# Patient Record
Sex: FEMALE | Race: BLACK OR AFRICAN AMERICAN | Employment: FULL TIME | URBAN - METROPOLITAN AREA
[De-identification: names, ages, dates, MRNs, and addresses within clinical notes are randomized per-mention and may not be internally consistent; named-entity substitution may affect disease eponyms.]

---

## 2017-08-17 ENCOUNTER — HOSPITAL ENCOUNTER (OUTPATIENT)
Dept: LAB | Age: 42
Discharge: HOME | End: 2017-08-17
Payer: COMMERCIAL

## 2017-08-17 ENCOUNTER — OFFICE VISIT (OUTPATIENT)
Dept: OBGYN CLINIC | Age: 42
End: 2017-08-17

## 2017-08-17 VITALS
TEMPERATURE: 98.4 F | WEIGHT: 163.8 LBS | DIASTOLIC BLOOD PRESSURE: 98 MMHG | RESPIRATION RATE: 18 BRPM | HEIGHT: 61 IN | BODY MASS INDEX: 30.93 KG/M2 | SYSTOLIC BLOOD PRESSURE: 154 MMHG

## 2017-08-17 DIAGNOSIS — Z01.419 WELL WOMAN EXAM WITH ROUTINE GYNECOLOGICAL EXAM: Primary | ICD-10-CM

## 2017-08-17 PROCEDURE — 87624 HPV HI-RISK TYP POOLED RSLT: CPT | Performed by: NURSE PRACTITIONER

## 2017-08-17 PROCEDURE — 88175 CYTOPATH C/V AUTO FLUID REDO: CPT | Performed by: NURSE PRACTITIONER

## 2017-08-17 NOTE — PATIENT INSTRUCTIONS
Epidermoid Cyst: Care Instructions  Your Care Instructions  An epidermoid (say \"mq-lri-AML-shayy\") cyst is a lump just under the skin. These cysts can form when a hair follicle becomes blocked. They are common in acne and may occur on the face, neck, back, and genitals. However, they can form anywhere on the body. These cysts are not cancer and do not lead to cancer. They tend not to hurt, but they can sometimes become swollen and painful. They also may break open (rupture) and cause scarring. These cysts sometimes do not cause problems and may not need treatment. If you have a cyst that is swollen and hurts, your doctor may inject it with a medicine to help it heal. But it is more likely that a painful cyst will need to be removed. Your doctor will give you a shot of numbing medicine and cut into the cyst to drain it or remove it. This makes the symptoms go away. Follow-up care is a key part of your treatment and safety. Be sure to make and go to all appointments, and call your doctor if you are having problems. Its also a good idea to know your test results and keep a list of the medicines you take. How can you care for yourself at home? · Do not squeeze the cyst or poke it with a needle to open it. This can cause swelling, redness, and infection. · Always have a doctor look at any new lumps you get to make sure that they are not serious. When should you call for help? Watch closely for changes in your health, and be sure to contact your doctor if:  · You have a fever, redness, or swelling after you get a shot of medicine in the cyst.  · You see or feel a new lump on your skin. Where can you learn more? Go to http://shan-valentina.info/. Enter C875 in the search box to learn more about \"Epidermoid Cyst: Care Instructions. \"  Current as of: October 13, 2016  Content Version: 11.3  © 3847-9204 [employer].  Care instructions adapted under license by [employer] (which disclaims liability or warranty for this information). If you have questions about a medical condition or this instruction, always ask your healthcare professional. [address] any warranty or liability for your use of this information.

## 2017-08-17 NOTE — MR AVS SNAPSHOT
Visit Information Date & Time Provider Department Dept. Phone Encounter #  
 8/17/2017  1:00 PM Randy Hernandez [address] [phone] [id number] Follow-up Instructions Return in about 1 year (around 8/17/2018). Upcoming Health Maintenance Date Due  
 PAP AKA CERVICAL CYTOLOGY 3/23/1995 INFLUENZA AGE 9 TO ADULT 8/1/2017 Allergies as of 8/17/2017  Review Complete On: 8/17/2017 By: Randy Hernandez NP No Known Allergies Current Immunizations  Reviewed on 11/22/2013 Name Date Influenza Vaccine  Deferred (Patient Refused) Pneumococcal Conjugate (PCV-13)  Deferred (Patient Refused) Not reviewed this visit You Were Diagnosed With   
  
 Codes Comments Well woman exam with routine gynecological exam    -  Primary ICD-10-CM: Y84.235 ICD-9-CM: V72.31 Vitals BP Temp Resp Height(growth percentile) Weight(growth percentile) LMP  
 (!) 154/98 (BP 1 Location: Right arm, BP Patient Position: Sitting) 98.4 °F (36.9 °C) (Oral) 18 5' 1\" (1.549 m) 163 lb 12.8 oz (74.3 kg) 08/13/2017 BMI OB Status Smoking Status 30.95 kg/m2 Having regular periods Current Some Day Smoker Vitals History BMI and BSA Data Body Mass Index Body Surface Area 30.95 kg/m 2 1.79 m 2 Preferred Pharmacy Pharmacy Name Phone [facility] [address] [phone] Your Updated Medication List  
  
   
This list is accurate as of: 8/17/17  2:28 PM.  Always use your most recent med list.  
  
  
  
  
 cloNIDine HCl 0.1 mg tablet Commonly known as:  CATAPRES Take  by mouth two (2) times a day. multivitamin, tx-iron-ca-min 9 mg iron-400 mcg Tab tablet Commonly known as:  THERA-M w/ IRON Take 1 Tab by mouth daily. We Performed the Following PAP IG, HPV AND RFX HPV 97/52,88([id number]) [[id number] Custom] Follow-up Instructions Return in about 1 year (around 8/17/2018). To-Do List   
 09/13/2017 Imaging:  SAR[facility] MAMMO BI SCREENING INCL CAD Patient Instructions Epidermoid Cyst: Care Instructions Your Care Instructions An epidermoid (say \"Radha\") cyst is a lump just under the skin. These cysts can form when a hair follicle becomes blocked. They are common in acne and may occur on the face, neck, back, and genitals. However, they can form anywhere on the body. These cysts are not cancer and do not lead to cancer. They tend not to hurt, but they can sometimes become swollen and painful. They also may break open (rupture) and cause scarring. These cysts sometimes do not cause problems and may not need treatment. If you have a cyst that is swollen and hurts, your doctor may inject it with a medicine to help it heal. But it is more likely that a painful cyst will need to be removed. Your doctor will give you a shot of numbing medicine and cut into the cyst to drain it or remove it. This makes the symptoms go away. Follow-up care is a key part of your treatment and safety. Be sure to make and go to all appointments, and call your doctor if you are having problems. Its also a good idea to know your test results and keep a list of the medicines you take. How can you care for yourself at home? · Do not squeeze the cyst or poke it with a needle to open it. This can cause swelling, redness, and infection. · Always have a doctor look at any new lumps you get to make sure that they are not serious. When should you call for help? Watch closely for changes in your health, and be sure to contact your doctor if: 
· You have a fever, redness, or swelling after you get a shot of medicine in the cyst. 
· You see or feel a new lump on your skin. Where can you learn more? Go to http://shan-valentina.info/. Enter S604 in the search box to learn more about \"Epidermoid Cyst: Care Instructions. \" Current as of: October 13, 2016 Content Version: 11.3 © 8813-7399 [employer] Care instructions adapted under license by [employer] (which disclaims liability or warranty for this information). If you have questions about a medical condition or this instruction, always ask your healthcare professional. [address] any warranty or liability for your use of this information. Introducing [facility]! [facility] introduces [employer] patient portal. Now you can access parts of your medical record, email your doctor's office, and request medication refills online. 1. In your internet browser, go to [email or web address] 2. Click on the First Time User? Click Here link in the Sign In box. You will see the New Member Sign Up page. 3. Enter your [employer] Access Code exactly as it appears below. You will not need to use this code after youve completed the sign-up process. If you do not sign up before the expiration date, you must request a new code. · [employer] Access Code: [email or web address] Expires: 11/15/2017  2:28 PM 
 
4. Enter the last four digits of your Social Security Number (xxxx) and Date of Birth (mm/dd/yyyy) as indicated and click Submit. You will be taken to the next sign-up page. 5. Create a [employer] ID. This will be your [employer] login ID and cannot be changed, so think of one that is secure and easy to remember. 6. Create a [employer] password. You can change your password at any time. 7. Enter your Password Reset Question and Answer. This can be used at a later time if you forget your password. 8. Enter your e-mail address. You will receive e-mail notification when new information is available in [address] 9. Click Sign Up. You can now view and download portions of your medical record. 10. Click the Download Summary menu link to download a portable copy of your medical information. If you have questions, please visit the Frequently Asked Questions section of the [employer] website. Remember, [employer] is NOT to be used for urgent needs. For medical emergencies, dial 911. Now available from your iPhone and Android! Please provide this summary of care documentation to your next provider. Your primary care clinician is listed as Geraldine Cerna. If you have any questions after today's visit, please call [phone].

## 2017-08-17 NOTE — PROGRESS NOTES
Chief Complaint   Patient presents with    Well Woman     Pt states has vaginal \"bumps\" for two years. Pt states she had a partial hysterectomy in 2013. Pt states she was told she had cyst on her ovary last year. Pt states she has a cycle every month, something like a regular cycle. Pt complained of having bad back pain two weeks ago.

## 2017-08-17 NOTE — PROGRESS NOTES
NEW PATIENT EXAM  Ada/Post Menopause    SUBJECTIVE: Herbie Edouard is a 36 y.o. female  who presents for well woman exam and to discuss \"bumps\" in the genital area. Pt. Has not had a check up in \"years\" and no longer takes any of her medications, in particular related to her hypertension. She reports that she notice \"bumps\" about 2 years ago particularly with waxing and shaving of the mons pubis. She no longer practices these behaviors however these bumps persist.  They have not gotten bigger, are not tender and do not raise to a head even with \"squeezing and hot compresses. \"  She has a history of \"boils\" in the axilla and wonders if these may be similar. She does have a history of abnormal pap tests however did not go for follow up. She describes a history of ectopic pregnancy which required \"removal of the tube\"--she thinks on the left. She states they did not remove the uterus or ovary. She does not use contraception describes that her cycles are monthly however irregular at times. Patient's last menstrual period was 2017. GYN History   Menopause:  No  Post menopausal bleeding:   NO  Sexually active: YES  Contraception:  none  Sexually transmitted diseases/infections: YES- HSV    Last pap: 3-4 years ago    Last Mammogram: never had      Past Medical History:   Diagnosis Date    Hypertension     Kidney anomaly, congenital     Tubal pregnancy      Past Surgical History:   Procedure Laterality Date    HX GYN  10/07/2013    cyst removed    HX HYSTERECTOMY  10/7/2013    partial hysterectomy     OB History      Para Term  AB Living    4    1 2    SAB TAB Ectopic Molar Multiple Live Births      1           Obstetric Comments     x 2- 1 male, 1 female--cerclage x 2--31 weeks and 32 weeks deliveries.          Family History   Problem Relation Age of Onset    Hypertension Mother     Hypertension Father     Hypertension Sister      Social History     Social History    Marital status:      Spouse name: N/A    Number of children: N/A    Years of education: N/A     Occupational History    Not on file. Social History Main Topics    Smoking status: Current Some Day Smoker     Packs/day: 0.25     Years: 25.00    Smokeless tobacco: Never Used      Comment: 1-3 day    Alcohol use Yes      Comment: occasionally/socially    Drug use: Yes     Special: Marijuana      Comment: stopped in 2013    Sexual activity: Yes     Partners: Male     Other Topics Concern    Not on file     Social History Narrative       Current Outpatient Prescriptions   Medication Sig Dispense Refill    multivitamin, tx-iron-ca-min (THERA-M W/ IRON) 9 mg iron-400 mcg tab tablet Take 1 Tab by mouth daily.  cloNIDine (CATAPRES) 0.1 mg tablet Take  by mouth two (2) times a day. Review of Systems:   Complete review of systems reviewed from social and history data forms. Pertinent positives in HPI. Objective:     Visit Vitals    BP (!) 154/98 (BP 1 Location: Right arm, BP Patient Position: Sitting)    Temp 98.4 °F (36.9 °C) (Oral)    Resp 18    Ht 5' 1\" (1.549 m)    Wt 163 lb 12.8 oz (74.3 kg)    LMP 08/13/2017  Comment: partial hysterecomy    BMI 30.95 kg/m2       General:  alert, cooperative, no distress, appears stated age   Skin:  Normal.   Lymph Nodes:  Cervical, supraclavicular, and axillary nodes normal.   Breast Exam: normal appearance, no masses or tenderness    Lungs:  clear to auscultation bilaterally   Heart:  regular rate and rhythm, S1, S2 normal, no murmur, click, rub or gallop   Abdomen: soft, non-tender. No masses,  no organomegaly   Back:  Costovertebral angle tenderness absent   Genitourinary: BUS normal. Introitus normal. Normal appearing vaginal epithelium, Vaginal discharge described as normal and physiologic.,  Normal cervix without lesions or tenderness, Uterus normal size anteverted. NT., Adnexa normal in size left and right without tenderness.    Extremities: extremities normal, atraumatic, no cyanosis or edema     Neurologic:  negative   Psychiatric:  non focal     ASSESSMENT:      ICD-10-CM ICD-9-CM    1. Well woman exam with routine gynecological exam Z01.419 V72.31 PAP IG, HPV AND RFX HPV 80/64,96([id number])      SARAH MAMMO BI SCREENING INCL CAD      NUSWAB VAGINITIS   2. Perimenopausal  3. Hydranitis supporativa  4. Epidermal cyst- left and right vulva    Plan:  Keep menstrual calendar. Advised to use contraception--foam and condoms. Pap taken. NuSwab taken. Mammogram ordered. Advised use astringent and antibacterial soap. Discourage \"squeezing\" boils. Discourage shaving in the genital region. Encouraged smoking cessation. See PCP regarding BP management--names of PCP's given. RTO 1 year. Pt. Voices understanding of treatment plan. Follow-up Disposition:  Return in about 1 year (around 8/17/2018).     Amanda Richter, RODOLFO

## 2017-08-21 ENCOUNTER — TELEPHONE (OUTPATIENT)
Dept: OBGYN CLINIC | Age: 42
End: 2017-08-21

## 2017-08-21 LAB
A VAGINAE DNA VAG QL NAA+PROBE: ABNORMAL SCORE
BVAB2 DNA VAG QL NAA+PROBE: ABNORMAL SCORE
C ALBICANS DNA VAG QL NAA+PROBE: NEGATIVE
C GLABRATA DNA VAG QL NAA+PROBE: NEGATIVE
MEGA1 DNA VAG QL NAA+PROBE: ABNORMAL SCORE
T VAGINALIS RRNA SPEC QL NAA+PROBE: NEGATIVE

## 2017-08-21 RX ORDER — METRONIDAZOLE 500 MG/1
500 TABLET ORAL 2 TIMES DAILY
Qty: 14 TAB | Refills: 0 | Status: SHIPPED | OUTPATIENT
Start: 2017-08-21 | End: 2017-08-28

## 2018-04-22 ENCOUNTER — HOSPITAL ENCOUNTER (EMERGENCY)
Age: 43
Discharge: HOME | End: 2018-04-22
Attending: EMERGENCY MEDICINE
Payer: COMMERCIAL

## 2018-04-22 VITALS
RESPIRATION RATE: 14 BRPM | BODY MASS INDEX: 31.01 KG/M2 | SYSTOLIC BLOOD PRESSURE: 208 MMHG | WEIGHT: 164.24 LBS | TEMPERATURE: 97.8 F | HEIGHT: 61 IN | HEART RATE: 72 BPM | OXYGEN SATURATION: 100 % | DIASTOLIC BLOOD PRESSURE: 120 MMHG

## 2018-04-22 DIAGNOSIS — I10 ESSENTIAL HYPERTENSION: ICD-10-CM

## 2018-04-22 DIAGNOSIS — K52.9 GASTROENTERITIS, ACUTE: Primary | ICD-10-CM

## 2018-04-22 LAB
ALBUMIN SERPL-MCNC: 4.1 G/DL (ref 3.5–5)
ALBUMIN/GLOB SERPL: 0.8 {RATIO} (ref 1.1–2.2)
ALP SERPL-CCNC: 90 U/L (ref 45–117)
ALT SERPL-CCNC: 21 U/L (ref 12–78)
ANION GAP SERPL CALC-SCNC: 12 MMOL/L (ref 5–15)
APPEARANCE UR: CLEAR
AST SERPL-CCNC: 17 U/L (ref 15–37)
BACTERIA URNS QL MICRO: NEGATIVE /HPF
BASOPHILS # BLD: 0.1 K/UL (ref 0–0.1)
BASOPHILS NFR BLD: 1 % (ref 0–1)
BILIRUB SERPL-MCNC: 0.3 MG/DL (ref 0.2–1)
BILIRUB UR QL: NEGATIVE
BUN SERPL-MCNC: 11 MG/DL (ref 6–20)
BUN/CREAT SERPL: 13 (ref 12–20)
CALCIUM SERPL-MCNC: 9.6 MG/DL (ref 8.5–10.1)
CHLORIDE SERPL-SCNC: 106 MMOL/L (ref 97–108)
CK MB CFR SERPL CALC: NORMAL % (ref 0–2.5)
CK MB SERPL-MCNC: <1 NG/ML (ref 5–25)
CK SERPL-CCNC: 102 U/L (ref 26–192)
CO2 SERPL-SCNC: 18 MMOL/L (ref 21–32)
COLOR UR: ABNORMAL
CREAT SERPL-MCNC: 0.87 MG/DL (ref 0.55–1.02)
DIFFERENTIAL METHOD BLD: ABNORMAL
EOSINOPHIL # BLD: 0.1 K/UL (ref 0–0.4)
EOSINOPHIL NFR BLD: 0 % (ref 0–7)
EPITH CASTS URNS QL MICRO: ABNORMAL /LPF
ERYTHROCYTE [DISTWIDTH] IN BLOOD BY AUTOMATED COUNT: 13.6 % (ref 11.5–14.5)
GLOBULIN SER CALC-MCNC: 4.9 G/DL (ref 2–4)
GLUCOSE SERPL-MCNC: 110 MG/DL (ref 65–100)
GLUCOSE UR STRIP.AUTO-MCNC: NEGATIVE MG/DL
HCT VFR BLD AUTO: 41.3 % (ref 35–47)
HGB BLD-MCNC: 14.8 G/DL (ref 11.5–16)
HGB UR QL STRIP: ABNORMAL
HYALINE CASTS URNS QL MICRO: ABNORMAL /LPF (ref 0–5)
IMM GRANULOCYTES # BLD: 0 K/UL (ref 0–0.04)
IMM GRANULOCYTES NFR BLD AUTO: 0 % (ref 0–0.5)
KETONES UR QL STRIP.AUTO: ABNORMAL MG/DL
LEUKOCYTE ESTERASE UR QL STRIP.AUTO: NEGATIVE
LIPASE SERPL-CCNC: 177 U/L (ref 73–393)
LYMPHOCYTES # BLD: 2.7 K/UL (ref 0.8–3.5)
LYMPHOCYTES NFR BLD: 23 % (ref 12–49)
MCH RBC QN AUTO: 32.2 PG (ref 26–34)
MCHC RBC AUTO-ENTMCNC: 35.8 G/DL (ref 30–36.5)
MCV RBC AUTO: 89.8 FL (ref 80–99)
MONOCYTES # BLD: 0.8 K/UL (ref 0–1)
MONOCYTES NFR BLD: 7 % (ref 5–13)
NEUTS SEG # BLD: 8 K/UL (ref 1.8–8)
NEUTS SEG NFR BLD: 69 % (ref 32–75)
NITRITE UR QL STRIP.AUTO: NEGATIVE
NRBC # BLD: 0 K/UL (ref 0–0.01)
NRBC BLD-RTO: 0 PER 100 WBC
PH UR STRIP: 7 [PH] (ref 5–8)
PLATELET # BLD AUTO: 365 K/UL (ref 150–400)
PMV BLD AUTO: 8.9 FL (ref 8.9–12.9)
POTASSIUM SERPL-SCNC: 3.4 MMOL/L (ref 3.5–5.1)
PROT SERPL-MCNC: 9 G/DL (ref 6.4–8.2)
PROT UR STRIP-MCNC: 300 MG/DL
RBC # BLD AUTO: 4.6 M/UL (ref 3.8–5.2)
RBC #/AREA URNS HPF: ABNORMAL /HPF (ref 0–5)
SODIUM SERPL-SCNC: 136 MMOL/L (ref 136–145)
SP GR UR REFRACTOMETRY: 1.01 (ref 1–1.03)
TROPONIN I SERPL-MCNC: <0.04 NG/ML
UA: UC IF INDICATED,UAUC: ABNORMAL
UROBILINOGEN UR QL STRIP.AUTO: 0.2 EU/DL (ref 0.2–1)
WBC # BLD AUTO: 11.7 K/UL (ref 3.6–11)
WBC URNS QL MICRO: ABNORMAL /HPF (ref 0–4)

## 2018-04-22 PROCEDURE — 96374 THER/PROPH/DIAG INJ IV PUSH: CPT

## 2018-04-22 PROCEDURE — 74011250637 HC RX REV CODE- 250/637: Performed by: EMERGENCY MEDICINE

## 2018-04-22 PROCEDURE — 93005 ELECTROCARDIOGRAM TRACING: CPT

## 2018-04-22 PROCEDURE — 82550 ASSAY OF CK (CPK): CPT | Performed by: PHYSICIAN ASSISTANT

## 2018-04-22 PROCEDURE — 96375 TX/PRO/DX INJ NEW DRUG ADDON: CPT

## 2018-04-22 PROCEDURE — 81001 URINALYSIS AUTO W/SCOPE: CPT | Performed by: PHYSICIAN ASSISTANT

## 2018-04-22 PROCEDURE — 74011250636 HC RX REV CODE- 250/636: Performed by: PHYSICIAN ASSISTANT

## 2018-04-22 PROCEDURE — 80053 COMPREHEN METABOLIC PANEL: CPT | Performed by: PHYSICIAN ASSISTANT

## 2018-04-22 PROCEDURE — 83690 ASSAY OF LIPASE: CPT | Performed by: PHYSICIAN ASSISTANT

## 2018-04-22 PROCEDURE — 99284 EMERGENCY DEPT VISIT MOD MDM: CPT

## 2018-04-22 PROCEDURE — 36415 COLL VENOUS BLD VENIPUNCTURE: CPT | Performed by: PHYSICIAN ASSISTANT

## 2018-04-22 PROCEDURE — 85025 COMPLETE CBC W/AUTO DIFF WBC: CPT | Performed by: PHYSICIAN ASSISTANT

## 2018-04-22 PROCEDURE — 74011250637 HC RX REV CODE- 250/637: Performed by: PHYSICIAN ASSISTANT

## 2018-04-22 PROCEDURE — 96361 HYDRATE IV INFUSION ADD-ON: CPT

## 2018-04-22 PROCEDURE — 84484 ASSAY OF TROPONIN QUANT: CPT | Performed by: PHYSICIAN ASSISTANT

## 2018-04-22 RX ORDER — CLONIDINE HYDROCHLORIDE 0.1 MG/1
0.1 TABLET ORAL
Status: COMPLETED | OUTPATIENT
Start: 2018-04-22 | End: 2018-04-22

## 2018-04-22 RX ORDER — AMLODIPINE BESYLATE 5 MG/1
10 TABLET ORAL
Status: COMPLETED | OUTPATIENT
Start: 2018-04-22 | End: 2018-04-22

## 2018-04-22 RX ORDER — CLONIDINE HYDROCHLORIDE 0.1 MG/1
0.1 TABLET ORAL 2 TIMES DAILY
Qty: 30 TAB | Refills: 0 | Status: SHIPPED | OUTPATIENT
Start: 2018-04-22 | End: 2018-05-08

## 2018-04-22 RX ORDER — KETOROLAC TROMETHAMINE 30 MG/ML
30 INJECTION, SOLUTION INTRAMUSCULAR; INTRAVENOUS
Status: COMPLETED | OUTPATIENT
Start: 2018-04-22 | End: 2018-04-22

## 2018-04-22 RX ORDER — PROCHLORPERAZINE EDISYLATE 5 MG/ML
10 INJECTION INTRAMUSCULAR; INTRAVENOUS
Status: COMPLETED | OUTPATIENT
Start: 2018-04-22 | End: 2018-04-22

## 2018-04-22 RX ORDER — ONDANSETRON 4 MG/1
4 TABLET, ORALLY DISINTEGRATING ORAL
Status: COMPLETED | OUTPATIENT
Start: 2018-04-22 | End: 2018-04-22

## 2018-04-22 RX ORDER — ONDANSETRON 4 MG/1
4 TABLET, ORALLY DISINTEGRATING ORAL
Qty: 10 TAB | Refills: 0 | Status: SHIPPED | OUTPATIENT
Start: 2018-04-22 | End: 2018-05-08

## 2018-04-22 RX ORDER — MORPHINE SULFATE 4 MG/ML
2 INJECTION INTRAVENOUS
Status: COMPLETED | OUTPATIENT
Start: 2018-04-22 | End: 2018-04-22

## 2018-04-22 RX ADMIN — SODIUM CHLORIDE 1000 ML: 900 INJECTION, SOLUTION INTRAVENOUS at 17:55

## 2018-04-22 RX ADMIN — KETOROLAC TROMETHAMINE 30 MG: 30 INJECTION, SOLUTION INTRAMUSCULAR at 17:08

## 2018-04-22 RX ADMIN — CLONIDINE HYDROCHLORIDE 0.1 MG: 0.1 TABLET ORAL at 19:46

## 2018-04-22 RX ADMIN — ONDANSETRON 4 MG: 4 TABLET, ORALLY DISINTEGRATING ORAL at 17:08

## 2018-04-22 RX ADMIN — CLONIDINE HYDROCHLORIDE 0.1 MG: 0.1 TABLET ORAL at 19:28

## 2018-04-22 RX ADMIN — MORPHINE SULFATE 2 MG: 4 INJECTION INTRAVENOUS at 19:22

## 2018-04-22 RX ADMIN — AMLODIPINE BESYLATE 10 MG: 5 TABLET ORAL at 19:46

## 2018-04-22 RX ADMIN — PROCHLORPERAZINE EDISYLATE 10 MG: 5 INJECTION INTRAMUSCULAR; INTRAVENOUS at 17:55

## 2018-04-22 RX ADMIN — ONDANSETRON 4 MG: 4 TABLET, ORALLY DISINTEGRATING ORAL at 15:48

## 2018-04-22 NOTE — DISCHARGE INSTRUCTIONS
Gastroenteritis: Care Instructions  Your Care Instructions    Gastroenteritis is an illness that may cause nausea, vomiting, and diarrhea. It is sometimes called \"stomach flu. \" It can be caused by bacteria or a virus. You will probably begin to feel better in 1 to 2 days. In the meantime, get plenty of rest and make sure you do not become dehydrated. Dehydration occurs when your body loses too much fluid. Follow-up care is a key part of your treatment and safety. Be sure to make and go to all appointments, and call your doctor if you are having problems. It's also a good idea to know your test results and keep a list of the medicines you take. How can you care for yourself at home? · If your doctor prescribed antibiotics, take them as directed. Do not stop taking them just because you feel better. You need to take the full course of antibiotics. · Drink plenty of fluids to prevent dehydration, enough so that your urine is light yellow or clear like water. Choose water and other caffeine-free clear liquids until you feel better. If you have kidney, heart, or liver disease and have to limit fluids, talk with your doctor before you increase your fluid intake. · Drink fluids slowly, in frequent, small amounts, because drinking too much too fast can cause vomiting. · Begin eating mild foods, such as dry toast, yogurt, applesauce, bananas, and rice. Avoid spicy, hot, or high-fat foods, and do not drink alcohol or caffeine for a day or two. Do not drink milk or eat ice cream until you are feeling better. How to prevent gastroenteritis  · Keep hot foods hot and cold foods cold. · Do not eat meats, dressings, salads, or other foods that have been kept at room temperature for more than 2 hours. · Use a thermometer to check your refrigerator. It should be between 34°F and 40°F.  · Defrost meats in the refrigerator or microwave, not on the kitchen counter. · Keep your hands and your kitchen clean.  Wash your hands, cutting boards, and countertops with hot soapy water frequently. · Cook meat until it is well done. · Do not eat raw eggs or uncooked sauces made with raw eggs. · Do not take chances. If food looks or tastes spoiled, throw it out. When should you call for help? Call 911 anytime you think you may need emergency care. For example, call if:  ? · You vomit blood or what looks like coffee grounds. ? · You passed out (lost consciousness). ? · You pass maroon or very bloody stools. ?Call your doctor now or seek immediate medical care if:  ? · You have severe belly pain. ? · You have signs of needing more fluids. You have sunken eyes, a dry mouth, and pass only a little dark urine. ? · You feel like you are going to faint. ? · You have increased belly pain that does not go away in 1 to 2 days. ? · You have new or increased nausea, or you are vomiting. ? · You have a new or higher fever. ? · Your stools are black and tarlike or have streaks of blood. ? Watch closely for changes in your health, and be sure to contact your doctor if:  ? · You are dizzy or lightheaded. ? · You urinate less than usual, or your urine is dark yellow or brown. ? · You do not feel better with each day that goes by. Where can you learn more? Go to http://shan-valentina.info/. Enter N142 in the search box to learn more about \"Gastroenteritis: Care Instructions. \"  Current as of: March 3, 2017  Content Version: 11.4  © 7781-0872 [employer]. Care instructions adapted under license by [employer] (which disclaims liability or warranty for this information). If you have questions about a medical condition or this instruction, always ask your healthcare professional. [address] any warranty or liability for your use of this information.        High Blood Pressure: Care Instructions  Your Care Instructions    If your blood pressure is usually above 140/90, you have high blood pressure, or hypertension. That means the top number is 140 or higher or the bottom number is 90 or higher, or both. Despite what a lot of people think, high blood pressure usually doesn't cause headaches or make you feel dizzy or lightheaded. It usually has no symptoms. But it does increase your risk for heart attack, stroke, and kidney or eye damage. The higher your blood pressure, the more your risk increases. Your doctor will give you a goal for your blood pressure. Your goal will be based on your health and your age. An example of a goal is to keep your blood pressure below 140/90. Lifestyle changes, such as eating healthy and being active, are always important to help lower blood pressure. You might also take medicine to reach your blood pressure goal.  Follow-up care is a key part of your treatment and safety. Be sure to make and go to all appointments, and call your doctor if you are having problems. It's also a good idea to know your test results and keep a list of the medicines you take. How can you care for yourself at home? Medical treatment  · If you stop taking your medicine, your blood pressure will go back up. You may take one or more types of medicine to lower your blood pressure. Be safe with medicines. Take your medicine exactly as prescribed. Call your doctor if you think you are having a problem with your medicine. · Talk to your doctor before you start taking aspirin every day. Aspirin can help certain people lower their risk of a heart attack or stroke. But taking aspirin isn't right for everyone, because it can cause serious bleeding. · See your doctor regularly. You may need to see the doctor more often at first or until your blood pressure comes down. · If you are taking blood pressure medicine, talk to your doctor before you take decongestants or anti-inflammatory medicine, such as ibuprofen. Some of these medicines can raise blood pressure.   · Learn how to check your blood pressure at home. Lifestyle changes  · Stay at a healthy weight. This is especially important if you put on weight around the waist. Losing even 10 pounds can help you lower your blood pressure. · If your doctor recommends it, get more exercise. Walking is a good choice. Bit by bit, increase the amount you walk every day. Try for at least 30 minutes on most days of the week. You also may want to swim, bike, or do other activities. · Avoid or limit alcohol. Talk to your doctor about whether you can drink any alcohol. · Try to limit how much sodium you eat to less than 2,300 milligrams (mg) a day. Your doctor may ask you to try to eat less than 1,500 mg a day. · Eat plenty of fruits (such as bananas and oranges), vegetables, legumes, whole grains, and low-fat dairy products. · Lower the amount of saturated fat in your diet. Saturated fat is found in animal products such as milk, cheese, and meat. Limiting these foods may help you lose weight and also lower your risk for heart disease. · Do not smoke. Smoking increases your risk for heart attack and stroke. If you need help quitting, talk to your doctor about stop-smoking programs and medicines. These can increase your chances of quitting for good. When should you call for help? Call 911 anytime you think you may need emergency care. This may mean having symptoms that suggest that your blood pressure is causing a serious heart or blood vessel problem. Your blood pressure may be over 180/110. ? For example, call 911 if:  ? · You have symptoms of a heart attack. These may include:  ¨ Chest pain or pressure, or a strange feeling in the chest.  ¨ Sweating. ¨ Shortness of breath. ¨ Nausea or vomiting. ¨ Pain, pressure, or a strange feeling in the back, neck, jaw, or upper belly or in one or both shoulders or arms. ¨ Lightheadedness or sudden weakness. ¨ A fast or irregular heartbeat. ? · You have symptoms of a stroke.  These may include:  ¨ Sudden numbness, tingling, weakness, or loss of movement in your face, arm, or leg, especially on only one side of your body. ¨ Sudden vision changes. ¨ Sudden trouble speaking. ¨ Sudden confusion or trouble understanding simple statements. ¨ Sudden problems with walking or balance. ¨ A sudden, severe headache that is different from past headaches. ? · You have severe back or belly pain. ?Do not wait until your blood pressure comes down on its own. Get help right away. ?Call your doctor now or seek immediate care if:  ? · Your blood pressure is much higher than normal (such as 180/110 or higher), but you don't have symptoms. ? · You think high blood pressure is causing symptoms, such as:  ¨ Severe headache. ¨ Blurry vision. ? Watch closely for changes in your health, and be sure to contact your doctor if:  ? · Your blood pressure measures 140/90 or higher at least 2 times. That means the top number is 140 or higher or the bottom number is 90 or higher, or both. ? · You think you may be having side effects from your blood pressure medicine. ? · Your blood pressure is usually normal, but it goes above normal at least 2 times. Where can you learn more? Go to http://shan-valentina.info/. Enter J956 in the search box to learn more about \"High Blood Pressure: Care Instructions. \"  Current as of: September 21, 2016  Content Version: 11.4  © 6867-0396 [employer]. Care instructions adapted under license by [employer] (which disclaims liability or warranty for this information). If you have questions about a medical condition or this instruction, always ask your healthcare professional. [place] [address] any warranty or liability for your use of this information.

## 2018-04-22 NOTE — ED TRIAGE NOTES
Assumed care of pt from [facility]. Pt is A&Ox 4. Pt reports CC of abdominal pain and back pain with N/V/D. Pt denies SOB/ CP. Pt reports she has a Hx of hypertension. Rechecked BP and still elevated. Pt resting on stretcher in POC.

## 2018-04-22 NOTE — ED PROVIDER NOTES
EMERGENCY DEPARTMENT HISTORY AND PHYSICAL EXAM      Date: 4/22/2018  Patient Name: Fred Lay have seen and evaluated this patient in the Express Care portion of triage for NVD since 4:00 AM.  The patients care will begin now and orders have been placed. This patient will be seen and provided further care in the Emergency Room. Written by Travis Crespo, a scribe for PA-C Kathyrn Schirmer.    History of Presenting Illness     Chief Complaint   Patient presents with    Vomiting     Pt. started vomiting this morning. History Provided By: Patient    HPI: Stefano Parson, 39 y.o. female with PMHx significant for HTN, presents ambulatory to the ED with cc of intermittent episodes of mild to moderate NVD with associated diaphoresis and diffuse ABD pain since 4:00 AM. Pt reports ~ 15 episodes of each. She states she took Advil without relief. She denies eating any suspicious foods. Pt specifically denies any cough, congestion, CP, SOB, or urinary symptoms. PCP: Jarrell Palafox MD    There are no other complaints, changes, or physical findings at this time. Current Outpatient Prescriptions   Medication Sig Dispense Refill    ondansetron (ZOFRAN ODT) 4 mg disintegrating tablet Take 1 Tab by mouth every eight (8) hours as needed for Nausea. 10 Tab 0    cloNIDine HCl (CATAPRES) 0.1 mg tablet Take 1 Tab by mouth two (2) times a day. 30 Tab 0    multivitamin, tx-iron-ca-min (THERA-M W/ IRON) 9 mg iron-400 mcg tab tablet Take 1 Tab by mouth daily.          Past History     Past Medical History:  Past Medical History:   Diagnosis Date    Hypertension     Kidney anomaly, congenital     Tubal pregnancy        Past Surgical History:  Past Surgical History:   Procedure Laterality Date    HX GYN  10/07/2013    cyst removed    HX HYSTERECTOMY  10/7/2013    partial hysterectomy       Family History:  Family History   Problem Relation Age of Onset    Hypertension Mother     Hypertension Father     Hypertension Sister        Social History:  Social History   Substance Use Topics    Smoking status: Current Some Day Smoker     Packs/day: 0.25     Years: 25.00    Smokeless tobacco: Never Used      Comment: 1-3 day    Alcohol use Yes      Comment: occasionally/socially       Allergies:  No Known Allergies      Review of Systems   Review of Systems   Constitutional: Positive for diaphoresis. HENT: Negative for congestion. Respiratory: Negative for cough and shortness of breath. Cardiovascular: Negative for chest pain. Gastrointestinal: Positive for abdominal pain, diarrhea, nausea and vomiting. Genitourinary: Negative for dysuria, frequency and hematuria. All other systems reviewed and are negative. Physical Exam   Physical Exam   Constitutional: She is oriented to person, place, and time. She appears well-developed and well-nourished. No distress. 40 yo -American female   HENT:   Head: Normocephalic and atraumatic. Eyes: Conjunctivae are normal. Right eye exhibits no discharge. Left eye exhibits no discharge. Neck: Normal range of motion. Neck supple. Cardiovascular: Normal rate, regular rhythm and normal heart sounds. No murmur heard. Pulmonary/Chest: Effort normal and breath sounds normal. No respiratory distress. She has no wheezes. Abdominal: Soft. Normal appearance and bowel sounds are normal. She exhibits no distension. There is generalized tenderness. There is no rebound and no guarding. Lymphadenopathy:     She has no cervical adenopathy. Neurological: She is alert and oriented to person, place, and time. Skin: Skin is warm and dry. She is not diaphoretic. Psychiatric: She has a normal mood and affect. Her behavior is normal.   Nursing note and vitals reviewed.     Diagnostic Study Results     Labs -     Recent Results (from the past 12 hour(s))   CBC WITH AUTOMATED DIFF    Collection Time: 04/22/18  4:47 PM   Result Value Ref Range    WBC 11.7 (H) 3.6 - 11.0 K/uL    RBC 4.60 3.80 - 5.20 M/uL    HGB 14.8 11.5 - 16.0 g/dL    HCT 41.3 35.0 - 47.0 %    MCV 89.8 80.0 - 99.0 FL    MCH 32.2 26.0 - 34.0 PG    MCHC 35.8 30.0 - 36.5 g/dL    RDW 13.6 11.5 - 14.5 %    PLATELET 770 096 - 757 K/uL    MPV 8.9 8.9 - 12.9 FL    NRBC 0.0 0  WBC    ABSOLUTE NRBC 0.00 0.00 - 0.01 K/uL    NEUTROPHILS 69 32 - 75 %    LYMPHOCYTES 23 12 - 49 %    MONOCYTES 7 5 - 13 %    EOSINOPHILS 0 0 - 7 %    BASOPHILS 1 0 - 1 %    IMMATURE GRANULOCYTES 0 0.0 - 0.5 %    ABS. NEUTROPHILS 8.0 1.8 - 8.0 K/UL    ABS. LYMPHOCYTES 2.7 0.8 - 3.5 K/UL    ABS. MONOCYTES 0.8 0.0 - 1.0 K/UL    ABS. EOSINOPHILS 0.1 0.0 - 0.4 K/UL    ABS. BASOPHILS 0.1 0.0 - 0.1 K/UL    ABS. IMM. GRANS. 0.0 0.00 - 0.04 K/UL    DF AUTOMATED     METABOLIC PANEL, COMPREHENSIVE    Collection Time: 04/22/18  4:47 PM   Result Value Ref Range    Sodium 136 136 - 145 mmol/L    Potassium 3.4 (L) 3.5 - 5.1 mmol/L    Chloride 106 97 - 108 mmol/L    CO2 18 (L) 21 - 32 mmol/L    Anion gap 12 5 - 15 mmol/L    Glucose 110 (H) 65 - 100 mg/dL    BUN 11 6 - 20 MG/DL    Creatinine 0.87 0.55 - 1.02 MG/DL    BUN/Creatinine ratio 13 12 - 20      GFR est AA >60 >60 ml/min/1.73m2    GFR est non-AA >60 >60 ml/min/1.73m2    Calcium 9.6 8.5 - 10.1 MG/DL    Bilirubin, total 0.3 0.2 - 1.0 MG/DL    ALT (SGPT) 21 12 - 78 U/L    AST (SGOT) 17 15 - 37 U/L    Alk.  phosphatase 90 45 - 117 U/L    Protein, total 9.0 (H) 6.4 - 8.2 g/dL    Albumin 4.1 3.5 - 5.0 g/dL    Globulin 4.9 (H) 2.0 - 4.0 g/dL    A-G Ratio 0.8 (L) 1.1 - 2.2     LIPASE    Collection Time: 04/22/18  4:47 PM   Result Value Ref Range    Lipase 177 73 - 393 U/L   TROPONIN I    Collection Time: 04/22/18  4:47 PM   Result Value Ref Range    Troponin-I, Qt. <0.04 <0.05 ng/mL   CK W/ CKMB & INDEX    Collection Time: 04/22/18  4:47 PM   Result Value Ref Range     26 - 192 U/L    CK - MB <1.0 <3.6 NG/ML    CK-MB Index Cannot be calculated 0 - 2.5     EKG, 12 LEAD, INITIAL    Collection Time: 04/22/18  5:14 PM   Result Value Ref Range    Ventricular Rate 86 BPM    Atrial Rate 86 BPM    P-R Interval 142 ms    QRS Duration 82 ms    Q-T Interval 396 ms    QTC Calculation (Bezet) 473 ms    Calculated P Axis 61 degrees    Calculated R Axis 82 degrees    Calculated T Axis 49 degrees    Diagnosis       Normal sinus rhythm  Possible Left atrial enlargement  Borderline ECG  When compared with ECG of 03-MAY-2016 16:55,  No significant change was found     URINALYSIS W/ REFLEX CULTURE    Collection Time: 04/22/18  6:51 PM   Result Value Ref Range    Color YELLOW/STRAW      Appearance CLEAR CLEAR      Specific gravity 1.014 1.003 - 1.030      pH (UA) 7.0 5.0 - 8.0      Protein 300 (A) NEG mg/dL    Glucose NEGATIVE  NEG mg/dL    Ketone TRACE (A) NEG mg/dL    Bilirubin NEGATIVE  NEG      Blood SMALL (A) NEG      Urobilinogen 0.2 0.2 - 1.0 EU/dL    Nitrites NEGATIVE  NEG      Leukocyte Esterase NEGATIVE  NEG      WBC 0-4 0 - 4 /hpf    RBC 0-5 0 - 5 /hpf    Epithelial cells FEW FEW /lpf    Bacteria NEGATIVE  NEG /hpf    UA:UC IF INDICATED CULTURE NOT INDICATED BY UA RESULT CNI      Hyaline cast 0-2 0 - 5 /lpf     Medical Decision Making   I am the first provider for this patient. I reviewed the vital signs, available nursing notes, past medical history, past surgical history, family history and social history. Vital Signs-Reviewed the patient's vital signs.   Patient Vitals for the past 12 hrs:   Temp Pulse Resp BP SpO2   04/22/18 2015 - - - (!) 208/120 -   04/22/18 1928 - 72 - - -   04/22/18 1926 - - - (!) 240/140 -   04/22/18 1710 97.8 °F (36.6 °C) - - (!) 250/142 -   04/22/18 1540 98.1 °F (36.7 °C) 95 14 (!) 184/161 100 %       Pulse Oximetry Analysis - 100% on RA    Cardiac Monitor:   Rate: 90 bpm    Records Reviewed: Nursing Notes, Old Medical Records, Previous Radiology Studies and Previous Laboratory Studies    Provider Notes (Medical Decision Making):   DDx: Gastroenteritis, colitis, food poisoning, dehydration, electrolyte abnormality     ED Course:   Initial assessment performed. The patients presenting problems have been discussed, and they are in agreement with the care plan formulated and outlined with them. I have encouraged them to ask questions as they arise throughout their visit. 5:40 PM  Spoke with Harleen Sorenson M.D. Agrees with care plan. Written by YUN Sherman, as dictated by Natalie Brock.    5:52 PM  Pt reevaluated. Pt has had multiple episodes of vomiting in ED. Compazine ordered but not given. States she is not currently prescribed HTN medications but she has taken Clonidine in the past BID (unsure of dose). She states her PCP took her off because her BP was controlled. Will order dose of Clonidine. Written by YUN Sherman, as dictated by Natalie Brock.    7:06 PM  Pt reevaluated. States she still has ABD pain but it is much improved. Written by YUN Sherman, as dictated by Natalie Brock.    8:55 PM  Pt reevaluated. No new episodes of emesis. States she is feeling improved and ready to be discharged. Written by YUN Sherman, as dictated by CHUYITA Houston Time:   0    Disposition:  DISCHARGE NOTE  9:07 PM  The patient has been re-evaluated and is ready for discharge. Reviewed available results with patient. Counseled pt on diagnosis and care plan. Pt has expressed understanding, and all questions have been answered. Pt agrees with plan and agrees to follow up as recommended, or return to the ED if their symptoms worsen. Discharge instructions have been provided and explained to the pt, along with reasons to return to the ED. PLAN:  1. Current Discharge Medication List      START taking these medications    Details   ondansetron (ZOFRAN ODT) 4 mg disintegrating tablet Take 1 Tab by mouth every eight (8) hours as needed for Nausea.   Qty: 10 Tab, Refills: 0      cloNIDine HCl (CATAPRES) 0.1 mg tablet Take 1 Tab by mouth two (2) times a day. Qty: 30 Tab, Refills: 0         CONTINUE these medications which have NOT CHANGED    Details   multivitamin, tx-iron-ca-min (THERA-M W/ IRON) 9 mg iron-400 mcg tab tablet Take 1 Tab by mouth daily. 2.   Follow-up Information     Follow up With Details Comments Torie Foster MD In 2 days        3. Eat a bland diet; drink plenty of fluids  Return to ED if worse     Diagnosis     Clinical Impression:   1. Gastroenteritis, acute    2. Essential hypertension        Attestations: This note is prepared by Jason Rojas, acting as Scribe for [facility]: The scribe's documentation has been prepared under my direction and personally reviewed by me in its entirety. I confirm that the note above accurately reflects all work, treatment, procedures, and medical decision making performed by me.

## 2018-04-23 LAB
ATRIAL RATE: 86 BPM
CALCULATED P AXIS, ECG09: 61 DEGREES
CALCULATED R AXIS, ECG10: 82 DEGREES
CALCULATED T AXIS, ECG11: 49 DEGREES
DIAGNOSIS, 93000: NORMAL
P-R INTERVAL, ECG05: 142 MS
Q-T INTERVAL, ECG07: 396 MS
QRS DURATION, ECG06: 82 MS
QTC CALCULATION (BEZET), ECG08: 473 MS
VENTRICULAR RATE, ECG03: 86 BPM

## 2018-05-08 ENCOUNTER — OFFICE VISIT (OUTPATIENT)
Dept: INTERNAL MEDICINE CLINIC | Age: 43
End: 2018-05-08

## 2018-05-08 VITALS
DIASTOLIC BLOOD PRESSURE: 113 MMHG | SYSTOLIC BLOOD PRESSURE: 185 MMHG | HEIGHT: 61 IN | TEMPERATURE: 98 F | RESPIRATION RATE: 18 BRPM | BODY MASS INDEX: 31.47 KG/M2 | WEIGHT: 166.7 LBS | HEART RATE: 96 BPM

## 2018-05-08 DIAGNOSIS — Z00.00 WELL ADULT EXAM: Primary | ICD-10-CM

## 2018-05-08 DIAGNOSIS — I10 HYPERTENSION, UNCONTROLLED: ICD-10-CM

## 2018-05-08 DIAGNOSIS — Q61.3 POLYCYSTIC KIDNEY DISEASE: ICD-10-CM

## 2018-05-08 DIAGNOSIS — L73.2 HIDRADENITIS SUPPURATIVA: ICD-10-CM

## 2018-05-08 DIAGNOSIS — Z12.39 BREAST CANCER SCREENING: ICD-10-CM

## 2018-05-08 RX ORDER — AMLODIPINE BESYLATE 5 MG/1
5 TABLET ORAL DAILY
Qty: 60 TAB | Refills: 3 | Status: SHIPPED | OUTPATIENT
Start: 2018-05-08 | End: 2018-09-09 | Stop reason: DRUGHIGH

## 2018-05-08 RX ORDER — CHLORTHALIDONE 25 MG/1
25 TABLET ORAL DAILY
Qty: 60 TAB | Refills: 3 | Status: SHIPPED | OUTPATIENT
Start: 2018-05-08 | End: 2018-09-09 | Stop reason: DRUGHIGH

## 2018-05-08 NOTE — PATIENT INSTRUCTIONS
Low Sodium Diet (2,000 Milligram): Care Instructions  Your Care Instructions    Too much sodium causes your body to hold on to extra water. This can raise your blood pressure and force your heart and kidneys to work harder. In very serious cases, this could cause you to be put in the hospital. It might even be life-threatening. By limiting sodium, you will feel better and lower your risk of serious problems. The most common source of sodium is salt. People get most of the salt in their diet from canned, prepared, and packaged foods. Fast food and restaurant meals also are very high in sodium. Your doctor will probably limit your sodium to less than 2,000 milligrams (mg) a day. This limit counts all the sodium in prepared and packaged foods and any salt you add to your food. Follow-up care is a key part of your treatment and safety. Be sure to make and go to all appointments, and call your doctor if you are having problems. It's also a good idea to know your test results and keep a list of the medicines you take. How can you care for yourself at home? Read food labels  · Read labels on cans and food packages. The labels tell you how much sodium is in each serving. Make sure that you look at the serving size. If you eat more than the serving size, you have eaten more sodium. · Food labels also tell you the Percent Daily Value for sodium. Choose products with low Percent Daily Values for sodium. · Be aware that sodium can come in forms other than salt, including monosodium glutamate (MSG), sodium citrate, and sodium bicarbonate (baking soda). MSG is often added to Asian food. When you eat out, you can sometimes ask for food without MSG or added salt. Buy low-sodium foods  · Buy foods that are labeled \"unsalted\" (no salt added), \"sodium-free\" (less than 5 mg of sodium per serving), or \"low-sodium\" (less than 140 mg of sodium per serving).  Foods labeled \"reduced-sodium\" and \"light sodium\" may still have too much sodium. Be sure to read the label to see how much sodium you are getting. · Buy fresh vegetables, or frozen vegetables without added sauces. Buy low-sodium versions of canned vegetables, soups, and other canned goods. Prepare low-sodium meals  · Cut back on the amount of salt you use in cooking. This will help you adjust to the taste. Do not add salt after cooking. One teaspoon of salt has about 2,300 mg of sodium. · Take the salt shaker off the table. · Flavor your food with garlic, lemon juice, onion, vinegar, herbs, and spices. Do not use soy sauce, lite soy sauce, steak sauce, onion salt, garlic salt, celery salt, mustard, or ketchup on your food. · Use low-sodium salad dressings, sauces, and ketchup. Or make your own salad dressings and sauces without adding salt. · Use less salt (or none) when recipes call for it. You can often use half the salt a recipe calls for without losing flavor. Other foods such as rice, pasta, and grains do not need added salt. · Rinse canned vegetables, and cook them in fresh water. This removes some-but not all-of the salt. · Avoid water that is naturally high in sodium or that has been treated with water softeners, which add sodium. Call your local water company to find out the sodium content of your water supply. If you buy bottled water, read the label and choose a sodium-free brand. Avoid high-sodium foods  · Avoid eating:  ¨ Smoked, cured, salted, and canned meat, fish, and poultry. ¨ Ham, [place], hot dogs, and luncheon meats. ¨ Regular, hard, and processed cheese and regular peanut butter. ¨ Crackers with salted tops, and other salted snack foods such as pretzels, chips, and salted popcorn. ¨ Frozen prepared meals, unless labeled low-sodium. ¨ Canned and dried soups, broths, and bouillon, unless labeled sodium-free or low-sodium. ¨ Canned vegetables, unless labeled sodium-free or low-sodium. ¨ Siddharth Shuck fries, pizza, tacos, and other fast foods.   Bjorn Garcia, olives, ketchup, and other condiments, especially soy sauce, unless labeled sodium-free or low-sodium. Where can you learn more? Go to http://shan-valentina.info/. Enter V213 in the search box to learn more about \"Low Sodium Diet (2,000 Milligram): Care Instructions. \"  Current as of: May 12, 2017  Content Version: 11.4  © 3160-0703 [employer]. Care instructions adapted under license by [employer] (which disclaims liability or warranty for this information). If you have questions about a medical condition or this instruction, always ask your healthcare professional. [address] any warranty or liability for your use of this information. Hidradenitis Suppurativa: Care Instructions  Your Care Instructions    Hidradenitis suppurativa (say \"vxu-jnoj-zm-NY-tus sup-yur-uh-TY-vuh\") is a skin condition that causes lumps on the skin that look like pimples or boils. The lumps are usually painful and can break open and drain blood and bad-smelling pus. The condition can come and go for many years. Treatment for this condition may includeantibiotics and other medicines. You may need surgeryto remove the lumps. Home care includes wearing loose-fitting clothes and washing the area gently. You can help prevent lumps from coming back by staying at a healthy weight and not smoking. Doctors don't know exactly how this condition starts. But they do know that something irritates and inflames the hair follicles, causing them to swell and form lumps. This skin condition can't be spread from person to person (isn't contagious). Follow-up care is a key part of your treatment and safety. Be sure to make and go to all appointments, and call your doctor if you are having problems. It's also a good idea to know your test results and keep a list of the medicines you take. How can you care for yourself at home? ?Skin care  ? · Wash the area every day with mild soap. Use your hands rather than a washcloth or sponge when you wash that part of your body. ? · Leave the affected areas uncovered when you can. If you have lumps that are draining, you can cover them with a bandage or other dressing. Put petroleum jelly (such as Vaseline) on the dressing to help keep it from sticking. ? · Wear-loose fitting clothes that don't rub against the area. Avoid activities that cause skin to rub together. ? · If you have pain, try a warm compress. Soak a towel or washcloth in warm water, wring it out, and place it on the affected skin for about 10 minutes. Medicines  ? · Be safe with medicines. Take your medicines exactly as prescribed. Call your doctor if you think you are having a problem with your medicine. You will get more details on the specific medicines your doctor prescribes. ? · If your doctor prescribed antibiotics, take them as directed. Do not stop taking them just because you feel better. You need to take the full course of antibiotics. ? Lifestyle choices  ? · If you smoke, think about quitting. Smoking can make the condition worse. If you need help quitting, talk to your doctor about stop-smoking programs and medicines. These can increase your chances of quitting for good. ? · Stay at a healthy weight, or lose weight, by eating healthy foods and being physically active. Being overweight could make this condition worse. When should you call for help? Call your doctor now or seek immediate medical care if:  ? · You have symptoms of infection, such as:  ¨ Increased pain, swelling, warmth, or redness. ¨ Red streaks leading from the area. ¨ Pus draining from the area. ¨ A fever. ? Watch closely for changes in your health, and be sure to contact your doctor if:  ? · You do not get better as expected. Where can you learn more? Go to http://shan-valentina.info/.   Enter X882 in the search box to learn more about \"Hidradenitis Suppurativa: Care Instructions. \"  Current as of: October 13, 2016  Content Version: 11.4  © 3480-5769 [employer], [facility]. Care instructions adapted under license by [employer] (which disclaims liability or warranty for this information). If you have questions about a medical condition or this instruction, always ask your healthcare professional. [place] [address] any warranty or liability for your use of this information.

## 2018-05-08 NOTE — PROGRESS NOTES
Keyona Dennison is a 39 y.o. female and presents with Establish Care and Hypertension  . Subjective:      PMH  HTN-pt was seen in ED and given clonidine.  She does not like clonidine due to SE  BP Readings from Last 3 Encounters:   05/08/18 (!) 185/113   04/22/18 (!) 208/120   08/17/17 (!) 154/98     Polycystic kidney disease  Pt suffers from deya axill and groin recurrent abscesses    PSH-salpingectomy  Due to ectopic pregnancy   oophorectomy    SH-    -+ sex active, no birth control   -works as teacher    -std check 8/17      mammo none  Colonoscopy n/a  Immunizations n/a  Eye care ~ 3/18  Dental care ~ 9/17  Pap 8/17  Exercise none      Review of Systems  Constitutional: negative for fevers, chills, anorexia and weight loss  Respiratory:  negative for cough, hemoptysis, dyspnea,wheezing  CV:   negative for chest pain, palpitations, lower extremity edema  GI:   negative for nausea, vomiting, diarrhea, abdominal pain,melena  Musculoskel: negative for myalgias, arthralgias, back pain, muscle weakness, joint pain  Neurological:  negative for headaches, dizziness, vertigo, memory problems and gait   Behavl/Psych: negative for feelings of anxiety, depression, mood changes    Past Medical History:   Diagnosis Date    Hypertension     Kidney anomaly, congenital     Tubal pregnancy      Past Surgical History:   Procedure Laterality Date    HX GYN  10/07/2013    cyst removed    HX HYSTERECTOMY  10/7/2013    partial hysterectomy     Social History     Social History    Marital status:      Spouse name: N/A    Number of children: N/A    Years of education: N/A     Social History Main Topics    Smoking status: Current Some Day Smoker     Packs/day: 0.25     Years: 25.00    Smokeless tobacco: Never Used      Comment: 1-3 day    Alcohol use Yes      Comment: occasionally/socially    Drug use: No      Comment: stopped in 2013    Sexual activity: Yes     Partners: Male     Other Topics Concern    None     Social History Narrative     Family History   Problem Relation Age of Onset    Hypertension Mother     Hypertension Father     Hypertension Sister      Current Outpatient Prescriptions   Medication Sig Dispense Refill    chlorthalidone (HYGROTEN) 25 mg tablet Take 1 Tab by mouth daily. Indications: hypertension 60 Tab 3    amLODIPine (NORVASC) 5 mg tablet Take 1 Tab by mouth daily. 60 Tab 3    cloNIDine HCl (CATAPRES) 0.1 mg tablet Take 1 Tab by mouth two (2) times a day. 30 Tab 0    multivitamin, tx-iron-ca-min (THERA-M W/ IRON) 9 mg iron-400 mcg tab tablet Take 1 Tab by mouth daily.  ondansetron (ZOFRAN ODT) 4 mg disintegrating tablet Take 1 Tab by mouth every eight (8) hours as needed for Nausea. 10 Tab 0     No Known Allergies    Objective:  Visit Vitals    BP (!) 185/113 (BP 1 Location: Left arm, BP Patient Position: Sitting)    Pulse 96    Temp 98 °F (36.7 °C) (Oral)    Resp 18    Ht 5' 1\" (1.549 m)    Wt 166 lb 11.2 oz (75.6 kg)    LMP 08/13/2017  Comment: partial hysterecomy    BMI 31.5 kg/m2     Physical Exam:   General appearance - alert, pleasant  Mental status - alert, oriented to person, place, and time  EYE-OGNZALO, EOMI, corneas normal, no foreign bodies ? exopthalmos?   ENT-ENT exam normal, no neck nodes or sinus tenderness  Nose - normal and patent, no erythema, discharge or polyps  Mouth - mucous membranes moist, pharynx normal without lesions  Neck - supple, no significant adenopathy   Chest - clear to auscultation, no wheezes, rales or rhonchi, symmetric air entry   Heart - normal rate, regular rhythm, normal S1, S2 + 2/6 systolic murmur  Abdomen - soft, obese soft +bs  Ext-peripheral pulses normal, no pedal edema, no clubbing or cyanosis  Skin-+ stigmata hidradenitis deya axillary area  Neuro -alert, oriented, normal speech, no focal findings or movement disorder noted      Results for orders placed or performed during the hospital encounter of 04/22/18   CBC WITH AUTOMATED DIFF   Result Value Ref Range    WBC 11.7 (H) 3.6 - 11.0 K/uL    RBC 4.60 3.80 - 5.20 M/uL    HGB 14.8 11.5 - 16.0 g/dL    HCT 41.3 35.0 - 47.0 %    MCV 89.8 80.0 - 99.0 FL    MCH 32.2 26.0 - 34.0 PG    MCHC 35.8 30.0 - 36.5 g/dL    RDW 13.6 11.5 - 14.5 %    PLATELET 480 446 - 552 K/uL    MPV 8.9 8.9 - 12.9 FL    NRBC 0.0 0  WBC    ABSOLUTE NRBC 0.00 0.00 - 0.01 K/uL    NEUTROPHILS 69 32 - 75 %    LYMPHOCYTES 23 12 - 49 %    MONOCYTES 7 5 - 13 %    EOSINOPHILS 0 0 - 7 %    BASOPHILS 1 0 - 1 %    IMMATURE GRANULOCYTES 0 0.0 - 0.5 %    ABS. NEUTROPHILS 8.0 1.8 - 8.0 K/UL    ABS. LYMPHOCYTES 2.7 0.8 - 3.5 K/UL    ABS. MONOCYTES 0.8 0.0 - 1.0 K/UL    ABS. EOSINOPHILS 0.1 0.0 - 0.4 K/UL    ABS. BASOPHILS 0.1 0.0 - 0.1 K/UL    ABS. IMM. GRANS. 0.0 0.00 - 0.04 K/UL    DF AUTOMATED     METABOLIC PANEL, COMPREHENSIVE   Result Value Ref Range    Sodium 136 136 - 145 mmol/L    Potassium 3.4 (L) 3.5 - 5.1 mmol/L    Chloride 106 97 - 108 mmol/L    CO2 18 (L) 21 - 32 mmol/L    Anion gap 12 5 - 15 mmol/L    Glucose 110 (H) 65 - 100 mg/dL    BUN 11 6 - 20 MG/DL    Creatinine 0.87 0.55 - 1.02 MG/DL    BUN/Creatinine ratio 13 12 - 20      GFR est AA >60 >60 ml/min/1.73m2    GFR est non-AA >60 >60 ml/min/1.73m2    Calcium 9.6 8.5 - 10.1 MG/DL    Bilirubin, total 0.3 0.2 - 1.0 MG/DL    ALT (SGPT) 21 12 - 78 U/L    AST (SGOT) 17 15 - 37 U/L    Alk.  phosphatase 90 45 - 117 U/L    Protein, total 9.0 (H) 6.4 - 8.2 g/dL    Albumin 4.1 3.5 - 5.0 g/dL    Globulin 4.9 (H) 2.0 - 4.0 g/dL    A-G Ratio 0.8 (L) 1.1 - 2.2     LIPASE   Result Value Ref Range    Lipase 177 73 - 393 U/L   URINALYSIS W/ REFLEX CULTURE   Result Value Ref Range    Color YELLOW/STRAW      Appearance CLEAR CLEAR      Specific gravity 1.014 1.003 - 1.030      pH (UA) 7.0 5.0 - 8.0      Protein 300 (A) NEG mg/dL    Glucose NEGATIVE  NEG mg/dL    Ketone TRACE (A) NEG mg/dL    Bilirubin NEGATIVE  NEG      Blood SMALL (A) NEG      Urobilinogen 0.2 0.2 - 1.0 EU/dL    Nitrites NEGATIVE  NEG      Leukocyte Esterase NEGATIVE  NEG      WBC 0-4 0 - 4 /hpf    RBC 0-5 0 - 5 /hpf    Epithelial cells FEW FEW /lpf    Bacteria NEGATIVE  NEG /hpf    UA:UC IF INDICATED CULTURE NOT INDICATED BY UA RESULT CNI      Hyaline cast 0-2 0 - 5 /lpf   TROPONIN I   Result Value Ref Range    Troponin-I, Qt. <0.04 <0.05 ng/mL   CK W/ CKMB & INDEX   Result Value Ref Range     26 - 192 U/L    CK - MB <1.0 <3.6 NG/ML    CK-MB Index Cannot be calculated 0 - 2.5     EKG, 12 LEAD, INITIAL   Result Value Ref Range    Ventricular Rate 86 BPM    Atrial Rate 86 BPM    P-R Interval 142 ms    QRS Duration 82 ms    Q-T Interval 396 ms    QTC Calculation (Bezet) 473 ms    Calculated P Axis 61 degrees    Calculated R Axis 82 degrees    Calculated T Axis 49 degrees    Diagnosis       Normal sinus rhythm  Possible Left atrial enlargement  Borderline ECG  When compared with ECG of 03-MAY-2016 16:55,  No significant change was found  Confirmed by Pao Harley ([id number]) on 4/23/2018 1:15:18 PM         Assessment/Plan:    ICD-10-CM ICD-9-CM    1. Well adult exam Z00.00 V70.0 LIPID PANEL      TSH 3RD GENERATION   2. Hypertension, uncontrolled I10 401.9 LIPID PANEL      TSH 3RD GENERATION      REFERRAL TO NEPHROLOGY      chlorthalidone (HYGROTEN) 25 mg tablet      amLODIPine (NORVASC) 5 mg tablet   3. Polycystic kidney disease Q61.3 753.12 LIPID PANEL      TSH 3RD GENERATION      REFERRAL TO NEPHROLOGY      chlorthalidone (HYGROTEN) 25 mg tablet      amLODIPine (NORVASC) 5 mg tablet   4. Breast cancer screening Z12.31 V76.10 [facility] MAMMO BI SCREENING INCL CAD   5. Hidradenitis suppurativa L73.2 705.83 REFERRAL TO DERMATOLOGY     Orders Placed This Encounter    [facility] MAMMO BI SCREENING INCL CAD     Standing Status:   Future     Standing Expiration Date:   6/7/2019     Order Specific Question:   Reason for Exam     Answer:   screening     Order Specific Question:   Is Patient Pregnant?      Answer:   No    LIPID PANEL    [facility] 3RD GENERATION    REFERRAL TO NEPHROLOGY     Referral Priority:   Routine     Referral Type:   Consultation     Referral Reason:   Specialty Services Required     Referral Location:   [facility]     Referred to Provider:   Peter Blanco MD    REFERRAL TO DERMATOLOGY     Referral Priority:   Routine     Referral Type:   Consultation     Referral Reason:   Specialty Services Required     Referral Location:   Marsha Herring MD     Referred to Provider:   Marsha Herring MD     Requested Specialty:   Dermatology    chlorthalidone (HYGROTEN) 25 mg tablet     Sig: Take 1 Tab by mouth daily. Indications: hypertension     Dispense:  60 Tab     Refill:  3    amLODIPine (NORVASC) 5 mg tablet     Sig: Take 1 Tab by mouth daily. Dispense:  60 Tab     Refill:  3     1. Well adult exam  mammo referral given  - LIPID PANEL  - [facility] 3RD GENERATION    2. Hypertension, uncontrolled  D/c clonidine  - LIPID PANEL  - [facility] 3RD GENERATION  - REFERRAL TO NEPHROLOGY  - chlorthalidone (HYGROTEN) 25 mg tablet; Take 1 Tab by mouth daily. Indications: hypertension  Dispense: 60 Tab; Refill: 3  - amLODIPine (NORVASC) 5 mg tablet; Take 1 Tab by mouth daily. Dispense: 60 Tab; Refill: 3    3. Polycystic kidney disease  noted  - LIPID PANEL  - [facility] 3RD GENERATION  - REFERRAL TO NEPHROLOGY  - chlorthalidone (HYGROTEN) 25 mg tablet; Take 1 Tab by mouth daily. Indications: hypertension  Dispense: 60 Tab; Refill: 3  - amLODIPine (NORVASC) 5 mg tablet; Take 1 Tab by mouth daily. Dispense: 60 Tab; Refill: 3    4. Breast cancer screening    - [facility] MAMMO BI SCREENING INCL CAD; Future    5. Hidradenitis suppurativa    - REFERRAL TO DERMATOLOGY    Patient Instructions          Low Sodium Diet (2,000 Milligram): Care Instructions  Your Care Instructions    Too much sodium causes your body to hold on to extra water. This can raise your blood pressure and force your heart and kidneys to work harder.  In very serious cases, this could cause you to be put in the hospital. It might even be life-threatening. By limiting sodium, you will feel better and lower your risk of serious problems. The most common source of sodium is salt. People get most of the salt in their diet from canned, prepared, and packaged foods. Fast food and restaurant meals also are very high in sodium. Your doctor will probably limit your sodium to less than 2,000 milligrams (mg) a day. This limit counts all the sodium in prepared and packaged foods and any salt you add to your food. Follow-up care is a key part of your treatment and safety. Be sure to make and go to all appointments, and call your doctor if you are having problems. It's also a good idea to know your test results and keep a list of the medicines you take. How can you care for yourself at home? Read food labels  · Read labels on cans and food packages. The labels tell you how much sodium is in each serving. Make sure that you look at the serving size. If you eat more than the serving size, you have eaten more sodium. · Food labels also tell you the Percent Daily Value for sodium. Choose products with low Percent Daily Values for sodium. · Be aware that sodium can come in forms other than salt, including monosodium glutamate (MSG), sodium citrate, and sodium bicarbonate (baking soda). MSG is often added to Asian food. When you eat out, you can sometimes ask for food without MSG or added salt. Buy low-sodium foods  · Buy foods that are labeled \"unsalted\" (no salt added), \"sodium-free\" (less than 5 mg of sodium per serving), or \"low-sodium\" (less than 140 mg of sodium per serving). Foods labeled \"reduced-sodium\" and \"light sodium\" may still have too much sodium. Be sure to read the label to see how much sodium you are getting. · Buy fresh vegetables, or frozen vegetables without added sauces. Buy low-sodium versions of canned vegetables, soups, and other canned goods.   Prepare low-sodium meals  · Cut back on the amount of salt you use in cooking. This will help you adjust to the taste. Do not add salt after cooking. One teaspoon of salt has about 2,300 mg of sodium. · Take the salt shaker off the table. · Flavor your food with garlic, lemon juice, onion, vinegar, herbs, and spices. Do not use soy sauce, lite soy sauce, steak sauce, onion salt, garlic salt, celery salt, mustard, or ketchup on your food. · Use low-sodium salad dressings, sauces, and ketchup. Or make your own salad dressings and sauces without adding salt. · Use less salt (or none) when recipes call for it. You can often use half the salt a recipe calls for without losing flavor. Other foods such as rice, pasta, and grains do not need added salt. · Rinse canned vegetables, and cook them in fresh water. This removes some-but not all-of the salt. · Avoid water that is naturally high in sodium or that has been treated with water softeners, which add sodium. Call your local water company to find out the sodium content of your water supply. If you buy bottled water, read the label and choose a sodium-free brand. Avoid high-sodium foods  · Avoid eating:  ¨ Smoked, cured, salted, and canned meat, fish, and poultry. ¨ Ham, [place], hot dogs, and luncheon meats. ¨ Regular, hard, and processed cheese and regular peanut butter. ¨ Crackers with salted tops, and other salted snack foods such as pretzels, chips, and salted popcorn. ¨ Frozen prepared meals, unless labeled low-sodium. ¨ Canned and dried soups, broths, and bouillon, unless labeled sodium-free or low-sodium. ¨ Canned vegetables, unless labeled sodium-free or low-sodium. ¨ [place] fries, pizza, tacos, and other fast foods. ¨ Pickles, olives, ketchup, and other condiments, especially soy sauce, unless labeled sodium-free or low-sodium. Where can you learn more? Go to http://shan-valentina.info/.   Enter P984 in the search box to learn more about \"Low Sodium Diet (2,000 Milligram): Care Instructions. \"  Current as of: May 12, 2017  Content Version: 11.4  © 7539-5432 [employer]. Care instructions adapted under license by [employer] (which disclaims liability or warranty for this information). If you have questions about a medical condition or this instruction, always ask your healthcare professional. [address] any warranty or liability for your use of this information. Hidradenitis Suppurativa: Care Instructions  Your Care Instructions    Hidradenitis suppurativa (say \"iqy-suoq-wz-NY-tus sup-yur-uh-TY-vuh\") is a skin condition that causes lumps on the skin that look like pimples or boils. The lumps are usually painful and can break open and drain blood and bad-smelling pus. The condition can come and go for many years. Treatment for this condition may includeantibiotics and other medicines. You may need surgeryto remove the lumps. Home care includes wearing loose-fitting clothes and washing the area gently. You can help prevent lumps from coming back by staying at a healthy weight and not smoking. Doctors don't know exactly how this condition starts. But they do know that something irritates and inflames the hair follicles, causing them to swell and form lumps. This skin condition can't be spread from person to person (isn't contagious). Follow-up care is a key part of your treatment and safety. Be sure to make and go to all appointments, and call your doctor if you are having problems. It's also a good idea to know your test results and keep a list of the medicines you take. How can you care for yourself at home? ?Skin care  ? · Wash the area every day with mild soap. Use your hands rather than a washcloth or sponge when you wash that part of your body. ? · Leave the affected areas uncovered when you can. If you have lumps that are draining, you can cover them with a bandage or other dressing.  Put petroleum jelly (such as Vaseline) on the dressing to help keep it from sticking. ? · Wear-loose fitting clothes that don't rub against the area. Avoid activities that cause skin to rub together. ? · If you have pain, try a warm compress. Soak a towel or washcloth in warm water, wring it out, and place it on the affected skin for about 10 minutes. Medicines  ? · Be safe with medicines. Take your medicines exactly as prescribed. Call your doctor if you think you are having a problem with your medicine. You will get more details on the specific medicines your doctor prescribes. ? · If your doctor prescribed antibiotics, take them as directed. Do not stop taking them just because you feel better. You need to take the full course of antibiotics. ? Lifestyle choices  ? · If you smoke, think about quitting. Smoking can make the condition worse. If you need help quitting, talk to your doctor about stop-smoking programs and medicines. These can increase your chances of quitting for good. ? · Stay at a healthy weight, or lose weight, by eating healthy foods and being physically active. Being overweight could make this condition worse. When should you call for help? Call your doctor now or seek immediate medical care if:  ? · You have symptoms of infection, such as:  ¨ Increased pain, swelling, warmth, or redness. ¨ Red streaks leading from the area. ¨ Pus draining from the area. ¨ A fever. ? Watch closely for changes in your health, and be sure to contact your doctor if:  ? · You do not get better as expected. Where can you learn more? Go to http://shan-valentina.info/. Enter N984 in the search box to learn more about \"Hidradenitis Suppurativa: Care Instructions. \"  Current as of: October 13, 2016  Content Version: 11.4  © 3077-3777 [employer]. Care instructions adapted under license by [employer] (which disclaims liability or warranty for this information).  If you have questions about a medical condition or this instruction, always ask your healthcare professional. [place] [address] any warranty or liability for your use of this information. Follow-up Disposition:  Return in about 8 weeks (around 7/3/2018) for bp check w me. I have reviewed with the patient details of the assessment and plan and all questions were answered. Relevent patient education was performed. The most recent lab findings were reviewed with the patient. An After Visit Summary was printed and given to the patient.

## 2018-05-08 NOTE — PROGRESS NOTES
Chief Complaint   Patient presents with    Establish Care    Hypertension     1. Have you been to the ER, urgent care clinic since your last visit? Hospitalized since your last visit? Yes When: 4/23/2018 [facility] for BP and stomach virus    2. Have you seen or consulted any other health care providers outside of the [facility] since your last visit? Include any pap smears or colon screening.  No

## 2018-05-08 NOTE — MR AVS SNAPSHOT
[address] [facility] [address] [facility] [id number] 
[phone] Patient: María Elena Otoole MRN: [id number] Visit Information Date & Time Provider Department Dept. Phone Encounter #  
 5/8/2018  2:15 PM Michael Abel, [address] [id number] Follow-up Instructions Return in about 8 weeks (around 7/3/2018) for bp check w me. Upcoming Health Maintenance Date Due  
 BREAST CANCER SCRN MAMMOGRAM 3/23/1992 Pneumococcal 19-64 Medium Risk (1 of 1 - PPSV23) 3/23/1993 DTaP/Tdap/Td series (1 - Tdap) 3/23/1995 Influenza Age 5 to Adult 8/1/2018 PAP AKA CERVICAL CYTOLOGY 8/17/2022 Allergies as of 5/8/2018  Review Complete On: 5/8/2018 By: Avelina Ross LPN No Known Allergies Current Immunizations  Reviewed on 11/22/2013 Name Date Influenza Vaccine  Deferred (Patient Refused) Pneumococcal Conjugate (PCV-13)  Deferred (Patient Refused) Not reviewed this visit You Were Diagnosed With   
  
 Codes Comments Well adult exam    -  Primary ICD-10-CM: Z00.00 ICD-9-CM: V70.0 Hypertension, uncontrolled     ICD-10-CM: I10 
ICD-9-CM: 401.9 Polycystic kidney disease     ICD-10-CM: Q61.3 ICD-9-CM: 753.12 Breast cancer screening     ICD-10-CM: Z12.31 
ICD-9-CM: V76.10 Hidradenitis suppurativa     ICD-10-CM: L73.2 ICD-9-CM: 705.83 Vitals BP Pulse Temp Resp Height(growth percentile) Weight(growth percentile) (!) 185/113 (BP 1 Location: Left arm, BP Patient Position: Sitting) 96 98 °F (36.7 °C) (Oral) 18 5' 1\" (1.549 m) 166 lb 11.2 oz (75.6 kg) LMP BMI OB Status Smoking Status 08/13/2017 31.5 kg/m2 Hysterectomy Current Some Day Smoker Vitals History BMI and BSA Data Body Mass Index Body Surface Area 31.5 kg/m 2 1.8 m 2 Preferred Pharmacy Pharmacy Name Phone [facility] [address]  [place] [phone] Your Updated Medication List  
  
   
This list is accurate as of 5/8/18  2:49 PM.  Always use your most recent med list. amLODIPine 5 mg tablet Commonly known as:  Brie Matar Take 1 Tab by mouth daily. chlorthalidone 25 mg tablet Commonly known as:  Michelle Yocasta Take 1 Tab by mouth daily. Indications: hypertension  
  
 cloNIDine HCl 0.1 mg tablet Commonly known as:  CATAPRES Take 1 Tab by mouth two (2) times a day. multivitamin, tx-iron-ca-min 9 mg iron-400 mcg Tab tablet Commonly known as:  THERA-M w/ IRON Take 1 Tab by mouth daily. ondansetron 4 mg disintegrating tablet Commonly known as:  ZOFRAN ODT Take 1 Tab by mouth every eight (8) hours as needed for Nausea. Prescriptions Sent to Pharmacy Refills  
 chlorthalidone (HYGROTEN) 25 mg tablet 3 Sig: Take 1 Tab by mouth daily. Indications: hypertension Class: Normal  
 Pharmacy: [facility] [address] Ph #: [phone] Route: Oral  
 amLODIPine (NORVASC) 5 mg tablet 3 Sig: Take 1 Tab by mouth daily. Class: Normal  
 Pharmacy: [facility] [address] Ph #: [phone] Route: Oral  
  
We Performed the Following LIPID PANEL [51694 CPT(R)] REFERRAL TO DERMATOLOGY [REF19 Custom] REFERRAL TO NEPHROLOGY [LLP43 Custom] Comments:  
 Please evaluate patient for PKD TSH 3RD GENERATION [07729 CPT(R)] Follow-up Instructions Return in about 8 weeks (around 7/3/2018) for bp check w me. To-Do List   
 05/08/2018 Imaging:  [facility] MAMMO BI SCREENING INCL CAD Referral Information Referral ID Referred By Referred To [id number] [address] [facility], [place]  [address]. Nephrology [address] [place], [address] Visits Status Start Date End Date 1 New Request 5/8/18 5/8/19 If your referral has a status of pending review or denied, additional information will be sent to support the outcome of this decision. Referral ID Referred By Referred To  
 [id number] ELLIOTT, Manfred Scheuermann, MD  
   [facility] [address] Visits Status Start Date End Date 1 New Request 5/8/18 5/8/19 If your referral has a status of pending review or denied, additional information will be sent to support the outcome of this decision. Patient Instructions Low Sodium Diet (2,000 Milligram): Care Instructions Your Care Instructions Too much sodium causes your body to hold on to extra water. This can raise your blood pressure and force your heart and kidneys to work harder. In very serious cases, this could cause you to be put in the hospital. It might even be life-threatening. By limiting sodium, you will feel better and lower your risk of serious problems. The most common source of sodium is salt. People get most of the salt in their diet from canned, prepared, and packaged foods. Fast food and restaurant meals also are very high in sodium. Your doctor will probably limit your sodium to less than 2,000 milligrams (mg) a day. This limit counts all the sodium in prepared and packaged foods and any salt you add to your food. Follow-up care is a key part of your treatment and safety. Be sure to make and go to all appointments, and call your doctor if you are having problems. It's also a good idea to know your test results and keep a list of the medicines you take. How can you care for yourself at home? Read food labels · Read labels on cans and food packages. The labels tell you how much sodium is in each serving. Make sure that you look at the serving size.  If you eat more than the serving size, you have eaten more sodium. · Food labels also tell you the Percent Daily Value for sodium. Choose products with low Percent Daily Values for sodium. · Be aware that sodium can come in forms other than salt, including monosodium glutamate (MSG), sodium citrate, and sodium bicarbonate (baking soda). MSG is often added to Asian food. When you eat out, you can sometimes ask for food without MSG or added salt. Buy low-sodium foods · Buy foods that are labeled \"unsalted\" (no salt added), \"sodium-free\" (less than 5 mg of sodium per serving), or \"low-sodium\" (less than 140 mg of sodium per serving). Foods labeled \"reduced-sodium\" and \"light sodium\" may still have too much sodium. Be sure to read the label to see how much sodium you are getting. · Buy fresh vegetables, or frozen vegetables without added sauces. Buy low-sodium versions of canned vegetables, soups, and other canned goods. Prepare low-sodium meals · Cut back on the amount of salt you use in cooking. This will help you adjust to the taste. Do not add salt after cooking. One teaspoon of salt has about 2,300 mg of sodium. · Take the salt shaker off the table. · Flavor your food with garlic, lemon juice, onion, vinegar, herbs, and spices. Do not use soy sauce, lite soy sauce, steak sauce, onion salt, garlic salt, celery salt, mustard, or ketchup on your food. · Use low-sodium salad dressings, sauces, and ketchup. Or make your own salad dressings and sauces without adding salt. · Use less salt (or none) when recipes call for it. You can often use half the salt a recipe calls for without losing flavor. Other foods such as rice, pasta, and grains do not need added salt. · Rinse canned vegetables, and cook them in fresh water. This removes some-but not all-of the salt. · Avoid water that is naturally high in sodium or that has been treated with water softeners, which add sodium.  Call your local water company to find out the sodium content of your water supply. If you buy bottled water, read the label and choose a sodium-free brand. Avoid high-sodium foods · Avoid eating: ¨ Smoked, cured, salted, and canned meat, fish, and poultry. ¨ Ham, upton, hot dogs, and luncheon meats. ¨ Regular, hard, and processed cheese and regular peanut butter. ¨ Crackers with salted tops, and other salted snack foods such as pretzels, chips, and salted popcorn. ¨ Frozen prepared meals, unless labeled low-sodium. ¨ Canned and dried soups, broths, and bouillon, unless labeled sodium-free or low-sodium. ¨ Canned vegetables, unless labeled sodium-free or low-sodium. ¨ Western Dee fries, pizza, tacos, and other fast foods. ¨ Pickles, olives, ketchup, and other condiments, especially soy sauce, unless labeled sodium-free or low-sodium. Where can you learn more? Go to http://shan-valentina.info/. Enter R845 in the search box to learn more about \"Low Sodium Diet (2,000 Milligram): Care Instructions. \" Current as of: May 12, 2017 Content Version: 11.4 © 9636-5836 [employer]. Care instructions adapted under license by [employer] (which disclaims liability or warranty for this information). If you have questions about a medical condition or this instruction, always ask your healthcare professional. [place] [address] any warranty or liability for your use of this information. Hidradenitis Suppurativa: Care Instructions Your Care Instructions Hidradenitis suppurativa (say \"gis-mtjl-si-NY-tus sup-yur-uh-TY-vuh\") is a skin condition that causes lumps on the skin that look like pimples or boils. The lumps are usually painful and can break open and drain blood and bad-smelling pus. The condition can come and go for many years. Treatment for this condition may includeantibiotics and other medicines. You may need surgeryto remove the lumps.  Home care includes wearing loose-fitting clothes and washing the area gently. You can help prevent lumps from coming back by staying at a healthy weight and not smoking. Doctors don't know exactly how this condition starts. But they do know that something irritates and inflames the hair follicles, causing them to swell and form lumps. This skin condition can't be spread from person to person (isn't contagious). Follow-up care is a key part of your treatment and safety. Be sure to make and go to all appointments, and call your doctor if you are having problems. It's also a good idea to know your test results and keep a list of the medicines you take. How can you care for yourself at home? ?Skin care ? · Wash the area every day with mild soap. Use your hands rather than a washcloth or sponge when you wash that part of your body. ? · Leave the affected areas uncovered when you can. If you have lumps that are draining, you can cover them with a bandage or other dressing. Put petroleum jelly (such as Vaseline) on the dressing to help keep it from sticking. ? · Wear-loose fitting clothes that don't rub against the area. Avoid activities that cause skin to rub together. ? · If you have pain, try a warm compress. Soak a towel or washcloth in warm water, wring it out, and place it on the affected skin for about 10 minutes. Medicines ? · Be safe with medicines. Take your medicines exactly as prescribed. Call your doctor if you think you are having a problem with your medicine. You will get more details on the specific medicines your doctor prescribes. ? · If your doctor prescribed antibiotics, take them as directed. Do not stop taking them just because you feel better. You need to take the full course of antibiotics. ? Lifestyle choices ? · If you smoke, think about quitting. Smoking can make the condition worse.  If you need help quitting, talk to your doctor about stop-smoking programs and medicines. These can increase your chances of quitting for good. ? · Stay at a healthy weight, or lose weight, by eating healthy foods and being physically active. Being overweight could make this condition worse. When should you call for help? Call your doctor now or seek immediate medical care if: 
? · You have symptoms of infection, such as: 
¨ Increased pain, swelling, warmth, or redness. ¨ Red streaks leading from the area. ¨ Pus draining from the area. ¨ A fever. ? Watch closely for changes in your health, and be sure to contact your doctor if: 
? · You do not get better as expected. Where can you learn more? Go to http://shan-valentina.info/. Enter B162 in the search box to learn more about \"Hidradenitis Suppurativa: Care Instructions. \" Current as of: October 13, 2016 Content Version: 11.4 © 0562-8800 [employer]. Care instructions adapted under license by [employer] (which disclaims liability or warranty for this information). If you have questions about a medical condition or this instruction, always ask your healthcare professional. [place] [address] any warranty or liability for your use of this information. Introducing \[facility]\"" & HEALTH SERVICES! [facility] introduces AwesomePiece patient portal. Now you can access parts of your medical record, email your doctor's office, and request medication refills online. 1. In your internet browser, go to [email or web address] 2. Click on the First Time User? Click Here link in the Sign In box. You will see the New Member Sign Up page. 3. Enter your AwesomePiece Access Code exactly as it appears below. You will not need to use this code after youve completed the sign-up process. If you do not sign up before the expiration date, you must request a new code. · AwesomePiece Access Code: [email or web address] Expires: 7/21/2018  3:40 PM 
 
 4. Enter the last four digits of your Social Security Number (xxxx) and Date of Birth (mm/dd/yyyy) as indicated and click Submit. You will be taken to the next sign-up page. 5. Create a Zimbra ID. This will be your Zimbra login ID and cannot be changed, so think of one that is secure and easy to remember. 6. Create a Zimbra password. You can change your password at any time. 7. Enter your Password Reset Question and Answer. This can be used at a later time if you forget your password. 8. Enter your e-mail address. You will receive e-mail notification when new information is available in [address] 9. Click Sign Up. You can now view and download portions of your medical record. 10. Click the Download Summary menu link to download a portable copy of your medical information. If you have questions, please visit the Frequently Asked Questions section of the Zimbra website. Remember, Zimbra is NOT to be used for urgent needs. For medical emergencies, dial 911. Now available from your iPhone and Android! Please provide this summary of care documentation to your next provider. Your primary care clinician is listed as Kendell Watts. If you have any questions after today's visit, please call [phone].

## 2018-09-09 ENCOUNTER — APPOINTMENT (OUTPATIENT)
Dept: GENERAL RADIOLOGY | Age: 43
DRG: 392 | End: 2018-09-09
Attending: EMERGENCY MEDICINE
Payer: COMMERCIAL

## 2018-09-09 ENCOUNTER — HOSPITAL ENCOUNTER (INPATIENT)
Age: 43
LOS: 3 days | Discharge: HOME | DRG: 392 | End: 2018-09-12
Attending: EMERGENCY MEDICINE | Admitting: INTERNAL MEDICINE
Payer: COMMERCIAL

## 2018-09-09 DIAGNOSIS — E87.6 HYPOKALEMIA: ICD-10-CM

## 2018-09-09 DIAGNOSIS — R11.2 NON-INTRACTABLE VOMITING WITH NAUSEA, UNSPECIFIED VOMITING TYPE: Primary | ICD-10-CM

## 2018-09-09 PROBLEM — R11.10 HYPEREMESIS: Status: ACTIVE | Noted: 2018-09-09

## 2018-09-09 LAB
ALBUMIN SERPL-MCNC: 4.1 G/DL (ref 3.5–5)
ALBUMIN/GLOB SERPL: 0.8 {RATIO} (ref 1.1–2.2)
ALP SERPL-CCNC: 83 U/L (ref 45–117)
ALT SERPL-CCNC: 23 U/L (ref 12–78)
AMPHET UR QL SCN: NEGATIVE
ANION GAP SERPL CALC-SCNC: 13 MMOL/L (ref 5–15)
APPEARANCE UR: CLEAR
AST SERPL-CCNC: 21 U/L (ref 15–37)
BACTERIA URNS QL MICRO: NEGATIVE /HPF
BARBITURATES UR QL SCN: NEGATIVE
BASOPHILS # BLD: 0.1 K/UL (ref 0–0.1)
BASOPHILS NFR BLD: 1 % (ref 0–1)
BENZODIAZ UR QL: NEGATIVE
BILIRUB SERPL-MCNC: 0.4 MG/DL (ref 0.2–1)
BILIRUB UR QL: NEGATIVE
BUN SERPL-MCNC: 8 MG/DL (ref 6–20)
BUN/CREAT SERPL: 8 (ref 12–20)
CALCIUM SERPL-MCNC: 9.8 MG/DL (ref 8.5–10.1)
CANNABINOIDS UR QL SCN: POSITIVE
CHLORIDE SERPL-SCNC: 97 MMOL/L (ref 97–108)
CO2 SERPL-SCNC: 25 MMOL/L (ref 21–32)
COCAINE UR QL SCN: NEGATIVE
COLOR UR: ABNORMAL
CREAT SERPL-MCNC: 0.99 MG/DL (ref 0.55–1.02)
DIFFERENTIAL METHOD BLD: NORMAL
DRUG SCRN COMMENT,DRGCM: ABNORMAL
EOSINOPHIL # BLD: 0.1 K/UL (ref 0–0.4)
EOSINOPHIL NFR BLD: 1 % (ref 0–7)
EPITH CASTS URNS QL MICRO: ABNORMAL /LPF
ERYTHROCYTE [DISTWIDTH] IN BLOOD BY AUTOMATED COUNT: 13.1 % (ref 11.5–14.5)
GLOBULIN SER CALC-MCNC: 5 G/DL (ref 2–4)
GLUCOSE SERPL-MCNC: 118 MG/DL (ref 65–100)
GLUCOSE UR STRIP.AUTO-MCNC: NEGATIVE MG/DL
HCT VFR BLD AUTO: 40.3 % (ref 35–47)
HGB BLD-MCNC: 14.5 G/DL (ref 11.5–16)
HGB UR QL STRIP: NEGATIVE
IMM GRANULOCYTES # BLD: 0 K/UL (ref 0–0.04)
IMM GRANULOCYTES NFR BLD AUTO: 0 % (ref 0–0.5)
KETONES UR QL STRIP.AUTO: NEGATIVE MG/DL
LEUKOCYTE ESTERASE UR QL STRIP.AUTO: NEGATIVE
LIPASE SERPL-CCNC: 226 U/L (ref 73–393)
LYMPHOCYTES # BLD: 2.8 K/UL (ref 0.8–3.5)
LYMPHOCYTES NFR BLD: 31 % (ref 12–49)
MAGNESIUM SERPL-MCNC: 2.2 MG/DL (ref 1.6–2.4)
MCH RBC QN AUTO: 32.9 PG (ref 26–34)
MCHC RBC AUTO-ENTMCNC: 36 G/DL (ref 30–36.5)
MCV RBC AUTO: 91.4 FL (ref 80–99)
METHADONE UR QL: NEGATIVE
MONOCYTES # BLD: 0.5 K/UL (ref 0–1)
MONOCYTES NFR BLD: 6 % (ref 5–13)
NEUTS SEG # BLD: 5.6 K/UL (ref 1.8–8)
NEUTS SEG NFR BLD: 62 % (ref 32–75)
NITRITE UR QL STRIP.AUTO: NEGATIVE
NRBC # BLD: 0 K/UL (ref 0–0.01)
NRBC BLD-RTO: 0 PER 100 WBC
OPIATES UR QL: NEGATIVE
PCP UR QL: NEGATIVE
PH UR STRIP: 7.5 [PH] (ref 5–8)
PLATELET # BLD AUTO: 380 K/UL (ref 150–400)
PMV BLD AUTO: 9 FL (ref 8.9–12.9)
POTASSIUM SERPL-SCNC: 2.2 MMOL/L (ref 3.5–5.1)
PROT SERPL-MCNC: 9.1 G/DL (ref 6.4–8.2)
PROT UR STRIP-MCNC: 30 MG/DL
RBC # BLD AUTO: 4.41 M/UL (ref 3.8–5.2)
RBC #/AREA URNS HPF: ABNORMAL /HPF (ref 0–5)
SODIUM SERPL-SCNC: 135 MMOL/L (ref 136–145)
SP GR UR REFRACTOMETRY: 1.01 (ref 1–1.03)
TROPONIN I SERPL-MCNC: <0.05 NG/ML
UA: UC IF INDICATED,UAUC: ABNORMAL
UROBILINOGEN UR QL STRIP.AUTO: 0.2 EU/DL (ref 0.2–1)
WBC # BLD AUTO: 9.1 K/UL (ref 3.6–11)
WBC URNS QL MICRO: ABNORMAL /HPF (ref 0–4)

## 2018-09-09 PROCEDURE — 80307 DRUG TEST PRSMV CHEM ANLYZR: CPT | Performed by: INTERNAL MEDICINE

## 2018-09-09 PROCEDURE — 85025 COMPLETE CBC W/AUTO DIFF WBC: CPT | Performed by: EMERGENCY MEDICINE

## 2018-09-09 PROCEDURE — 83735 ASSAY OF MAGNESIUM: CPT | Performed by: INTERNAL MEDICINE

## 2018-09-09 PROCEDURE — 65660000000 HC RM CCU STEPDOWN

## 2018-09-09 PROCEDURE — 74011250637 HC RX REV CODE- 250/637: Performed by: INTERNAL MEDICINE

## 2018-09-09 PROCEDURE — 74022 RADEX COMPL AQT ABD SERIES: CPT

## 2018-09-09 PROCEDURE — 96375 TX/PRO/DX INJ NEW DRUG ADDON: CPT

## 2018-09-09 PROCEDURE — 94760 N-INVAS EAR/PLS OXIMETRY 1: CPT

## 2018-09-09 PROCEDURE — 96376 TX/PRO/DX INJ SAME DRUG ADON: CPT

## 2018-09-09 PROCEDURE — 81001 URINALYSIS AUTO W/SCOPE: CPT | Performed by: EMERGENCY MEDICINE

## 2018-09-09 PROCEDURE — 74011250636 HC RX REV CODE- 250/636: Performed by: INTERNAL MEDICINE

## 2018-09-09 PROCEDURE — 36415 COLL VENOUS BLD VENIPUNCTURE: CPT | Performed by: EMERGENCY MEDICINE

## 2018-09-09 PROCEDURE — 93005 ELECTROCARDIOGRAM TRACING: CPT

## 2018-09-09 PROCEDURE — 99284 EMERGENCY DEPT VISIT MOD MDM: CPT

## 2018-09-09 PROCEDURE — 96365 THER/PROPH/DIAG IV INF INIT: CPT

## 2018-09-09 PROCEDURE — 83690 ASSAY OF LIPASE: CPT | Performed by: EMERGENCY MEDICINE

## 2018-09-09 PROCEDURE — 74011250636 HC RX REV CODE- 250/636: Performed by: EMERGENCY MEDICINE

## 2018-09-09 PROCEDURE — 84484 ASSAY OF TROPONIN QUANT: CPT | Performed by: EMERGENCY MEDICINE

## 2018-09-09 PROCEDURE — 80053 COMPREHEN METABOLIC PANEL: CPT | Performed by: EMERGENCY MEDICINE

## 2018-09-09 RX ORDER — POTASSIUM CHLORIDE 7.45 MG/ML
10 INJECTION INTRAVENOUS
Status: DISCONTINUED | OUTPATIENT
Start: 2018-09-09 | End: 2018-09-11

## 2018-09-09 RX ORDER — HYDRALAZINE HYDROCHLORIDE 20 MG/ML
10 INJECTION INTRAMUSCULAR; INTRAVENOUS
Status: DISCONTINUED | OUTPATIENT
Start: 2018-09-09 | End: 2018-09-11

## 2018-09-09 RX ORDER — ONDANSETRON 2 MG/ML
4 INJECTION INTRAMUSCULAR; INTRAVENOUS
Status: DISCONTINUED | OUTPATIENT
Start: 2018-09-09 | End: 2018-09-12 | Stop reason: HOSPADM

## 2018-09-09 RX ORDER — POTASSIUM CHLORIDE 7.45 MG/ML
10 INJECTION INTRAVENOUS
Status: COMPLETED | OUTPATIENT
Start: 2018-09-09 | End: 2018-09-09

## 2018-09-09 RX ORDER — POTASSIUM CHLORIDE 750 MG/1
40 TABLET, FILM COATED, EXTENDED RELEASE ORAL
Status: DISCONTINUED | OUTPATIENT
Start: 2018-09-09 | End: 2018-09-09

## 2018-09-09 RX ORDER — MORPHINE SULFATE 2 MG/ML
4 INJECTION, SOLUTION INTRAMUSCULAR; INTRAVENOUS
Status: COMPLETED | OUTPATIENT
Start: 2018-09-09 | End: 2018-09-09

## 2018-09-09 RX ORDER — SODIUM CHLORIDE AND POTASSIUM CHLORIDE .9; .15 G/100ML; G/100ML
SOLUTION INTRAVENOUS CONTINUOUS
Status: DISCONTINUED | OUTPATIENT
Start: 2018-09-09 | End: 2018-09-11

## 2018-09-09 RX ORDER — POTASSIUM CHLORIDE 7.45 MG/ML
10 INJECTION INTRAVENOUS
Status: COMPLETED | OUTPATIENT
Start: 2018-09-09 | End: 2018-09-10

## 2018-09-09 RX ORDER — ENOXAPARIN SODIUM 100 MG/ML
40 INJECTION SUBCUTANEOUS EVERY 24 HOURS
Status: DISCONTINUED | OUTPATIENT
Start: 2018-09-09 | End: 2018-09-12 | Stop reason: HOSPADM

## 2018-09-09 RX ORDER — ONDANSETRON 2 MG/ML
4 INJECTION INTRAMUSCULAR; INTRAVENOUS
Status: COMPLETED | OUTPATIENT
Start: 2018-09-09 | End: 2018-09-09

## 2018-09-09 RX ORDER — CHLORTHALIDONE 25 MG/1
25 TABLET ORAL EVERY EVENING
Status: ON HOLD | COMMUNITY
End: 2018-09-11

## 2018-09-09 RX ORDER — MORPHINE SULFATE 2 MG/ML
6 INJECTION, SOLUTION INTRAMUSCULAR; INTRAVENOUS
Status: COMPLETED | OUTPATIENT
Start: 2018-09-09 | End: 2018-09-09

## 2018-09-09 RX ORDER — ACETAMINOPHEN 500 MG
500 TABLET ORAL
Status: DISCONTINUED | OUTPATIENT
Start: 2018-09-09 | End: 2018-09-12 | Stop reason: HOSPADM

## 2018-09-09 RX ORDER — SODIUM CHLORIDE 0.9 % (FLUSH) 0.9 %
5-10 SYRINGE (ML) INJECTION AS NEEDED
Status: DISCONTINUED | OUTPATIENT
Start: 2018-09-09 | End: 2018-09-12 | Stop reason: HOSPADM

## 2018-09-09 RX ORDER — IBUPROFEN 200 MG
400 TABLET ORAL
COMMUNITY
End: 2018-09-18

## 2018-09-09 RX ORDER — SODIUM CHLORIDE 0.9 % (FLUSH) 0.9 %
5-10 SYRINGE (ML) INJECTION EVERY 8 HOURS
Status: DISCONTINUED | OUTPATIENT
Start: 2018-09-09 | End: 2018-09-12 | Stop reason: HOSPADM

## 2018-09-09 RX ORDER — CETIRIZINE HCL 10 MG
10 TABLET ORAL DAILY
COMMUNITY
End: 2020-09-03

## 2018-09-09 RX ORDER — HYDRALAZINE HYDROCHLORIDE 20 MG/ML
10 INJECTION INTRAMUSCULAR; INTRAVENOUS
Status: DISCONTINUED | OUTPATIENT
Start: 2018-09-09 | End: 2018-09-09

## 2018-09-09 RX ORDER — AMLODIPINE BESYLATE 5 MG/1
5 TABLET ORAL EVERY EVENING
Status: ON HOLD | COMMUNITY
End: 2018-09-11

## 2018-09-09 RX ADMIN — POTASSIUM CHLORIDE 10 MEQ: 10 INJECTION, SOLUTION INTRAVENOUS at 21:53

## 2018-09-09 RX ADMIN — HYDRALAZINE HYDROCHLORIDE 10 MG: 20 INJECTION INTRAMUSCULAR; INTRAVENOUS at 16:14

## 2018-09-09 RX ADMIN — POTASSIUM CHLORIDE 10 MEQ: 10 INJECTION, SOLUTION INTRAVENOUS at 18:22

## 2018-09-09 RX ADMIN — ONDANSETRON 4 MG: 2 INJECTION, SOLUTION INTRAMUSCULAR; INTRAVENOUS at 13:26

## 2018-09-09 RX ADMIN — POTASSIUM CHLORIDE 10 MEQ: 10 INJECTION, SOLUTION INTRAVENOUS at 19:39

## 2018-09-09 RX ADMIN — Medication 10 ML: at 19:01

## 2018-09-09 RX ADMIN — POTASSIUM CHLORIDE 10 MEQ: 10 INJECTION, SOLUTION INTRAVENOUS at 22:33

## 2018-09-09 RX ADMIN — ONDANSETRON 4 MG: 2 INJECTION INTRAMUSCULAR; INTRAVENOUS at 15:25

## 2018-09-09 RX ADMIN — ONDANSETRON 4 MG: 2 INJECTION INTRAMUSCULAR; INTRAVENOUS at 23:13

## 2018-09-09 RX ADMIN — Medication 10 ML: at 21:53

## 2018-09-09 RX ADMIN — POTASSIUM CHLORIDE 10 MEQ: 10 INJECTION, SOLUTION INTRAVENOUS at 15:10

## 2018-09-09 RX ADMIN — SODIUM CHLORIDE 1000 ML: 900 INJECTION, SOLUTION INTRAVENOUS at 13:26

## 2018-09-09 RX ADMIN — HYDRALAZINE HYDROCHLORIDE 10 MG: 20 INJECTION INTRAMUSCULAR; INTRAVENOUS at 21:53

## 2018-09-09 RX ADMIN — SODIUM CHLORIDE AND POTASSIUM CHLORIDE: 9; 1.49 INJECTION, SOLUTION INTRAVENOUS at 18:24

## 2018-09-09 RX ADMIN — POTASSIUM CHLORIDE 10 MEQ: 10 INJECTION, SOLUTION INTRAVENOUS at 17:04

## 2018-09-09 RX ADMIN — POTASSIUM CHLORIDE 10 MEQ: 10 INJECTION, SOLUTION INTRAVENOUS at 20:41

## 2018-09-09 RX ADMIN — ONDANSETRON 4 MG: 2 INJECTION INTRAMUSCULAR; INTRAVENOUS at 19:38

## 2018-09-09 RX ADMIN — MORPHINE SULFATE 6 MG: 2 INJECTION, SOLUTION INTRAMUSCULAR; INTRAVENOUS at 13:26

## 2018-09-09 RX ADMIN — MORPHINE SULFATE 4 MG: 2 INJECTION, SOLUTION INTRAMUSCULAR; INTRAVENOUS at 15:25

## 2018-09-09 RX ADMIN — POTASSIUM CHLORIDE 10 MEQ: 10 INJECTION, SOLUTION INTRAVENOUS at 23:16

## 2018-09-09 NOTE — PROGRESS NOTES
Pharmacy Clarification of Prior to Admission Medication Regimen The patient was interviewed regarding clarification of the prior to admission medication regimen and use of any other inhalers, topical products, over the counter medications, herbal medications, vitamin products or ophthalmic/nasal/otic medication use. Information Obtained From: Patient and Rx Query Pertinent Pharmacy Findings: None PTA medication list was corrected to the following:  
 
Prior to Admission Medications Prescriptions Last Dose Informant Patient Reported? Taking? amLODIPine (NORVASC) 5 mg tablet 9/8/2018 at Unknown time Self Yes Yes Sig: Take 5 mg by mouth every evening. cetirizine (ZYRTEC) 10 mg tablet 9/8/2018 at Unknown time Self Yes Yes Sig: Take 10 mg by mouth every evening. chlorthalidone (HYGROTEN) 25 mg tablet 9/8/2018 at Unknown time Self Yes Yes Sig: Take 25 mg by mouth every evening. ibuprofen (ADVIL) 200 mg tablet 9/8/2018 at Unknown time Self Yes Yes Sig: Take 400 mg by mouth daily as needed ('Pain/Headache'). multivitamin, tx-iron-ca-min (THERA-M W/ IRON) 9 mg iron-400 mcg tab tablet 9/8/2018 at Unknown time Self Yes Yes Sig: Take 1 Tab by mouth every evening. Facility-Administered Medications: None Thank you, Tasneem Isaacs CPhT Medication History Pharmacy Technician

## 2018-09-09 NOTE — IP AVS SNAPSHOT
[address] [facility] [address]. 
[phone] Patient: Chaka Garcia MRN: [id number] LYI:[id number] About your hospitalization You were admitted on:  September 9, 2018 You last received care in the:  [facility] 2 CARDIOPULMONARY CARE You were discharged on:  September 12, 2018 Why you were hospitalized Your primary diagnosis was:  Not on File Your diagnoses also included:  Hyperemesis Follow-up Information Follow up With Details Comments Contact Info Rebecca Yarbrough MD Go on 9/17/2018 For hospital follow up appointment at 2:00PM  [address] [place] [address] [address] [facility] [address] 
[phone] Your Scheduled Appointments Monday September 17, 2018  2:00 PM EDT TRANSITIONAL CARE MANAGEMENT with Rebecca Yarbrough MD  
[address] [place] [address] [address] [facility] [address]  
[phone] Thursday October 11, 2018 10:00 AM EDT  
SARAH MAMMO SCREENING with [facility] MAMMO 1 [address] [place] [address]) [address] Shower or bathe using soap and water. Do not use deodorant, powder, perfumes, or lotion the day of your exam.  If your prior mammograms were not performed at [facility] 6 please bring films with you or forward prior images 2 days before your procedure. Check in at registration 15min before your appointment time unless you were instructed to do otherwise. A script is not necessary, but if you have one, please bring it on the day of the mammogram or have it faxed to the department. You are responsible for finding a method of transportation to your appointment. If you don't have transportation, please reschedule your appointment at least 24 hours in advance.   [facility] [phone] [facility]  [phone] [facility] [address] [facility] [phone] [facility] [phone] Patients coming to [facility]. Use the Main Entrance on [address]. Check in at the Information Desk. If your appointment is after 4:00pm, please go to the Emergency Room to be registered. Physical Location: [address] [facility], [address] [facility] [address] Discharge Orders None A check meet indicates which time of day the medication should be taken. My Medications START taking these medications Instructions Each Dose to Equal  
 Morning Noon Evening Bedtime  
 metoprolol tartrate 25 mg tablet Commonly known as:  LOPRESSOR Your next dose is: Today Take 1 Tab by mouth every twelve (12) hours. 25 mg  
    
   
   
   
9:00pm  
  
 potassium chloride SR 10 mEq tablet Commonly known as:  KLOR-CON 10 Your next dose is: Today Take 1 Tab by mouth daily. 10 mEq CHANGE how you take these medications Instructions Each Dose to Equal  
 Morning Noon Evening Bedtime  
 amLODIPine 10 mg tablet Commonly known as:  Jeferson Alstrom Start taking on:  9/13/2018 What changed:   
- medication strength 
- how much to take - Another medication with the same name was removed. Continue taking this medication, and follow the directions you see here. Your next dose is:  Tomorrow Take 1 Tab by mouth daily. 10 mg  
    
  
   
   
   
  
 chlorthalidone 25 mg tablet Commonly known as:  Jennifer Gonzalez What changed:  Another medication with the same name was removed. Continue taking this medication, and follow the directions you see here. Your next dose is: Today Take 1 Tab by mouth every evening. 25 mg CONTINUE taking these medications Instructions Each Dose to Equal  
 Morning Noon Evening Bedtime ADVIL 200 mg tablet Generic drug:  ibuprofen Take 400 mg by mouth daily as needed ('Pain/Headache'). 400 mg  
    
   
   
   
  
 multivitamin, tx-iron-ca-min 9 mg iron-400 mcg Tab tablet Commonly known as:  THERA-M w/ IRON Your next dose is: Today Take 1 Tab by mouth every evening. 1 Tab ZyrTEC 10 mg tablet Generic drug:  cetirizine Your next dose is: Today Take 10 mg by mouth every evening. 10 mg Where to Get Your Medications Information on where to get these meds will be given to you by the nurse or doctor. ! Ask your nurse or doctor about these medications  
  amLODIPine 10 mg tablet  
 chlorthalidone 25 mg tablet  
 metoprolol tartrate 25 mg tablet  
 potassium chloride SR 10 mEq tablet Discharge Instructions None [employer] Announcement We are excited to announce that we are making your provider's discharge notes available to you in [employer]. You will see these notes when they are completed and signed by the physician that discharged you from your recent hospital stay. If you have any questions or concerns about any information you see in [employer], please call the Health Information Department where you were seen or reach out to your Primary Care Provider for more information about your plan of care. Introducing [facility]! Edwin Ernst introduces [employer] patient portal. Now you can access parts of your medical record, email your doctor's office, and request medication refills online. 1. In your internet browser, go to [email or web address] 2. Click on the First Time User? Click Here link in the Sign In box. You will see the New Member Sign Up page. 3. Enter your [employer] Access Code exactly as it appears below. You will not need to use this code after youve completed the sign-up process. If you do not sign up before the expiration date, you must request a new code. · [employer] Access Code: [email or web address] Expires: 11/20/2018 11:38 AM 
 
4. Enter the last four digits of your Social Security Number (xxxx) and Date of Birth (mm/dd/yyyy) as indicated and click Submit. You will be taken to the next sign-up page. 5. Create a [employer] ID. This will be your [employer] login ID and cannot be changed, so think of one that is secure and easy to remember. 6. Create a [employer] password. You can change your password at any time. 7. Enter your Password Reset Question and Answer. This can be used at a later time if you forget your password. 8. Enter your e-mail address. You will receive e-mail notification when new information is available in [address] 9. Click Sign Up. You can now view and download portions of your medical record. 10. Click the Download Summary menu link to download a portable copy of your medical information. If you have questions, please visit the Frequently Asked Questions section of the [employer] website. Remember, [employer] is NOT to be used for urgent needs. For medical emergencies, dial 911. Now available from your iPhone and Android! Introducing Shravan Eid As a [employer] patient, I wanted to make you aware of our electronic visit tool called Shravan Bazanjodyfracisco. [employer] 24/7 allows you to connect within minutes with a medical provider 24 hours a day, seven days a week via a mobile device or tablet or logging into a secure website from your computer. You can access Shravan Thompsonfin from anywhere in the [place]. A virtual visit might be right for you when you have a simple condition and feel like you just dont want to get out of bed, or cant get away from work for an appointment, when your regular [employer] provider is not available (evenings, weekends or holidays), or when youre out of town and need minor care.   Electronic visits cost only $49 and if the [facility] 24/7 provider determines a prescription is needed to treat your condition, one can be electronically transmitted to a nearby pharmacy*. Please take a moment to enroll today if you have not already done so. The enrollment process is free and takes just a few minutes. To enroll, please download the [employer] 24/7 tammie to your tablet or phone, or visit [email or web address] to enroll on your computer. And, as an [address] [place] [address] patient with a [employer] account, the results of your visits will be scanned into your electronic medical record and your primary care provider will be able to view the scanned results. We urge you to continue to see your regular [employer] provider for your ongoing medical care. And while your primary care provider may not be the one available when you seek a [employer] virtual visit, the peace of mind you get from getting a real diagnosis real time can be priceless. For more information on [employer], view our Frequently Asked Questions (FAQs) at [email or web address]. Sincerely, 
 
Maddison Flores MD 
Chief Medical Officer [facility] Sarita Quesada *:  certain medications cannot be prescribed via [employer] Unresulted Labs-Please follow up with your PCP about these lab tests Order Current Status EKG, 12 LEAD, INITIAL Preliminary result Providers Seen During Your Hospitalization Provider Specialty Primary office phone Flor Hernadez MD Emergency Medicine [phone] Gene Watkins MD Internal Medicine [phone] Jael Valencia MD Internal Medicine [phone] Your Primary Care Physician (PCP) Primary Care Physician Office Phone Office Fax Omar Miranda [phone] You are allergic to the following No active allergies Recent Documentation Height Weight BMI OB Status Smoking Status 1.549 m 65.8 kg 27.4 kg/m2 Hysterectomy Current Some Day Smoker Emergency Contacts Name Discharge Info Relation Home Work Mobile Julisa Spears N/A  AT THIS TIME [6] Mother [14] [phone] Devorah Brooks N/A  AT THIS TIME [6] Spouse [3] [phone] Madhav Hooper N/A  AT THIS TIME [6] Friend [5] [phone] Patient Belongings The following personal items are in your possession at time of discharge: 
  Dental Appliances: None  Visual Aid: None      Home Medications: None   Jewelry: With patient  Clothing: With patient    Other Valuables: Cell Phone, With patient Please provide this summary of care documentation to your next provider. Signatures-by signing, you are acknowledging that this After Visit Summary has been reviewed with you and you have received a copy. Patient Signature:  ____________________________________________________________ Date:  ____________________________________________________________  
  
Epi Stuart Provider Signature:  ____________________________________________________________ Date:  ____________________________________________________________

## 2018-09-09 NOTE — H&P
Hospitalist Admission Note NAME: Kamilla Mena :  1975 MRN:  [id number] Date/Time:  2018 3:44 PM 
 
Patient PCP: Naveen Becerril MD 
______________________________________________________________________ Given the patient's current clinical presentation, I have a high level of concern for decompensation if discharged from the emergency department. Complex decision making was performed, which includes reviewing the patient's available past medical records, laboratory results, and x-ray films. My assessment of this patient's clinical condition and my plan of care is as follows. Assessment / Plan: Hypokalemia due to abdominal pain and hyperemesis syndrome 
-likely related to [facility] use. UDS pending, however pt admits to last use 2 days ago prior to onset of symptoms. -will order KUB to r/o obstruction, ileus from low K 
-antiemetics prn 
-IVF with KCL 
-check mag 
-NPO 
-tylenol for fever/pain HTN, uncontrolled 
-unable to tolerate PO meds 
-hydralazine prn Tobacco use 
-nicotine patch, counseled Code Status: Full Surrogate Decision Maker: DVT Prophylaxis: lovenox GI Prophylaxis: not indicated Baseline: independent Subjective: CHIEF COMPLAINT: nausea/vomiting/abd pain HISTORY OF PRESENT ILLNESS:    
Анна Roldan is a 39 y.o.  female with PMHx HTN, hx of LORENA, present to the ER c/o 2 days of nausea and vomiting, now with abd pain associated with vomiting. Pt states she uses marijuana 2 days ago before onset of symptoms. She has had this problem in the past with THC use, however has never been admitted to the hospital for low potassium. Pt denies associated fever, chills, cp, palpitations, no diarrhea. Pt also states she has not been able to tolerate po intake to include her PTA meds. In the ER, vitals: T 97.9, P95, /124, SpO2 100% on RA We were asked to admit for work up and evaluation of the above problems. Past Medical History:  
Diagnosis Date  Hypertension  Kidney anomaly, congenital   
 Tubal pregnancy Past Surgical History:  
Procedure Laterality Date  HX GYN  10/07/2013 cyst removed  HX HYSTERECTOMY  10/7/2013  
 partial hysterectomy Social History Substance Use Topics  Smoking status: Current Some Day Smoker Packs/day: 0.25 Years: 25.00  Smokeless tobacco: Never Used Comment: 1-3 day  Alcohol use Yes Comment: occasionally/socially Family History Problem Relation Age of Onset  Hypertension Mother  Hypertension Father  Hypertension Sister No Known Allergies Prior to Admission medications Medication Sig Start Date End Date Taking? Authorizing Provider  
amLODIPine (NORVASC) 5 mg tablet Take 5 mg by mouth every evening. Yes Historical Provider  
chlorthalidone (HYGROTEN) 25 mg tablet Take 25 mg by mouth every evening. Yes Historical Provider  
cetirizine (ZYRTEC) 10 mg tablet Take 10 mg by mouth every evening. Yes Historical Provider  
ibuprofen (ADVIL) 200 mg tablet Take 400 mg by mouth daily as needed ('Pain/Headache'). Yes Historical Provider  
multivitamin, tx-iron-ca-min (THERA-M W/ IRON) 9 mg iron-400 mcg tab tablet Take 1 Tab by mouth every evening. Yes Historical Provider REVIEW OF SYSTEMS:    
I am not able to complete the review of systems because: The patient is intubated and sedated The patient has altered mental status due to his acute medical problems The patient has baseline aphasia from prior stroke(s) The patient has baseline dementia and is not reliable historian The patient is in acute medical distress and unable to provide information Total of 12 systems reviewed as follows:   
   POSITIVE= BOLD text  Negative = text not BOLD General:  fever, chills, sweats, generalized weakness, weight loss/gain,  
   loss of appetite Eyes:    blurred vision, eye pain, loss of vision, double vision ENT:    rhinorrhea, pharyngitis Respiratory:   cough, sputum production, SOB, ABRAHAM, wheezing, pleuritic pain  
Cardiology:   chest pain, palpitations, orthopnea, PND, edema, syncope Gastrointestinal:  abdominal pain , N/V, diarrhea, dysphagia, constipation, bleeding Genitourinary:  frequency, urgency, dysuria, hematuria, incontinence Muskuloskeletal :  arthralgia, myalgia, back pain Hematology:  easy bruising, nose or gum bleeding, lymphadenopathy Dermatological: rash, ulceration, pruritis, color change / jaundice Endocrine:   hot flashes or polydipsia Neurological:  headache, dizziness, confusion, focal weakness, paresthesia, Speech difficulties, memory loss, gait difficulty Psychological: Feelings of anxiety, depression, agitation Objective: VITALS:   
Visit Vitals  BP (!) 189/109 (BP 1 Location: Left arm, BP Patient Position: At rest)  Pulse 77  Temp 97.9 °F (36.6 °C)  Resp 10  
 Ht 5' 1\" (1.549 m)  Wt 65.8 kg (145 lb)  SpO2 100%  BMI 27.4 kg/m2 PHYSICAL EXAM: 
 
General:    Alert, cooperative, no distress, appears stated age. HEENT: Atraumatic, anicteric sclerae, pink conjunctivae No oral ulcers, mucosa moist, throat clear Neck:  Supple, symmetrical,  thyroid: non tender Lungs:   CTA b/l. No wheezing or Rhonchi. No rales. Chest wall:  No tenderness. No accessory muscle use. Heart:   Regular  rhythm,  No  Murmur. No edema Abdomen:   Tender on palpation but ND, +BS Extremities: No cyanosis. No clubbing,   
  Skin turgor normal, Radial dial pulse 2+ Skin:     Not pale. Not Jaundiced  No rashes Psych:  Not depressed. Not anxious or agitated. Neurologic: No facial asymmetry. No aphasia or slurred speech. Symmetrical strength, Sensation grossly intact. AAOx4.  
 
_______________________________________________________________________ Care Plan discussed with: 
  Comments Patient x Family RN x Care Manager Consultant:  camille MALCOLM physician  
_______________________________________________________________________ Expected  Disposition:  
Home with Family HH/PT/OT/RN x  
SNF/LTC   
MEME   
________________________________________________________________________ TOTAL TIME:  65 Minutes Critical Care Provided     Minutes non procedure based Comments  
 x Reviewed previous records  
>50% of visit spent in counseling and coordination of care x Discussion with patient and/or family and questions answered 
  
 
________________________________________________________________________ Signed: Jessica Sykes MD 
 
Procedures: see electronic medical records for all procedures/Xrays and details which were not copied into this note but were reviewed prior to creation of Plan. LAB DATA REVIEWED:   
Recent Results (from the past 24 hour(s)) CBC WITH AUTOMATED DIFF Collection Time: 09/09/18  1:16 PM  
Result Value Ref Range WBC 9.1 3.6 - 11.0 K/uL  
 RBC 4.41 3.80 - 5.20 M/uL  
 HGB 14.5 11.5 - 16.0 g/dL HCT 40.3 35.0 - 47.0 % MCV 91.4 80.0 - 99.0 FL  
 MCH 32.9 26.0 - 34.0 PG  
 MCHC 36.0 30.0 - 36.5 g/dL  
 RDW 13.1 11.5 - 14.5 % PLATELET 318 233 - 644 K/uL MPV 9.0 8.9 - 12.9 FL  
 NRBC 0.0 0  WBC ABSOLUTE NRBC 0.00 0.00 - 0.01 K/uL NEUTROPHILS 62 32 - 75 % LYMPHOCYTES 31 12 - 49 % MONOCYTES 6 5 - 13 % EOSINOPHILS 1 0 - 7 % BASOPHILS 1 0 - 1 % IMMATURE GRANULOCYTES 0 0.0 - 0.5 % ABS. NEUTROPHILS 5.6 1.8 - 8.0 K/UL  
 ABS. LYMPHOCYTES 2.8 0.8 - 3.5 K/UL  
 ABS. MONOCYTES 0.5 0.0 - 1.0 K/UL  
 ABS. EOSINOPHILS 0.1 0.0 - 0.4 K/UL  
 ABS. BASOPHILS 0.1 0.0 - 0.1 K/UL  
 ABS. IMM. GRANS. 0.0 0.00 - 0.04 K/UL  
 DF AUTOMATED METABOLIC PANEL, COMPREHENSIVE Collection Time: 09/09/18  1:16 PM  
Result Value Ref Range Sodium 135 (L) 136 - 145 mmol/L  Potassium 2.2 (LL) 3.5 - 5.1 mmol/L  
 Chloride 97 97 - 108 mmol/L  
 CO2 25 21 - 32 mmol/L Anion gap 13 5 - 15 mmol/L Glucose 118 (H) 65 - 100 mg/dL BUN 8 6 - 20 MG/DL Creatinine 0.99 0.55 - 1.02 MG/DL  
 BUN/Creatinine ratio 8 (L) 12 - 20 GFR est AA >60 >60 ml/min/1.73m2 GFR est non-AA >60 >60 ml/min/1.73m2 Calcium 9.8 8.5 - 10.1 MG/DL Bilirubin, total 0.4 0.2 - 1.0 MG/DL  
 ALT (SGPT) 23 12 - 78 U/L  
 AST (SGOT) 21 15 - 37 U/L Alk. phosphatase 83 45 - 117 U/L Protein, total 9.1 (H) 6.4 - 8.2 g/dL Albumin 4.1 3.5 - 5.0 g/dL Globulin 5.0 (H) 2.0 - 4.0 g/dL A-G Ratio 0.8 (L) 1.1 - 2.2    
TROPONIN I Collection Time: 09/09/18  1:16 PM  
Result Value Ref Range Troponin-I, Qt. <0.05 <0.05 ng/mL LIPASE Collection Time: 09/09/18  1:16 PM  
Result Value Ref Range Lipase 226 73 - 393 U/L MAGNESIUM Collection Time: 09/09/18  1:16 PM  
Result Value Ref Range Magnesium 2.2 1.6 - 2.4 mg/dL URINALYSIS W/ REFLEX CULTURE Collection Time: 09/09/18  1:17 PM  
Result Value Ref Range Color YELLOW/STRAW Appearance CLEAR CLEAR Specific gravity 1.015 1.003 - 1.030    
 pH (UA) 7.5 5.0 - 8.0 Protein 30 (A) NEG mg/dL Glucose NEGATIVE  NEG mg/dL Ketone NEGATIVE  NEG mg/dL Bilirubin NEGATIVE  NEG Blood NEGATIVE  NEG Urobilinogen 0.2 0.2 - 1.0 EU/dL Nitrites NEGATIVE  NEG Leukocyte Esterase NEGATIVE  NEG    
 WBC 0-4 0 - 4 /hpf  
 RBC 0-5 0 - 5 /hpf Epithelial cells FEW FEW /lpf Bacteria NEGATIVE  NEG /hpf  
 UA:UC IF INDICATED CULTURE NOT INDICATED BY UA RESULT CNI    
EKG, 12 LEAD, INITIAL Collection Time: 09/09/18  1:59 PM  
Result Value Ref Range Ventricular Rate 74 BPM  
 Atrial Rate 74 BPM  
 P-R Interval 138 ms QRS Duration 88 ms Q-T Interval 438 ms QTC Calculation (Bezet) 486 ms Calculated P Axis 58 degrees Calculated R Axis 68 degrees Calculated T Axis 33 degrees Diagnosis Normal sinus rhythm Prolonged QT 
 When compared with ECG of 22-APR-2018 17:14, No significant change was found

## 2018-09-09 NOTE — ED NOTES
Attempted to call report to [phone] - RN would like a minute to look over chart and will call back. Transport order placed for patient at this time.

## 2018-09-09 NOTE — ED PROVIDER NOTES
EMERGENCY DEPARTMENT HISTORY AND PHYSICAL EXAM 
 
 
Date: 9/9/2018 Patient Name: Carmen Oviedo History of Presenting Illness Chief Complaint Patient presents with  Vomiting  
  x yesterday, 3-4 episodes x this morning, denies any chance of pregnancy, denies any urinary sx's History Provided By: Patient HPI: Carmen Oviedo, 39 y.o. female with PMHx significant for HTN, presents to the ED with cc of ongoing nausea and vomiting x 2 days. She reports associated chest wall pain only when she vomits, general abdominal pain, fever, chills, and diaphoresis since onset of symptoms. Pt is compliant with her BP medications. Pt denies being in contact with sick individuals. Pt denies hx of abdominal surgery. Pt denies hx of DM. Pt specifically denies diarrhea, hematemesis, dyspnea, and cough. SHx: +Tobacco (0.25 ppd), +EtOH (occ), +Illicit Drug (marijuana) There are no other complaints, changes, or physical findings at this time. PCP: Catarina Morrell MD 
 
Current Facility-Administered Medications Medication Dose Route Frequency Provider Last Rate Last Dose  potassium chloride SR (KLOR-CON 10) tablet 40 mEq  40 mEq Oral NOW Michael Herndon MD      
 potassium chloride 10 mEq in 100 ml IVPB  10 mEq IntraVENous Jaziel Garcia MD      
 
Current Outpatient Prescriptions Medication Sig Dispense Refill  amLODIPine (NORVASC) 5 mg tablet Take 5 mg by mouth every evening.  chlorthalidone (HYGROTEN) 25 mg tablet Take 25 mg by mouth every evening.  cetirizine (ZYRTEC) 10 mg tablet Take 10 mg by mouth every evening.  ibuprofen (ADVIL) 200 mg tablet Take 400 mg by mouth daily as needed ('Pain/Headache').  multivitamin, tx-iron-ca-min (THERA-M W/ IRON) 9 mg iron-400 mcg tab tablet Take 1 Tab by mouth every evening. Past History Past Medical History: 
Past Medical History:  
Diagnosis Date  Hypertension  Kidney anomaly, congenital   
  Tubal pregnancy Past Surgical History: 
Past Surgical History:  
Procedure Laterality Date  HX GYN  10/07/2013 cyst removed  HX HYSTERECTOMY  10/7/2013  
 partial hysterectomy Family History: 
Family History Problem Relation Age of Onset  Hypertension Mother  Hypertension Father  Hypertension Sister Social History: 
Social History Substance Use Topics  Smoking status: Current Some Day Smoker Packs/day: 0.25 Years: 25.00  Smokeless tobacco: Never Used Comment: 1-3 day  Alcohol use Yes Comment: occasionally/socially Allergies: 
No Known Allergies Review of Systems Review of Systems Constitutional: Positive for chills, diaphoresis and fever. Negative for fatigue. HENT: Negative for congestion, rhinorrhea and sore throat. Eyes: Negative for pain, discharge and visual disturbance. Respiratory: Negative for cough, chest tightness, shortness of breath and wheezing. Cardiovascular: Positive for chest pain (with vomiting). Negative for palpitations and leg swelling. Gastrointestinal: Positive for abdominal pain (general), nausea and vomiting. Negative for constipation and diarrhea. Genitourinary: Negative for dysuria, frequency and hematuria. Musculoskeletal: Negative for arthralgias, back pain and myalgias. Skin: Negative for rash. Neurological: Negative for dizziness, weakness, light-headedness and headaches. Psychiatric/Behavioral: Negative. Physical Exam  
Physical Exam  
Constitutional: She is oriented to person, place, and time. She appears well-developed and well-nourished. No distress. Uncomfortable appearing HENT:  
Head: Normocephalic and atraumatic. Eyes: EOM are normal. Right eye exhibits no discharge. Left eye exhibits no discharge. No scleral icterus. Neck: Normal range of motion. Neck supple. No tracheal deviation present. Cardiovascular: Normal rate, regular rhythm, normal heart sounds and intact distal pulses. Exam reveals no gallop and no friction rub. No murmur heard. Pulmonary/Chest: Effort normal and breath sounds normal. No respiratory distress. She has no wheezes. She has no rales. Abdominal: Soft. She exhibits no distension. There is tenderness (general). There is no rebound and no guarding. Musculoskeletal: Normal range of motion. She exhibits no edema. Lymphadenopathy:  
  She has no cervical adenopathy. Neurological: She is alert and oriented to person, place, and time. No focal neuro deficits Skin: Skin is warm and dry. No rash noted. Psychiatric: She has a normal mood and affect. Nursing note and vitals reviewed. Diagnostic Study Results Labs - Recent Results (from the past 12 hour(s)) CBC WITH AUTOMATED DIFF Collection Time: 09/09/18  1:16 PM  
Result Value Ref Range WBC 9.1 3.6 - 11.0 K/uL  
 RBC 4.41 3.80 - 5.20 M/uL  
 HGB 14.5 11.5 - 16.0 g/dL HCT 40.3 35.0 - 47.0 % MCV 91.4 80.0 - 99.0 FL  
 MCH 32.9 26.0 - 34.0 PG  
 MCHC 36.0 30.0 - 36.5 g/dL  
 RDW 13.1 11.5 - 14.5 % PLATELET 952 920 - 338 K/uL MPV 9.0 8.9 - 12.9 FL  
 NRBC 0.0 0  WBC ABSOLUTE NRBC 0.00 0.00 - 0.01 K/uL NEUTROPHILS 62 32 - 75 % LYMPHOCYTES 31 12 - 49 % MONOCYTES 6 5 - 13 % EOSINOPHILS 1 0 - 7 % BASOPHILS 1 0 - 1 % IMMATURE GRANULOCYTES 0 0.0 - 0.5 % ABS. NEUTROPHILS 5.6 1.8 - 8.0 K/UL  
 ABS. LYMPHOCYTES 2.8 0.8 - 3.5 K/UL  
 ABS. MONOCYTES 0.5 0.0 - 1.0 K/UL  
 ABS. EOSINOPHILS 0.1 0.0 - 0.4 K/UL  
 ABS. BASOPHILS 0.1 0.0 - 0.1 K/UL  
 ABS. IMM. GRANS. 0.0 0.00 - 0.04 K/UL  
 DF AUTOMATED METABOLIC PANEL, COMPREHENSIVE Collection Time: 09/09/18  1:16 PM  
Result Value Ref Range Sodium 135 (L) 136 - 145 mmol/L Potassium 2.2 (LL) 3.5 - 5.1 mmol/L Chloride 97 97 - 108 mmol/L  
 CO2 25 21 - 32 mmol/L  Anion gap 13 5 - 15 mmol/L  
 Glucose 118 (H) 65 - 100 mg/dL BUN 8 6 - 20 MG/DL Creatinine 0.99 0.55 - 1.02 MG/DL  
 BUN/Creatinine ratio 8 (L) 12 - 20 GFR est AA >60 >60 ml/min/1.73m2 GFR est non-AA >60 >60 ml/min/1.73m2 Calcium 9.8 8.5 - 10.1 MG/DL Bilirubin, total 0.4 0.2 - 1.0 MG/DL  
 ALT (SGPT) 23 12 - 78 U/L  
 AST (SGOT) 21 15 - 37 U/L Alk. phosphatase 83 45 - 117 U/L Protein, total 9.1 (H) 6.4 - 8.2 g/dL Albumin 4.1 3.5 - 5.0 g/dL Globulin 5.0 (H) 2.0 - 4.0 g/dL A-G Ratio 0.8 (L) 1.1 - 2.2    
TROPONIN I Collection Time: 09/09/18  1:16 PM  
Result Value Ref Range Troponin-I, Qt. <0.05 <0.05 ng/mL LIPASE Collection Time: 09/09/18  1:16 PM  
Result Value Ref Range Lipase 226 73 - 393 U/L  
URINALYSIS W/ REFLEX CULTURE Collection Time: 09/09/18  1:17 PM  
Result Value Ref Range Color YELLOW/STRAW Appearance CLEAR CLEAR Specific gravity 1.015 1.003 - 1.030    
 pH (UA) 7.5 5.0 - 8.0 Protein 30 (A) NEG mg/dL Glucose NEGATIVE  NEG mg/dL Ketone NEGATIVE  NEG mg/dL Bilirubin NEGATIVE  NEG Blood NEGATIVE  NEG Urobilinogen 0.2 0.2 - 1.0 EU/dL Nitrites NEGATIVE  NEG Leukocyte Esterase NEGATIVE  NEG    
 WBC 0-4 0 - 4 /hpf  
 RBC 0-5 0 - 5 /hpf Epithelial cells FEW FEW /lpf Bacteria NEGATIVE  NEG /hpf  
 UA:UC IF INDICATED CULTURE NOT INDICATED BY UA RESULT CNI    
EKG, 12 LEAD, INITIAL Collection Time: 09/09/18  1:59 PM  
Result Value Ref Range Ventricular Rate 74 BPM  
 Atrial Rate 74 BPM  
 P-R Interval 138 ms QRS Duration 88 ms Q-T Interval 438 ms QTC Calculation (Bezet) 486 ms Calculated P Axis 58 degrees Calculated R Axis 68 degrees Calculated T Axis 33 degrees Diagnosis Normal sinus rhythm Prolonged QT When compared with ECG of 22-APR-2018 17:14, No significant change was found Radiologic Studies -  
XR ABD ACUTE W 1 V CHEST    (Results Pending) CXR Results  (Last 48 hours) None Medical Decision Making I am the first provider for this patient. I reviewed the vital signs, available nursing notes, past medical history, past surgical history, family history and social history. Vital Signs-Reviewed the patient's vital signs. Patient Vitals for the past 12 hrs: 
 Temp Pulse Resp BP SpO2  
09/09/18 1403 - - - - 100 % 09/09/18 1158 97.9 °F (36.6 °C) 95 18 (!) 157/124 100 % EKG interpretation: (Preliminary) 13:59 Rhythm: normal sinus rhythm; and regular . Rate (approx.): 74; Axis: normal; SC interval: normal; QRS interval: normal ; ST/T wave: normal; Other findings: normal ekg. Written by Chapin Sawyer ED Scribdanna, as dictated by Larissa Amin MD. Records Reviewed: Nursing Notes and Old Medical Records Provider Notes (Medical Decision Making): DDx: viral syndrome, gastritis, GERD, PUD, dehydration, electrolyte abnormality, cholecystitis, pancreatitis, appendicitis, obstruction, ilias Disposition Note: 
Pt is a 39 yr old female who presents to ED w/ vomiting and generalized abdominal pain. Generalized abdominal tenderness on exam. Wbc 9.1. Low suspicion for acute intraabdominal process. Potassium 2.2. Will treat with PO/IV potassium and admit for further management. ED Course:  
Initial assessment performed. The patients presenting problems have been discussed, and they are in agreement with the care plan formulated and outlined with them. I have encouraged them to ask questions as they arise throughout their visit. PROGRESS NOTE:  
2:13 PM 
Notified by RN that pt's potassium is 2.2. CRITICAL CARE NOTE : 
2:14 PM 
 
IMPENDING DETERIORATION -Metabolic ASSOCIATED RISK FACTORS - hypokalemia MANAGEMENT- Bedside Assessment and Supervision of Care INTERPRETATION -  ECG and labs INTERVENTIONS - IV and oral potassium CASE REVIEW - Hospitalist and Nursing TREATMENT RESPONSE -Stable PERFORMED BY - Self NOTES: 
 I have spent 35 minutes of critical care time involved in lab review, consultations with specialist, family decision- making, bedside attention and documentation. During this entire length of time I was immediately available to the patient . Gemma Chilel MD 
 
Consult Note: 
2:59 PM 
Gemma Chilel MD spoke with Dr. Pablo George, Specialty: Hospitalist 
Discussed pt's hx, disposition, and available diagnostic and imaging results. Reviewed care plans. Consultant agrees with plans as outlined. Will admit pt to hospital.  
 
Admit Note: 
3:00 PM 
Pt is being admitted by Dr. Pablo George. The results of their tests and reason(s) for their admission have been discussed with pt and/or available family. They convey agreement and understanding for the need to be admitted and for admission diagnosis. PLAN: 
1. Will admit pt to Hospitalist.  
 
Diagnosis Clinical Impression: 1. Non-intractable vomiting with nausea, unspecified vomiting type 2. Hypokalemia Attestations: This note is prepared by [employer], acting as Scribe for MD Gemma Laurent MD: The scribe's documentation has been prepared under my direction and personally reviewed by me in its entirety. I confirm that the note above accurately reflects all work, treatment, procedures, and medical decision making performed by me.

## 2018-09-09 NOTE — ED NOTES
1320- Assumed care of patient. Patient is alert and oriented, does not appear to be in distress. Patient ambulatory to ED with c/o abdominal pain and severe vomiting over past few days. Monitor x 2 applied. Patient positioned for comfort with call bell within reach. Side rails up for safety. Dr. Claudette Bugler has evaluated 1420- ED tech bedside for ultrasound guided PIV to administer IV potassium branden. Guerita Peralta

## 2018-09-09 NOTE — ED NOTES
TRANSFER - OUT REPORT: 
 
Verbal report given to JOVANA Banerjee (name) on Daryn Mistry  being transferred to 2225(unit) for routine progression of care Report consisted of patients Situation, Background, Assessment and  
Recommendations(SBAR). Information from the following report(s) SBAR, Kardex, ED Summary, [facility] and Recent Results was reviewed with the receiving nurse. Lines:  
Peripheral IV 09/09/18 Right Hand (Active) Site Assessment Clean, dry, & intact 9/9/2018  1:09 PM  
Phlebitis Assessment 0 9/9/2018  1:09 PM  
Infiltration Assessment 0 9/9/2018  1:09 PM  
Dressing Status Clean, dry, & intact 9/9/2018  1:09 PM  
Hub Color/Line Status Blue;Capped;Flushed 9/9/2018  1:09 PM  
   
Peripheral IV 09/09/18 Right Antecubital (Active) Site Assessment Clean, dry, & intact 9/9/2018  3:00 PM  
Phlebitis Assessment 0 9/9/2018  3:00 PM  
Infiltration Assessment 0 9/9/2018  3:00 PM  
Dressing Status Clean, dry, & intact 9/9/2018  3:00 PM  
Hub Color/Line Status Pink;Capped;Flushed 9/9/2018  3:00 PM  
  
 
Opportunity for questions and clarification was provided. Patient transported with: 
 I Just Shared

## 2018-09-09 NOTE — ED NOTES
Assumed care of pt from Cassie Turner RN. Pt resting comfortably with side rails up and call bell in reach. Pt aware of plan to be admitted to the hospital.  Pt requesting additional nausea and pain medication - Maura Halsted, MD informed. Will wait to administer PO K+ until nausea better controlled.

## 2018-09-10 LAB
ANION GAP SERPL CALC-SCNC: 13 MMOL/L (ref 5–15)
ATRIAL RATE: 74 BPM
BASOPHILS # BLD: 0 K/UL (ref 0–0.1)
BASOPHILS NFR BLD: 0 % (ref 0–1)
BUN SERPL-MCNC: 10 MG/DL (ref 6–20)
BUN/CREAT SERPL: 11 (ref 12–20)
CALCIUM SERPL-MCNC: 9.4 MG/DL (ref 8.5–10.1)
CALCULATED P AXIS, ECG09: 58 DEGREES
CALCULATED R AXIS, ECG10: 68 DEGREES
CALCULATED T AXIS, ECG11: 33 DEGREES
CHLORIDE SERPL-SCNC: 98 MMOL/L (ref 97–108)
CO2 SERPL-SCNC: 24 MMOL/L (ref 21–32)
CREAT SERPL-MCNC: 0.9 MG/DL (ref 0.55–1.02)
DIAGNOSIS, 93000: NORMAL
DIFFERENTIAL METHOD BLD: ABNORMAL
EOSINOPHIL # BLD: 0 K/UL (ref 0–0.4)
EOSINOPHIL NFR BLD: 0 % (ref 0–7)
ERYTHROCYTE [DISTWIDTH] IN BLOOD BY AUTOMATED COUNT: 13.4 % (ref 11.5–14.5)
GLUCOSE SERPL-MCNC: 130 MG/DL (ref 65–100)
HCT VFR BLD AUTO: 38.3 % (ref 35–47)
HGB BLD-MCNC: 13.9 G/DL (ref 11.5–16)
IMM GRANULOCYTES # BLD: 0.1 K/UL (ref 0–0.04)
IMM GRANULOCYTES NFR BLD AUTO: 1 % (ref 0–0.5)
LYMPHOCYTES # BLD: 2 K/UL (ref 0.8–3.5)
LYMPHOCYTES NFR BLD: 18 % (ref 12–49)
MAGNESIUM SERPL-MCNC: 2.2 MG/DL (ref 1.6–2.4)
MCH RBC QN AUTO: 33.5 PG (ref 26–34)
MCHC RBC AUTO-ENTMCNC: 36.3 G/DL (ref 30–36.5)
MCV RBC AUTO: 92.3 FL (ref 80–99)
MONOCYTES # BLD: 0.6 K/UL (ref 0–1)
MONOCYTES NFR BLD: 5 % (ref 5–13)
NEUTS SEG # BLD: 8.3 K/UL (ref 1.8–8)
NEUTS SEG NFR BLD: 76 % (ref 32–75)
NRBC # BLD: 0 K/UL (ref 0–0.01)
NRBC BLD-RTO: 0 PER 100 WBC
P-R INTERVAL, ECG05: 138 MS
PHOSPHATE SERPL-MCNC: 2.2 MG/DL (ref 2.6–4.7)
PLATELET # BLD AUTO: 368 K/UL (ref 150–400)
PMV BLD AUTO: 9.1 FL (ref 8.9–12.9)
POTASSIUM SERPL-SCNC: 2.8 MMOL/L (ref 3.5–5.1)
Q-T INTERVAL, ECG07: 438 MS
QRS DURATION, ECG06: 88 MS
QTC CALCULATION (BEZET), ECG08: 486 MS
RBC # BLD AUTO: 4.15 M/UL (ref 3.8–5.2)
SODIUM SERPL-SCNC: 135 MMOL/L (ref 136–145)
VENTRICULAR RATE, ECG03: 74 BPM
WBC # BLD AUTO: 11 K/UL (ref 3.6–11)

## 2018-09-10 PROCEDURE — 74011000250 HC RX REV CODE- 250: Performed by: HOSPITALIST

## 2018-09-10 PROCEDURE — 84100 ASSAY OF PHOSPHORUS: CPT | Performed by: NURSE PRACTITIONER

## 2018-09-10 PROCEDURE — 83735 ASSAY OF MAGNESIUM: CPT | Performed by: NURSE PRACTITIONER

## 2018-09-10 PROCEDURE — 36415 COLL VENOUS BLD VENIPUNCTURE: CPT | Performed by: INTERNAL MEDICINE

## 2018-09-10 PROCEDURE — 74011000250 HC RX REV CODE- 250: Performed by: NURSE PRACTITIONER

## 2018-09-10 PROCEDURE — 80048 BASIC METABOLIC PNL TOTAL CA: CPT | Performed by: INTERNAL MEDICINE

## 2018-09-10 PROCEDURE — 74011250637 HC RX REV CODE- 250/637: Performed by: INTERNAL MEDICINE

## 2018-09-10 PROCEDURE — 65660000000 HC RM CCU STEPDOWN

## 2018-09-10 PROCEDURE — 94760 N-INVAS EAR/PLS OXIMETRY 1: CPT

## 2018-09-10 PROCEDURE — 74011250636 HC RX REV CODE- 250/636: Performed by: NURSE PRACTITIONER

## 2018-09-10 PROCEDURE — 74011250636 HC RX REV CODE- 250/636: Performed by: HOSPITALIST

## 2018-09-10 PROCEDURE — 74011250636 HC RX REV CODE- 250/636: Performed by: INTERNAL MEDICINE

## 2018-09-10 PROCEDURE — 85025 COMPLETE CBC W/AUTO DIFF WBC: CPT | Performed by: INTERNAL MEDICINE

## 2018-09-10 RX ORDER — POTASSIUM CHLORIDE 7.45 MG/ML
10 INJECTION INTRAVENOUS
Status: DISCONTINUED | OUTPATIENT
Start: 2018-09-10 | End: 2018-09-10

## 2018-09-10 RX ADMIN — ACETAMINOPHEN 500 MG: 500 TABLET ORAL at 13:04

## 2018-09-10 RX ADMIN — Medication 10 ML: at 17:04

## 2018-09-10 RX ADMIN — ONDANSETRON 4 MG: 2 INJECTION INTRAMUSCULAR; INTRAVENOUS at 22:16

## 2018-09-10 RX ADMIN — Medication 10 ML: at 21:05

## 2018-09-10 RX ADMIN — HYDRALAZINE HYDROCHLORIDE 10 MG: 20 INJECTION INTRAMUSCULAR; INTRAVENOUS at 04:08

## 2018-09-10 RX ADMIN — NITROGLYCERIN 1 INCH: 20 OINTMENT TOPICAL at 05:01

## 2018-09-10 RX ADMIN — POTASSIUM CHLORIDE 10 MEQ: 10 INJECTION, SOLUTION INTRAVENOUS at 09:39

## 2018-09-10 RX ADMIN — NITROGLYCERIN 1 INCH: 20 OINTMENT TOPICAL at 13:04

## 2018-09-10 RX ADMIN — ONDANSETRON 4 MG: 2 INJECTION INTRAMUSCULAR; INTRAVENOUS at 17:04

## 2018-09-10 RX ADMIN — POTASSIUM CHLORIDE 10 MEQ: 10 INJECTION, SOLUTION INTRAVENOUS at 08:16

## 2018-09-10 RX ADMIN — POTASSIUM CHLORIDE 10 MEQ: 10 INJECTION, SOLUTION INTRAVENOUS at 05:35

## 2018-09-10 RX ADMIN — PROCHLORPERAZINE EDISYLATE 10 MG: 5 INJECTION INTRAMUSCULAR; INTRAVENOUS at 05:35

## 2018-09-10 RX ADMIN — ENOXAPARIN SODIUM 40 MG: 40 INJECTION, SOLUTION INTRAVENOUS; SUBCUTANEOUS at 17:03

## 2018-09-10 RX ADMIN — ONDANSETRON 4 MG: 2 INJECTION INTRAMUSCULAR; INTRAVENOUS at 08:18

## 2018-09-10 RX ADMIN — Medication 10 ML: at 05:02

## 2018-09-10 RX ADMIN — ACETAMINOPHEN 500 MG: 500 TABLET ORAL at 19:30

## 2018-09-10 RX ADMIN — PROCHLORPERAZINE EDISYLATE 10 MG: 5 INJECTION INTRAMUSCULAR; INTRAVENOUS at 11:24

## 2018-09-10 RX ADMIN — POTASSIUM PHOSPHATE, MONOBASIC AND POTASSIUM PHOSPHATE, DIBASIC: 224; 236 INJECTION, SOLUTION INTRAVENOUS at 12:51

## 2018-09-10 RX ADMIN — POTASSIUM CHLORIDE 10 MEQ: 10 INJECTION, SOLUTION INTRAVENOUS at 06:51

## 2018-09-10 RX ADMIN — ONDANSETRON 4 MG: 2 INJECTION INTRAMUSCULAR; INTRAVENOUS at 03:43

## 2018-09-10 NOTE — PROGRESS NOTES
0700: Bedside report received from JOVANA Waller. 
 
1900:  
Bedside shift change report given to JOVANA Waller (oncoming nurse). Report included the following information SBAR, Kardex, Intake/Output, MAR, Recent Results and Cardiac Rhythm NSR/Sinus Tach. SHIFT SUMMARY: 
 
 
 
 
 
[address] NURSING NOTE Admission Date 9/9/2018 Admission Diagnosis Hyperemesis Consults None Cardiac Monitoring [x] Yes [] No  
  
Purposeful Hourly Rounding [x] Yes   
Dhara Score Total Score: 2 Dhara score 3 or > [] Bed Alarm [] Avasys [] 1:1 sitter [] Patient refused (Signed refusal form in chart) George Score George Score: 21 George score 14 or < [] PMT consult [] Wound Care consult  
 []  Specialty bed  [] Nutrition consult Influenza Vaccine Received Flu Vaccine for Current Season (usually Sept-March): Not Flu Season Oxygen needs? [x] Room air Oxygen @  []1L    []2L    []3L   []4L    []5L   []6L via NC Chronic home O2 use? [] Yes [x] No 
Perform O2 challenge test and document in progress note using [employer] (.Home[employer]) Last bowel movement Urinary Catheter LDAs Peripheral IV 09/09/18 Right Hand (Active) Site Assessment Clean, dry, & intact 9/10/2018  7:59 AM  
Phlebitis Assessment 0 9/10/2018  7:59 AM  
Infiltration Assessment 0 9/10/2018  7:59 AM  
Dressing Status Clean, dry, & intact 9/10/2018  7:59 AM  
Dressing Type Transparent;Tape 9/10/2018  7:59 AM  
Hub Color/Line Status Blue;Flushed 9/10/2018  7:59 AM  
   
Peripheral IV 09/09/18 Right Antecubital (Active) Site Assessment Clean, dry, & intact 9/10/2018  7:59 AM  
Phlebitis Assessment 0 9/10/2018  7:59 AM  
Infiltration Assessment 0 9/10/2018  7:59 AM  
Dressing Status Clean, dry, & intact 9/10/2018  7:59 AM  
Dressing Type Transparent;Tape 9/10/2018  7:59 AM  
Hub Color/Line Status Pink; Infusing 9/10/2018  7:59 AM  
                  
  
Readmission Risk Assessment Tool Score Low Risk 0      
Total Score Criteria that do not apply:  
 Has Seen PCP in Last 6 Months (Yes=3, No=0) . Living with Significant Other. Assisted Living. LTAC. SNF. or  
Rehab Patient Length of Stay (>5 days = 3) IP Visits Last 12 Months (1-3=4, 4=9, >4=11) Pt. Coverage (Medicare=5 , Medicaid, or Self-Pay=4) Charlson Comorbidity Score (Age + Comorbid Conditions) Expected Length of Stay 2d 16h Actual Length of Stay 1

## 2018-09-10 NOTE — PROGRESS NOTES
0505 paged on call doctor concerning nausea with barely any relief with Zofran, and potassium of 2.8. 
0507 Spoke to Dr. Román Lock, new orders received.

## 2018-09-10 NOTE — PROGRESS NOTES
Hospitalist Progress Note NAME: Kamilla Mena :  1975 MRN:  [id number] Assessment / Plan: Hypokalemia due to abdominal pain and hyperemesis syndrome 
-likely related to [facility] use. UDS pending, however pt admits to last use 2 days ago prior to onset of symptoms. -will order KUB to r/o obstruction, ileus from low K 
-antiemetics prn 
-IVF with KCL 
-check mag 
-NPO 
-tylenol for fever/pain 9/10: 
K remains low this morning. Will continue to replace. Pt still having N/V. Will try to feed CLD. Anti-emetics PRN 
  
HTN, uncontrolled 
-unable to tolerate PO meds 
-hydralazine prn 
  
Tobacco use 
-nicotine patch, counseled 
  
25.0 - 29.9 Overweight / Body mass index is 27.4 kg/(m^2). Code status: Full Prophylaxis: Lovenox Recommended Disposition: Home w/Family Subjective: Chief Complaint / Reason for Physician Visit Still complaining of nausea and vomiting. Discussed with RN events overnight. Review of Systems: 
Symptom Y/N Comments  Symptom Y/N Comments Fever/Chills n   Chest Pain n   
Poor Appetite y   Edema Cough n   Abdominal Pain y Sputum    Joint Pain SOB/ABRAHAM    Pruritis/Rash Nausea/vomit y   Tolerating PT/OT Diarrhea    Tolerating Diet n   
Constipation    Other Could NOT obtain due to:   
 
Objective: VITALS:  
Last 24hrs VS reviewed since prior progress note. Most recent are: 
Patient Vitals for the past 24 hrs: 
 Temp Pulse Resp BP SpO2  
09/10/18 0811 98.2 °F (36.8 °C) (!) 103 - 155/90 98 % 09/10/18 0539 - (!) 103 - 129/75 -  
09/10/18 0501 - - - (!) 166/104 -  
09/10/18 0346 98.3 °F (36.8 °C) 93 18 (!) 183/128 100 % 18 2316 - - - (!) 150/98 -  
18 2231 97.7 °F (36.5 °C) 91 16 (!) 159/102 100 % 18 2151 - - - (!) 179/93 -  
18 2044 - - - (!) 173/96 -  
18 1933 98.2 °F (36.8 °C) 92 16 (!) 171/97 100 % 18 1702 98 °F (36.7 °C) 79 20 153/89 100 % 09/09/18 1615 98.2 °F (36.8 °C) 73 22 (!) 179/106 100 % 09/09/18 1614 - 81 - (!) 179/106 -  
09/09/18 1530 - 77 10 (!) 189/109 100 % 09/09/18 1403 - - - - 100 % 09/09/18 1158 97.9 °F (36.6 °C) 95 18 (!) 157/124 100 % Intake/Output Summary (Last 24 hours) at 09/10/18 1054 Last data filed at 09/10/18 1679 Gross per 24 hour Intake            957.5 ml Output                0 ml Net            957.5 ml PHYSICAL EXAM: 
General: Alert, cooperative, in mild distress due to nausea EENT:  EOMI. Anicteric sclerae. MMM Resp:  CTA bilaterally, no wheezing or rales. No accessory muscle use CV:  Regular  rhythm,  No edema GI:  Soft, Non distended, Non tender.  +Bowel sounds Neurologic:  Alert and oriented X 3, normal speech, Psych:   Good insight. Not anxious nor agitated Skin:  No rashes. No jaundice Reviewed most current lab test results and cultures  YES Reviewed most current radiology test results   YES Review and summation of old records today    NO Reviewed patient's current orders and MAR    YES 
PMH/ reviewed - no change compared to H&P 
________________________________________________________________________ Care Plan discussed with: 
  Comments Patient x Family RN x Care Manager Consultant Multidiciplinary team rounds were held today with , nursing, pharmacist and clinical coordinator. Patient's plan of care was discussed; medications were reviewed and discharge planning was addressed. ________________________________________________________________________ Total NON critical care TIME:  25   Minutes Total CRITICAL CARE TIME Spent:   Minutes non procedure based Comments >50% of visit spent in counseling and coordination of care    
________________________________________________________________________ Makeda More, MD  
 
Procedures: see electronic medical records for all procedures/Xrays and details which were not copied into this note but were reviewed prior to creation of Plan. LABS: 
I reviewed today's most current labs and imaging studies. Pertinent labs include: 
Recent Labs  
   09/10/18 
 0408  09/09/18 
 1316 WBC  11.0  9.1 HGB  13.9  14.5 HCT  38.3  40.3 PLT  368  380 Recent Labs  
   09/10/18 
 0408  09/09/18 
 1316 NA  135*  135*  
K  2.8*  2.2*  
CL  98  97 CO2  24  25 GLU  130*  118* BUN  10  8 CREA  0.90  0.99 CA  9.4  9.8 MG  2.2  2.2 PHOS  2.2*   --   
ALB   --   4.1 TBILI   --   0.4 SGOT   --   21 ALT   --   23 Signed: Kang Kelsey MD

## 2018-09-11 LAB
ANION GAP SERPL CALC-SCNC: 11 MMOL/L (ref 5–15)
BASOPHILS # BLD: 0 K/UL (ref 0–0.1)
BASOPHILS NFR BLD: 0 % (ref 0–1)
BUN SERPL-MCNC: 9 MG/DL (ref 6–20)
BUN/CREAT SERPL: 12 (ref 12–20)
CALCIUM SERPL-MCNC: 8.9 MG/DL (ref 8.5–10.1)
CHLORIDE SERPL-SCNC: 99 MMOL/L (ref 97–108)
CO2 SERPL-SCNC: 23 MMOL/L (ref 21–32)
CREAT SERPL-MCNC: 0.74 MG/DL (ref 0.55–1.02)
DIFFERENTIAL METHOD BLD: ABNORMAL
EOSINOPHIL # BLD: 0 K/UL (ref 0–0.4)
EOSINOPHIL NFR BLD: 0 % (ref 0–7)
ERYTHROCYTE [DISTWIDTH] IN BLOOD BY AUTOMATED COUNT: 13.6 % (ref 11.5–14.5)
GLUCOSE SERPL-MCNC: 107 MG/DL (ref 65–100)
HCT VFR BLD AUTO: 38.2 % (ref 35–47)
HGB BLD-MCNC: 13.5 G/DL (ref 11.5–16)
IMM GRANULOCYTES # BLD: 0.1 K/UL (ref 0–0.04)
IMM GRANULOCYTES NFR BLD AUTO: 1 % (ref 0–0.5)
LYMPHOCYTES # BLD: 3.1 K/UL (ref 0.8–3.5)
LYMPHOCYTES NFR BLD: 26 % (ref 12–49)
MCH RBC QN AUTO: 32.7 PG (ref 26–34)
MCHC RBC AUTO-ENTMCNC: 35.3 G/DL (ref 30–36.5)
MCV RBC AUTO: 92.5 FL (ref 80–99)
MONOCYTES # BLD: 1.1 K/UL (ref 0–1)
MONOCYTES NFR BLD: 9 % (ref 5–13)
NEUTS SEG # BLD: 7.9 K/UL (ref 1.8–8)
NEUTS SEG NFR BLD: 65 % (ref 32–75)
NRBC # BLD: 0 K/UL (ref 0–0.01)
NRBC BLD-RTO: 0 PER 100 WBC
PLATELET # BLD AUTO: 352 K/UL (ref 150–400)
PMV BLD AUTO: 9 FL (ref 8.9–12.9)
POTASSIUM SERPL-SCNC: 2.8 MMOL/L (ref 3.5–5.1)
RBC # BLD AUTO: 4.13 M/UL (ref 3.8–5.2)
SODIUM SERPL-SCNC: 133 MMOL/L (ref 136–145)
WBC # BLD AUTO: 12.2 K/UL (ref 3.6–11)

## 2018-09-11 PROCEDURE — 93005 ELECTROCARDIOGRAM TRACING: CPT

## 2018-09-11 PROCEDURE — 74011250636 HC RX REV CODE- 250/636: Performed by: INTERNAL MEDICINE

## 2018-09-11 PROCEDURE — 94760 N-INVAS EAR/PLS OXIMETRY 1: CPT

## 2018-09-11 PROCEDURE — 74011250637 HC RX REV CODE- 250/637: Performed by: INTERNAL MEDICINE

## 2018-09-11 PROCEDURE — 36415 COLL VENOUS BLD VENIPUNCTURE: CPT | Performed by: GENERAL ACUTE CARE HOSPITAL

## 2018-09-11 PROCEDURE — 85025 COMPLETE CBC W/AUTO DIFF WBC: CPT | Performed by: GENERAL ACUTE CARE HOSPITAL

## 2018-09-11 PROCEDURE — 74011000250 HC RX REV CODE- 250: Performed by: HOSPITALIST

## 2018-09-11 PROCEDURE — 65660000000 HC RM CCU STEPDOWN

## 2018-09-11 PROCEDURE — 80048 BASIC METABOLIC PNL TOTAL CA: CPT | Performed by: GENERAL ACUTE CARE HOSPITAL

## 2018-09-11 PROCEDURE — 74011250636 HC RX REV CODE- 250/636: Performed by: HOSPITALIST

## 2018-09-11 PROCEDURE — 74011000250 HC RX REV CODE- 250: Performed by: INTERNAL MEDICINE

## 2018-09-11 RX ORDER — AMLODIPINE BESYLATE 5 MG/1
10 TABLET ORAL DAILY
Status: DISCONTINUED | OUTPATIENT
Start: 2018-09-11 | End: 2018-09-12 | Stop reason: HOSPADM

## 2018-09-11 RX ORDER — POTASSIUM CHLORIDE 1.5 G/1.77G
40 POWDER, FOR SOLUTION ORAL
Status: DISCONTINUED | OUTPATIENT
Start: 2018-09-11 | End: 2018-09-11

## 2018-09-11 RX ORDER — HYDROCHLOROTHIAZIDE 25 MG/1
25 TABLET ORAL DAILY
Status: DISCONTINUED | OUTPATIENT
Start: 2018-09-12 | End: 2018-09-12 | Stop reason: HOSPADM

## 2018-09-11 RX ORDER — POTASSIUM CHLORIDE 750 MG/1
10 TABLET, FILM COATED, EXTENDED RELEASE ORAL DAILY
Qty: 30 TAB | Refills: 0 | Status: SHIPPED | OUTPATIENT
Start: 2018-09-11 | End: 2018-09-21

## 2018-09-11 RX ORDER — CHLORTHALIDONE 25 MG/1
25 TABLET ORAL EVERY EVENING
Qty: 30 TAB | Refills: 0 | Status: SHIPPED | OUTPATIENT
Start: 2018-09-11 | End: 2018-09-18 | Stop reason: SDUPTHER

## 2018-09-11 RX ORDER — AMLODIPINE BESYLATE 5 MG/1
5 TABLET ORAL EVERY EVENING
Qty: 30 TAB | Refills: 0 | Status: SHIPPED | OUTPATIENT
Start: 2018-09-11 | End: 2018-09-12

## 2018-09-11 RX ORDER — POTASSIUM CHLORIDE 1.5 G/1.77G
40 POWDER, FOR SOLUTION ORAL 2 TIMES DAILY WITH MEALS
Status: COMPLETED | OUTPATIENT
Start: 2018-09-11 | End: 2018-09-11

## 2018-09-11 RX ORDER — LANOLIN ALCOHOL/MO/W.PET/CERES
3 CREAM (GRAM) TOPICAL
Status: DISCONTINUED | OUTPATIENT
Start: 2018-09-11 | End: 2018-09-12 | Stop reason: HOSPADM

## 2018-09-11 RX ORDER — METOPROLOL TARTRATE 25 MG/1
12.5 TABLET, FILM COATED ORAL EVERY 12 HOURS
Status: DISCONTINUED | OUTPATIENT
Start: 2018-09-11 | End: 2018-09-12 | Stop reason: HOSPADM

## 2018-09-11 RX ADMIN — Medication 10 ML: at 15:51

## 2018-09-11 RX ADMIN — HYDRALAZINE HYDROCHLORIDE 10 MG: 20 INJECTION INTRAMUSCULAR; INTRAVENOUS at 15:54

## 2018-09-11 RX ADMIN — ACETAMINOPHEN 500 MG: 500 TABLET ORAL at 10:22

## 2018-09-11 RX ADMIN — ONDANSETRON 4 MG: 2 INJECTION INTRAMUSCULAR; INTRAVENOUS at 19:50

## 2018-09-11 RX ADMIN — MELATONIN TAB 3 MG 3 MG: 3 TAB at 22:09

## 2018-09-11 RX ADMIN — ONDANSETRON 4 MG: 2 INJECTION INTRAMUSCULAR; INTRAVENOUS at 06:06

## 2018-09-11 RX ADMIN — ACETAMINOPHEN 500 MG: 500 TABLET ORAL at 04:03

## 2018-09-11 RX ADMIN — AMLODIPINE BESYLATE 10 MG: 5 TABLET ORAL at 12:53

## 2018-09-11 RX ADMIN — PROCHLORPERAZINE EDISYLATE 10 MG: 5 INJECTION INTRAMUSCULAR; INTRAVENOUS at 08:08

## 2018-09-11 RX ADMIN — HYDRALAZINE HYDROCHLORIDE 10 MG: 20 INJECTION INTRAMUSCULAR; INTRAVENOUS at 08:07

## 2018-09-11 RX ADMIN — METOPROLOL TARTRATE 12.5 MG: 25 TABLET ORAL at 17:46

## 2018-09-11 RX ADMIN — NITROGLYCERIN 1 INCH: 20 OINTMENT TOPICAL at 04:33

## 2018-09-11 RX ADMIN — ONDANSETRON 4 MG: 2 INJECTION INTRAMUSCULAR; INTRAVENOUS at 12:52

## 2018-09-11 RX ADMIN — ACETAMINOPHEN 500 MG: 500 TABLET ORAL at 20:37

## 2018-09-11 RX ADMIN — ENOXAPARIN SODIUM 40 MG: 40 INJECTION, SOLUTION INTRAVENOUS; SUBCUTANEOUS at 15:51

## 2018-09-11 RX ADMIN — POTASSIUM PHOSPHATE, MONOBASIC AND POTASSIUM PHOSPHATE, DIBASIC: 224; 236 INJECTION, SOLUTION INTRAVENOUS at 09:50

## 2018-09-11 RX ADMIN — ONDANSETRON 4 MG: 2 INJECTION INTRAMUSCULAR; INTRAVENOUS at 23:47

## 2018-09-11 RX ADMIN — POTASSIUM CHLORIDE 40 MEQ: 1.5 POWDER, FOR SOLUTION ORAL at 09:49

## 2018-09-11 RX ADMIN — PROCHLORPERAZINE EDISYLATE 10 MG: 5 INJECTION INTRAMUSCULAR; INTRAVENOUS at 01:04

## 2018-09-11 RX ADMIN — Medication 10 ML: at 22:10

## 2018-09-11 RX ADMIN — Medication 10 ML: at 05:04

## 2018-09-11 RX ADMIN — POTASSIUM CHLORIDE 40 MEQ: 1.5 POWDER, FOR SOLUTION ORAL at 15:53

## 2018-09-11 NOTE — PROGRESS NOTES
[address]. 
 
 
 
 
 
9/12/2018 RE: Mega Hammond (YOB: 1975) To Whom it May Concern: This is to certify that Mega Hammond was admitted to [facility] from 9/9/2018 to 9/12/2018 for treatment of a medical illness. . 
 
Please feel free to contact my office if you have any questions or concerns. Thank you for your assistance in this matter. Sincerely, Yolanda Prather MD 
[phone] office

## 2018-09-11 NOTE — PROGRESS NOTES
Bedside shift change report given to JOVANA Nielsen (oncoming nurse). Report included the following information SBAR. SHIFT SUMMARY: 
 
 
 
 
 
[address] NURSING NOTE Admission Date 9/9/2018 Admission Diagnosis Hyperemesis Consults IP CONSULT TO CARDIOLOGY Cardiac Monitoring [x] Yes [] No  
  
Purposeful Hourly Rounding [x] Yes   
Dhara Score Total Score: 2 Dhara score 3 or > [] Bed Alarm [] Avasys [] 1:1 sitter [] Patient refused (Signed refusal form in chart) George Score George Score: 22 George score 14 or < [] PMT consult [] Wound Care consult  
 []  Specialty bed  [] Nutrition consult Influenza Vaccine Received Flu Vaccine for Current Season (usually Sept-March): Not Flu Season Oxygen needs? [x] Room air Oxygen @  []1L    []2L    []3L   []4L    []5L   []6L via NC Chronic home O2 use? [] Yes [x] No 
Perform O2 challenge test and document in progress note using smartphrase (.Homeoxygen) Last bowel movement Last Bowel Movement Date: 09/09/18 Urinary Catheter LDAs Peripheral IV 09/11/18 Left Antecubital (Active) Site Assessment Clean, dry, & intact 9/11/2018  7:36 PM  
Phlebitis Assessment 0 9/11/2018  5:54 PM  
Infiltration Assessment 0 9/11/2018  5:54 PM  
Dressing Status Clean, dry, & intact 9/11/2018  5:54 PM  
Dressing Type Transparent 9/11/2018  5:54 PM  
Hub Color/Line Status Pink;Flushed 9/11/2018  5:54 PM  
                  
  
Readmission Risk Assessment Tool Score Low Risk   
      
 0 Total Score Criteria that do not apply:  
 Has Seen PCP in Last 6 Months (Yes=3, No=0) . Living with Significant Other. Assisted Living. LTAC. SNF. or  
Rehab Patient Length of Stay (>5 days = 3) IP Visits Last 12 Months (1-3=4, 4=9, >4=11) Pt. Coverage (Medicare=5 , Medicaid, or Self-Pay=4) Charlson Comorbidity Score (Age + Comorbid Conditions) Expected Length of Stay 2d 16h Actual Length of Stay 2

## 2018-09-11 NOTE — PROGRESS NOTES
0700: Bedside report received from JOVANA Contreras. 26: Hospitalist on call paged regarding potassium and phosphorus levels. 1135: Dr. Dia Chance paged regarding increased BP and HR.  
 
1140: Dr. Dia Chance at bedside. Discussed HR and BP. Orders received. Will continue to monitor. 1700: Discharge postponed due to elevated HR and BP. Orders received for EKG and cardiology consult to be called tomorrow. Will continue to monitor. 1900:  
Bedside shift change report given to JOVANA Waller (oncoming nurse). Report included the following information SBAR, Kardex, Intake/Output, MAR, Recent Results and Cardiac Rhythm Sinus Tach. SHIFT SUMMARY: 
 
 
 
 
 
[address] NURSING NOTE Admission Date 9/9/2018 Admission Diagnosis Hyperemesis Consults IP CONSULT TO CARDIOLOGY Cardiac Monitoring [x] Yes [] No  
  
Purposeful Hourly Rounding [x] Yes   
Dhara Score Total Score: 2 Dhara score 3 or > [] Bed Alarm [] Avasys [] 1:1 sitter [] Patient refused (Signed refusal form in chart) George Score George Score: 22 George score 14 or < [] PMT consult [] Wound Care consult  
 []  Specialty bed  [] Nutrition consult Influenza Vaccine Received Flu Vaccine for Current Season (usually Sept-March): Not Flu Season Oxygen needs? [x] Room air Oxygen @  []1L    []2L    []3L   []4L    []5L   []6L via NC Chronic home O2 use? [] Yes [x] No 
Perform O2 challenge test and document in progress note using smartphrase (.Homeoxygen) Last bowel movement Last Bowel Movement Date: 09/09/18 Urinary Catheter LDAs Peripheral IV 09/11/18 Left Antecubital (Active) Site Assessment Clean, dry, & intact 9/11/2018  5:54 PM  
Phlebitis Assessment 0 9/11/2018  5:54 PM  
Infiltration Assessment 0 9/11/2018  5:54 PM  
Dressing Status Clean, dry, & intact 9/11/2018  5:54 PM  
Dressing Type Transparent 9/11/2018  5:54 PM  
Hub Color/Line Status Pink;Flushed 9/11/2018  5:54 PM  
                  
  
 Readmission Risk Assessment Tool Score Low Risk   
      
 0 Total Score Criteria that do not apply:  
 Has Seen PCP in Last 6 Months (Yes=3, No=0) . Living with Significant Other. Assisted Living. LTAC. SNF. or  
Rehab Patient Length of Stay (>5 days = 3) IP Visits Last 12 Months (1-3=4, 4=9, >4=11) Pt. Coverage (Medicare=5 , Medicaid, or Self-Pay=4) Charlson Comorbidity Score (Age + Comorbid Conditions) Expected Length of Stay 2d 16h Actual Length of Stay 2

## 2018-09-11 NOTE — PROGRESS NOTES
Cm acknowledged discharge order.  informed of appointment to be scheduled. Patient does not qualify for [facility]. Patient is on room air. Has family/friend that can provide transportation at discharge. 1255 - CM tasks completed. Rina WHALEY Informed. Shagufta Maria RN CM Ext [phone]

## 2018-09-11 NOTE — DISCHARGE SUMMARY
Discharge Summary      Name: Pura Verma  [id number]  YOB: 1975 (Age: 39 y.o.)   Date of Admission: 9/9/2018  Date of Discharge: 9/12/2018  Attending Physician: Bakari Dumont MD    Discharge Diagnosis:   Hypokalemia due to abdominal pain and hyperemesis syndrome  Uncontrolled HTN  Tobacco use    Consultations:  None    Brief Admission History/Reason for Admission Per Bakari Dumont MD:   Aleksandra Kingston is a 39 y.o.  female with PMHx HTN, hx of LORENA, present to the ER c/o 2 days of nausea and vomiting, now with abd pain associated with vomiting. Pt states she uses marijuana 2 days ago before onset of symptoms. She has had this problem in the past with THC use, however has never been admitted to the hospital for low potassium. Pt denies associated fever, chills, cp, palpitations, no diarrhea. Pt also states she has not been able to tolerate po intake to include her PTA meds. In the ER, vitals: T 97.9, P95, /124, SpO2 100% on RA  We were asked to admit for work up and evaluation of the above problems. Brief Hospital Course by Main Problems:   Hypokalemia due to abdominal pain and hyperemesis syndrome  Likely related to [facility] use.  UDS + for THC. KUB negative. Her lytes were aggressively repleted. Pt is able to tolerate PO intake for >24 hrs. No n/v or abd pain for >24 hrs. She remains hemodynamically stable and is ready for discharge. Will give KCl supplement on discharge but she needs to have a f/u BMP at f/u with PCP. Counseled on cessation of THC use.      HTN, uncontrolled  Medications adjusted to include: amlodipine 10mg daily, metoprolol 25mg BID, and hygroten 25mg daily. Tobacco use, counseled      25.0 - 29.9 Overweight / Body mass index is 27.4 kg/(m^2).       Discharge Exam:  Patient seen and examined by me on discharge day.   Pertinent Findings:  Visit Vitals    /84 (BP 1 Location: Right arm, BP Patient Position: At rest;Supine)    Pulse 95    Temp 97.8 °F (36.6 °C)    Resp 18    Ht 5' 1\" (1.549 m)    Wt 65.8 kg (145 lb)    LMP 08/13/2017  Comment: partial hysterecomy    SpO2 98%    BMI 27.4 kg/m2     Gen:    Not in distress  Chest: Clear lungs  CVS:   Regular rhythm. No edema  Abd:  Soft, not distended, not tender    Discharge/Recent Laboratory Results:  Recent Labs      09/12/18   0257   NA  132*   K  3.1*   CL  99   CO2  24   BUN  8   GLU  102*   CA  8.4*   PHOS  2.4*   MG  2.5*     Recent Labs      09/11/18   0345   HGB  13.5   HCT  38.2   WBC  12.2*   PLT  352       Discharge Medications:  Current Discharge Medication List      START taking these medications    Details   metoprolol tartrate (LOPRESSOR) 25 mg tablet Take 1 Tab by mouth every twelve (12) hours. Qty: 30 Tab, Refills: 0      potassium chloride SR (KLOR-CON 10) 10 mEq tablet Take 1 Tab by mouth daily. Qty: 30 Tab, Refills: 0         CONTINUE these medications which have CHANGED    Details   amLODIPine (NORVASC) 10 mg tablet Take 1 Tab by mouth daily. Qty: 30 Tab, Refills: 0      chlorthalidone (HYGROTEN) 25 mg tablet Take 1 Tab by mouth every evening. Qty: 30 Tab, Refills: 0         CONTINUE these medications which have NOT CHANGED    Details   cetirizine (ZYRTEC) 10 mg tablet Take 10 mg by mouth every evening. ibuprofen (ADVIL) 200 mg tablet Take 400 mg by mouth daily as needed ('Pain/Headache'). multivitamin, tx-iron-ca-min (THERA-M W/ IRON) 9 mg iron-400 mcg tab tablet Take 1 Tab by mouth every evening.              DISPOSITION:    Home with Family: x   Home with HH/PT/OT/RN:    SNF/LTC:    MEME:    OTHER:          Follow up with:   PCP : Tonia Mena MD  Follow-up Information     Follow up With Details Comments [address] [facility] Keny Giron MD Go on 9/17/2018 For hospital follow up appointment at 2:00PM  [address] [place] [address]  [phone]            Code: Full  Diet: cardiac    Total time in minutes spent coordinating this discharge (includes going over instructions, follow-up, prescriptions, and preparing report for sign off to her PCP) :  35 minutes

## 2018-09-11 NOTE — PROGRESS NOTES
09/11/18 1506 Vital Signs Temp 98.7 °F (37.1 °C) Temp Source Oral  
Pulse (Heart Rate) (!) 111 Heart Rate Source Monitor Resp Rate 18  
O2 Sat (%) 99 % Level of Consciousness Alert  
BP (!) 154/106 MAP (Calculated) 122 BP 1 Method Automatic  
BP 1 Location Right arm BP Patient Position Sitting MEWS Score 3 Oxygen Therapy O2 Device Room air MEWS 3 due to elevated HR and BP. MD notified. Orders received for one dose of hydralazine. Charge nurse notified. Will continue to monitor.

## 2018-09-11 NOTE — PROGRESS NOTES
Reason for Admission:   hyperemesis RRAT Score:          0 Plan for utilizing home health:      No active dx to qualify for [facility]. Patient  Is not homebound Likelihood of Readmission:  Low Transition of Care Plan:           Follow up with PCP 
 
CM met with patient to discuss discharge planning. Pt name and  confirmed as pt identifiers. Demographics confirmed. Patient lives with other adults in a single story home. ADL's/IADL's - independent prior to admission to include driving DMe - none Preferred pharmacy - [facility] - none Anticipate no needs at discharge. Patient is up ad marleny. Independent prior to admission. Room Air. Alert and oriented. Care Management Interventions PCP Verified by CM: Yes Mode of Transport at Discharge: Other (see comment) (family/friend) Discharge Durable Medical Equipment: No 
Physical Therapy Consult: No 
Occupational Therapy Consult: No 
Current Support Network: Own Home (lives with others) Confirm Follow Up Transport: Friends Plan discussed with Pt/Family/Caregiver: Yes Discharge Location Discharge Placement: Home Ciera Parson RN  Ext [phone]

## 2018-09-11 NOTE — PROGRESS NOTES
Bedside and Verbal shift change report given to Nitin Sykes (primary RN), Naseem Cuevas RN (oncoming nurse). Report included the following information SBAR. SHIFT SUMMARY: 
 
 
 
 
 
[address] NURSING NOTE Admission Date 9/9/2018 Admission Diagnosis Hyperemesis Consults None Cardiac Monitoring [x] Yes [] No  
  
Purposeful Hourly Rounding [x] Yes   
Dhara Score Total Score: 2 Dhara score 3 or > [] Bed Alarm [] Avasys [] 1:1 sitter [] Patient refused (Signed refusal form in chart) George Score George Score: 21 George score 14 or < [] PMT consult [] Wound Care consult  
 []  Specialty bed  [] Nutrition consult Influenza Vaccine Received Flu Vaccine for Current Season (usually Sept-March): Not Flu Season Oxygen needs? [x] Room air Oxygen @  []1L    []2L    []3L   []4L    []5L   []6L via NC Chronic home O2 use? [] Yes [x] No 
Perform O2 challenge test and document in progress note using [employer]) Last bowel movement Urinary Catheter LDAs Peripheral IV 09/09/18 Right Hand (Active) Site Assessment Clean, dry, & intact 9/10/2018  8:00 PM  
Phlebitis Assessment 0 9/10/2018  8:00 PM  
Infiltration Assessment 0 9/10/2018  8:00 PM  
Dressing Status Clean, dry, & intact 9/10/2018  8:00 PM  
Dressing Type Transparent 9/10/2018  8:00 PM  
Hub Color/Line Status Blue;Flushed 9/10/2018  8:00 PM  
   
Peripheral IV 09/09/18 Right Antecubital (Active) Site Assessment Clean, dry, & intact 9/10/2018  8:00 PM  
Phlebitis Assessment 0 9/10/2018  8:00 PM  
Infiltration Assessment 0 9/10/2018  8:00 PM  
Dressing Status Clean, dry, & intact 9/10/2018  8:00 PM  
Dressing Type Transparent 9/10/2018  8:00 PM  
Hub Color/Line Status Pink; Infusing 9/10/2018  8:00 PM  
                  
  
Readmission Risk Assessment Tool Score Low Risk   
      
 0 Total Score Criteria that do not apply:  
 Has Seen PCP in Last 6 Months (Yes=3, No=0) . Living with Significant Other. Assisted Living. LTAC. SNF. or  
Rehab Patient Length of Stay (>5 days = 3) IP Visits Last 12 Months (1-3=4, 4=9, >4=11) Pt. Coverage (Medicare=5 , Medicaid, or Self-Pay=4) Charlson Comorbidity Score (Age + Comorbid Conditions) Expected Length of Stay 2d 16h Actual Length of Stay 1

## 2018-09-12 VITALS
HEART RATE: 95 BPM | DIASTOLIC BLOOD PRESSURE: 84 MMHG | HEIGHT: 61 IN | BODY MASS INDEX: 27.38 KG/M2 | TEMPERATURE: 97.8 F | WEIGHT: 145 LBS | OXYGEN SATURATION: 98 % | RESPIRATION RATE: 18 BRPM | SYSTOLIC BLOOD PRESSURE: 124 MMHG

## 2018-09-12 LAB
ANION GAP SERPL CALC-SCNC: 9 MMOL/L (ref 5–15)
ATRIAL RATE: 103 BPM
BUN SERPL-MCNC: 8 MG/DL (ref 6–20)
BUN/CREAT SERPL: 11 (ref 12–20)
CALCIUM SERPL-MCNC: 8.4 MG/DL (ref 8.5–10.1)
CALCULATED P AXIS, ECG09: 69 DEGREES
CALCULATED R AXIS, ECG10: 75 DEGREES
CALCULATED T AXIS, ECG11: 29 DEGREES
CHLORIDE SERPL-SCNC: 99 MMOL/L (ref 97–108)
CO2 SERPL-SCNC: 24 MMOL/L (ref 21–32)
CREAT SERPL-MCNC: 0.76 MG/DL (ref 0.55–1.02)
DIAGNOSIS, 93000: NORMAL
GLUCOSE SERPL-MCNC: 102 MG/DL (ref 65–100)
MAGNESIUM SERPL-MCNC: 2.5 MG/DL (ref 1.6–2.4)
P-R INTERVAL, ECG05: 118 MS
PHOSPHATE SERPL-MCNC: 2.4 MG/DL (ref 2.6–4.7)
POTASSIUM SERPL-SCNC: 3.1 MMOL/L (ref 3.5–5.1)
Q-T INTERVAL, ECG07: 356 MS
QRS DURATION, ECG06: 78 MS
QTC CALCULATION (BEZET), ECG08: 466 MS
SODIUM SERPL-SCNC: 132 MMOL/L (ref 136–145)
VENTRICULAR RATE, ECG03: 103 BPM

## 2018-09-12 PROCEDURE — 36415 COLL VENOUS BLD VENIPUNCTURE: CPT | Performed by: INTERNAL MEDICINE

## 2018-09-12 PROCEDURE — 94760 N-INVAS EAR/PLS OXIMETRY 1: CPT

## 2018-09-12 PROCEDURE — 80048 BASIC METABOLIC PNL TOTAL CA: CPT | Performed by: INTERNAL MEDICINE

## 2018-09-12 PROCEDURE — 74011000250 HC RX REV CODE- 250: Performed by: HOSPITALIST

## 2018-09-12 PROCEDURE — 84100 ASSAY OF PHOSPHORUS: CPT | Performed by: INTERNAL MEDICINE

## 2018-09-12 PROCEDURE — 74011250636 HC RX REV CODE- 250/636: Performed by: HOSPITALIST

## 2018-09-12 PROCEDURE — 74011250637 HC RX REV CODE- 250/637: Performed by: INTERNAL MEDICINE

## 2018-09-12 PROCEDURE — 93306 TTE W/DOPPLER COMPLETE: CPT

## 2018-09-12 PROCEDURE — 83735 ASSAY OF MAGNESIUM: CPT | Performed by: INTERNAL MEDICINE

## 2018-09-12 PROCEDURE — 74011250636 HC RX REV CODE- 250/636: Performed by: INTERNAL MEDICINE

## 2018-09-12 RX ORDER — AMLODIPINE BESYLATE 10 MG/1
10 TABLET ORAL DAILY
Qty: 30 TAB | Refills: 0 | Status: SHIPPED | OUTPATIENT
Start: 2018-09-13 | End: 2018-09-18 | Stop reason: DRUGHIGH

## 2018-09-12 RX ORDER — METOPROLOL TARTRATE 25 MG/1
12.5 TABLET, FILM COATED ORAL ONCE
Status: DISCONTINUED | OUTPATIENT
Start: 2018-09-12 | End: 2018-09-12

## 2018-09-12 RX ORDER — METOPROLOL TARTRATE 25 MG/1
25 TABLET, FILM COATED ORAL EVERY 12 HOURS
Qty: 30 TAB | Refills: 0 | Status: SHIPPED | OUTPATIENT
Start: 2018-09-12 | End: 2018-09-18 | Stop reason: SDUPTHER

## 2018-09-12 RX ORDER — POTASSIUM CHLORIDE 20 MEQ/1
40 TABLET, EXTENDED RELEASE ORAL
Status: COMPLETED | OUTPATIENT
Start: 2018-09-12 | End: 2018-09-12

## 2018-09-12 RX ADMIN — POTASSIUM CHLORIDE 40 MEQ: 1500 TABLET, EXTENDED RELEASE ORAL at 10:02

## 2018-09-12 RX ADMIN — Medication 10 ML: at 08:20

## 2018-09-12 RX ADMIN — AMLODIPINE BESYLATE 10 MG: 5 TABLET ORAL at 08:21

## 2018-09-12 RX ADMIN — ONDANSETRON 4 MG: 2 INJECTION INTRAMUSCULAR; INTRAVENOUS at 08:19

## 2018-09-12 RX ADMIN — ACETAMINOPHEN 500 MG: 500 TABLET ORAL at 02:43

## 2018-09-12 RX ADMIN — Medication 10 ML: at 05:05

## 2018-09-12 RX ADMIN — METOPROLOL TARTRATE 12.5 MG: 25 TABLET ORAL at 08:22

## 2018-09-12 RX ADMIN — PROCHLORPERAZINE EDISYLATE 10 MG: 5 INJECTION INTRAMUSCULAR; INTRAVENOUS at 02:43

## 2018-09-12 NOTE — PROGRESS NOTES
Hospitalist Progress Note NAME: Joe Beauchamp :  1975 MRN:  [id number] Assessment / Plan: Hypokalemia due to abdominal pain and hyperemesis syndrome 
-likely related to [facility] use. UDS pending, however pt admits to last use 2 days ago prior to onset of symptoms. -will order KUB to r/o obstruction, ileus from low K 
-antiemetics prn 
-replete with KCL 40mg x2 doses 
-CLD, advance diet as tolerated 
-prn antiemetics 
  
HTN, uncontrolled Sinus tachycardia 
-meds adjusted 
-hydralazine prn 
  
Tobacco use 
-nicotine patch, counseled 
  
25.0 - 29.9 Overweight / Body mass index is 27.4 kg/(m^2). Code status: Full Prophylaxis: Lovenox Recommended Disposition: Home w/Family Subjective: Chief Complaint / Reason for Physician Visit No complaints of nausea and vomiting. Discussed with RN events overnight. Review of Systems: 
Symptom Y/N Comments  Symptom Y/N Comments Fever/Chills n   Chest Pain n   
Poor Appetite n   Edema Cough n   Abdominal Pain n   
Sputum    Joint Pain SOB/ABRAHAM    Pruritis/Rash Nausea/vomit n   Tolerating PT/OT Diarrhea    Tolerating Diet n   
Constipation    Other Could NOT obtain due to:   
 
Objective: VITALS:  
Last 24hrs VS reviewed since prior progress note. Most recent are: 
Patient Vitals for the past 24 hrs: 
 Temp Pulse Resp BP SpO2  
18 0819 97.8 °F (36.6 °C) 95 18 124/84 98 % 18 0250 97.9 °F (36.6 °C) 87 18 (!) 154/102 99 % 18 2323 98 °F (36.7 °C) 87 18 (!) 157/106 99 % 18 1945 98 °F (36.7 °C) 98 18 (!) 146/100 98 % 18 1706 - (!) 129 - 139/81 -  
18 1506 98.7 °F (37.1 °C) (!) 111 18 (!) 154/106 99 % 18 1119 98 °F (36.7 °C) (!) 109 16 (!) 153/104 100 % Intake/Output Summary (Last 24 hours) at 18 1022 Last data filed at 18 1827 Gross per 24 hour Intake              840 ml Output                0 ml Net              840 ml  
  
 
 PHYSICAL EXAM: 
General: Alert, cooperative, in mild distress due to nausea EENT:  EOMI. Anicteric sclerae. MMM Resp:  CTA bilaterally, no wheezing or rales. No accessory muscle use CV:  Regular  rhythm,  No edema GI:  Soft, Non distended, Non tender.  +Bowel sounds Neurologic:  Alert and oriented X 3, normal speech, Psych:   Good insight. Not anxious nor agitated Skin:  No rashes. No jaundice Reviewed most current lab test results and cultures  YES Reviewed most current radiology test results   YES Review and summation of old records today    NO Reviewed patient's current orders and MAR    YES 
PMH/SH reviewed - no change compared to H&P 
________________________________________________________________________ Care Plan discussed with: 
  Comments Patient x Family RN x Care Manager Consultant Multidiciplinary team rounds were held today with , nursing, pharmacist and clinical coordinator. Patient's plan of care was discussed; medications were reviewed and discharge planning was addressed. ________________________________________________________________________ Total NON critical care TIME:  35   Minutes Total CRITICAL CARE TIME Spent:   Minutes non procedure based Comments >50% of visit spent in counseling and coordination of care    
________________________________________________________________________ Andreia Vuong MD  
 
Procedures: see electronic medical records for all procedures/Xrays and details which were not copied into this note but were reviewed prior to creation of Plan. LABS: 
I reviewed today's most current labs and imaging studies. Pertinent labs include: 
Recent Labs  
   09/11/18 
 0345  09/10/18 
 0408  09/09/18 
 1316 WBC  12.2*  11.0  9.1 HGB  13.5  13.9  14.5 HCT  38.2  38.3  40.3 PLT  352  368  380 Recent Labs  
   09/12/18 
 0257  09/11/18 
 0345  09/10/18 
 0408  09/09/18 
 1316 NA  132*  133*  135*  135* K  3.1*  2.8*  2.8*  2.2*  
CL  99  99  98  97 CO2  24  23  24  25 GLU  102*  107*  130*  118* BUN  8  9  10  8 CREA  0.76  0.74  0.90  0.99 CA  8.4*  8.9  9.4  9.8 MG  2.5*   --   2.2  2.2 PHOS  2.4*   --   2.2*   --   
ALB   --    --    --   4.1 TBILI   --    --    --   0.4 SGOT   --    --    --   21 ALT   --    --    --   23 Signed: Shayy Brooks MD

## 2018-09-12 NOTE — PROGRESS NOTES
Patient notified she is going home today. She is excited. Blood pressure was normal. Medicated for nausea and it is under control without nausea.

## 2018-09-17 ENCOUNTER — OFFICE VISIT (OUTPATIENT)
Dept: INTERNAL MEDICINE CLINIC | Age: 43
End: 2018-09-17

## 2018-09-17 VITALS
TEMPERATURE: 97.4 F | SYSTOLIC BLOOD PRESSURE: 127 MMHG | OXYGEN SATURATION: 99 % | BODY MASS INDEX: 28.32 KG/M2 | HEART RATE: 91 BPM | DIASTOLIC BLOOD PRESSURE: 86 MMHG | RESPIRATION RATE: 18 BRPM | HEIGHT: 61 IN | WEIGHT: 150 LBS

## 2018-09-17 DIAGNOSIS — Q61.3 POLYCYSTIC KIDNEY DISEASE: ICD-10-CM

## 2018-09-17 DIAGNOSIS — E87.6 HYPOKALEMIA: ICD-10-CM

## 2018-09-17 DIAGNOSIS — I10 ESSENTIAL HYPERTENSION, MALIGNANT: ICD-10-CM

## 2018-09-17 DIAGNOSIS — R11.0 NAUSEA: ICD-10-CM

## 2018-09-17 DIAGNOSIS — R10.84 GENERALIZED ABDOMINAL PAIN: ICD-10-CM

## 2018-09-17 RX ORDER — AMLODIPINE BESYLATE 5 MG/1
TABLET ORAL
Refills: 2 | COMMUNITY
Start: 2018-07-08 | End: 2018-09-18 | Stop reason: DRUGHIGH

## 2018-09-17 RX ORDER — ONDANSETRON 4 MG/1
4 TABLET, ORALLY DISINTEGRATING ORAL
Qty: 30 TAB | Refills: 1 | Status: SHIPPED | OUTPATIENT
Start: 2018-09-17 | End: 2018-09-28

## 2018-09-17 NOTE — PROGRESS NOTES
Shanta Chen is a 39 y.o. female and presents with Transitions Of Care  . Subjective:    Pt comes-in for hospital d/c f/u. Pt was admitted 9/9/18-9/12/18 for intractable nausea and abdominal pain. W/u unrevealing. Nausea was attributed to marijuana use. She was hypophosphatemic, hypokalemic and hypomagnesemic. KUB nml  Pt relays she still has nausea, albeit less. She is tolerating POs. Pt relays her abd pain has worsened. She describes it as generalized. NO diarrhea.  Pt has a nml BM this AM.    Pt needs a note for work      PMH  HTN-  BP Readings from Last 3 Encounters:   09/17/18 127/86   09/12/18 124/84   05/08/18 (!) 185/113     Polycystic kidney disease-pt has an appt w renal, Dr. 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    PSH-salpingectomy  Due to ectopic pregnancy   oophorectomy    SH-    -+ sex active, no birth control   -works as teacher    -std check 8/17      mammo none  Colonoscopy n/a  Immunizations n/a  Eye care ~ 3/18  Dental care ~ 9/17  Pap 8/17  Exercise none      Review of Systems  Constitutional: negative for fevers, chills, anorexia and weight loss  Respiratory:  negative for cough, hemoptysis, dyspnea,wheezing  CV:   negative for chest pain, palpitations, lower extremity edema  GI:   positive for nausea, abdominal pain  Musculoskel: negative for myalgias, arthralgias, back pain, muscle weakness, joint pain  Neurological:  negative for headaches, dizziness, vertigo, memory problems and gait   Behavl/Psych: negative for feelings of anxiety, depression, mood changes    Past Medical History:   Diagnosis Date    Hypertension     Kidney anomaly, congenital     Tubal pregnancy      Past Surgical History:   Procedure Laterality Date    HX GYN  10/07/2013    cyst removed    HX HYSTERECTOMY  10/7/2013    partial hysterectomy     Social History     Social History    Marital status:      Spouse name: N/A    Number of children: N/A    Years of education: N/A     Social History Main Topics    Smoking status: Current Some Day Smoker     Packs/day: 0.25     Years: 25.00    Smokeless tobacco: Never Used      Comment: 1-3 day    Alcohol use Yes      Comment: occasionally/socially    Drug use: No      Comment: stopped in 2013    Sexual activity: Yes     Partners: Male     Other Topics Concern    None     Social History Narrative     Family History   Problem Relation Age of Onset    Hypertension Mother     Hypertension Father     Hypertension Sister      Current Outpatient Prescriptions   Medication Sig Dispense Refill    amLODIPine (NORVASC) 10 mg tablet Take 1 Tab by mouth daily. 90 Tab 1    metoprolol tartrate (LOPRESSOR) 25 mg tablet Take 1 Tab by mouth every twelve (12) hours. 180 Tab 1    chlorthalidone (HYGROTEN) 25 mg tablet Take 1 Tab by mouth every evening. 90 Tab 1    ondansetron (ZOFRAN ODT) 4 mg disintegrating tablet Take 1 Tab by mouth every eight (8) hours as needed for Nausea. 30 Tab 1    potassium chloride SR (KLOR-CON 10) 10 mEq tablet Take 1 Tab by mouth daily. 30 Tab 0    cetirizine (ZYRTEC) 10 mg tablet Take 10 mg by mouth every evening.  ibuprofen (ADVIL) 200 mg tablet Take 400 mg by mouth daily as needed ('Pain/Headache').  multivitamin, tx-iron-ca-min (THERA-M W/ IRON) 9 mg iron-400 mcg tab tablet Take 1 Tab by mouth every evening. No Known Allergies    Objective:  Visit Vitals    /86 (BP 1 Location: Left arm, BP Patient Position: Sitting)    Pulse 91    Temp 97.4 °F (36.3 °C) (Oral)    Resp 18    Ht 5' 1\" (1.549 m)    Wt 150 lb (68 kg)    LMP 08/13/2017  Comment: partial hysterecomy    SpO2 99%    BMI 28.34 kg/m2     Physical Exam:   General appearance - alert, pleasant  Mental status - alert, oriented to person, place, and time  EYE-GONZALO, EOMI, corneas normal, no foreign bodies ? exopthalmos?   ENT-ENT exam normal, no neck nodes or sinus tenderness  Nose - normal and patent, no erythema, discharge or polyps  Mouth - mucous membranes moist, pharynx normal without lesions  Neck - supple, no significant adenopathy   Chest - clear to auscultation, no wheezes, rales or rhonchi, symmetric air entry   Heart - normal rate, regular rhythm, normal S1, S2 + 2/6 systolic murmur  Abdomen - soft, obese soft +bs + diffusely tender to light palpation  Ext-peripheral pulses normal, no pedal edema, no clubbing or cyanosis  Neuro -alert, oriented, normal speech, no focal findings or movement disorder noted      Results for orders placed or performed during the hospital encounter of 09/09/18   CBC WITH AUTOMATED DIFF   Result Value Ref Range    WBC 9.1 3.6 - 11.0 K/uL    RBC 4.41 3.80 - 5.20 M/uL    HGB 14.5 11.5 - 16.0 g/dL    HCT 40.3 35.0 - 47.0 %    MCV 91.4 80.0 - 99.0 FL    MCH 32.9 26.0 - 34.0 PG    MCHC 36.0 30.0 - 36.5 g/dL    RDW 13.1 11.5 - 14.5 %    PLATELET 498 139 - 847 K/uL    MPV 9.0 8.9 - 12.9 FL    NRBC 0.0 0  WBC    ABSOLUTE NRBC 0.00 0.00 - 0.01 K/uL    NEUTROPHILS 62 32 - 75 %    LYMPHOCYTES 31 12 - 49 %    MONOCYTES 6 5 - 13 %    EOSINOPHILS 1 0 - 7 %    BASOPHILS 1 0 - 1 %    IMMATURE GRANULOCYTES 0 0.0 - 0.5 %    ABS. NEUTROPHILS 5.6 1.8 - 8.0 K/UL    ABS. LYMPHOCYTES 2.8 0.8 - 3.5 K/UL    ABS. MONOCYTES 0.5 0.0 - 1.0 K/UL    ABS. EOSINOPHILS 0.1 0.0 - 0.4 K/UL    ABS. BASOPHILS 0.1 0.0 - 0.1 K/UL    ABS. IMM.  GRANS. 0.0 0.00 - 0.04 K/UL    DF AUTOMATED     METABOLIC PANEL, COMPREHENSIVE   Result Value Ref Range    Sodium 135 (L) 136 - 145 mmol/L    Potassium 2.2 (LL) 3.5 - 5.1 mmol/L    Chloride 97 97 - 108 mmol/L    CO2 25 21 - 32 mmol/L    Anion gap 13 5 - 15 mmol/L    Glucose 118 (H) 65 - 100 mg/dL    BUN 8 6 - 20 MG/DL    Creatinine 0.99 0.55 - 1.02 MG/DL    BUN/Creatinine ratio 8 (L) 12 - 20      GFR est AA >60 >60 ml/min/1.73m2    GFR est non-AA >60 >60 ml/min/1.73m2    Calcium 9.8 8.5 - 10.1 MG/DL    Bilirubin, total 0.4 0.2 - 1.0 MG/DL    ALT (SGPT) 23 12 - 78 U/L AST (SGOT) 21 15 - 37 U/L    Alk.  phosphatase 83 45 - 117 U/L    Protein, total 9.1 (H) 6.4 - 8.2 g/dL    Albumin 4.1 3.5 - 5.0 g/dL    Globulin 5.0 (H) 2.0 - 4.0 g/dL    A-G Ratio 0.8 (L) 1.1 - 2.2     TROPONIN I   Result Value Ref Range    Troponin-I, Qt. <0.05 <0.05 ng/mL   LIPASE   Result Value Ref Range    Lipase 226 73 - 393 U/L   URINALYSIS W/ REFLEX CULTURE   Result Value Ref Range    Color YELLOW/STRAW      Appearance CLEAR CLEAR      Specific gravity 1.015 1.003 - 1.030      pH (UA) 7.5 5.0 - 8.0      Protein 30 (A) NEG mg/dL    Glucose NEGATIVE  NEG mg/dL    Ketone NEGATIVE  NEG mg/dL    Bilirubin NEGATIVE  NEG      Blood NEGATIVE  NEG      Urobilinogen 0.2 0.2 - 1.0 EU/dL    Nitrites NEGATIVE  NEG      Leukocyte Esterase NEGATIVE  NEG      WBC 0-4 0 - 4 /hpf    RBC 0-5 0 - 5 /hpf    Epithelial cells FEW FEW /lpf    Bacteria NEGATIVE  NEG /hpf    UA:UC IF INDICATED CULTURE NOT INDICATED BY UA RESULT CNI     MAGNESIUM   Result Value Ref Range    Magnesium 2.2 1.6 - 2.4 mg/dL   DRUG SCREEN, URINE   Result Value Ref Range    AMPHETAMINES NEGATIVE  NEG      BARBITURATES NEGATIVE  NEG      BENZODIAZEPINES NEGATIVE  NEG      COCAINE NEGATIVE  NEG      METHADONE NEGATIVE  NEG      OPIATES NEGATIVE  NEG      PCP(PHENCYCLIDINE) NEGATIVE  NEG      THC (TH-CANNABINOL) POSITIVE (A) NEG      Drug screen comment (NOTE)    METABOLIC PANEL, BASIC   Result Value Ref Range    Sodium 135 (L) 136 - 145 mmol/L    Potassium 2.8 (L) 3.5 - 5.1 mmol/L    Chloride 98 97 - 108 mmol/L    CO2 24 21 - 32 mmol/L    Anion gap 13 5 - 15 mmol/L    Glucose 130 (H) 65 - 100 mg/dL    BUN 10 6 - 20 MG/DL    Creatinine 0.90 0.55 - 1.02 MG/DL    BUN/Creatinine ratio 11 (L) 12 - 20      GFR est AA >60 >60 ml/min/1.73m2    GFR est non-AA >60 >60 ml/min/1.73m2    Calcium 9.4 8.5 - 10.1 MG/DL   CBC WITH AUTOMATED DIFF   Result Value Ref Range    WBC 11.0 3.6 - 11.0 K/uL    RBC 4.15 3.80 - 5.20 M/uL    HGB 13.9 11.5 - 16.0 g/dL    HCT 38.3 35.0 - 47.0 %    MCV 92.3 80.0 - 99.0 FL    MCH 33.5 26.0 - 34.0 PG    MCHC 36.3 30.0 - 36.5 g/dL    RDW 13.4 11.5 - 14.5 %    PLATELET 728 518 - 339 K/uL    MPV 9.1 8.9 - 12.9 FL    NRBC 0.0 0  WBC    ABSOLUTE NRBC 0.00 0.00 - 0.01 K/uL    NEUTROPHILS 76 (H) 32 - 75 %    LYMPHOCYTES 18 12 - 49 %    MONOCYTES 5 5 - 13 %    EOSINOPHILS 0 0 - 7 %    BASOPHILS 0 0 - 1 %    IMMATURE GRANULOCYTES 1 (H) 0.0 - 0.5 %    ABS. NEUTROPHILS 8.3 (H) 1.8 - 8.0 K/UL    ABS. LYMPHOCYTES 2.0 0.8 - 3.5 K/UL    ABS. MONOCYTES 0.6 0.0 - 1.0 K/UL    ABS. EOSINOPHILS 0.0 0.0 - 0.4 K/UL    ABS. BASOPHILS 0.0 0.0 - 0.1 K/UL    ABS. IMM. GRANS. 0.1 (H) 0.00 - 0.04 K/UL    DF AUTOMATED     MAGNESIUM   Result Value Ref Range    Magnesium 2.2 1.6 - 2.4 mg/dL   PHOSPHORUS   Result Value Ref Range    Phosphorus 2.2 (L) 2.6 - 4.7 MG/DL   CBC WITH AUTOMATED DIFF   Result Value Ref Range    WBC 12.2 (H) 3.6 - 11.0 K/uL    RBC 4.13 3.80 - 5.20 M/uL    HGB 13.5 11.5 - 16.0 g/dL    HCT 38.2 35.0 - 47.0 %    MCV 92.5 80.0 - 99.0 FL    MCH 32.7 26.0 - 34.0 PG    MCHC 35.3 30.0 - 36.5 g/dL    RDW 13.6 11.5 - 14.5 %    PLATELET 872 593 - 380 K/uL    MPV 9.0 8.9 - 12.9 FL    NRBC 0.0 0  WBC    ABSOLUTE NRBC 0.00 0.00 - 0.01 K/uL    NEUTROPHILS 65 32 - 75 %    LYMPHOCYTES 26 12 - 49 %    MONOCYTES 9 5 - 13 %    EOSINOPHILS 0 0 - 7 %    BASOPHILS 0 0 - 1 %    IMMATURE GRANULOCYTES 1 (H) 0.0 - 0.5 %    ABS. NEUTROPHILS 7.9 1.8 - 8.0 K/UL    ABS. LYMPHOCYTES 3.1 0.8 - 3.5 K/UL    ABS. MONOCYTES 1.1 (H) 0.0 - 1.0 K/UL    ABS. EOSINOPHILS 0.0 0.0 - 0.4 K/UL    ABS. BASOPHILS 0.0 0.0 - 0.1 K/UL    ABS. IMM.  GRANS. 0.1 (H) 0.00 - 0.04 K/UL    DF AUTOMATED     METABOLIC PANEL, BASIC   Result Value Ref Range    Sodium 133 (L) 136 - 145 mmol/L    Potassium 2.8 (L) 3.5 - 5.1 mmol/L    Chloride 99 97 - 108 mmol/L    CO2 23 21 - 32 mmol/L    Anion gap 11 5 - 15 mmol/L    Glucose 107 (H) 65 - 100 mg/dL    BUN 9 6 - 20 MG/DL    Creatinine 0.74 0.55 - 1.02 MG/DL BUN/Creatinine ratio 12 12 - 20      GFR est AA >60 >60 ml/min/1.73m2    GFR est non-AA >60 >60 ml/min/1.73m2    Calcium 8.9 8.5 - 99.9 MG/DL   METABOLIC PANEL, BASIC   Result Value Ref Range    Sodium 132 (L) 136 - 145 mmol/L    Potassium 3.1 (L) 3.5 - 5.1 mmol/L    Chloride 99 97 - 108 mmol/L    CO2 24 21 - 32 mmol/L    Anion gap 9 5 - 15 mmol/L    Glucose 102 (H) 65 - 100 mg/dL    BUN 8 6 - 20 MG/DL    Creatinine 0.76 0.55 - 1.02 MG/DL    BUN/Creatinine ratio 11 (L) 12 - 20      GFR est AA >60 >60 ml/min/1.73m2    GFR est non-AA >60 >60 ml/min/1.73m2    Calcium 8.4 (L) 8.5 - 10.1 MG/DL   MAGNESIUM   Result Value Ref Range    Magnesium 2.5 (H) 1.6 - 2.4 mg/dL   PHOSPHORUS   Result Value Ref Range    Phosphorus 2.4 (L) 2.6 - 4.7 MG/DL   EKG, 12 LEAD, INITIAL   Result Value Ref Range    Ventricular Rate 74 BPM    Atrial Rate 74 BPM    P-R Interval 138 ms    QRS Duration 88 ms    Q-T Interval 438 ms    QTC Calculation (Bezet) 486 ms    Calculated P Axis 58 degrees    Calculated R Axis 68 degrees    Calculated T Axis 33 degrees    Diagnosis       Normal sinus rhythm  Prolonged QT  When compared with ECG of 22-APR-2018 17:14,  No significant change was found  Confirmed by Rolly Jacinto ([id number]) on 9/10/2018 8:51:58 AM     EKG, 12 LEAD, INITIAL   Result Value Ref Range    Ventricular Rate 103 BPM    Atrial Rate 103 BPM    P-R Interval 118 ms    QRS Duration 78 ms    Q-T Interval 356 ms    QTC Calculation (Bezet) 466 ms    Calculated P Axis 69 degrees    Calculated R Axis 75 degrees    Calculated T Axis 29 degrees    Diagnosis       Sinus tachycardia  Possible Left atrial enlargement  When compared with ECG of 09-SEP-2018 13:59,  No significant change was found  Confirmed by Rolly Jacinto ([id number]) on 9/12/2018 12:49:48 PM         Assessment/Plan:    ICD-10-CM ICD-9-CM    1. Transition of care performed with sharing of clinical summary Z91.89 V15.89    2.  Nausea Z01.8 139.61 METABOLIC PANEL, COMPREHENSIVE MAGNESIUM      PHOSPHORUS      ondansetron (ZOFRAN ODT) 4 mg disintegrating tablet      CT ABD PELV W CONT      DISCONTINUED: amLODIPine (NORVASC) 5 mg tablet   3. Generalized abdominal pain M44.58 853.68 METABOLIC PANEL, COMPREHENSIVE      MAGNESIUM      PHOSPHORUS      ondansetron (ZOFRAN ODT) 4 mg disintegrating tablet      CT ABD PELV W CONT      DISCONTINUED: amLODIPine (NORVASC) 5 mg tablet   4. Polycystic kidney disease S22.1 719.31 METABOLIC PANEL, COMPREHENSIVE      MAGNESIUM      PHOSPHORUS      ondansetron (ZOFRAN ODT) 4 mg disintegrating tablet      CT ABD PELV W CONT      DISCONTINUED: amLODIPine (NORVASC) 5 mg tablet   5. Hypokalemia Z16.6 080.0 METABOLIC PANEL, COMPREHENSIVE      MAGNESIUM      PHOSPHORUS      ondansetron (ZOFRAN ODT) 4 mg disintegrating tablet      CT ABD PELV W CONT      DISCONTINUED: amLODIPine (NORVASC) 5 mg tablet   6. Essential hypertension, malignant I10 401.0 amLODIPine (NORVASC) 10 mg tablet      metoprolol tartrate (LOPRESSOR) 25 mg tablet      chlorthalidone (HYGROTEN) 25 mg tablet     Orders Placed This Encounter    CT ABD PELV W CONT     Standing Status:   Future     Standing Expiration Date:   9/18/2019     Order Specific Question:   Is Patient Allergic to Contrast Dye? Answer:   No     Order Specific Question:   Is Patient Pregnant? Answer:   No     Order Specific Question:   STAT Creatinine as indicated     Answer:   Yes     Order Specific Question: This order utilizes IV contrast.  What additional contrast is needed? Answer:   Per Radiologist Protocol    METABOLIC PANEL, COMPREHENSIVE    MAGNESIUM    PHOSPHORUS    DISCONTD: amLODIPine (NORVASC) 5 mg tablet     Sig: TK 1 T PO D     Refill:  2    ondansetron (ZOFRAN ODT) 4 mg disintegrating tablet     Sig: Take 1 Tab by mouth every eight (8) hours as needed for Nausea. Dispense:  30 Tab     Refill:  1    amLODIPine (NORVASC) 10 mg tablet     Sig: Take 1 Tab by mouth daily.      Dispense:  90 Tab Refill:  1    metoprolol tartrate (LOPRESSOR) 25 mg tablet     Sig: Take 1 Tab by mouth every twelve (12) hours. Dispense:  180 Tab     Refill:  1    chlorthalidone (HYGROTEN) 25 mg tablet     Sig: Take 1 Tab by mouth every evening. Dispense:  90 Tab     Refill:  1     1. Nausea  Unclear etiology  - METABOLIC PANEL, COMPREHENSIVE  - MAGNESIUM  - PHOSPHORUS  - ondansetron (ZOFRAN ODT) 4 mg disintegrating tablet; Take 1 Tab by mouth every eight (8) hours as needed for Nausea. Dispense: 30 Tab; Refill: 1  - CT ABD PELV W CONT; Future    2. Generalized abdominal pain  Go to ED if worsen, not tolerating POs  - METABOLIC PANEL, COMPREHENSIVE  - MAGNESIUM  - PHOSPHORUS  - ondansetron (ZOFRAN ODT) 4 mg disintegrating tablet; Take 1 Tab by mouth every eight (8) hours as needed for Nausea. Dispense: 30 Tab; Refill: 1  - CT ABD PELV W CONT; Future    3. Hypokalemia    - METABOLIC PANEL, COMPREHENSIVE  - MAGNESIUM  - PHOSPHORUS  - ondansetron (ZOFRAN ODT) 4 mg disintegrating tablet; Take 1 Tab by mouth every eight (8) hours as needed for Nausea. Dispense: 30 Tab; Refill: 1  - CT ABD PELV W CONT; Future    4. Polycystic kidney disease    - METABOLIC PANEL, COMPREHENSIVE  - MAGNESIUM  - PHOSPHORUS  - ondansetron (ZOFRAN ODT) 4 mg disintegrating tablet; Take 1 Tab by mouth every eight (8) hours as needed for Nausea. Dispense: 30 Tab; Refill: 1  - CT ABD PELV W CONT; Future    5. Essential hypertension, malignant  Improved on current regimen of metoprolol added and amlodipine maxed  - amLODIPine (NORVASC) 10 mg tablet; Take 1 Tab by mouth daily. Dispense: 90 Tab; Refill: 1  - metoprolol tartrate (LOPRESSOR) 25 mg tablet; Take 1 Tab by mouth every twelve (12) hours. Dispense: 180 Tab; Refill: 1  - chlorthalidone (HYGROTEN) 25 mg tablet; Take 1 Tab by mouth every evening. Dispense: 90 Tab; Refill: 1      There are no Patient Instructions on file for this visit.    Follow-up Disposition:  Return in about 3 months (around 12/17/2018) for bp check. I have reviewed with the patient details of the assessment and plan and all questions were answered. Relevent patient education was performed. The most recent lab findings were reviewed with the patient. An After Visit Summary was printed and given to the patient.

## 2018-09-17 NOTE — PROGRESS NOTES
Chief Complaint   Patient presents with    Transitions Of Care       1. Have you been to the ER, urgent care clinic since your last visit? Hospitalized since your last visit? Yes When: 9/9/2018 [facility] for hypokalemia    2. Have you seen or consulted any other health care providers outside of the [address] [place] [address] since your last visit? Include any pap smears or colon screening.  No

## 2018-09-18 ENCOUNTER — APPOINTMENT (OUTPATIENT)
Dept: CT IMAGING | Age: 43
End: 2018-09-18
Attending: EMERGENCY MEDICINE
Payer: COMMERCIAL

## 2018-09-18 ENCOUNTER — TELEPHONE (OUTPATIENT)
Dept: INTERNAL MEDICINE CLINIC | Age: 43
End: 2018-09-18

## 2018-09-18 ENCOUNTER — HOSPITAL ENCOUNTER (OUTPATIENT)
Age: 43
Setting detail: OBSERVATION
Discharge: HOME | End: 2018-09-21
Attending: EMERGENCY MEDICINE | Admitting: INTERNAL MEDICINE
Payer: COMMERCIAL

## 2018-09-18 DIAGNOSIS — E86.0 DEHYDRATION: ICD-10-CM

## 2018-09-18 DIAGNOSIS — R10.84 ABDOMINAL PAIN, GENERALIZED: ICD-10-CM

## 2018-09-18 DIAGNOSIS — K82.9 GALLBLADDER DISEASE: ICD-10-CM

## 2018-09-18 DIAGNOSIS — R11.2 NAUSEA AND VOMITING, INTRACTABILITY OF VOMITING NOT SPECIFIED, UNSPECIFIED VOMITING TYPE: Primary | ICD-10-CM

## 2018-09-18 DIAGNOSIS — Z90.49 S/P LAPAROSCOPIC CHOLECYSTECTOMY: ICD-10-CM

## 2018-09-18 DIAGNOSIS — E87.6 HYPOKALEMIA: ICD-10-CM

## 2018-09-18 LAB
ALBUMIN SERPL-MCNC: 4.2 G/DL (ref 3.5–5)
ALBUMIN SERPL-MCNC: 4.6 G/DL (ref 3.5–5.5)
ALBUMIN/GLOB SERPL: 0.8 {RATIO} (ref 1.1–2.2)
ALBUMIN/GLOB SERPL: 1.5 {RATIO} (ref 1.2–2.2)
ALP SERPL-CCNC: 81 IU/L (ref 39–117)
ALP SERPL-CCNC: 89 U/L (ref 45–117)
ALT SERPL-CCNC: 21 IU/L (ref 0–32)
ALT SERPL-CCNC: 29 U/L (ref 12–78)
ANION GAP SERPL CALC-SCNC: 12 MMOL/L (ref 5–15)
APPEARANCE UR: CLEAR
AST SERPL-CCNC: 18 U/L (ref 15–37)
AST SERPL-CCNC: 19 IU/L (ref 0–40)
BACTERIA URNS QL MICRO: ABNORMAL /HPF
BILIRUB SERPL-MCNC: 0.3 MG/DL (ref 0.2–1)
BILIRUB SERPL-MCNC: <0.2 MG/DL (ref 0–1.2)
BILIRUB UR QL: NEGATIVE
BUN SERPL-MCNC: 13 MG/DL (ref 6–24)
BUN SERPL-MCNC: 9 MG/DL (ref 6–20)
BUN/CREAT SERPL: 12 (ref 9–23)
BUN/CREAT SERPL: 9 (ref 12–20)
CALCIUM SERPL-MCNC: 10 MG/DL (ref 8.7–10.2)
CALCIUM SERPL-MCNC: 10.5 MG/DL (ref 8.5–10.1)
CHLORIDE SERPL-SCNC: 95 MMOL/L (ref 96–106)
CHLORIDE SERPL-SCNC: 98 MMOL/L (ref 97–108)
CLUE CELLS VAG QL WET PREP: NORMAL
CO2 SERPL-SCNC: 24 MMOL/L (ref 20–29)
CO2 SERPL-SCNC: 24 MMOL/L (ref 21–32)
COLOR UR: ABNORMAL
CREAT SERPL-MCNC: 0.95 MG/DL (ref 0.55–1.02)
CREAT SERPL-MCNC: 1.05 MG/DL (ref 0.57–1)
EPITH CASTS URNS QL MICRO: ABNORMAL /LPF
ERYTHROCYTE [DISTWIDTH] IN BLOOD BY AUTOMATED COUNT: 12.8 % (ref 11.5–14.5)
GLOBULIN SER CALC-MCNC: 3 G/DL (ref 1.5–4.5)
GLOBULIN SER CALC-MCNC: 5 G/DL (ref 2–4)
GLUCOSE SERPL-MCNC: 107 MG/DL (ref 65–100)
GLUCOSE SERPL-MCNC: 89 MG/DL (ref 65–99)
GLUCOSE UR STRIP.AUTO-MCNC: NEGATIVE MG/DL
HCT VFR BLD AUTO: 42 % (ref 35–47)
HGB BLD-MCNC: 15 G/DL (ref 11.5–16)
HGB UR QL STRIP: NEGATIVE
KETONES UR QL STRIP.AUTO: 15 MG/DL
KOH PREP SPEC: NORMAL
LEUKOCYTE ESTERASE UR QL STRIP.AUTO: NEGATIVE
LIPASE SERPL-CCNC: 306 U/L (ref 73–393)
MAGNESIUM SERPL-MCNC: 1.8 MG/DL (ref 1.6–2.4)
MAGNESIUM SERPL-MCNC: 2 MG/DL (ref 1.6–2.3)
MCH RBC QN AUTO: 32.5 PG (ref 26–34)
MCHC RBC AUTO-ENTMCNC: 35.7 G/DL (ref 30–36.5)
MCV RBC AUTO: 91.1 FL (ref 80–99)
NITRITE UR QL STRIP.AUTO: NEGATIVE
NRBC # BLD: 0 K/UL (ref 0–0.01)
NRBC BLD-RTO: 0 PER 100 WBC
PH UR STRIP: 7.5 [PH] (ref 5–8)
PHOSPHATE SERPL-MCNC: 3.9 MG/DL (ref 2.5–4.5)
PLATELET # BLD AUTO: 431 K/UL (ref 150–400)
PMV BLD AUTO: 8.4 FL (ref 8.9–12.9)
POTASSIUM SERPL-SCNC: 2.9 MMOL/L (ref 3.5–5.1)
POTASSIUM SERPL-SCNC: 3.5 MMOL/L (ref 3.5–5.2)
PROT SERPL-MCNC: 7.6 G/DL (ref 6–8.5)
PROT SERPL-MCNC: 9.2 G/DL (ref 6.4–8.2)
PROT UR STRIP-MCNC: ABNORMAL MG/DL
RBC # BLD AUTO: 4.61 M/UL (ref 3.8–5.2)
RBC #/AREA URNS HPF: ABNORMAL /HPF (ref 0–5)
SERVICE CMNT-IMP: NORMAL
SODIUM SERPL-SCNC: 134 MMOL/L (ref 136–145)
SODIUM SERPL-SCNC: 139 MMOL/L (ref 134–144)
SP GR UR REFRACTOMETRY: 1.02 (ref 1–1.03)
T VAGINALIS VAG QL WET PREP: NORMAL
UA: UC IF INDICATED,UAUC: ABNORMAL
UROBILINOGEN UR QL STRIP.AUTO: 0.2 EU/DL (ref 0.2–1)
WBC # BLD AUTO: 11.3 K/UL (ref 3.6–11)
WBC URNS QL MICRO: ABNORMAL /HPF (ref 0–4)

## 2018-09-18 PROCEDURE — 87210 SMEAR WET MOUNT SALINE/INK: CPT

## 2018-09-18 PROCEDURE — 96374 THER/PROPH/DIAG INJ IV PUSH: CPT

## 2018-09-18 PROCEDURE — 96376 TX/PRO/DX INJ SAME DRUG ADON: CPT

## 2018-09-18 PROCEDURE — 96365 THER/PROPH/DIAG IV INF INIT: CPT

## 2018-09-18 PROCEDURE — 85027 COMPLETE CBC AUTOMATED: CPT | Performed by: EMERGENCY MEDICINE

## 2018-09-18 PROCEDURE — 96361 HYDRATE IV INFUSION ADD-ON: CPT

## 2018-09-18 PROCEDURE — 87086 URINE CULTURE/COLONY COUNT: CPT | Performed by: EMERGENCY MEDICINE

## 2018-09-18 PROCEDURE — 80053 COMPREHEN METABOLIC PANEL: CPT | Performed by: EMERGENCY MEDICINE

## 2018-09-18 PROCEDURE — 96375 TX/PRO/DX INJ NEW DRUG ADDON: CPT

## 2018-09-18 PROCEDURE — 74177 CT ABD & PELVIS W/CONTRAST: CPT

## 2018-09-18 PROCEDURE — 83690 ASSAY OF LIPASE: CPT

## 2018-09-18 PROCEDURE — 36415 COLL VENOUS BLD VENIPUNCTURE: CPT | Performed by: EMERGENCY MEDICINE

## 2018-09-18 PROCEDURE — 99285 EMERGENCY DEPT VISIT HI MDM: CPT

## 2018-09-18 PROCEDURE — 74011250636 HC RX REV CODE- 250/636: Performed by: EMERGENCY MEDICINE

## 2018-09-18 PROCEDURE — 74011636320 HC RX REV CODE- 636/320: Performed by: EMERGENCY MEDICINE

## 2018-09-18 PROCEDURE — 81001 URINALYSIS AUTO W/SCOPE: CPT | Performed by: EMERGENCY MEDICINE

## 2018-09-18 PROCEDURE — 83735 ASSAY OF MAGNESIUM: CPT

## 2018-09-18 PROCEDURE — 87491 CHLMYD TRACH DNA AMP PROBE: CPT

## 2018-09-18 RX ORDER — ONDANSETRON 2 MG/ML
4 INJECTION INTRAMUSCULAR; INTRAVENOUS
Status: COMPLETED | OUTPATIENT
Start: 2018-09-18 | End: 2018-09-18

## 2018-09-18 RX ORDER — POTASSIUM CHLORIDE 7.45 MG/ML
10 INJECTION INTRAVENOUS
Status: COMPLETED | OUTPATIENT
Start: 2018-09-18 | End: 2018-09-18

## 2018-09-18 RX ORDER — POTASSIUM CHLORIDE 750 MG/1
40 TABLET, FILM COATED, EXTENDED RELEASE ORAL
Status: DISCONTINUED | OUTPATIENT
Start: 2018-09-18 | End: 2018-09-19

## 2018-09-18 RX ORDER — MORPHINE SULFATE 2 MG/ML
2 INJECTION, SOLUTION INTRAMUSCULAR; INTRAVENOUS
Status: COMPLETED | OUTPATIENT
Start: 2018-09-18 | End: 2018-09-18

## 2018-09-18 RX ORDER — AMLODIPINE BESYLATE 10 MG/1
10 TABLET ORAL DAILY
Qty: 90 TAB | Refills: 1 | Status: SHIPPED | OUTPATIENT
Start: 2018-09-18 | End: 2019-04-16 | Stop reason: SDUPTHER

## 2018-09-18 RX ORDER — SODIUM CHLORIDE 9 MG/ML
50 INJECTION, SOLUTION INTRAVENOUS
Status: COMPLETED | OUTPATIENT
Start: 2018-09-18 | End: 2018-09-18

## 2018-09-18 RX ORDER — SODIUM CHLORIDE 0.9 % (FLUSH) 0.9 %
10 SYRINGE (ML) INJECTION
Status: COMPLETED | OUTPATIENT
Start: 2018-09-18 | End: 2018-09-18

## 2018-09-18 RX ORDER — ACETAMINOPHEN 500 MG
1000 TABLET ORAL
COMMUNITY
End: 2018-09-28

## 2018-09-18 RX ORDER — CHLORTHALIDONE 25 MG/1
25 TABLET ORAL DAILY
COMMUNITY
End: 2019-04-16 | Stop reason: SDUPTHER

## 2018-09-18 RX ORDER — METOPROLOL TARTRATE 25 MG/1
25 TABLET, FILM COATED ORAL EVERY 12 HOURS
Qty: 180 TAB | Refills: 1 | Status: SHIPPED | OUTPATIENT
Start: 2018-09-18 | End: 2019-04-16 | Stop reason: SDUPTHER

## 2018-09-18 RX ORDER — CHLORTHALIDONE 25 MG/1
25 TABLET ORAL EVERY EVENING
Qty: 90 TAB | Refills: 1 | Status: SHIPPED | OUTPATIENT
Start: 2018-09-18 | End: 2018-09-18 | Stop reason: DRUGHIGH

## 2018-09-18 RX ADMIN — ONDANSETRON 4 MG: 2 INJECTION INTRAMUSCULAR; INTRAVENOUS at 21:16

## 2018-09-18 RX ADMIN — ONDANSETRON 4 MG: 2 INJECTION INTRAMUSCULAR; INTRAVENOUS at 18:05

## 2018-09-18 RX ADMIN — MORPHINE SULFATE 2 MG: 2 INJECTION, SOLUTION INTRAMUSCULAR; INTRAVENOUS at 21:16

## 2018-09-18 RX ADMIN — POTASSIUM CHLORIDE 10 MEQ: 10 INJECTION, SOLUTION INTRAVENOUS at 23:38

## 2018-09-18 RX ADMIN — MORPHINE SULFATE 2 MG: 2 INJECTION, SOLUTION INTRAMUSCULAR; INTRAVENOUS at 18:07

## 2018-09-18 RX ADMIN — SODIUM CHLORIDE 1000 ML: 900 INJECTION, SOLUTION INTRAVENOUS at 18:10

## 2018-09-18 RX ADMIN — IOPAMIDOL 100 ML: 755 INJECTION, SOLUTION INTRAVENOUS at 20:37

## 2018-09-18 RX ADMIN — Medication 10 ML: at 20:37

## 2018-09-18 RX ADMIN — SODIUM CHLORIDE 50 ML/HR: 900 INJECTION, SOLUTION INTRAVENOUS at 20:37

## 2018-09-18 NOTE — ED PROVIDER NOTES
EMERGENCY DEPARTMENT HISTORY AND PHYSICAL EXAM 
 
 
Date: 9/18/2018 Patient Name: Yordan Maher History of Presenting Illness Chief Complaint Patient presents with  Vomiting Patient ambulatory to triage with steady gait and complain of vomiting this morning  Generalized Body Aches Patient also complain of generalized body aches History Provided By: Patient HPI: Yordan Maher, 39 y.o. female with PMHx significant for HTN, presents ambulatory to the ED with cc of diffuse abdominal pain for the past 11 days. According to chart review, patient was admitted on 9/9 for same symptoms, diagnosed with hypokalemia, and discharged 9/12 but patient states abdominal pain has persisted. She describes pain as a diffuse  sharp sensation that worsens after vomiting. Patient also reports NV, mild diarrhea, and diffuse back pain. She states she was able to have soup last night but has not been able to keep anything else down today without vomiting. She reports going to her PCP yesterday and was given nausea medication with no relief. She denies fever, chills, vaginal bleeding, vaginal discharge, or bloody stool. There are no other complaints, changes, or physical findings at this time. PCP: Duarte Bartholomew MD 
 
Current Outpatient Prescriptions Medication Sig Dispense Refill  amLODIPine (NORVASC) 10 mg tablet Take 1 Tab by mouth daily. 90 Tab 1  
 metoprolol tartrate (LOPRESSOR) 25 mg tablet Take 1 Tab by mouth every twelve (12) hours. 180 Tab 1  chlorthalidone (HYGROTEN) 25 mg tablet Take 1 Tab by mouth every evening. 90 Tab 1  
 ondansetron (ZOFRAN ODT) 4 mg disintegrating tablet Take 1 Tab by mouth every eight (8) hours as needed for Nausea. 30 Tab 1  potassium chloride SR (KLOR-CON 10) 10 mEq tablet Take 1 Tab by mouth daily. 30 Tab 0  
 cetirizine (ZYRTEC) 10 mg tablet Take 10 mg by mouth every evening.  ibuprofen (ADVIL) 200 mg tablet Take 400 mg by mouth daily as needed ('Pain/Headache').  multivitamin, tx-iron-ca-min (THERA-M W/ IRON) 9 mg iron-400 mcg tab tablet Take 1 Tab by mouth every evening. Past History Past Medical History: 
Past Medical History:  
Diagnosis Date  Hypertension  Kidney anomaly, congenital   
 Tubal pregnancy Past Surgical History: 
Past Surgical History:  
Procedure Laterality Date  HX GYN  10/07/2013 cyst removed  HX HYSTERECTOMY  10/7/2013  
 partial hysterectomy Family History: 
Family History Problem Relation Age of Onset  Hypertension Mother  Hypertension Father  Hypertension Sister Social History: 
Social History Substance Use Topics  Smoking status: Current Some Day Smoker Packs/day: 0.25 Years: 25.00  Smokeless tobacco: Never Used Comment: 1-3 day  Alcohol use Yes Comment: occasionally/socially Allergies: 
No Known Allergies Review of Systems Review of Systems Constitutional: Negative for chills, fatigue and fever. HENT: Negative for congestion, ear pain and rhinorrhea. Eyes: Negative for pain and visual disturbance. Respiratory: Negative for cough and shortness of breath. Cardiovascular: Negative for chest pain and leg swelling. Gastrointestinal: Positive for abdominal pain, diarrhea, nausea and vomiting. Genitourinary: Negative for dysuria and flank pain. Musculoskeletal: Positive for back pain. Negative for neck pain. Skin: Negative for rash and wound. Neurological: Negative for dizziness, syncope and headaches. Psychiatric/Behavioral: Negative for self-injury and suicidal ideas. Physical Exam  
Physical Exam  
 
GENERAL: alert and oriented, mild distress EYES: PEERL, No injection, discharge or icterus. ENT: Mucous membranes pink and moist. 
NECK: Supple LUNGS: Airway patent. Non-labored respirations.  Breath sounds clear with good air entry bilaterally. HEART: tachycardia. No peripheral edema ABDOMEN: mild diffuse TTP, without guarding or rebound.  EXAM:  External genitalia normal.  Pelvic exam: cervix normal, ovaries and uterus normal size, mild bilateral adnexal TTP,, no cervical motion tenderness or adnexal masses. No discharge or bleeding or foreign body. Chaperone present . SKIN:  warm, dry EXTREMITIES: Without swelling, tenderness or deformity, symmetric with normal ROM 
NEUROLOGICAL: Alert, oriented Diagnostic Study Results Labs - No results found for this or any previous visit (from the past 12 hour(s)). Radiologic Studies - No orders to display CT Results  (Last 48 hours) None CXR Results  (Last 48 hours) None Medical Decision Making I am the first provider for this patient. I reviewed the vital signs, available nursing notes, past medical history, past surgical history, family history and social history. Vital Signs-Reviewed the patient's vital signs. Patient Vitals for the past 12 hrs: 
 Temp Pulse Resp BP SpO2  
09/18/18 1706 97.5 °F (36.4 °C) (!) 112 18 (!) 169/113 100 % Pulse Oximetry Analysis - 100% on RA Cardiac Monitor:  
Rate: 112 bpm 
Rhythm: Sinus Tachycardia Records Reviewed: Nursing Notes and Old Medical Records Provider Notes (Medical Decision Making): The patient presents with a chief complaint of abdominal pain. Differential diagnosis considered is broad and  includes acute appendicitis, acute cholecystitis, pancreatitis, gastritis, PUD, diverticulitis, mesenteric ischemia, abdominal aortic aneurysm, bowel obstruction, enteritis, colitis, fecal impaction, volvulus, IBS, inflammatory bowel disease, specific food intolerance, peritonitis, perforated viscous, malignancy, UTI, abscess, and abdominal pain NOS.   Pelvic source of pain was also considered including endometritis, dysmenorrhea, ovarian cyst, ovarian torsion, PID, TOA, cervicitis, vaginitis, or uterine fibroid. All labs and diagnostic studies were reviewed. Clinical examination, history, and work-up exclude some of the more serious diagnoses as listed above. CT did note cystic structures in the endometrium and right hydrosalpinx. I did perform a pelvic and sent cultures but other than some mild bilateral adnexal TTP the exam was unremarkable. I discussed this with gynecology who felt the findings are likely not the etiology of her sx. Cause of the abdominal pain was not clearly identified Although the cause of the patients abdominal pain was not clearly identified, the examination was non-surgical and remained benign on repeat exam however she continues to not be able to tolerate PO despite antiemetics. Her labs did demonstrate persistent hypokalemia which we repleted. Will admit the patient for continued hydration and further workup as to the etiology of her symptoms . ED Course:  
Initial assessment performed. The patients presenting problems have been discussed, and they are in agreement with the care plan formulated and outlined with them. I have encouraged them to ask questions as they arise throughout their visit. Consult Note: 
10:26 PM 
Deng Garza MD spoke with Dr. Kelsey Garsia, Specialty: [facility] [place] [facility] [place] Discussed pt's hx, disposition, and available diagnostic and imaging results. Reviewed care plans. Consultant agrees with plans as outlined. Recommends pelvic ultrasound, will see patient in hospital. 
 
CONSULT NOTE:  
10:35 PM 
Deng Garza MD spoke with Dr. Mark Canales, Specialty: Hospitalist 
Discussed pt's hx, disposition, and available diagnostic and imaging results. Reviewed care plans. Consultant will evaluate pt for admission. Written by Obdulio Nguyen ED Scribe, as dictated by Angel Garza MD. Critical Care Time:  
0 minutes Disposition: 
Admit Note: 
10:35 PM 
 Pt is being admitted by Dr. Chaka Fernandez. The results of their tests and reason(s) for their admission have been discussed with pt and/or available family. They convey agreement and understanding for the need to be admitted and for admission diagnosis. PLAN: 
Admit Diagnosis Clinical Impression: 1. abdominal pain 2. dehydration 3. hypokalemia Attestations: This note is prepared by Marek Flanagan, acting as Scribe for [employer]. MD Amalia Bach MD: The scribe's documentation has been prepared under my direction and personally reviewed by me in its entirety. I confirm that the note above accurately reflects all work, treatment, procedures, and medical decision making performed by me.

## 2018-09-18 NOTE — ED NOTES
Pt ambulatory to ED with friend. Pt c/o of vomiting that began last Thursday. Pt reports she was here at the ED and was admitted to the hospital last Thursday and her vomiting has gotten worse. Pt denies dizziness, diarrhea, SOB and urinary symptoms. Pt reports headache and dizziness. Pt reports appetite has decreased d/t nausea and vomiting. Pt reports being able to eat some chicken noodle soup last night without vomiting.

## 2018-09-18 NOTE — IP AVS SNAPSHOT
[address] [facility] 
[phone] Patient: Rocio Escamilla MRN: [id number] WUE:[id number] A check meet indicates which time of day the medication should be taken. My Medications START taking these medications Instructions Each Dose to Equal  
 Morning Noon Evening Bedtime  
 esomeprazole 20 mg capsule Commonly known as:  NexIUM 24HR Your last dose was: Your next dose is: Take 1 Cap by mouth daily. 20 mg  
    
   
   
   
  
 oxyCODONE IR 5 mg immediate release tablet Commonly known as:  Cora Joya Your last dose was: Your next dose is: Take 1 Tab by mouth every four (4) hours as needed. Max Daily Amount: 30 mg.  
 5 mg  
    
   
   
   
  
 oxyCODONE-acetaminophen 5-325 mg per tablet Commonly known as:  PERCOCET Your last dose was: Your next dose is: Take 1 Tab by mouth every four (4) hours as needed for Pain. Max Daily Amount: 6 Tabs. 1 Tab  
    
   
   
   
  
 polyethylene glycol 17 gram packet Commonly known as:  Robert Arreaga Your last dose was: Your next dose is: Take 1 Packet by mouth daily. 17 g CHANGE how you take these medications Instructions Each Dose to Equal  
 Morning Noon Evening Bedtime  
 chlorthalidone 25 mg tablet Commonly known as:  Barton Deirdre What changed:  Another medication with the same name was removed. Continue taking this medication, and follow the directions you see here. Your last dose was: Your next dose is: Take 25 mg by mouth daily. 25 mg CONTINUE taking these medications Instructions Each Dose to Equal  
 Morning Noon Evening Bedtime  
 acetaminophen 500 mg tablet Commonly known as:  TYLENOL Your last dose was: Your next dose is: Take 1,000 mg by mouth every six (6) hours as needed for Pain. 1000 mg  
    
   
   
   
  
 amLODIPine 10 mg tablet Commonly known as:  Don Chafe Your last dose was: Your next dose is: Take 1 Tab by mouth daily. 10 mg  
    
   
   
   
  
 metoprolol tartrate 25 mg tablet Commonly known as:  LOPRESSOR Your last dose was: Your next dose is: Take 1 Tab by mouth every twelve (12) hours. 25 mg  
    
   
   
   
  
 ondansetron 4 mg disintegrating tablet Commonly known as:  ZOFRAN ODT Your last dose was: Your next dose is: Take 1 Tab by mouth every eight (8) hours as needed for Nausea. 4 mg ZyrTEC 10 mg tablet Generic drug:  cetirizine Your last dose was: Your next dose is: Take 10 mg by mouth daily. 10 mg  
    
   
   
   
  
  
STOP taking these medications   
 potassium chloride SR 10 mEq tablet Commonly known as:  KLOR-CON 10 Where to Get Your Medications Information on where to get these meds will be given to you by the nurse or doctor. ! Ask your nurse or doctor about these medications  
  esomeprazole 20 mg capsule  
 oxyCODONE IR 5 mg immediate release tablet  
 oxyCODONE-acetaminophen 5-325 mg per tablet  
 polyethylene glycol 17 gram packet

## 2018-09-18 NOTE — TELEPHONE ENCOUNTER
Patient called and stated that she wanted to inform Dr. Elin Wilcox, that instead of faxing her FMLA paperwork, she would like to pick it up when completed because she has some additional stuff that she has to do.  The contact number is [phone]

## 2018-09-18 NOTE — IP AVS SNAPSHOT
[address] [facility] 
[phone] Patient: Danuta Beavers MRN: [id number] IGP:[id number]/71/[id number] About your hospitalization You were admitted on:  September 19, 2018 You last received care in the:  [facility] 2 GENERAL SURGERY You were discharged on:  September 21, 2018 Why you were hospitalized Your primary diagnosis was:  S/P Laparoscopic Cholecystectomy Your diagnoses also included:  Nausea & Vomiting, Essential Hypertension, Malignant, Cannabis Abuse, Tobacco Abuse, Polycystic Kidney Disease, Autosomal Dominant, Hypertension, Uncontrolled, Polycystic Kidney Disease, Hyperemesis Follow-up Information Follow up With Details Comments Contact Info Rubio Daniel MD Go on 9/28/2018 Hospital follow-up scheduled at 11:00am ( If you have questions or need to reschedule please call [phone] [address] [place] [address] [address] [facility] 7 [address] 
[phone] Kallie Murray MD Schedule an appointment as soon as possible for a visit on 10/5/2018 @ 3:40pm - arrrive at 3:20pm - paperwork to fill out [address] [facility] 
[phone] Sean Tilley MD Schedule an appointment as soon as possible for a visit to be seen within 2-3 weeks [address] [facility] 
[phone] Your Scheduled Appointments Friday September 28, 2018 11:00 AM EDT TRANSITIONAL CARE MANAGEMENT with Rubio Daniel MD  
[address]) [facility] [address] [facility] [address]  
[phone] Friday October 05, 2018  3:40 PM EDT  
POST OP with Kallie Murray MD  
[facility] Chu Atkins [facility] ([facility]) [address] [place] [address], [address] [facility]  
[phone]  Thursday October 11, 2018 10:00 AM EDT  
SARAH MAMMO SCREENING with [facility] MAMMO 1  
 145 [facility] [address] Shower or bathe using soap and water. Do not use deodorant, powder, perfumes, or lotion the day of your exam.  If your prior mammograms were not performed at [place] [address] please bring films with you or forward prior images 2 days before your procedure. Check in at registration 15min before your appointment time unless you were instructed to do otherwise. A script is not necessary, but if you have one, please bring it on the day of the mammogram or have it faxed to the department. You are responsible for finding a method of transportation to your appointment. If you don't have transportation, please reschedule your appointment at least 24 hours in advance. [facility] [phone] [facility]  [phone] [address] [place] [address]  [phone] [phone] [facility] 1158 [facility] [phone] Patients coming to [facility]. Use the Main Entrance on [address] [place] [address]. Check in at the Information Desk. If your appointment is after 4:00pm, please go to the Emergency Room to be registered. Physical Location: [address] [facility], [address] [place] [address] Discharge Orders None A check meet indicates which time of day the medication should be taken. My Medications START taking these medications Instructions Each Dose to Equal  
 Morning Noon Evening Bedtime  
 esomeprazole 20 mg capsule Commonly known as:  NexIUM 24HR Your last dose was: Your next dose is: Take 1 Cap by mouth daily. 20 mg  
    
   
   
   
  
 oxyCODONE IR 5 mg immediate release tablet Commonly known as:  Diaz Herndon Your last dose was: Your next dose is: Take 1 Tab by mouth every four (4) hours as needed. Max Daily Amount: 30 mg.  
 5 mg  
    
   
   
   
  
 oxyCODONE-acetaminophen 5-325 mg per tablet Commonly known as:  PERCOCET Your last dose was: Your next dose is: Take 1 Tab by mouth every four (4) hours as needed for Pain. Max Daily Amount: 6 Tabs. 1 Tab  
    
   
   
   
  
 polyethylene glycol 17 gram packet Commonly known as:  Rosa Mathew Your last dose was: Your next dose is: Take 1 Packet by mouth daily. 17 g CHANGE how you take these medications Instructions Each Dose to Equal  
 Morning Noon Evening Bedtime  
 chlorthalidone 25 mg tablet Commonly known as:  Levar Fajardo What changed:  Another medication with the same name was removed. Continue taking this medication, and follow the directions you see here. Your last dose was: Your next dose is: Take 25 mg by mouth daily. 25 mg CONTINUE taking these medications Instructions Each Dose to Equal  
 Morning Noon Evening Bedtime  
 acetaminophen 500 mg tablet Commonly known as:  TYLENOL Your last dose was: Your next dose is: Take 1,000 mg by mouth every six (6) hours as needed for Pain. 1000 mg  
    
   
   
   
  
 amLODIPine 10 mg tablet Commonly known as:  [place] Your last dose was: Your next dose is: Take 1 Tab by mouth daily. 10 mg  
    
   
   
   
  
 metoprolol tartrate 25 mg tablet Commonly known as:  LOPRESSOR Your last dose was: Your next dose is: Take 1 Tab by mouth every twelve (12) hours. 25 mg  
    
   
   
   
  
 ondansetron 4 mg disintegrating tablet Commonly known as:  ZOFRAN ODT Your last dose was: Your next dose is: Take 1 Tab by mouth every eight (8) hours as needed for Nausea. 4 mg ZyrTEC 10 mg tablet Generic drug:  cetirizine Your last dose was: Your next dose is: Take 10 mg by mouth daily.   
 10 mg  
 STOP taking these medications   
 potassium chloride SR 10 mEq tablet Commonly known as:  KLOR-CON 10 Where to Get Your Medications Information on where to get these meds will be given to you by the nurse or doctor. ! Ask your nurse or doctor about these medications  
  esomeprazole 20 mg capsule  
 oxyCODONE IR 5 mg immediate release tablet  
 oxyCODONE-acetaminophen 5-325 mg per tablet  
 polyethylene glycol 17 gram packet Opioid Education Prescription Opioids: What You Need to Know: 
 
Prescription opioids can be used to help relieve moderate-to-severe pain and are often prescribed following a surgery or injury, or for certain health conditions. These medications can be an important part of treatment but also come with serious risks. Opioids are strong pain medicines. Examples include hydrocodone, oxycodone, fentanyl, and morphine. Heroin is an example of an illegal opioid. It is important to work with your health care provider to make sure you are getting the safest, most effective care. WHAT ARE THE RISKS AND SIDE EFFECTS OF OPIOID USE? Prescription opioids carry serious risks of addiction and overdose, especially with prolonged use. An opioid overdose, often marked by slow breathing, can cause sudden death. The use of prescription opioids can have a number of side effects as well, even when taken as directed. · Tolerance-meaning you might need to take more of a medication for the same pain relief · Physical dependence-meaning you have symptoms of withdrawal when the medication is stopped. Withdrawal symptoms can include nausea, sweating, chills, diarrhea, stomach cramps, and muscle aches. Withdrawal can last up to several weeks, depending on which drug you took and how long you took it. · Increased sensitivity to pain · Constipation · Nausea, vomiting, and dry mouth · Sleepiness and dizziness · Confusion · Depression · Low levels of testosterone that can result in lower sex drive, energy, and strength · Itching and sweating RISKS ARE GREATER WITH:      
· History of drug misuse, substance use disorder, or overdose · Mental health conditions (such as depression or anxiety) · Sleep apnea · Older age (72 years or older) · Pregnancy Avoid alcohol while taking prescription opioids. Also, unless specifically advised by your health care provider, medications to avoid include: · Benzodiazepines (such as Xanax or Valium) · Muscle relaxants (such as Soma or Flexeril) · Hypnotics (such as Ambien or Lunesta) · Other prescription opioids KNOW YOUR OPTIONS Talk to your health care provider about ways to manage your pain that don't involve prescription opioids. Some of these options may actually work better and have fewer risks and side effects. Consult your physician before adding or stopping any medications, treatments, or physical activity. Options may include: 
· Pain relievers such as acetaminophen, ibuprofen, and naproxen · Some medications that are also used for depression or seizures · Physical therapy and exercise · Counseling to help patients learn how to cope better with triggers of pain and stress. · Application of heat or cold compress · Massage therapy · Relaxation techniques Be Informed Make sure you know the name of your medication, how much and how often to take it, and its potential risks & side effects. IF YOU ARE PRESCRIBED OPIOIDS FOR PAIN: 
· Never take opioids in greater amounts or more often than prescribed. Remember the goal is not to be pain-free but to manage your pain at a tolerable level. · Follow up with your primary care provider to: · Work together to create a plan on how to manage your pain. · Talk about ways to help manage your pain that don't involve prescription opioids. · Talk about any and all concerns and side effects. · Help prevent misuse and abuse. · Never sell or share prescription opioids · Help prevent misuse and abuse. · Store prescription opioids in a secure place and out of reach of others (this may include visitors, children, friends, and family). · Safely dispose of unused/unwanted prescription opioids: Find your community drug take-back program or your pharmacy mail-back program, or flush them down the toilet, following guidance from the Food and Drug Administration (www.fda.gov/Drugs/ResourcesForYou). · Visit www.cdc.gov/drugoverdose to learn about the risks of opioid abuse and overdose. · If you believe you may be struggling with addiction, tell your health care provider and ask for guidance or call [employer] at 9-049-837-FCVI. Discharge Instructions Patient Discharge Instructions Gail Renan / [id number] : 1975 Admitted 2018 Discharged: 2018 What to do at [facility] Recommended diet: Low fat, Low cholesterol Recommended activity: no heavy lifting > 20 lbs x 2 weeks; no driving while taking narcotics for pain. May take shower, but no bath x 2 weeks. Keep ice over the incision to minimize swelling. Please take over the counter stool softener or miralax while taking narcotics for pain. However, stop taking the stool softener if you develop diarrhea. If you experience a lot of drainage, develop redness around the wound, or a fever over 101 F occurs please call the office. Narcotics and anesthesia sometimes cause nausea and vomiting. If persistent please call the office. Do not hesitate to call with questions or concerns. Follow-up with Dr. Shakila Mcmillan in 2 weeks. Please call [phone] for an appointment. Information obtained by : 
I understand that if any problems occur once I am at home I am to contact my physician. I understand and acknowledge receipt of the instructions indicated above. Physician's or R.N.'s Signature                                                                  Date/Time Patient or Representative Signature                                                          Date/Time Patient Discharge Instructions Pt Name  Eze Treviño Date of Birth 1975 Age  39 y.o. Medical Record Number  [id number] PCP Anshul Inman MD   
Admit date:  9/18/2018 @    [place] [address] Room Number  2680/48 Date of Discharge 9/21/2018 Admission Diagnoses:     S/P laparoscopic cholecystectomy No Known Allergies You were admitted to [address] for  S/P laparoscopic cholecystectomy YOUR OTHER MEDICAL DIAGNOSES INCLUDE (BUT NOT LIMITED TO ): 
Present on Admission: 
 Nausea & vomiting  Essential hypertension, malignant  Cannabis abuse  S/P laparoscopic cholecystectomy  Tobacco abuse  Polycystic kidney disease, autosomal dominant  Hypertension, uncontrolled  Polycystic kidney disease  Hyperemesis DIET:  Cardiac Diet and Low fat, Low cholesterol Recommended activity: Activity as tolerated Stopping Smoking: Care Instructions Your Care Instructions Cigarette smokers crave the nicotine in cigarettes. Giving it up is much harder than simply changing a habit. Your body has to stop craving the nicotine. It is hard to quit, but you can do it. There are many tools that people use to quit smoking. You may find that combining tools works best for you. There are several steps to quitting. First you get ready to quit.  Then you get support to help you. After that, you learn new skills and behaviors to become a nonsmoker. For many people, a necessary step is getting and using medicine. Your doctor will help you set up the plan that best meets your needs. You may want to attend a smoking cessation program to help you quit smoking. When you choose a program, look for one that has proven success. Ask your doctor for ideas. You will greatly increase your chances of success if you take medicine as well as get counseling or join a cessation program. 
Some of the changes you feel when you first quit tobacco are uncomfortable. Your body will miss the nicotine at first, and you may feel short-tempered and grumpy. You may have trouble sleeping or concentrating. Medicine can help you deal with these symptoms. You may struggle with changing your smoking habits and rituals. The last step is the tricky one: Be prepared for the smoking urge to continue for a time. This is a lot to deal with, but keep at it. You will feel better. Follow-up care is a key part of your treatment and safety. Be sure to make and go to all appointments, and call your doctor if you are having problems. It's also a good idea to know your test results and keep a list of the medicines you take. How can you care for yourself at home? · Ask your family, friends, and coworkers for support. You have a better chance of quitting if you have help and support. · Join a support group, such as Nicotine Anonymous, for people who are trying to quit smoking. · Consider signing up for a smoking cessation program, such as the American Lung Association's Freedom from Smoking program. 
· Get text messaging support. Go to the website at www.smokefree. gov to sign up for the [facility] program. 
· Set a quit date. Pick your date carefully so that it is not right in the middle of a big deadline or stressful time.  Once you quit, do not even take a puff. Get rid of all ashtrays and lighters after your last cigarette. Clean your house and your clothes so that they do not smell of smoke. · Learn how to be a nonsmoker. Think about ways you can avoid those things that make you reach for a cigarette. ¨ Avoid situations that put you at greatest risk for smoking. For some people, it is hard to have a drink with friends without smoking. For others, they might skip a coffee break with coworkers who smoke. ¨ Change your daily routine. Take a different route to work or eat a meal in a different place. · Cut down on stress. Calm yourself or release tension by doing an activity you enjoy, such as reading a book, taking a hot bath, or gardening. · Talk to your doctor or pharmacist about nicotine replacement therapy, which replaces the nicotine in your body. You still get nicotine but you do not use tobacco. Nicotine replacement products help you slowly reduce the amount of nicotine you need. These products come in several forms, many of them available over-the-counter: ¨ Nicotine patches ¨ Nicotine gum and lozenges ¨ Nicotine inhaler · Ask your doctor about bupropion (Wellbutrin) or varenicline (Chantix), which are prescription medicines. They do not contain nicotine. They help you by reducing withdrawal symptoms, such as stress and anxiety. · Some people find hypnosis, acupuncture, and massage helpful for ending the smoking habit. · Eat a healthy diet and get regular exercise. Having healthy habits will help your body move past its craving for nicotine. · Be prepared to keep trying. Most people are not successful the first few times they try to quit. Do not get mad at yourself if you smoke again. Make a list of things you learned and think about when you want to try again, such as next week, next month, or next year. Where can you learn more? Go to http://shan-valentina.info/. Enter I137 in the search box to learn more about \"Stopping Smoking: Care Instructions. \" Current as of: November 29, 2017 Content Version: 11.7 © 5368-3084 [employer]. Care instructions adapted under license by [employer] (which disclaims liability or warranty for this information). If you have questions about a medical condition or this instruction, always ask your healthcare professional. [place] [address] any warranty or liability for your use of this information. Follow up : Follow-up Information Follow up With Details Comments Contact Info Zain Moralez MD   [address] [place] [address] 308 [facility] [address] 
[phone] Froylan Baldwin MD Schedule an appointment as soon as possible for a visit to be seen in 2 weeks [address] 67 [facility] 
[phone] Dona Suarez MD Schedule an appointment as soon as possible for a visit to be seen within 1-2 weeks [address] 202 [facility] 
[phone] Skilled nursing [facility] MD responsible for above upon discharge. · It is important that you take the medication exactly as they are prescribed. · Keep your medication in the bottles provided by the pharmacist and keep a list of the medication names, dosages, and times to be taken in your wallet. · Do not take other medications without consulting your doctor. ADDITIONAL INFORMATION: If you experience any of the following symptoms or have any health problem not listed below, then please call your primary care physician or return to the emergency room if you cannot get hold of your doctor: Fever, chills, nausea, vomiting, diarrhea, change in mentation, falling, bleeding, shortness of breath.  
 
 
I understand that if any problems occur once I am discharged, I am supposed to call my Primary care physician for further care or seek help in the Emergency Department at the nearest Healthcare facility. I have had an opportunity to discuss my clinical issues with my doctor and nursing staff. I understand and acknowledge receipt of the above instructions. Physician's or R.N.'s Signature                                                            Date/Time Patient or Representative Signature                                                 Date/Time [employer] Announcement We are excited to announce that we are making your provider's discharge notes available to you in [employer]. You will see these notes when they are completed and signed by the physician that discharged you from your recent hospital stay. If you have any questions or concerns about any information you see in [employer], please call the Health Information Department where you were seen or reach out to your Primary Care Provider for more information about your plan of care. Introducing [address]! Dear Chi Dawkins: Thank you for requesting a [employer] account. Our records indicate that you already have an active [employer] account. You can access your account anytime at [email or web address]. [employer]/College Snack Attack Did you know that you can access your hospital and ER discharge instructions at any time in [employer]? You can also review all of your test results from your hospital stay or ER visit. Additional Information If you have questions, please visit the Frequently Asked Questions section of the [employer] website at [email or web address] [email or web address] Remember, [employer] is NOT to be used for urgent needs. For medical emergencies, dial 911. Now available from your iPhone and Android! Introducing Shravan Eid As a Lucie Chin patient, I wanted to make you aware of our electronic visit tool called Shravan Stanton. [facility]ie [facility] 24/7 allows you to connect within minutes with a medical provider 24 hours a day, seven days a week via a mobile device or tablet or logging into a secure website from your computer. You can access Shravan Benijodyfin from anywhere in the [place]. A virtual visit might be right for you when you have a simple condition and feel like you just dont want to get out of bed, or cant get away from work for an appointment, when your regular Lucie Chin provider is not available (evenings, weekends or holidays), or when youre out of town and need minor care. Electronic visits cost only $49 and if the Lucie Chin 24/7 provider determines a prescription is needed to treat your condition, one can be electronically transmitted to a nearby pharmacy*. Please take a moment to enroll today if you have not already done so. The enrollment process is free and takes just a few minutes. To enroll, please download the Lucie Drrocco 24/7 tammie to your tablet or phone, or visit [email or web address] to enroll on your computer. And, as an [address] [place] [address] patient with a [employer] account, the results of your visits will be scanned into your electronic medical record and your primary care provider will be able to view the scanned results. We urge you to continue to see your regular Lucie Chin provider for your ongoing medical care. And while your primary care provider may not be the one available when you seek a Shravan Bazanjodyfin virtual visit, the peace of mind you get from getting a real diagnosis real time can be priceless. For more information on Shravan Benijodyfin, view our Frequently Asked Questions (FAQs) at [email or web address]. Sincerely, 
 
Cecille Hays MD 
Chief Medical Officer [address] *:  certain medications cannot be prescribed via Shravan Eid Providers Seen During Your Hospitalization Provider Specialty Primary office phone Thelma Longoria MD Emergency Medicine [phone] Cortney Phelps MD Internal Medicine [phone] Your Primary Care Physician (PCP) Primary Care Physician Office Phone Office Fax Shruti Connelly [phone] You are allergic to the following No active allergies Recent Documentation Height Weight BMI OB Status Smoking Status 1.549 m 68.4 kg 28.49 kg/m2 Unknown Current Some Day Smoker Emergency Contacts Name Discharge Info Relation Home Work Mobile Julisa Spears N/A  AT THIS TIME [6] Mother [14] [phone] RufusMadhav N/A  AT THIS TIME [6] Friend [5] [phone] Patient Belongings The following personal items are in your possession at time of discharge: 
  Dental Appliances: None  Visual Aid: None      Home Medications: None   Jewelry: None  Clothing: None    Other Valuables: None  Personal Items Sent to Safe: none Please provide this summary of care documentation to your next provider. Signatures-by signing, you are acknowledging that this After Visit Summary has been reviewed with you and you have received a copy. Patient Signature:  ____________________________________________________________ Date:  ____________________________________________________________  
  
[facility] Provider Signature:  ____________________________________________________________ Date:  ____________________________________________________________

## 2018-09-18 NOTE — ED NOTES
Pt medicated as ordered. RN discussed with pt that urine sample is needed, pt reported she does not need to urinate at this time. Pt's friend at bedside. No further needs expressed.

## 2018-09-19 ENCOUNTER — APPOINTMENT (OUTPATIENT)
Dept: NUCLEAR MEDICINE | Age: 43
End: 2018-09-19
Attending: INTERNAL MEDICINE
Payer: COMMERCIAL

## 2018-09-19 ENCOUNTER — APPOINTMENT (OUTPATIENT)
Dept: ULTRASOUND IMAGING | Age: 43
End: 2018-09-19
Attending: INTERNAL MEDICINE
Payer: COMMERCIAL

## 2018-09-19 PROBLEM — R11.2 NAUSEA & VOMITING: Status: ACTIVE | Noted: 2018-09-19

## 2018-09-19 LAB
ANION GAP SERPL CALC-SCNC: 10 MMOL/L (ref 5–15)
ANION GAP SERPL CALC-SCNC: 13 MMOL/L (ref 5–15)
BUN SERPL-MCNC: 10 MG/DL (ref 6–20)
BUN SERPL-MCNC: 6 MG/DL (ref 6–20)
BUN/CREAT SERPL: 12 (ref 12–20)
BUN/CREAT SERPL: 7 (ref 12–20)
C TRACH DNA SPEC QL NAA+PROBE: NEGATIVE
CALCIUM SERPL-MCNC: 8 MG/DL (ref 8.5–10.1)
CALCIUM SERPL-MCNC: 8.7 MG/DL (ref 8.5–10.1)
CHLORIDE SERPL-SCNC: 100 MMOL/L (ref 97–108)
CHLORIDE SERPL-SCNC: 106 MMOL/L (ref 97–108)
CK MB CFR SERPL CALC: 4.4 % (ref 0–2.5)
CK MB SERPL-MCNC: 1.9 NG/ML (ref 5–25)
CK SERPL-CCNC: 43 U/L (ref 26–192)
CO2 SERPL-SCNC: 22 MMOL/L (ref 21–32)
CO2 SERPL-SCNC: 23 MMOL/L (ref 21–32)
CREAT SERPL-MCNC: 0.83 MG/DL (ref 0.55–1.02)
CREAT SERPL-MCNC: 0.84 MG/DL (ref 0.55–1.02)
ERYTHROCYTE [DISTWIDTH] IN BLOOD BY AUTOMATED COUNT: 13.2 % (ref 11.5–14.5)
GLUCOSE SERPL-MCNC: 125 MG/DL (ref 65–100)
GLUCOSE SERPL-MCNC: 80 MG/DL (ref 65–100)
HCT VFR BLD AUTO: 34.5 % (ref 35–47)
HGB BLD-MCNC: 12.4 G/DL (ref 11.5–16)
MAGNESIUM SERPL-MCNC: 1.8 MG/DL (ref 1.6–2.4)
MCH RBC QN AUTO: 33.6 PG (ref 26–34)
MCHC RBC AUTO-ENTMCNC: 35.9 G/DL (ref 30–36.5)
MCV RBC AUTO: 93.5 FL (ref 80–99)
N GONORRHOEA DNA SPEC QL NAA+PROBE: NEGATIVE
NRBC # BLD: 0 K/UL (ref 0–0.01)
NRBC BLD-RTO: 0 PER 100 WBC
PHOSPHATE SERPL-MCNC: 2.6 MG/DL (ref 2.6–4.7)
PLATELET # BLD AUTO: 388 K/UL (ref 150–400)
PMV BLD AUTO: 8.9 FL (ref 8.9–12.9)
POTASSIUM SERPL-SCNC: 2.8 MMOL/L (ref 3.5–5.1)
POTASSIUM SERPL-SCNC: 3 MMOL/L (ref 3.5–5.1)
RBC # BLD AUTO: 3.69 M/UL (ref 3.8–5.2)
SAMPLE TYPE: NORMAL
SERVICE CMNT-IMP: NORMAL
SODIUM SERPL-SCNC: 136 MMOL/L (ref 136–145)
SODIUM SERPL-SCNC: 138 MMOL/L (ref 136–145)
SPECIMEN SOURCE: NORMAL
TROPONIN I SERPL-MCNC: <0.05 NG/ML
WBC # BLD AUTO: 14.9 K/UL (ref 3.6–11)

## 2018-09-19 PROCEDURE — 80048 BASIC METABOLIC PNL TOTAL CA: CPT | Performed by: INTERNAL MEDICINE

## 2018-09-19 PROCEDURE — 96361 HYDRATE IV INFUSION ADD-ON: CPT

## 2018-09-19 PROCEDURE — 96376 TX/PRO/DX INJ SAME DRUG ADON: CPT

## 2018-09-19 PROCEDURE — 74011250636 HC RX REV CODE- 250/636

## 2018-09-19 PROCEDURE — 93005 ELECTROCARDIOGRAM TRACING: CPT

## 2018-09-19 PROCEDURE — 36415 COLL VENOUS BLD VENIPUNCTURE: CPT | Performed by: INTERNAL MEDICINE

## 2018-09-19 PROCEDURE — 85027 COMPLETE CBC AUTOMATED: CPT | Performed by: INTERNAL MEDICINE

## 2018-09-19 PROCEDURE — 99218 HC RM OBSERVATION: CPT

## 2018-09-19 PROCEDURE — 76856 US EXAM PELVIC COMPLETE: CPT

## 2018-09-19 PROCEDURE — 84484 ASSAY OF TROPONIN QUANT: CPT | Performed by: INTERNAL MEDICINE

## 2018-09-19 PROCEDURE — C9113 INJ PANTOPRAZOLE SODIUM, VIA: HCPCS | Performed by: INTERNAL MEDICINE

## 2018-09-19 PROCEDURE — 96372 THER/PROPH/DIAG INJ SC/IM: CPT

## 2018-09-19 PROCEDURE — 74011000250 HC RX REV CODE- 250: Performed by: INTERNAL MEDICINE

## 2018-09-19 PROCEDURE — 74011250636 HC RX REV CODE- 250/636: Performed by: INTERNAL MEDICINE

## 2018-09-19 PROCEDURE — 83735 ASSAY OF MAGNESIUM: CPT | Performed by: INTERNAL MEDICINE

## 2018-09-19 PROCEDURE — 74011250637 HC RX REV CODE- 250/637: Performed by: INTERNAL MEDICINE

## 2018-09-19 PROCEDURE — 76830 TRANSVAGINAL US NON-OB: CPT

## 2018-09-19 PROCEDURE — 82550 ASSAY OF CK (CPK): CPT | Performed by: INTERNAL MEDICINE

## 2018-09-19 PROCEDURE — 84100 ASSAY OF PHOSPHORUS: CPT | Performed by: INTERNAL MEDICINE

## 2018-09-19 RX ORDER — SODIUM CHLORIDE AND POTASSIUM CHLORIDE .9; .15 G/100ML; G/100ML
SOLUTION INTRAVENOUS CONTINUOUS
Status: DISCONTINUED | OUTPATIENT
Start: 2018-09-19 | End: 2018-09-20

## 2018-09-19 RX ORDER — SODIUM CHLORIDE 0.9 % (FLUSH) 0.9 %
5-10 SYRINGE (ML) INJECTION EVERY 8 HOURS
Status: DISCONTINUED | OUTPATIENT
Start: 2018-09-19 | End: 2018-09-20

## 2018-09-19 RX ORDER — ONDANSETRON 2 MG/ML
INJECTION INTRAMUSCULAR; INTRAVENOUS
Status: DISPENSED
Start: 2018-09-19 | End: 2018-09-19

## 2018-09-19 RX ORDER — MAG HYDROX/ALUMINUM HYD/SIMETH 200-200-20
30 SUSPENSION, ORAL (FINAL DOSE FORM) ORAL
Status: DISCONTINUED | OUTPATIENT
Start: 2018-09-19 | End: 2018-09-21 | Stop reason: HOSPADM

## 2018-09-19 RX ORDER — METOPROLOL TARTRATE 25 MG/1
25 TABLET, FILM COATED ORAL EVERY 12 HOURS
Status: DISCONTINUED | OUTPATIENT
Start: 2018-09-19 | End: 2018-09-21 | Stop reason: HOSPADM

## 2018-09-19 RX ORDER — HEPARIN SODIUM 5000 [USP'U]/ML
INJECTION, SOLUTION INTRAVENOUS; SUBCUTANEOUS
Status: DISPENSED
Start: 2018-09-19 | End: 2018-09-19

## 2018-09-19 RX ORDER — MORPHINE SULFATE 4 MG/ML
INJECTION INTRAVENOUS
Status: DISPENSED
Start: 2018-09-19 | End: 2018-09-19

## 2018-09-19 RX ORDER — AMLODIPINE BESYLATE 5 MG/1
10 TABLET ORAL DAILY
Status: DISCONTINUED | OUTPATIENT
Start: 2018-09-19 | End: 2018-09-21 | Stop reason: HOSPADM

## 2018-09-19 RX ORDER — HEPARIN SODIUM 5000 [USP'U]/ML
5000 INJECTION, SOLUTION INTRAVENOUS; SUBCUTANEOUS EVERY 8 HOURS
Status: DISCONTINUED | OUTPATIENT
Start: 2018-09-19 | End: 2018-09-20

## 2018-09-19 RX ORDER — ONDANSETRON 2 MG/ML
4 INJECTION INTRAMUSCULAR; INTRAVENOUS
Status: DISCONTINUED | OUTPATIENT
Start: 2018-09-19 | End: 2018-09-21 | Stop reason: HOSPADM

## 2018-09-19 RX ORDER — MORPHINE SULFATE 2 MG/ML
1 INJECTION, SOLUTION INTRAMUSCULAR; INTRAVENOUS
Status: DISPENSED | OUTPATIENT
Start: 2018-09-19 | End: 2018-09-19

## 2018-09-19 RX ORDER — SODIUM CHLORIDE 0.9 % (FLUSH) 0.9 %
5-10 SYRINGE (ML) INJECTION AS NEEDED
Status: DISCONTINUED | OUTPATIENT
Start: 2018-09-19 | End: 2018-09-20

## 2018-09-19 RX ADMIN — Medication 5 ML: at 05:00

## 2018-09-19 RX ADMIN — Medication 10 ML: at 08:50

## 2018-09-19 RX ADMIN — ONDANSETRON HYDROCHLORIDE 4 MG: 2 INJECTION, SOLUTION INTRAVENOUS at 17:28

## 2018-09-19 RX ADMIN — MORPHINE SULFATE 1 MG: 2 INJECTION, SOLUTION INTRAMUSCULAR; INTRAVENOUS at 03:30

## 2018-09-19 RX ADMIN — ONDANSETRON HYDROCHLORIDE 4 MG: 2 INJECTION, SOLUTION INTRAVENOUS at 03:30

## 2018-09-19 RX ADMIN — MORPHINE SULFATE 1 MG: 2 INJECTION, SOLUTION INTRAMUSCULAR; INTRAVENOUS at 08:50

## 2018-09-19 RX ADMIN — METOPROLOL TARTRATE 25 MG: 25 TABLET ORAL at 03:30

## 2018-09-19 RX ADMIN — METOPROLOL TARTRATE 25 MG: 25 TABLET ORAL at 20:48

## 2018-09-19 RX ADMIN — Medication 5 ML: at 21:08

## 2018-09-19 RX ADMIN — METOPROLOL TARTRATE 25 MG: 25 TABLET ORAL at 08:25

## 2018-09-19 RX ADMIN — ONDANSETRON HYDROCHLORIDE 4 MG: 2 INJECTION, SOLUTION INTRAVENOUS at 09:40

## 2018-09-19 RX ADMIN — MORPHINE SULFATE 1 MG: 2 INJECTION, SOLUTION INTRAMUSCULAR; INTRAVENOUS at 17:28

## 2018-09-19 RX ADMIN — Medication 10 ML: at 17:29

## 2018-09-19 RX ADMIN — HEPARIN SODIUM 5000 UNITS: 5000 INJECTION, SOLUTION INTRAVENOUS; SUBCUTANEOUS at 21:08

## 2018-09-19 RX ADMIN — Medication 10 ML: at 13:08

## 2018-09-19 RX ADMIN — MORPHINE SULFATE 1 MG: 2 INJECTION, SOLUTION INTRAMUSCULAR; INTRAVENOUS at 22:40

## 2018-09-19 RX ADMIN — SODIUM CHLORIDE AND POTASSIUM CHLORIDE: 9; 1.49 INJECTION, SOLUTION INTRAVENOUS at 03:30

## 2018-09-19 RX ADMIN — SODIUM CHLORIDE 40 MG: 9 INJECTION, SOLUTION INTRAMUSCULAR; INTRAVENOUS; SUBCUTANEOUS at 20:51

## 2018-09-19 RX ADMIN — SODIUM CHLORIDE AND POTASSIUM CHLORIDE: 9; 1.49 INJECTION, SOLUTION INTRAVENOUS at 17:41

## 2018-09-19 RX ADMIN — HEPARIN SODIUM 5000 UNITS: 5000 INJECTION, SOLUTION INTRAVENOUS; SUBCUTANEOUS at 13:08

## 2018-09-19 RX ADMIN — HEPARIN SODIUM 5000 UNITS: 5000 INJECTION, SOLUTION INTRAVENOUS; SUBCUTANEOUS at 05:20

## 2018-09-19 RX ADMIN — Medication 10 ML: at 09:41

## 2018-09-19 RX ADMIN — ONDANSETRON HYDROCHLORIDE 4 MG: 2 INJECTION, SOLUTION INTRAVENOUS at 22:40

## 2018-09-19 RX ADMIN — Medication 5 ML: at 01:30

## 2018-09-19 RX ADMIN — SODIUM CHLORIDE 40 MG: 9 INJECTION, SOLUTION INTRAMUSCULAR; INTRAVENOUS; SUBCUTANEOUS at 16:38

## 2018-09-19 RX ADMIN — AMLODIPINE BESYLATE 10 MG: 5 TABLET ORAL at 08:25

## 2018-09-19 RX ADMIN — MORPHINE SULFATE 1 MG: 2 INJECTION, SOLUTION INTRAMUSCULAR; INTRAVENOUS at 13:08

## 2018-09-19 NOTE — PROGRESS NOTES
Pt seen and examined at bedside. Will check HIDA to rule out chronic cholecystitis, if positive then surgery consult and if negative then GI consult for possible EGD. NPO from midnight for HIDA tomorrow. No pain meds post midnight for HIDA. Start Px PPIs.  No other change in plan and management from earlier assessment from Dr Marty Hodge H&P

## 2018-09-19 NOTE — PROGRESS NOTES
Biliary study 9/20/18 Thursday 7:30am - due to patient not NPO Keep pt. NPO after midnight - NO narcotics

## 2018-09-19 NOTE — ED NOTES
Complaints of nausea at this time and pain 6/10. Patient does not need potassium level drawn until 0400 per Dr. Marina Hernandez. Patient receiving 1 dose of IV potassium at this time in the ED. After this dose is done patient will be placed on IV fluids with potassium additive per Dr. Marina Hernandez.

## 2018-09-19 NOTE — CONSULTS
Gynecology History and Physical    Name: Mine Graham MRN: [id number] SSN: [id number]    YOB: 1975  Age: 39 y.o. Sex: female       Subjective:      Chief complaint: nausea/vomiting and abdominal pain    Bob Valentine is a 39 y.o.  female who was admitted last evening with recurring nausea/vomiting. She was recently admitted 18-18 with the same complaints. When she was discharged home, she was tolerating clears. She reports over the weekend vomiting 1-2x/day but then on Monday it just got worse where she wasn't even able to keep clears down and then she vomited up her blood pressure medication. She reports diffuse abdominal pain. She reports similar symptoms 3-4 months ago where she came to the ER and received IV hydration and her symptoms improved and she went home; she denied having any abdominal pain at that time. Gynecology was consulted for question of pelvic pain and ovarian cysts seen on CT scan. Patient reports h/o partial hysterectomy but upon further discussion she reports she had an ectopic pregnancy 7 years ago and it appears she had a salpingectomy as she still has a uterus. She reports she stopped having menses after that surgery; she is not exactly sure what was done during that surgery. She does have a gynecologist and has an appt with her in October. Patient was recently found to have cysts on her kidneys and was supposed to have an appt with a nephrologist; this is a new finding for her. PObHx:  x 2 (); EAB x 1; ectopic x 1  PGynHx: nothing significant per pt  PMHx: HTN  PSHx: ?salpingectomy  SocHx: +tob 2cigs/day; +MJ; occ etoh; denies other drug use  Meds:  Amlodipine; metoprolol; zyrtec  All: NKDA    The current method of family planning is none.     OB History      Para Term  AB Living    4    1 2    SAB TAB Ectopic Molar Multiple Live Births      1           Obstetric Comments     x 2- 1 male, 1 female--cerclage x 2--31 weeks and 32 weeks deliveries. Past Medical History:   Diagnosis Date    Hypertension     Kidney anomaly, congenital     Tubal pregnancy      Past Surgical History:   Procedure Laterality Date    HX GYN  10/07/2013    cyst removed    HX HYSTERECTOMY  10/7/2013    partial hysterectomy     Social History     Occupational History    Not on file. Social History Main Topics    Smoking status: Current Some Day Smoker     Packs/day: 0.25     Years: 25.00    Smokeless tobacco: Never Used      Comment: 1-3 day    Alcohol use Yes      Comment: occasionally/socially    Drug use: No      Comment: stopped in 2013    Sexual activity: Yes     Partners: Male     Family History   Problem Relation Age of Onset    Hypertension Mother     Hypertension Father     Hypertension Sister         No Known Allergies  Prior to Admission medications    Medication Sig Start Date End Date Taking? Authorizing Provider   amLODIPine (NORVASC) 10 mg tablet Take 1 Tab by mouth daily. 9/18/18  Yes Bob Lazo MD   metoprolol tartrate (LOPRESSOR) 25 mg tablet Take 1 Tab by mouth every twelve (12) hours. 9/18/18  Yes Bob Lazo MD   chlorthalidone (HYGROTEN) 25 mg tablet Take 25 mg by mouth daily. Yes Jaylon Walker MD   acetaminophen (TYLENOL) 500 mg tablet Take 1,000 mg by mouth every six (6) hours as needed for Pain. Yes Jaylon Walker MD   ondansetron (ZOFRAN ODT) 4 mg disintegrating tablet Take 1 Tab by mouth every eight (8) hours as needed for Nausea. 9/17/18  Yes Bob Lazo MD   potassium chloride SR (KLOR-CON 10) 10 mEq tablet Take 1 Tab by mouth daily. 9/11/18  Yes Gilford Lack, MD   cetirizine (ZYRTEC) 10 mg tablet Take 10 mg by mouth daily. Yes Historical Provider        Review of Systems  A comprehensive review of systems was negative except for that written in the History of Present Illness.     Objective:     Vitals:    09/19/18 0130 09/19/18 0330 09/19/18 0745 09/19/18 1445   BP: 136/88 130/86 103/67 108/63   Pulse: 90 98 73 71   Resp: 18 20 18 18   Temp: 98 °F (36.7 °C) 98.6 °F (37 °C) 98.1 °F (36.7 °C) 98 °F (36.7 °C)   SpO2: 97% 99% 99% 98%   Weight:       Height:         Pelvic Ultrasound:  ULTRASOUND PELVIS  The uterus  is normal in size and echotexture. Transabdominally, the uterus  measures 6.3 a 4.5 x 4.1 cm. The endometrial stripe measures 5 mm. .  The right  ovary measures 3.2 x 2.5 x 2.2 cm. The  left ovary measures 3.5 x 3.1 x 2.9 cm. There is no mass or fluid or other abnormality in the adnexa or cul-de-sac.     ULTRASOUND TRANSVAGINAL  The uterus contains tiny cysts submucosal, the largest measuring 11 mm in  maximum diameter. Transvaginally, the uterus measures  7.2 x 3.9 x 3.9 cm. The  endometrial stripe was not clearly visualized. The right ovary  measures 3.4 x  2.3 x 3.9 cm and contains a 3.0 x 2.8 x 2.6 cm simple cyst.  The left ovary  measures 3.9 x 2.6 x 4.8 cm and contains a 3.8 x 2.0 x 3.6 cm simple cyst.   There is no hydrosalpinx. There is no fluid or mass or other abnormality in the  pelvic cul-de-sac.     IMPRESSION  IMPRESSION:      1. Small submucosal uterine cyst. No uterine enlargement. 2. Bilateral simple ovarian cysts, likely physiologic    Physical Exam:  Patient without distress.   Abdomen: soft; generalized tenderness; no rebound/guarding  Pelvic: deferred; done in the ER and unremarkable    Assessment/Plan:     Active Problems:    Essential hypertension, malignant (4/13/2012)      Cannabis abuse (7/18/2013)      Overview: UDS +VE 07/17/13      Nausea & vomiting (9/19/2018)    44yo with recurring nausea/vomiting  -essentially normal appearing pelvic ultrasound; doubt her symptoms are gyn in origin  -pt to f/u with her gynecologist as scheduled next month    Signed By:  Goldy Velazquez DO     September 19, 2018

## 2018-09-19 NOTE — PROGRESS NOTES
Pharmacy Clarification of Prior to Admission Medication Regimen The patient was interviewed regarding clarification of the prior to admission medication regimen and was questioned regarding use of any other inhalers, topical products, over the counter medications, herbal medications, vitamin products or ophthalmic/nasal/otic medication use. Information Obtained From: Patient, prescription bottle, RX Query Pertinent Pharmacy Findings: 
? Patient stated she took her medications this morning (9/18/18), but vomited after taking them. PTA medication list was corrected to the following:  
 
Prior to Admission Medications Prescriptions Last Dose Informant Patient Reported? Taking?  
acetaminophen (TYLENOL) 500 mg tablet 9/18/2018 at Unknown time Self Yes Yes Sig: Take 1,000 mg by mouth every six (6) hours as needed for Pain. amLODIPine (NORVASC) 10 mg tablet 9/18/2018 at Unknown time Other No Yes Sig: Take 1 Tab by mouth daily. cetirizine (ZYRTEC) 10 mg tablet 9/18/2018 at Unknown time Self Yes Yes Sig: Take 10 mg by mouth daily. chlorthalidone (HYGROTEN) 25 mg tablet 9/18/2018 at Unknown time Other Yes Yes Sig: Take 25 mg by mouth daily. metoprolol tartrate (LOPRESSOR) 25 mg tablet 9/18/2018 at Unknown time Other No Yes Sig: Take 1 Tab by mouth every twelve (12) hours. ondansetron (ZOFRAN ODT) 4 mg disintegrating tablet 9/18/2018 at Unknown time Self No Yes Sig: Take 1 Tab by mouth every eight (8) hours as needed for Nausea. potassium chloride SR (KLOR-CON 10) 10 mEq tablet 9/18/2018 at Unknown time Other No Yes Sig: Take 1 Tab by mouth daily. Facility-Administered Medications: None Thank you, 
Carline Edwards [facility] Medication History Pharmacy Technician

## 2018-09-19 NOTE — ED NOTES
Patient off the floor to inpatient unit at this time. IV locked during transport. The documentation for this period is being entered following the guidelines as defined in the [facility] downtime policy by Stanley Almendarez RN.

## 2018-09-19 NOTE — PROGRESS NOTES
09:36- Attempted to call ob/gyn to pt's outpatient MD, Dr. Kedar Herndon. I was told by the  that the physician, Dr. Yomaira Bruno, who normally comes to [facility] would not return until tomorrow. I then called labor and delivery and was connected to Dr. Chas Arriaga who stated she would come see patient after ultrasound was completed today.

## 2018-09-19 NOTE — ED NOTES
Patient continues to complain of nausea. Unable to tolerate ice chips at this time. Dr. Sarita Councilman notified patient unable to tolerate PO. Order for PO potassium discontinued. Changed to K-rider x 1.

## 2018-09-19 NOTE — H&P
Hospitalist Admission Note NAME: Juliana Gordon :  1975 MRN:  [id number] Date/Time:  2018 12:14 AM 
 
Patient PCP: Liv Mckeon MD 
______________________________________________________________________ Given the patient's current clinical presentation, I have a high level of concern for decompensation if discharged from the emergency department. Complex decision making was performed, which includes reviewing the patient's available past medical records, laboratory results, and x-ray films. My assessment of this patient's clinical condition and my plan of care is as follows. Assessment / Plan: Hypokalemia likely secondary to  hyperemesis syndrome 
-antiemetics prn 
-IVF with KCL 
-check mag 
-start on clear liquid diet  
 
  
HTN, uncontrolled 
-continue lopressor and Norvasc Tobacco use 
-nicotine patch, counseled 
  
Cystic lesions in both ovaries Possible right-sided hydrosalpinx 
-Gynecology consult at AM 
-pelvic sonogram at AM  
 
polycystic kidney disease 
-refer to nephrology as outpatient Code Status: full code Surrogate Decision Maker: DVT Prophylaxis: Heparin GI Prophylaxis: not indicated Baseline: independent Subjective: CHIEF COMPLAINT: nausea/vomiting/abd pain HISTORY OF PRESENT ILLNESS:    
Gurinder Joyner is a 39 y.o.  female with PMHx HTN, hx of LORENA, present to the ER c/o diffuse abdominal pain associated with nausea and vomiting , patient was recently admitted to the hospital  for same complained and was discharged  , patient stated since discharged she didn't feel better and  her abdomen pain get worse , she was seen by her PCP yesterday and was given some nausea medication but without any improvement , CT abdomen was done and showed Cystic lesions in both ovaries and Possible right-sided hydrosalpinx , OBGYN on called was called and recommend pelvic ultrasound . We were asked to admit for work up and evaluation of the above problems. Past Medical History:  
Diagnosis Date  Hypertension  Kidney anomaly, congenital   
 Tubal pregnancy Past Surgical History:  
Procedure Laterality Date  HX GYN  10/07/2013 cyst removed  HX HYSTERECTOMY  10/7/2013  
 partial hysterectomy Social History Substance Use Topics  Smoking status: Current Some Day Smoker Packs/day: 0.25 Years: 25.00  Smokeless tobacco: Never Used Comment: 1-3 day  Alcohol use Yes Comment: occasionally/socially Family History Problem Relation Age of Onset  Hypertension Mother  Hypertension Father  Hypertension Sister No Known Allergies Prior to Admission medications Medication Sig Start Date End Date Taking? Authorizing Provider  
amLODIPine (NORVASC) 10 mg tablet Take 1 Tab by mouth daily. 9/18/18  Yes Duarte Bartholomew MD  
metoprolol tartrate (LOPRESSOR) 25 mg tablet Take 1 Tab by mouth every twelve (12) hours. 9/18/18  Yes Duarte Bartholomew MD  
chlorthalidone (HYGROTEN) 25 mg tablet Take 25 mg by mouth daily. Yes Jaylon Walker MD  
acetaminophen (TYLENOL) 500 mg tablet Take 1,000 mg by mouth every six (6) hours as needed for Pain. Yes Jaylon Walker MD  
ondansetron (ZOFRAN ODT) 4 mg disintegrating tablet Take 1 Tab by mouth every eight (8) hours as needed for Nausea. 9/17/18  Yes Duarte Bartholomew MD  
potassium chloride SR (KLOR-CON 10) 10 mEq tablet Take 1 Tab by mouth daily. 9/11/18  Yes Ish Bucio MD  
cetirizine (ZYRTEC) 10 mg tablet Take 10 mg by mouth daily. Yes Historical Provider REVIEW OF SYSTEMS:    
I am not able to complete the review of systems because: The patient is intubated and sedated The patient has altered mental status due to his acute medical problems The patient has baseline aphasia from prior stroke(s) The patient has baseline dementia and is not reliable historian The patient is in acute medical distress and unable to provide information Total of 12 systems reviewed as follows:   
   POSITIVE= underlined text  Negative = text not underlined General:  fever, chills, sweats, generalized weakness, weight loss/gain,  
   loss of appetite Eyes:    blurred vision, eye pain, loss of vision, double vision ENT:    rhinorrhea, pharyngitis Respiratory:   cough, sputum production, SOB, ABRAHAM, wheezing, pleuritic pain  
Cardiology:   chest pain, palpitations, orthopnea, PND, edema, syncope Gastrointestinal:  abdominal pain , N/V, diarrhea, dysphagia, constipation, bleeding Genitourinary:  frequency, urgency, dysuria, hematuria, incontinence Muskuloskeletal :  arthralgia, myalgia, back pain Hematology:  easy bruising, nose or gum bleeding, lymphadenopathy Dermatological: rash, ulceration, pruritis, color change / jaundice Endocrine:   hot flashes or polydipsia Neurological:  headache, dizziness, confusion, focal weakness, paresthesia, Speech difficulties, memory loss, gait difficulty Psychological: Feelings of anxiety, depression, agitation Objective: VITALS:   
Visit Vitals  /73  Pulse 98  Temp 97.5 °F (36.4 °C)  Resp 18  Ht 5' 1\" (1.549 m)  Wt 68.4 kg (150 lb 12.7 oz)  SpO2 100%  BMI 28.49 kg/m2 PHYSICAL EXAM: 
 
General:    Alert, cooperative, no distress, appears stated age. HEENT: Atraumatic, anicteric sclerae, pink conjunctivae No oral ulcers, mucosa moist, throat clear, dentition fair Neck:  Supple, symmetrical,  thyroid: non tender Lungs:   Clear to auscultation bilaterally. No Wheezing or Rhonchi. No rales. Chest wall:  No tenderness  No Accessory muscle use. Heart:   Regular  rhythm,  No  murmur   No edema Abdomen:   -tender lower abdomen . Not distended. Bowel sounds normal 
Extremities: No cyanosis. No clubbing,   
  Skin turgor normal, Capillary refill normal, Radial dial pulse 2+ Skin:     Not pale. Not Jaundiced  No rashes Psych:  Good insight. Not depressed. Not anxious or agitated. Neurologic: EOMs intact. No facial asymmetry. No aphasia or slurred speech. Symmetrical strength, Sensation grossly intact. Alert and oriented X 4.  
 
_______________________________________________________________________ Care Plan discussed with: 
  Comments Patient y Family RN y   
Care Manager Consultant:     
_______________________________________________________________________ Expected  Disposition:  
Home with Family y HH/PT/OT/RN   
SNF/LTC   
MEME   
________________________________________________________________________ TOTAL TIME:  40  Minutes Critical Care Provided     Minutes non procedure based Comments  
 y Reviewed previous records  
>50% of visit spent in counseling and coordination of care y Discussion with patient and/or family and questions answered 
  
 
________________________________________________________________________ Signed: Bryan Abdi MD 
 
Procedures: see electronic medical records for all procedures/Xrays and details which were not copied into this note but were reviewed prior to creation of Plan. LAB DATA REVIEWED:   
Recent Results (from the past 24 hour(s)) CBC W/O DIFF Collection Time: 09/18/18  5:24 PM  
Result Value Ref Range WBC 11.3 (H) 3.6 - 11.0 K/uL  
 RBC 4.61 3.80 - 5.20 M/uL  
 HGB 15.0 11.5 - 16.0 g/dL HCT 42.0 35.0 - 47.0 % MCV 91.1 80.0 - 99.0 FL  
 MCH 32.5 26.0 - 34.0 PG  
 MCHC 35.7 30.0 - 36.5 g/dL  
 RDW 12.8 11.5 - 14.5 % PLATELET 406 (H) 773 - 400 K/uL MPV 8.4 (L) 8.9 - 12.9 FL  
 NRBC 0.0 0  WBC ABSOLUTE NRBC 0.00 0.00 - 0.01 K/uL METABOLIC PANEL, COMPREHENSIVE Collection Time: 09/18/18  5:24 PM  
Result Value Ref Range Sodium 134 (L) 136 - 145 mmol/L Potassium 2.9 (L) 3.5 - 5.1 mmol/L  Chloride 98 97 - 108 mmol/L  
 CO2 24 21 - 32 mmol/L  
 Anion gap 12 5 - 15 mmol/L Glucose 107 (H) 65 - 100 mg/dL BUN 9 6 - 20 MG/DL Creatinine 0.95 0.55 - 1.02 MG/DL  
 BUN/Creatinine ratio 9 (L) 12 - 20 GFR est AA >60 >60 ml/min/1.73m2 GFR est non-AA >60 >60 ml/min/1.73m2 Calcium 10.5 (H) 8.5 - 10.1 MG/DL Bilirubin, total 0.3 0.2 - 1.0 MG/DL  
 ALT (SGPT) 29 12 - 78 U/L  
 AST (SGOT) 18 15 - 37 U/L Alk. phosphatase 89 45 - 117 U/L Protein, total 9.2 (H) 6.4 - 8.2 g/dL Albumin 4.2 3.5 - 5.0 g/dL Globulin 5.0 (H) 2.0 - 4.0 g/dL A-G Ratio 0.8 (L) 1.1 - 2.2 LIPASE Collection Time: 09/18/18  5:24 PM  
Result Value Ref Range Lipase 306 73 - 393 U/L MAGNESIUM Collection Time: 09/18/18  5:24 PM  
Result Value Ref Range Magnesium 1.8 1.6 - 2.4 mg/dL URINALYSIS W/ REFLEX CULTURE Collection Time: 09/18/18  8:43 PM  
Result Value Ref Range Color YELLOW/STRAW Appearance CLEAR CLEAR Specific gravity 1.025 1.003 - 1.030    
 pH (UA) 7.5 5.0 - 8.0 Protein TRACE (A) NEG mg/dL Glucose NEGATIVE  NEG mg/dL Ketone 15 (A) NEG mg/dL Bilirubin NEGATIVE  NEG Blood NEGATIVE  NEG Urobilinogen 0.2 0.2 - 1.0 EU/dL Nitrites NEGATIVE  NEG Leukocyte Esterase NEGATIVE  NEG    
 WBC 0-4 0 - 4 /hpf  
 RBC 0-5 0 - 5 /hpf Epithelial cells MODERATE (A) FEW /lpf Bacteria 1+ (A) NEG /hpf  
 UA:UC IF INDICATED URINE CULTURE ORDERED (A) CNI    
WET PREP Collection Time: 09/18/18 10:18 PM  
Result Value Ref Range Clue cells CLUE CELLS PRESENT Wet prep NO TRICHOMONAS SEEN    
KOH, OTHER SOURCES Collection Time: 09/18/18 10:18 PM  
Result Value Ref Range  Special Requests: NO SPECIAL REQUESTS    
 KOH NO YEAST SEEN

## 2018-09-19 NOTE — ED NOTES
TRANSFER - OUT REPORT: 
 
Verbal report given to Kiran [place] RN(name) on Jesse Molina  being transferred to Renal (unit) for routine progression of care Report consisted of patients Situation, Background, Assessment and  
Recommendations(SBAR). Information from the following report(s) SBAR, Kardex, ED Summary, MAR, Recent Results and Cardiac Rhythm NSR was reviewed with the receiving nurse. Lines:  
Peripheral IV 09/18/18 Right Hand (Active) Site Assessment Clean, dry, & intact 9/18/2018  8:07 PM  
Phlebitis Assessment 0 9/18/2018  8:07 PM  
Infiltration Assessment 0 9/18/2018  8:07 PM  
Dressing Status Clean, dry, & intact 9/18/2018  8:07 PM  
Hub Color/Line Status Blue 9/18/2018  8:07 PM  
  
 
Opportunity for questions and clarification was provided. Patient transported with: 
 [employer]

## 2018-09-19 NOTE — PROGRESS NOTES
TRANSFER - IN REPORT: 
 
Verbal report received from Crystal(name) on Juliana Gordon  being received from ED (unit) for routine progression of care Report consisted of patients Situation, Background, Assessment and  
Recommendations(SBAR). Information from the following report(s) ED Summary, MAR, Recent Results and Cardiac Rhythm NSR was reviewed with the receiving nurse. Opportunity for questions and clarification was provided. Assessment completed upon patients arrival to unit and care assumed.

## 2018-09-19 NOTE — PROGRESS NOTES
Bedside and Verbal shift change report given to [facility] (oncoming nurse) by [address] RN (offgoing nurse). Report included the following information Kardex, MAR and Recent Results.

## 2018-09-19 NOTE — PROGRESS NOTES
Problem: Falls - Risk of 
Goal: *Absence of Falls Document Nida Taylorw Fall Risk and appropriate interventions in the flowsheet. Outcome: Progressing Towards Goal 
Fall Risk Interventions:

## 2018-09-19 NOTE — PROGRESS NOTES
Bedside shift change report given to [address] [place] [address] Ne (oncoming nurse) by Shekhar Medina (offgoing nurse). Report included the following information SBAR, Kardex, Intake/Output, MAR, Recent Results and Cardiac Rhythm NSR During shift report, pt expressed that she was having chest pain. Rapid response called. EKG, vitals and labs obtained, 2 L O2 via NC applied. RRT and Dr. Richard Gallegos responded. Pt stated she felt better and that she thought it was just gas.

## 2018-09-19 NOTE — PROGRESS NOTES
Rapid Response Chest Pain Note RR called at approx 655pm - responded shortly afterwards. Pt is 39year old female admitted with N/V and abnormal electrolytes. Utox positive for THC. When evaluated at bedside, pt reports feeling well. She states that earlier, for a \"couple of mins\" she experienced \"chest pain\" that she thought was maybe due to GI discomfort but was not sure. She states that the pain was located over her left chest and radiating towards her left shoulder. She denies being able to describe if it was burning, crushing, or sharp in nature. She states that pain resolved on it's own. She denies any associated SOB, dizziness or weakness. PE: 
VSS, RRR, CTAB, soft abdomen A/P: 
Atypical CP, likely related to dyspepsia and N/V, resolved 
-Multiple EKGs done at bedside with poor signal at Lead II but otherwise no unremarkable 
-Will repeat electroytes and Troponin 
-RN to call when results available 
-Continue O2, Protonix 
-Add Maalox Addendum Labs results noted. Trop negative. K 3.0. Pt already receiving on going IV K supplementation.

## 2018-09-19 NOTE — PROGRESS NOTES
Primary Nurse Candido Hodges RN and Lucia Santos RN performed a dual skin assessment on this patient No impairment noted George score is 21

## 2018-09-20 ENCOUNTER — ANESTHESIA (OUTPATIENT)
Dept: SURGERY | Age: 43
End: 2018-09-20
Payer: COMMERCIAL

## 2018-09-20 ENCOUNTER — APPOINTMENT (OUTPATIENT)
Dept: NUCLEAR MEDICINE | Age: 43
End: 2018-09-20
Attending: INTERNAL MEDICINE
Payer: COMMERCIAL

## 2018-09-20 ENCOUNTER — ANESTHESIA EVENT (OUTPATIENT)
Dept: SURGERY | Age: 43
End: 2018-09-20
Payer: COMMERCIAL

## 2018-09-20 ENCOUNTER — APPOINTMENT (OUTPATIENT)
Dept: GENERAL RADIOLOGY | Age: 43
End: 2018-09-20
Attending: SURGERY
Payer: COMMERCIAL

## 2018-09-20 LAB
ALBUMIN SERPL-MCNC: 2.8 G/DL (ref 3.5–5)
ALBUMIN/GLOB SERPL: 0.8 {RATIO} (ref 1.1–2.2)
ALP SERPL-CCNC: 57 U/L (ref 45–117)
ALT SERPL-CCNC: 22 U/L (ref 12–78)
ANION GAP SERPL CALC-SCNC: 9 MMOL/L (ref 5–15)
AST SERPL-CCNC: 18 U/L (ref 15–37)
ATRIAL RATE: 72 BPM
BACTERIA SPEC CULT: NORMAL
BILIRUB SERPL-MCNC: 0.3 MG/DL (ref 0.2–1)
BUN SERPL-MCNC: 6 MG/DL (ref 6–20)
BUN/CREAT SERPL: 8 (ref 12–20)
CALCIUM SERPL-MCNC: 8 MG/DL (ref 8.5–10.1)
CALCULATED R AXIS, ECG10: 145 DEGREES
CALCULATED T AXIS, ECG11: 156 DEGREES
CC UR VC: NORMAL
CHLORIDE SERPL-SCNC: 108 MMOL/L (ref 97–108)
CO2 SERPL-SCNC: 21 MMOL/L (ref 21–32)
COMMENT, HOLDF: NORMAL
CREAT SERPL-MCNC: 0.78 MG/DL (ref 0.55–1.02)
DIAGNOSIS, 93000: NORMAL
ERYTHROCYTE [DISTWIDTH] IN BLOOD BY AUTOMATED COUNT: 13.6 % (ref 11.5–14.5)
GLOBULIN SER CALC-MCNC: 3.7 G/DL (ref 2–4)
GLUCOSE SERPL-MCNC: 84 MG/DL (ref 65–100)
HCT VFR BLD AUTO: 33.1 % (ref 35–47)
HGB BLD-MCNC: 11.5 G/DL (ref 11.5–16)
MAGNESIUM SERPL-MCNC: 1.8 MG/DL (ref 1.6–2.4)
MCH RBC QN AUTO: 32.9 PG (ref 26–34)
MCHC RBC AUTO-ENTMCNC: 34.7 G/DL (ref 30–36.5)
MCV RBC AUTO: 94.6 FL (ref 80–99)
NRBC # BLD: 0 K/UL (ref 0–0.01)
NRBC BLD-RTO: 0 PER 100 WBC
P-R INTERVAL, ECG05: 122 MS
PHOSPHATE SERPL-MCNC: 2.5 MG/DL (ref 2.6–4.7)
PLATELET # BLD AUTO: 330 K/UL (ref 150–400)
PMV BLD AUTO: 8.6 FL (ref 8.9–12.9)
POTASSIUM SERPL-SCNC: 3.7 MMOL/L (ref 3.5–5.1)
PROT SERPL-MCNC: 6.5 G/DL (ref 6.4–8.2)
Q-T INTERVAL, ECG07: 426 MS
QRS DURATION, ECG06: 76 MS
QTC CALCULATION (BEZET), ECG08: 466 MS
RBC # BLD AUTO: 3.5 M/UL (ref 3.8–5.2)
SAMPLES BEING HELD,HOLD: NORMAL
SERVICE CMNT-IMP: NORMAL
SODIUM SERPL-SCNC: 138 MMOL/L (ref 136–145)
VENTRICULAR RATE, ECG03: 72 BPM
WBC # BLD AUTO: 7.6 K/UL (ref 3.6–11)

## 2018-09-20 PROCEDURE — C1758 CATHETER, URETERAL: HCPCS | Performed by: SURGERY

## 2018-09-20 PROCEDURE — 77030008684 HC TU ET CUF COVD -B: Performed by: NURSE ANESTHETIST, CERTIFIED REGISTERED

## 2018-09-20 PROCEDURE — 77030008771 HC TU NG SALEM SUMP -A: Performed by: NURSE ANESTHETIST, CERTIFIED REGISTERED

## 2018-09-20 PROCEDURE — 74011250636 HC RX REV CODE- 250/636: Performed by: ANESTHESIOLOGY

## 2018-09-20 PROCEDURE — 74011250636 HC RX REV CODE- 250/636: Performed by: SURGERY

## 2018-09-20 PROCEDURE — 76010000138 HC OR TIME 0.5 TO 1 HR: Performed by: SURGERY

## 2018-09-20 PROCEDURE — 77030039266 HC ADH SKN EXOFIN S2SG -A: Performed by: SURGERY

## 2018-09-20 PROCEDURE — 74011250636 HC RX REV CODE- 250/636: Performed by: INTERNAL MEDICINE

## 2018-09-20 PROCEDURE — 76060000032 HC ANESTHESIA 0.5 TO 1 HR: Performed by: SURGERY

## 2018-09-20 PROCEDURE — 77030035051: Performed by: SURGERY

## 2018-09-20 PROCEDURE — 99218 HC RM OBSERVATION: CPT

## 2018-09-20 PROCEDURE — 96372 THER/PROPH/DIAG INJ SC/IM: CPT

## 2018-09-20 PROCEDURE — 74011250637 HC RX REV CODE- 250/637: Performed by: INTERNAL MEDICINE

## 2018-09-20 PROCEDURE — 74011250636 HC RX REV CODE- 250/636

## 2018-09-20 PROCEDURE — 84100 ASSAY OF PHOSPHORUS: CPT | Performed by: NURSE PRACTITIONER

## 2018-09-20 PROCEDURE — 36415 COLL VENOUS BLD VENIPUNCTURE: CPT | Performed by: INTERNAL MEDICINE

## 2018-09-20 PROCEDURE — 80053 COMPREHEN METABOLIC PANEL: CPT | Performed by: NURSE PRACTITIONER

## 2018-09-20 PROCEDURE — 77030037892: Performed by: SURGERY

## 2018-09-20 PROCEDURE — 74011250636 HC RX REV CODE- 250/636: Performed by: NURSE PRACTITIONER

## 2018-09-20 PROCEDURE — 96361 HYDRATE IV INFUSION ADD-ON: CPT

## 2018-09-20 PROCEDURE — 96376 TX/PRO/DX INJ SAME DRUG ADON: CPT

## 2018-09-20 PROCEDURE — 77030020053 HC ELECTRD LAPSCP COVD -B: Performed by: SURGERY

## 2018-09-20 PROCEDURE — 77030011640 HC PAD GRND REM COVD -A: Performed by: SURGERY

## 2018-09-20 PROCEDURE — 77030035045 HC TRCR ENDOSC VRSPRT BLDLSS COVD -B: Performed by: SURGERY

## 2018-09-20 PROCEDURE — 74300 X-RAY BILE DUCTS/PANCREAS: CPT

## 2018-09-20 PROCEDURE — 77030032490 HC SLV COMPR SCD KNE COVD -B: Performed by: SURGERY

## 2018-09-20 PROCEDURE — 77030020256 HC SOL INJ NACL 0.9%  500ML: Performed by: SURGERY

## 2018-09-20 PROCEDURE — 77030035048 HC TRCR ENDOSC OPTCL COVD -B: Performed by: SURGERY

## 2018-09-20 PROCEDURE — 83735 ASSAY OF MAGNESIUM: CPT | Performed by: NURSE PRACTITIONER

## 2018-09-20 PROCEDURE — 76210000016 HC OR PH I REC 1 TO 1.5 HR: Performed by: SURGERY

## 2018-09-20 PROCEDURE — 77030031139 HC SUT VCRL2 J&J -A: Performed by: SURGERY

## 2018-09-20 PROCEDURE — 77030012022 HC APPL CLP ENDOSC COVD -C: Performed by: SURGERY

## 2018-09-20 PROCEDURE — 74011000250 HC RX REV CODE- 250: Performed by: INTERNAL MEDICINE

## 2018-09-20 PROCEDURE — 85027 COMPLETE CBC AUTOMATED: CPT | Performed by: INTERNAL MEDICINE

## 2018-09-20 PROCEDURE — C9113 INJ PANTOPRAZOLE SODIUM, VIA: HCPCS | Performed by: INTERNAL MEDICINE

## 2018-09-20 PROCEDURE — 74011000250 HC RX REV CODE- 250: Performed by: SURGERY

## 2018-09-20 PROCEDURE — 88304 TISSUE EXAM BY PATHOLOGIST: CPT | Performed by: SURGERY

## 2018-09-20 PROCEDURE — 77030026438 HC STYL ET INTUB CARD -A: Performed by: NURSE ANESTHETIST, CERTIFIED REGISTERED

## 2018-09-20 PROCEDURE — 77030020263 HC SOL INJ SOD CL0.9% LFCR 1000ML: Performed by: SURGERY

## 2018-09-20 PROCEDURE — 74011636320 HC RX REV CODE- 636/320: Performed by: SURGERY

## 2018-09-20 PROCEDURE — 74011000250 HC RX REV CODE- 250

## 2018-09-20 PROCEDURE — 77030008756 HC TU IRR SUC STRY -B: Performed by: SURGERY

## 2018-09-20 PROCEDURE — A9537 TC99M MEBROFENIN: HCPCS

## 2018-09-20 RX ORDER — MORPHINE SULFATE 2 MG/ML
INJECTION, SOLUTION INTRAMUSCULAR; INTRAVENOUS
Status: DISCONTINUED
Start: 2018-09-20 | End: 2018-09-20

## 2018-09-20 RX ORDER — FENTANYL CITRATE 50 UG/ML
INJECTION, SOLUTION INTRAMUSCULAR; INTRAVENOUS AS NEEDED
Status: DISCONTINUED | OUTPATIENT
Start: 2018-09-20 | End: 2018-09-20 | Stop reason: HOSPADM

## 2018-09-20 RX ORDER — ROCURONIUM BROMIDE 10 MG/ML
INJECTION, SOLUTION INTRAVENOUS AS NEEDED
Status: DISCONTINUED | OUTPATIENT
Start: 2018-09-20 | End: 2018-09-20 | Stop reason: HOSPADM

## 2018-09-20 RX ORDER — FENTANYL CITRATE 50 UG/ML
25 INJECTION, SOLUTION INTRAMUSCULAR; INTRAVENOUS
Status: DISCONTINUED | OUTPATIENT
Start: 2018-09-20 | End: 2018-09-20 | Stop reason: HOSPADM

## 2018-09-20 RX ORDER — CEFAZOLIN SODIUM 1 G/3ML
1 INJECTION, POWDER, FOR SOLUTION INTRAMUSCULAR; INTRAVENOUS EVERY 8 HOURS
Status: DISCONTINUED | OUTPATIENT
Start: 2018-09-20 | End: 2018-09-20 | Stop reason: DRUGHIGH

## 2018-09-20 RX ORDER — DEXTROSE, SODIUM CHLORIDE, AND POTASSIUM CHLORIDE 5; .45; .15 G/100ML; G/100ML; G/100ML
75 INJECTION INTRAVENOUS CONTINUOUS
Status: DISCONTINUED | OUTPATIENT
Start: 2018-09-20 | End: 2018-09-21 | Stop reason: HOSPADM

## 2018-09-20 RX ORDER — HYDROMORPHONE HYDROCHLORIDE 1 MG/ML
0.2 INJECTION, SOLUTION INTRAMUSCULAR; INTRAVENOUS; SUBCUTANEOUS
Status: DISCONTINUED | OUTPATIENT
Start: 2018-09-20 | End: 2018-09-20 | Stop reason: HOSPADM

## 2018-09-20 RX ORDER — LIDOCAINE HYDROCHLORIDE 20 MG/ML
INJECTION, SOLUTION EPIDURAL; INFILTRATION; INTRACAUDAL; PERINEURAL AS NEEDED
Status: DISCONTINUED | OUTPATIENT
Start: 2018-09-20 | End: 2018-09-20 | Stop reason: HOSPADM

## 2018-09-20 RX ORDER — CEFAZOLIN SODIUM 1 G/3ML
2 INJECTION, POWDER, FOR SOLUTION INTRAMUSCULAR; INTRAVENOUS
Status: DISCONTINUED | OUTPATIENT
Start: 2018-09-20 | End: 2018-09-20 | Stop reason: CLARIF

## 2018-09-20 RX ORDER — OXYCODONE HYDROCHLORIDE 5 MG/1
5 TABLET ORAL
Status: DISCONTINUED | OUTPATIENT
Start: 2018-09-20 | End: 2018-09-21 | Stop reason: HOSPADM

## 2018-09-20 RX ORDER — DIPHENHYDRAMINE HYDROCHLORIDE 50 MG/ML
12.5 INJECTION, SOLUTION INTRAMUSCULAR; INTRAVENOUS AS NEEDED
Status: DISCONTINUED | OUTPATIENT
Start: 2018-09-20 | End: 2018-09-20 | Stop reason: HOSPADM

## 2018-09-20 RX ORDER — SODIUM CHLORIDE 0.9 % (FLUSH) 0.9 %
5-10 SYRINGE (ML) INJECTION AS NEEDED
Status: DISCONTINUED | OUTPATIENT
Start: 2018-09-20 | End: 2018-09-20 | Stop reason: HOSPADM

## 2018-09-20 RX ORDER — DEXAMETHASONE SODIUM PHOSPHATE 4 MG/ML
INJECTION, SOLUTION INTRA-ARTICULAR; INTRALESIONAL; INTRAMUSCULAR; INTRAVENOUS; SOFT TISSUE AS NEEDED
Status: DISCONTINUED | OUTPATIENT
Start: 2018-09-20 | End: 2018-09-20 | Stop reason: HOSPADM

## 2018-09-20 RX ORDER — CEFAZOLIN SODIUM/WATER 2 G/20 ML
2 SYRINGE (ML) INTRAVENOUS
Status: COMPLETED | OUTPATIENT
Start: 2018-09-20 | End: 2018-09-20

## 2018-09-20 RX ORDER — DIPHENHYDRAMINE HYDROCHLORIDE 50 MG/ML
12.5 INJECTION, SOLUTION INTRAMUSCULAR; INTRAVENOUS
Status: DISCONTINUED | OUTPATIENT
Start: 2018-09-20 | End: 2018-09-21 | Stop reason: HOSPADM

## 2018-09-20 RX ORDER — MIDAZOLAM HYDROCHLORIDE 1 MG/ML
INJECTION, SOLUTION INTRAMUSCULAR; INTRAVENOUS AS NEEDED
Status: DISCONTINUED | OUTPATIENT
Start: 2018-09-20 | End: 2018-09-20 | Stop reason: HOSPADM

## 2018-09-20 RX ORDER — MORPHINE SULFATE 2 MG/ML
2 INJECTION, SOLUTION INTRAMUSCULAR; INTRAVENOUS ONCE
Status: COMPLETED | OUTPATIENT
Start: 2018-09-20 | End: 2018-09-20

## 2018-09-20 RX ORDER — ACETAMINOPHEN 10 MG/ML
1000 INJECTION, SOLUTION INTRAVENOUS EVERY 6 HOURS
Status: DISCONTINUED | OUTPATIENT
Start: 2018-09-20 | End: 2018-09-20 | Stop reason: DRUGHIGH

## 2018-09-20 RX ORDER — MORPHINE SULFATE 2 MG/ML
1 INJECTION, SOLUTION INTRAMUSCULAR; INTRAVENOUS
Status: DISPENSED | OUTPATIENT
Start: 2018-09-20 | End: 2018-09-21

## 2018-09-20 RX ORDER — SODIUM CHLORIDE, SODIUM LACTATE, POTASSIUM CHLORIDE, CALCIUM CHLORIDE 600; 310; 30; 20 MG/100ML; MG/100ML; MG/100ML; MG/100ML
25 INJECTION, SOLUTION INTRAVENOUS CONTINUOUS
Status: DISCONTINUED | OUTPATIENT
Start: 2018-09-20 | End: 2018-09-20 | Stop reason: HOSPADM

## 2018-09-20 RX ORDER — SODIUM CHLORIDE 0.9 % (FLUSH) 0.9 %
5-10 SYRINGE (ML) INJECTION AS NEEDED
Status: DISCONTINUED | OUTPATIENT
Start: 2018-09-20 | End: 2018-09-21 | Stop reason: HOSPADM

## 2018-09-20 RX ORDER — SODIUM CHLORIDE 0.9 % (FLUSH) 0.9 %
5-10 SYRINGE (ML) INJECTION EVERY 8 HOURS
Status: DISCONTINUED | OUTPATIENT
Start: 2018-09-20 | End: 2018-09-20 | Stop reason: HOSPADM

## 2018-09-20 RX ORDER — GLYCOPYRROLATE 0.2 MG/ML
INJECTION INTRAMUSCULAR; INTRAVENOUS AS NEEDED
Status: DISCONTINUED | OUTPATIENT
Start: 2018-09-20 | End: 2018-09-20 | Stop reason: HOSPADM

## 2018-09-20 RX ORDER — HYDROMORPHONE HYDROCHLORIDE 2 MG/ML
INJECTION, SOLUTION INTRAMUSCULAR; INTRAVENOUS; SUBCUTANEOUS AS NEEDED
Status: DISCONTINUED | OUTPATIENT
Start: 2018-09-20 | End: 2018-09-20 | Stop reason: HOSPADM

## 2018-09-20 RX ORDER — ONDANSETRON 2 MG/ML
4 INJECTION INTRAMUSCULAR; INTRAVENOUS ONCE
Status: COMPLETED | OUTPATIENT
Start: 2018-09-20 | End: 2018-09-20

## 2018-09-20 RX ORDER — BUPIVACAINE HYDROCHLORIDE AND EPINEPHRINE 2.5; 5 MG/ML; UG/ML
INJECTION, SOLUTION EPIDURAL; INFILTRATION; INTRACAUDAL; PERINEURAL AS NEEDED
Status: DISCONTINUED | OUTPATIENT
Start: 2018-09-20 | End: 2018-09-20 | Stop reason: HOSPADM

## 2018-09-20 RX ORDER — PHENYLEPHRINE HCL IN 0.9% NACL 0.4MG/10ML
SYRINGE (ML) INTRAVENOUS AS NEEDED
Status: DISCONTINUED | OUTPATIENT
Start: 2018-09-20 | End: 2018-09-20 | Stop reason: HOSPADM

## 2018-09-20 RX ORDER — ONDANSETRON 2 MG/ML
INJECTION INTRAMUSCULAR; INTRAVENOUS
Status: COMPLETED
Start: 2018-09-20 | End: 2018-09-20

## 2018-09-20 RX ORDER — DEXTROSE, SODIUM CHLORIDE, AND POTASSIUM CHLORIDE 5; .45; .15 G/100ML; G/100ML; G/100ML
INJECTION INTRAVENOUS
Status: COMPLETED
Start: 2018-09-20 | End: 2018-09-20

## 2018-09-20 RX ORDER — LIDOCAINE HYDROCHLORIDE 10 MG/ML
0.1 INJECTION, SOLUTION EPIDURAL; INFILTRATION; INTRACAUDAL; PERINEURAL AS NEEDED
Status: DISCONTINUED | OUTPATIENT
Start: 2018-09-20 | End: 2018-09-20 | Stop reason: HOSPADM

## 2018-09-20 RX ORDER — PROPOFOL 10 MG/ML
INJECTION, EMULSION INTRAVENOUS AS NEEDED
Status: DISCONTINUED | OUTPATIENT
Start: 2018-09-20 | End: 2018-09-20 | Stop reason: HOSPADM

## 2018-09-20 RX ORDER — ONDANSETRON 2 MG/ML
INJECTION INTRAMUSCULAR; INTRAVENOUS AS NEEDED
Status: DISCONTINUED | OUTPATIENT
Start: 2018-09-20 | End: 2018-09-20 | Stop reason: HOSPADM

## 2018-09-20 RX ORDER — SODIUM CHLORIDE 0.9 % (FLUSH) 0.9 %
5-10 SYRINGE (ML) INJECTION EVERY 8 HOURS
Status: DISCONTINUED | OUTPATIENT
Start: 2018-09-20 | End: 2018-09-21 | Stop reason: HOSPADM

## 2018-09-20 RX ORDER — CEFAZOLIN SODIUM/WATER 2 G/20 ML
2 SYRINGE (ML) INTRAVENOUS EVERY 8 HOURS
Status: COMPLETED | OUTPATIENT
Start: 2018-09-20 | End: 2018-09-21

## 2018-09-20 RX ORDER — ACETAMINOPHEN 10 MG/ML
1000 INJECTION, SOLUTION INTRAVENOUS ONCE
Status: COMPLETED | OUTPATIENT
Start: 2018-09-20 | End: 2018-09-20

## 2018-09-20 RX ORDER — ACETAMINOPHEN 500 MG
1000 TABLET ORAL EVERY 6 HOURS
Status: DISCONTINUED | OUTPATIENT
Start: 2018-09-21 | End: 2018-09-21 | Stop reason: HOSPADM

## 2018-09-20 RX ORDER — SUCCINYLCHOLINE CHLORIDE 20 MG/ML
INJECTION INTRAMUSCULAR; INTRAVENOUS AS NEEDED
Status: DISCONTINUED | OUTPATIENT
Start: 2018-09-20 | End: 2018-09-20 | Stop reason: HOSPADM

## 2018-09-20 RX ADMIN — MORPHINE SULFATE 2 MG: 2 INJECTION, SOLUTION INTRAMUSCULAR; INTRAVENOUS at 09:00

## 2018-09-20 RX ADMIN — PROPOFOL 160 MG: 10 INJECTION, EMULSION INTRAVENOUS at 14:12

## 2018-09-20 RX ADMIN — Medication 40 MCG: at 14:23

## 2018-09-20 RX ADMIN — ONDANSETRON 4 MG: 2 INJECTION INTRAMUSCULAR; INTRAVENOUS at 15:08

## 2018-09-20 RX ADMIN — ONDANSETRON 4 MG: 2 INJECTION INTRAMUSCULAR; INTRAVENOUS at 14:06

## 2018-09-20 RX ADMIN — AMLODIPINE BESYLATE 10 MG: 5 TABLET ORAL at 10:04

## 2018-09-20 RX ADMIN — DEXTROSE MONOHYDRATE, SODIUM CHLORIDE, AND POTASSIUM CHLORIDE 75 ML/HR: 50; 4.5; 1.49 INJECTION, SOLUTION INTRAVENOUS at 15:20

## 2018-09-20 RX ADMIN — SODIUM CHLORIDE, SODIUM LACTATE, POTASSIUM CHLORIDE, AND CALCIUM CHLORIDE 25 ML/HR: 600; 310; 30; 20 INJECTION, SOLUTION INTRAVENOUS at 13:55

## 2018-09-20 RX ADMIN — ROCURONIUM BROMIDE 5 MG: 10 INJECTION, SOLUTION INTRAVENOUS at 14:12

## 2018-09-20 RX ADMIN — MORPHINE SULFATE 1 MG: 2 INJECTION, SOLUTION INTRAMUSCULAR; INTRAVENOUS at 21:20

## 2018-09-20 RX ADMIN — SODIUM CHLORIDE 40 MG: 9 INJECTION, SOLUTION INTRAMUSCULAR; INTRAVENOUS; SUBCUTANEOUS at 21:19

## 2018-09-20 RX ADMIN — GLYCOPYRROLATE 0.2 MG: 0.2 INJECTION INTRAMUSCULAR; INTRAVENOUS at 14:06

## 2018-09-20 RX ADMIN — Medication 5 ML: at 05:05

## 2018-09-20 RX ADMIN — SUCCINYLCHOLINE CHLORIDE 120 MG: 20 INJECTION INTRAMUSCULAR; INTRAVENOUS at 14:12

## 2018-09-20 RX ADMIN — ACETAMINOPHEN 1000 MG: 10 INJECTION, SOLUTION INTRAVENOUS at 19:37

## 2018-09-20 RX ADMIN — Medication 2 G: at 14:16

## 2018-09-20 RX ADMIN — FENTANYL CITRATE 25 MCG: 50 INJECTION, SOLUTION INTRAMUSCULAR; INTRAVENOUS at 16:05

## 2018-09-20 RX ADMIN — SODIUM CHLORIDE 40 MG: 9 INJECTION, SOLUTION INTRAMUSCULAR; INTRAVENOUS; SUBCUTANEOUS at 10:04

## 2018-09-20 RX ADMIN — HYDROMORPHONE HYDROCHLORIDE 0.5 MG: 2 INJECTION, SOLUTION INTRAMUSCULAR; INTRAVENOUS; SUBCUTANEOUS at 14:24

## 2018-09-20 RX ADMIN — Medication 10 ML: at 17:20

## 2018-09-20 RX ADMIN — DEXAMETHASONE SODIUM PHOSPHATE 8 MG: 4 INJECTION, SOLUTION INTRA-ARTICULAR; INTRALESIONAL; INTRAMUSCULAR; INTRAVENOUS; SOFT TISSUE at 14:16

## 2018-09-20 RX ADMIN — LIDOCAINE HYDROCHLORIDE 60 MG: 20 INJECTION, SOLUTION EPIDURAL; INFILTRATION; INTRACAUDAL; PERINEURAL at 14:12

## 2018-09-20 RX ADMIN — HYDROMORPHONE HYDROCHLORIDE 0.5 MG: 2 INJECTION, SOLUTION INTRAMUSCULAR; INTRAVENOUS; SUBCUTANEOUS at 14:40

## 2018-09-20 RX ADMIN — HEPARIN SODIUM 5000 UNITS: 5000 INJECTION, SOLUTION INTRAVENOUS; SUBCUTANEOUS at 05:05

## 2018-09-20 RX ADMIN — FENTANYL CITRATE 25 MCG: 50 INJECTION, SOLUTION INTRAMUSCULAR; INTRAVENOUS at 16:00

## 2018-09-20 RX ADMIN — ROCURONIUM BROMIDE 25 MG: 10 INJECTION, SOLUTION INTRAVENOUS at 14:18

## 2018-09-20 RX ADMIN — Medication 40 MCG: at 14:34

## 2018-09-20 RX ADMIN — Medication 10 ML: at 21:20

## 2018-09-20 RX ADMIN — FENTANYL CITRATE 25 MCG: 50 INJECTION, SOLUTION INTRAMUSCULAR; INTRAVENOUS at 15:45

## 2018-09-20 RX ADMIN — FENTANYL CITRATE 50 MCG: 50 INJECTION, SOLUTION INTRAMUSCULAR; INTRAVENOUS at 14:20

## 2018-09-20 RX ADMIN — FENTANYL CITRATE 25 MCG: 50 INJECTION, SOLUTION INTRAMUSCULAR; INTRAVENOUS at 15:30

## 2018-09-20 RX ADMIN — ROCURONIUM BROMIDE 10 MG: 10 INJECTION, SOLUTION INTRAVENOUS at 14:22

## 2018-09-20 RX ADMIN — PROPOFOL 100 MG: 10 INJECTION, EMULSION INTRAVENOUS at 14:20

## 2018-09-20 RX ADMIN — PROPOFOL 40 MG: 10 INJECTION, EMULSION INTRAVENOUS at 14:13

## 2018-09-20 RX ADMIN — Medication 2 G: at 21:20

## 2018-09-20 RX ADMIN — HYDROMORPHONE HYDROCHLORIDE 0.5 MG: 2 INJECTION, SOLUTION INTRAMUSCULAR; INTRAVENOUS; SUBCUTANEOUS at 14:55

## 2018-09-20 RX ADMIN — HYDROMORPHONE HYDROCHLORIDE 0.5 MG: 2 INJECTION, SOLUTION INTRAMUSCULAR; INTRAVENOUS; SUBCUTANEOUS at 14:49

## 2018-09-20 RX ADMIN — MORPHINE SULFATE 1 MG: 2 INJECTION, SOLUTION INTRAMUSCULAR; INTRAVENOUS at 17:51

## 2018-09-20 RX ADMIN — FENTANYL CITRATE 50 MCG: 50 INJECTION, SOLUTION INTRAMUSCULAR; INTRAVENOUS at 14:12

## 2018-09-20 RX ADMIN — METOPROLOL TARTRATE 25 MG: 25 TABLET ORAL at 10:04

## 2018-09-20 RX ADMIN — SODIUM CHLORIDE AND POTASSIUM CHLORIDE: 9; 1.49 INJECTION, SOLUTION INTRAVENOUS at 04:14

## 2018-09-20 RX ADMIN — MIDAZOLAM HYDROCHLORIDE 2 MG: 1 INJECTION, SOLUTION INTRAMUSCULAR; INTRAVENOUS at 14:03

## 2018-09-20 NOTE — PROGRESS NOTES
07:55- Received orders for STAT labs. Onofre Patel NP and made her aware that pt was off floor for nuclear med study. NP stated to obtain labs when pt arrived back to unit.  
 
09:35- Pt back on floor. Spoke to Jerry Souza NP. Pt to remain NPO until scan results. Received order to restart previous morphine dose (discontinued for scan yesterday). 11:26- General surgery consult called. Spoke to Mohini Luna. Was told it would be Dr. Andreina Fontenot (she is currently in surgery).

## 2018-09-20 NOTE — PERIOP NOTES
TRANSFER - OUT REPORT: 
 
Verbal report given to Josue Quevedo RN on Danuta Beavers  being transferred to [id number] for routine post - op Report consisted of patients Situation, Background, Assessment and  
Recommendations(SBAR). Information from the following report(s) SBAR, OR Summary, Intake/Output, MAR and Recent Results was reviewed with the receiving nurse. Opportunity for questions and clarification was provided. Patient transported with: 
 Registered Nurse Tech

## 2018-09-20 NOTE — PROGRESS NOTES
Problem: Falls - Risk of 
Goal: *Absence of Falls Document Catherene Bunting Fall Risk and appropriate interventions in the flowsheet. Outcome: Progressing Towards Goal 
Fall Risk Interventions: 
  
 
  
 
Medication Interventions: Patient to call before getting OOB

## 2018-09-20 NOTE — OP NOTES
Patient ID:   Name: Radha Ni   Medical Record Number: [id number]   YOB: 1975    Date of Surgery: 9/20/2018      Preoperative Diagnosis:   Gallbladder disease    Postoperative Diagnosis:   Gallbladder disease    Procedures:  Laparoscopic cholecystectomy with intraoperative cholangiogram    Surgeon: Amanda Newsome MD      Assistant:  Surgeon(s):  Amanda Newsome MD    Surgical assistant:  Peng Mendoza. Ameya    Anesthesia:  General    Findings:  No filling defect in the common bile duct. Estimated Blood Loss:   5 cc    Implants:  None              Specimens:  Gallbladder    Indications: Patient 39 y.o.  female with history of drug abuse has been having intermittent diffuse abdominal pain radiating to the back with intractable nausea and vomiting since 5/8/18. Patient underwent CT scan on admission which showed bilateral ovarian cystic lesions; possible right hydrosalpinx, multiple cystic lesions in the endometrial canal, multiple low-attenuation lesions in both kidneys. Of note, retroperitoneal US from 7/18/13 showed multiple bilateral renal cysts. Patient subsequently underwent pelvic/vaginal US yesterday and it showed small submucosal uterine cyst and bilateral simple ovarian cysts. Patient was evaluated to Ob-Gyn and felt that her symptoms are not related. Patient also had HIDA scan today and it showed delayed visualization of the GB requiring morphine. Patient for cholecystectomy/IOC.     Procedure Details: The patient was seen in the Holding Room. The risks, benefits, complications, treatment options, and expected outcomes were discussed with the patient. After informed consent was performed, patient was taken to the operating room and was placed supine in the operating table. After establishing general anesthesia, abdomen was prepped and draped in standard surgical fashion. Small right subcostal incision was made and a 5 mm blunt trocar was placed under direct vision.   CO2 pneumoperitoneum was then created. Additional 12 mm blunt trocar was placed infraumbilical and a 5 mm trocar was placed in the epigastric area. Abdomen was explored. Patient was found to have adhesions along the right lateral inferior edge of the gallbladder. This was taken down carefully. The base of the gallbladder was carefully dissected. Cystic duct was then identified and isolated. A clip was applied at the base of the gallbladder and small incision was made within the cystic duct for the cholangiogram.  Cholangiogram catheter was placed and cholangiogram was performed. No filling defect was noted within the common bile duct. Cholangiogram catheter was removed and 3 clips were applied to the cystic duct on the patient side. Cystic duct was divided. Cystic artery was identified  and isolated. Cystic artery was then clipped x 3 and divided. Gallbladder was then removed off of the gallbladder fossa using electrocautery. Gallbladder was retrieved using the Endocatch bag through the periumbilical port site. Abdomen was irrigated. Good hemostasis was obtained. Fascial defect in the periumbilical port was closed with figure of eight 0-0 Vicryl using Endoclose. Trocars were removed. CO2 pneumoperitoneum was released. Skin was then approximated using 4-0 Vicryl subcuticular stitch followed by Dermabond. Instrument, sponge, and needle counts were correct prior to closure and at the conclusion of the case. The patient was taken to recovery room in good condition having tolerated the procedure well.       Complication: None    CC:  Clarke Newton MD

## 2018-09-20 NOTE — PERIOP NOTES
Notified patient's mother by phone of patient's room number. Left voicemail message for Miranda Panchal, patient's significant other. Room number provided.

## 2018-09-20 NOTE — ROUTINE PROCESS
Patient: Augustus Roberson MRN: [id number]  SSN: [id number] YOB: 1975  Age: 39 y.o. Sex: female Patient is status post Procedure(s): CHOLECYSTECTOMY LAPAROSCOPIC WITH GRAMS. Surgeon(s) and Role: * Rob Snowden MD - Primary Local/Dose/Irrigation:  16 ml local at incision site Peripheral IV 09/20/18 Right Forearm (Active) Site Assessment Clean, dry, & intact 9/20/2018  1:47 PM  
Phlebitis Assessment 0 9/20/2018  1:47 PM  
Infiltration Assessment 0 9/20/2018  1:47 PM  
Dressing Status Clean, dry, & intact; Occlusive 9/20/2018  1:47 PM  
Dressing Type Tape;Transparent 9/20/2018  1:47 PM  
Hub Color/Line Status Blue;Patent;Capped;Flushed 9/20/2018  1:47 PM  
Action Taken Other (comment) 9/20/2018  1:47 PM  
        
Airway - Endotracheal Tube 09/20/18 Oral (Active) Line Chris Lips 9/20/2018 12:00 AM  
         
 
 
 
Dressing/Packing:  Wound Abdomen-DRESSING TYPE: Topical skin adhesive/glue (exofin) (09/20/18 4540) Splint/Cast:  ]

## 2018-09-20 NOTE — ANESTHESIA POSTPROCEDURE EVALUATION
Post-Anesthesia Evaluation and Assessment Patient: Erum Baker MRN: [id number]  SSN: [id number] YOB: 1975  Age: 39 y.o. Sex: female Cardiovascular Function/Vital Signs Visit Vitals  /81  Pulse 67  Temp 36.9 °C (98.4 °F)  Resp 15  Ht 5' 1\" (1.549 m)  Wt 68.4 kg (150 lb 12.7 oz)  SpO2 99%  BMI 28.49 kg/m2 Patient is status post general anesthesia for Procedure(s): CHOLECYSTECTOMY LAPAROSCOPIC WITH GRAMS. Nausea/Vomiting: None Postoperative hydration reviewed and adequate. Pain: 
Pain Scale 1: Numeric (0 - 10) (09/20/18 1545) Pain Intensity 1: 8 (09/20/18 1545) Managed Neurological Status:  
Neuro (WDL): Exceptions to WDL (09/20/18 1501) Neuro Neurologic State: Drowsy; Eyes open to voice (09/20/18 1501) Orientation Level: Oriented to person;Oriented to place (09/20/18 1501) Cognition: Follows commands (09/20/18 1501) Speech: Clear (09/20/18 1501) LUE Motor Response: Purposeful (09/20/18 1501) LLE Motor Response: Purposeful (09/20/18 1501) RUE Motor Response: Purposeful (09/20/18 1501) RLE Motor Response: Purposeful (09/20/18 1501) At baseline Mental Status and Level of Consciousness: Arousable Pulmonary Status:  
O2 Device: Nasal cannula (09/20/18 1501) Adequate oxygenation and airway patent Complications related to anesthesia: None Post-anesthesia assessment completed. No concerns Signed By: Suman Bai MD   
 September 20, 2018

## 2018-09-20 NOTE — PERIOP NOTES
Handoff Report from Operating Room to PACU Report received from ERICK Gotti RN and LENCHO Coe CRNA regarding Loralie Hammond. Surgeon(s): 
Joseph Mckeon MD  And Procedure(s) (LRB): 
CHOLECYSTECTOMY LAPAROSCOPIC WITH GRAMS (N/A)  confirmed  
with dressings discussed. Anesthesia type, drugs, patient history, complications, estimated blood loss, vital signs, intake and output, and last pain medication, lines, reversal medications and temperature were reviewed.

## 2018-09-20 NOTE — PERIOP NOTES
Patient reports post-op nausea. Per telephone order with read-back to confirm from Dr. Britney La, patient has been medicated with 4 mg Zofran IV.

## 2018-09-20 NOTE — PROGRESS NOTES
Hospitalist Progress Note NAME: Rocio Escamilla :  1975 MRN:  [id number] Interim Hospital Summary: 39 y.o. female whom presented on 2018 with Assessment / Plan: ABD pain POA Cholecysitis 
- HIDA scan; Delayed visualization of the gallbladder requiring morphine. 
- appreciate Dr. Tonie Tuttle input; pt is currently downstairs to have lap cholyecystecomy - post op care per Dr. Param Barrett Hypokalemia likely secondary to  hyperemesis syndrome 
- resolved; will continue to follow 
- antiemetics prn 
  
HTN, uncontrolled 
- continue lopressor and Norvasc  
  
Tobacco use 
- nicotine patch, counseled 
   
Cystic lesions in both ovaries Possible right-sided hydrosalpinx - appreciate GYN input; recommend outpatient follow up - ABD/PEL CT: Cystic lesions in both ovaries are possibly of no clinical significance. Possible right-sided hydrosalpinx. Multiple cystic  appearing lesions in the 
endometrial canal of uncertain significance. - PEL/transvaginal US: Small submucosal uterine cyst. No uterine enlargement. Bilateral simple ovarian cysts, likely physiologic. 
  
polycystic kidney disease 
- refer to nephrology as outpatient  
  
Code Status: full code  
  
Surrogate Decision Maker: 
  
DVT Prophylaxis: Heparin GI Prophylaxis: not indicated 
  
Baseline: independent Recommended Disposition: Home w/Family Subjective: Chief Complaint / Reason for Physician Visit \"I am hungry\". Pt was seen @1000 Discussed with RN events overnight. Review of Systems: 
Symptom Y/N Comments  Symptom Y/N Comments Fever/Chills n   Chest Pain n   
Poor Appetite    Edema Cough    Abdominal Pain Sputum    Joint Pain SOB/ABRAHAM n   Pruritis/Rash Nausea/vomit n   Tolerating PT/OT Diarrhea    Tolerating Diet Constipation    Other Could NOT obtain due to:   
 
Objective: VITALS:  
Last 24hrs VS reviewed since prior progress note. Most recent are: Patient Vitals for the past 24 hrs: 
 Temp Pulse Resp BP SpO2  
09/20/18 1059 98 °F (36.7 °C) 62 20 107/71 98 %  
09/20/18 0803 98.1 °F (36.7 °C) 64 18 109/68 96 %  
09/20/18 0254 98 °F (36.7 °C) 64 18 106/72 100 % 09/19/18 2257 98.1 °F (36.7 °C) 75 18 128/85 98 % 09/19/18 2048 - 72 - 127/90 -  
09/19/18 1902 97.7 °F (36.5 °C) 79 20 116/89 98 % 09/19/18 1445 98 °F (36.7 °C) 71 18 108/63 98 % Intake/Output Summary (Last 24 hours) at 09/20/18 1335 Last data filed at 09/20/18 1010 Gross per 24 hour Intake          2241.67 ml Output                0 ml Net          2241.67 ml PHYSICAL EXAM: 
General: WD, WN. Alert, cooperative, no acute distress   
EENT:  EOMI. Anicteric sclerae. MMM Resp:  CTA bilaterally, no wheezing or rales. No accessory muscle use CV:  Regular  rhythm,  No edema GI:  Soft, Non distended, diffuse abd tender.  +Bowel sounds Neurologic:  Alert and oriented X 3, normal speech, Psych:   Good insight. Not anxious nor agitated Skin:  No rashes. No jaundice Reviewed most current lab test results and cultures  YES Reviewed most current radiology test results   YES Review and summation of old records today    NO Reviewed patient's current orders and MAR    YES 
PMH/ reviewed - no change compared to H&P 
________________________________________________________________________ Care Plan discussed with: 
  Comments Patient y Family RN y   
Care Manager Consultant Multidiciplinary team rounds were held today with , nursing, pharmacist and clinical coordinator. Patient's plan of care was discussed; medications were reviewed and discharge planning was addressed. ________________________________________________________________________ Total NON critical care TIME:  30  Minutes Total CRITICAL CARE TIME Spent:   Minutes non procedure based Comments >50% of visit spent in counseling and coordination of care    
________________________________________________________________________ Carolyn Garcia NP Procedures: see electronic medical records for all procedures/Xrays and details which were not copied into this note but were reviewed prior to creation of Plan. LABS: 
I reviewed today's most current labs and imaging studies. Pertinent labs include: 
Recent Labs  
   09/20/18 
 0247  09/19/18 
 0508  09/18/18 
 1724 WBC  7.6  14.9*  11.3* HGB  11.5  12.4  15.0  
HCT  33.1*  34.5*  42.0 PLT  330  388  431* Recent Labs  
   09/20/18 
 1034  09/19/18 
 1920  09/19/18 
 0508  09/18/18 
 1724  09/17/18 [address] NA  138  138  136  134*  139  
K  3.7  3.0*  2.8*  2.9*  3.5 CL  108  106  100  98  95* CO2  21  22  23  24  24 GLU  84  125*  80  107*  89 BUN  6  6  10  9  13 CREA  0.78  0.84  0.83  0.95  1.05* CA  8.0*  8.0*  8.7  10.5*  10.0 MG  1.8  1.8   --   1.8  2.0 PHOS  2.5*  2.6   --    --   3.9 ALB  2.8*   --    --   4.2  4.6 TBILI  0.3   --    --   0.3  <0.2 SGOT  18   --    --   18  19 ALT  22   --    --   29  21 Signed: )Bigg Geiger NP

## 2018-09-20 NOTE — PROGRESS NOTES
Reason for Admission:   Cystic lesions RRAT Score:          7 Plan for utilizing home health:      No need at present and pt is not homebound Likelihood of Readmission:  low Transition of Care Plan:             Pt lives with \"other adults\" in [place], PCP Dr Marlee Silvestre, full code, [facility], uses [facility] at [address], is independent with adls, no dme, and was discharged 9/12/18. Will follow as needed to assist with any d/c needs. Care Management Interventions PCP Verified by CM: Yes Transition of Care Consult (CM Consult): Discharge Planning MyChart Signup: No 
Health Maintenance Reviewed: Yes Physical Therapy Consult: No 
Occupational Therapy Consult: No 
Speech Therapy Consult: No 
Current Support Network: Lives with Caregiver, Family Lives [place] Confirm Follow Up Transport: Family Plan discussed with Pt/Family/Caregiver: Yes Freedom of Choice Offered: Yes Discharge Location Discharge Placement: Home

## 2018-09-20 NOTE — CONSULTS
Surgery H&P    Subjective:   Patient 39 y.o.  female with history of drug abuse has been having intermittent diffuse abdominal pain radiating to the back with intractable nausea and vomiting since 5/8/18. No obvious provocative or palliative factors. Patient was admitted 9/9-9/12/18 and AXR at that time was unremarkable. Patient was readmitted on 9/18/18 with persistent symptoms. Patient underwent CT scan on admission which showed bilateral ovarian cystic lesions; possible right hydrosalpinx, multiple cystic lesions in the endometrial canal, multiple low-attenuation lesions in both kidneys. Of note, retroperitoneal US from 7/18/13 showed multiple bilateral renal cysts. Patient subsequently underwent pelvic/vaginal US yesterday and it showed small submucosal uterine cyst and bilateral simple ovarian cysts. Patient was evaluated to Ob-Gyn and felt that her symptoms are not related. Patient also had HIDA scan today and it showed delayed visualization of the GB requiring morphine. No history of jaundice or pancreatitis. Past Medical & Surgical History:  Past Medical History:   Diagnosis Date    Hypertension     Kidney anomaly, congenital     Tubal pregnancy       Past Surgical History:   Procedure Laterality Date    HX GYN  10/07/2013    cyst removed    HX HYSTERECTOMY  10/7/2013    partial hysterectomy       Social History:  Social History     Social History    Marital status:      Spouse name: N/A    Number of children: N/A    Years of education: N/A     Occupational History    Not on file.      Social History Main Topics    Smoking status: Current Some Day Smoker     Packs/day: 0.25     Years: 25.00    Smokeless tobacco: Never Used      Comment: 1-3 day    Alcohol use Yes      Comment: occasionally/socially    Drug use: No      Comment: stopped in 2013    Sexual activity: Yes     Partners: Male     Other Topics Concern    Not on file     Social History Narrative Family History:  Family History   Problem Relation Age of Onset    Hypertension Mother     Hypertension Father     Hypertension Sister         Medications:  Current Facility-Administered Medications   Medication Dose Route Frequency    morphine 2 mg/mL injection        morphine injection 1 mg  1 mg IntraVENous Q4H PRN    ceFAZolin (ANCEF) injection 2 g  2 g IntraVENous ON CALL TO OR    0.9% sodium chloride with KCl 20 mEq/L infusion   IntraVENous CONTINUOUS    metoprolol tartrate (LOPRESSOR) tablet 25 mg  25 mg Oral Q12H    amLODIPine (NORVASC) tablet 10 mg  10 mg Oral DAILY    sodium chloride (NS) flush 5-10 mL  5-10 mL IntraVENous Q8H    sodium chloride (NS) flush 5-10 mL  5-10 mL IntraVENous PRN    ondansetron (ZOFRAN) injection 4 mg  4 mg IntraVENous Q6H PRN    pantoprazole (PROTONIX) 40 mg in sodium chloride 0.9% 10 mL injection  40 mg IntraVENous Q12H    alum-mag hydroxide-simeth (MYLANTA) oral suspension 30 mL  30 mL Oral Q4H PRN       Allergies:  No Known Allergies    Review of Systems  A comprehensive review of systems was negative except for that written in the HPI. Objective:     Exam:    Visit Vitals    /71    Pulse 62    Temp 98 °F (36.7 °C)    Resp 20    Ht 5' 1\" (1.549 m)    Wt 68.4 kg (150 lb 12.7 oz)    LMP 08/13/2017    SpO2 98%    BMI 28.49 kg/m2     General appearance: alert, cooperative, no distress, appears stated age  Eyes: no sclera icterus  Lungs: clear to auscultation bilaterally  Heart: regular rate and rhythm  Abdomen: soft, non-distended. Upper abdominal tenderness, no rebound or guarding. Extremities: extremities normal, atraumatic, no cyanosis or edema. FIFI. Skin: Skin color, texture, turgor normal. No rashes or lesions. No jaundice.   Neurologic: Grossly normal    Assessment:     Active Problems:    Essential hypertension, malignant (4/13/2012)      Cannabis abuse (7/18/2013)      Overview: UDS +VE 07/17/13      Nausea & vomiting (9/19/2018)    GB disease with atypical symptoms. Plan:     Reasonable to consider laparoscopic cholecystectomy/IOC. Risks, benefit, and alternative to surgery was discussed with the patient. The risks of surgery include but not limited to infection, bleeding, intraabdominal organ injury, bile duct injury, retained stone, bile leak, possible conversion to open, and the risks of general anesthetic. Also discussed with the patient that given her atypical symptoms, all of her symptoms may not resolve following surgery. Patient is agreeable to surgery. All questions answered.

## 2018-09-20 NOTE — PROGRESS NOTES
Bedside and Verbal shift change report given to Shekhar Medina (oncoming nurse) by [address] RN (offgoing nurse). Report included the following information Kardex, MAR and Recent Results.

## 2018-09-20 NOTE — ANESTHESIA PREPROCEDURE EVALUATION
Anesthetic History PONV Review of Systems / Medical History Patient summary reviewed, nursing notes reviewed and pertinent labs reviewed Pulmonary Within defined limits Neuro/Psych Within defined limits Cardiovascular Hypertension Exercise tolerance: >4 METS 
  
GI/Hepatic/Renal 
Within defined limits Endo/Other Within defined limits Other Findings Comments: Cholelithiasis Physical Exam 
 
Airway Mallampati: II 
TM Distance: 4 - 6 cm Neck ROM: normal range of motion Mouth opening: Normal 
 
 Cardiovascular Regular rate and rhythm,  S1 and S2 normal,  no murmur, click, rub, or gallop Dental 
No notable dental hx Pulmonary Breath sounds clear to auscultation Abdominal 
GI exam deferred Other Findings Anesthetic Plan ASA: 2 Anesthesia type: general 
 
Monitoring Plan: BIS Induction: Intravenous Anesthetic plan and risks discussed with: Patient

## 2018-09-20 NOTE — PROGRESS NOTES
Pharmacy Review of Intravenous Acetaminophen Celeste Wilson) Acetaminophen 1000mg IV q6h x4 doses ? Use of IV APAP will be restricted to patients whose oral route is not usable. ? One (1) dose may be given perioperatively (either preoperatively or postoperatively) ? Order duration will not exceed one dose. Pharmacists will automatically convert subsequent doses to an equivalent oral dose Acetaminophen 1000mg IV q6h x4 doses changed to Acetaminophen 1000mg IV x1, then 1000mg PO q6h x3 doses ThanksXimena [facility] 
 
 
[email or web address]/Pharmacy/Clinical%20Companion/Acetaminophen%20IV%20Ivent. pdf

## 2018-09-21 VITALS
DIASTOLIC BLOOD PRESSURE: 82 MMHG | SYSTOLIC BLOOD PRESSURE: 133 MMHG | TEMPERATURE: 96.1 F | HEART RATE: 75 BPM | BODY MASS INDEX: 28.47 KG/M2 | RESPIRATION RATE: 16 BRPM | HEIGHT: 61 IN | OXYGEN SATURATION: 98 % | WEIGHT: 150.79 LBS

## 2018-09-21 PROBLEM — Z90.49 S/P LAPAROSCOPIC CHOLECYSTECTOMY: Status: ACTIVE | Noted: 2018-09-21

## 2018-09-21 LAB
ANION GAP SERPL CALC-SCNC: 8 MMOL/L (ref 5–15)
BUN SERPL-MCNC: 5 MG/DL (ref 6–20)
BUN/CREAT SERPL: 6 (ref 12–20)
CALCIUM SERPL-MCNC: 8.8 MG/DL (ref 8.5–10.1)
CHLORIDE SERPL-SCNC: 105 MMOL/L (ref 97–108)
CO2 SERPL-SCNC: 24 MMOL/L (ref 21–32)
CREAT SERPL-MCNC: 0.89 MG/DL (ref 0.55–1.02)
ERYTHROCYTE [DISTWIDTH] IN BLOOD BY AUTOMATED COUNT: 13.2 % (ref 11.5–14.5)
GLUCOSE SERPL-MCNC: 108 MG/DL (ref 65–100)
HCT VFR BLD AUTO: 32.6 % (ref 35–47)
HGB BLD-MCNC: 11.4 G/DL (ref 11.5–16)
MCH RBC QN AUTO: 32.9 PG (ref 26–34)
MCHC RBC AUTO-ENTMCNC: 35 G/DL (ref 30–36.5)
MCV RBC AUTO: 94.2 FL (ref 80–99)
NRBC # BLD: 0 K/UL (ref 0–0.01)
NRBC BLD-RTO: 0 PER 100 WBC
PLATELET # BLD AUTO: 336 K/UL (ref 150–400)
PMV BLD AUTO: 8.6 FL (ref 8.9–12.9)
POTASSIUM SERPL-SCNC: 3.8 MMOL/L (ref 3.5–5.1)
RBC # BLD AUTO: 3.46 M/UL (ref 3.8–5.2)
SODIUM SERPL-SCNC: 137 MMOL/L (ref 136–145)
WBC # BLD AUTO: 13 K/UL (ref 3.6–11)

## 2018-09-21 PROCEDURE — 74011250637 HC RX REV CODE- 250/637: Performed by: INTERNAL MEDICINE

## 2018-09-21 PROCEDURE — 74011250636 HC RX REV CODE- 250/636: Performed by: INTERNAL MEDICINE

## 2018-09-21 PROCEDURE — 80048 BASIC METABOLIC PNL TOTAL CA: CPT | Performed by: SURGERY

## 2018-09-21 PROCEDURE — 74011250637 HC RX REV CODE- 250/637: Performed by: GENERAL ACUTE CARE HOSPITAL

## 2018-09-21 PROCEDURE — 36415 COLL VENOUS BLD VENIPUNCTURE: CPT | Performed by: SURGERY

## 2018-09-21 PROCEDURE — C9113 INJ PANTOPRAZOLE SODIUM, VIA: HCPCS | Performed by: INTERNAL MEDICINE

## 2018-09-21 PROCEDURE — 85027 COMPLETE CBC AUTOMATED: CPT | Performed by: INTERNAL MEDICINE

## 2018-09-21 PROCEDURE — 74011250636 HC RX REV CODE- 250/636: Performed by: SURGERY

## 2018-09-21 PROCEDURE — 74011000250 HC RX REV CODE- 250: Performed by: NURSE PRACTITIONER

## 2018-09-21 PROCEDURE — 99218 HC RM OBSERVATION: CPT

## 2018-09-21 PROCEDURE — 74011250637 HC RX REV CODE- 250/637: Performed by: SURGERY

## 2018-09-21 PROCEDURE — 74011250636 HC RX REV CODE- 250/636: Performed by: NURSE PRACTITIONER

## 2018-09-21 PROCEDURE — 74011000250 HC RX REV CODE- 250: Performed by: INTERNAL MEDICINE

## 2018-09-21 RX ORDER — HYDROGEN PEROXIDE 3 %
20 SOLUTION, NON-ORAL MISCELLANEOUS DAILY
Qty: 30 CAP | Refills: 0 | Status: SHIPPED | OUTPATIENT
Start: 2018-09-21 | End: 2018-11-12 | Stop reason: SDUPTHER

## 2018-09-21 RX ORDER — DOCUSATE SODIUM 100 MG/1
100 CAPSULE, LIQUID FILLED ORAL ONCE
Status: DISCONTINUED | OUTPATIENT
Start: 2018-09-21 | End: 2018-09-21

## 2018-09-21 RX ORDER — FACIAL-BODY WIPES
10 EACH TOPICAL
Status: COMPLETED | OUTPATIENT
Start: 2018-09-21 | End: 2018-09-21

## 2018-09-21 RX ORDER — POLYETHYLENE GLYCOL 3350 17 G/17G
17 POWDER, FOR SOLUTION ORAL DAILY
Qty: 30 PACKET | Refills: 0 | Status: SHIPPED | OUTPATIENT
Start: 2018-09-21 | End: 2018-10-09

## 2018-09-21 RX ORDER — OXYCODONE AND ACETAMINOPHEN 5; 325 MG/1; MG/1
1 TABLET ORAL
Qty: 40 TAB | Refills: 0 | Status: SHIPPED | OUTPATIENT
Start: 2018-09-21 | End: 2018-10-09

## 2018-09-21 RX ORDER — OXYCODONE HYDROCHLORIDE 5 MG/1
5 TABLET ORAL
Qty: 10 TAB | Refills: 0 | Status: SHIPPED | OUTPATIENT
Start: 2018-09-21 | End: 2018-09-28

## 2018-09-21 RX ORDER — POLYETHYLENE GLYCOL 3350 17 G/17G
17 POWDER, FOR SOLUTION ORAL DAILY
Status: DISCONTINUED | OUTPATIENT
Start: 2018-09-21 | End: 2018-09-21 | Stop reason: HOSPADM

## 2018-09-21 RX ADMIN — ACETAMINOPHEN 1000 MG: 500 TABLET ORAL at 02:04

## 2018-09-21 RX ADMIN — ACETAMINOPHEN 1000 MG: 500 TABLET ORAL at 07:43

## 2018-09-21 RX ADMIN — DEXTROSE MONOHYDRATE, SODIUM CHLORIDE, AND POTASSIUM CHLORIDE 75 ML/HR: 50; 4.5; 1.49 INJECTION, SOLUTION INTRAVENOUS at 04:07

## 2018-09-21 RX ADMIN — OXYCODONE HYDROCHLORIDE 5 MG: 5 TABLET ORAL at 07:44

## 2018-09-21 RX ADMIN — MORPHINE SULFATE 1 MG: 2 INJECTION, SOLUTION INTRAMUSCULAR; INTRAVENOUS at 02:05

## 2018-09-21 RX ADMIN — ONDANSETRON HYDROCHLORIDE 4 MG: 2 INJECTION, SOLUTION INTRAVENOUS at 04:07

## 2018-09-21 RX ADMIN — Medication 2 G: at 06:48

## 2018-09-21 RX ADMIN — Medication 10 ML: at 06:48

## 2018-09-21 RX ADMIN — OXYCODONE HYDROCHLORIDE 5 MG: 5 TABLET ORAL at 04:07

## 2018-09-21 RX ADMIN — METOPROLOL TARTRATE 25 MG: 25 TABLET ORAL at 08:02

## 2018-09-21 RX ADMIN — SODIUM CHLORIDE 40 MG: 9 INJECTION, SOLUTION INTRAMUSCULAR; INTRAVENOUS; SUBCUTANEOUS at 08:01

## 2018-09-21 RX ADMIN — ONDANSETRON HYDROCHLORIDE 4 MG: 2 INJECTION, SOLUTION INTRAVENOUS at 08:26

## 2018-09-21 RX ADMIN — AMLODIPINE BESYLATE 10 MG: 5 TABLET ORAL at 08:02

## 2018-09-21 RX ADMIN — DEXTROSE MONOHYDRATE, SODIUM CHLORIDE, AND POTASSIUM CHLORIDE 75 ML/HR: 50; 4.5; 1.49 INJECTION, SOLUTION INTRAVENOUS at 08:01

## 2018-09-21 RX ADMIN — ALUMINUM HYDROXIDE, MAGNESIUM HYDROXIDE, AND SIMETHICONE 30 ML: 200; 200; 20 SUSPENSION ORAL at 07:52

## 2018-09-21 RX ADMIN — Medication 10 ML: at 04:07

## 2018-09-21 RX ADMIN — BISACODYL 10 MG: 10 SUPPOSITORY RECTAL at 11:10

## 2018-09-21 RX ADMIN — POLYETHYLENE GLYCOL 3350 17 G: 17 POWDER, FOR SOLUTION ORAL at 11:10

## 2018-09-21 NOTE — PROGRESS NOTES
General Surgery End of Shift Nursing Note Bedside shift change report given to Latisha (oncoming nurse) by Rolando Torres (offgoing nurse). Report included the following information SBAR, Kardex, OR Summary, Procedure Summary, Intake/Output, MAR and Recent Results. Shift worked:   7p-7a Summary of shift:  DENNIS overnight. VSS, on room air. Voiding well. Issues for physician to address: D/C iv fluids as patient is tolerating po and voiding frequently. D/C home? Number times ambulated in hallway past shift: 0 Number of times OOB to chair past shift: 1 Ambulating around room, up ad marleny to bathroom. Pain Management: 
Current medication: Morphine ivp prn, oxycodone prn Patient states pain is manageable on current pain medication: YES 
 
GI: 
Current diet:  DIET REGULAR 50GM Fat Tolerating current diet: YES Passing flatus: YES Last Bowel Movement: several days ago Respiratory: 
 
Incentive Spirometer at bedside: YES Patient instructed on use: YES Patient Safety: 
 
Falls Score: 1 Bed Alarm On? No 
Sitter?  No 
 
Authur Lombard, RN

## 2018-09-21 NOTE — DISCHARGE SUMMARY
Hospitalist Discharge Summary     Patient ID:  Jon Crowder  [id number]  89 y.o.  1975    PCP on record: Clarke Newton MD    Admit date: 9/18/2018  Discharge date and time: 9/21/2018      DISCHARGE DIAGNOSIS:  S/P Laparoscopic cholecystectomy (9/20/2018)  ABD pain POA  Cholecysitis  Hypokalemia likely secondary to  hyperemesis syndrome  HTN, uncontrolled  Tobacco use  Cystic lesions in both ovaries Possible right-sided hydrosalpinx  polycystic kidney disease    CONSULTATIONS:  IP CONSULT TO OB GYN  IP CONSULT TO GENERAL SURGERY    Excerpted HPI from H&P of Tim Rivera MD:    Dulce Brooks is a 39 y. o.   female with PMHx HTN, hx of LORENA, present to the ER c/o diffuse abdominal pain associated with nausea and vomiting , patient was recently admitted to the hospital 09/09 for same complained and was discharged 09/12 , patient stated since discharged she didn't feel better and  her abdomen pain get worse , she was seen by her PCP yesterday and was given some nausea medication but without any improvement , CT abdomen was done and showed Cystic lesions in both ovaries and Possible right-sided hydrosalpinx , OBGYN on called was called and recommend pelvic ultrasound . ______________________________________________________________________  DISCHARGE SUMMARY/HOSPITAL COURSE:  for full details see H&P, daily progress notes, labs, consult notes. S/P Laparoscopic cholecystectomy (9/20/2018) done by Dr. Jia Foster  ABD pain POA  Cholecysitis  - tolerating GI lite diet. No nausea  - ABD soft with hypoactive bowel sounds  - dulcolax suppository to be given prior to discharge  - recommend daily miralax while taking pain medication as need  - follow up with Dr. Rubio Bashir in 2 weeks  - HIDA scan; Delayed visualization of the gallbladder requiring morphine.  - recommend pt to continue with low fat/low cholesterol diet. Daily PPI.  Follow up with Gastroenteritis in 2-3 weeks     Hypokalemia likely secondary to  hyperemesis syndrome  - resolved      HTN, uncontrolled  - continue lopressor and Norvasc       Tobacco use  - nicotine patch, counseled      Cystic lesions in both ovaries Possible right-sided hydrosalpinx  - appreciate GYN input; recommend outpatient follow up   - ABD/PEL CT: Cystic lesions in both ovaries are possibly of no clinical significance. Possible right-sided hydrosalpinx. Multiple cystic  appearing lesions in the  endometrial canal of uncertain significance. - PEL/transvaginal US: Small submucosal uterine cyst. No uterine enlargement. Bilateral simple ovarian cysts, likely physiologic.      polycystic kidney disease  - refer to nephrology as outpatient             _______________________________________________________________________  Patient seen and examined by me on discharge day. Pertinent Findings:  Gen:    Not in distress  Chest: Clear lungs  CVS:   Regular rhythm. No edema  Abd:  Soft, not distended, tender around the surgical site  Neuro:  Alert, orient x 3  _______________________________________________________________________  DISCHARGE MEDICATIONS:   Current Discharge Medication List      START taking these medications    Details   oxyCODONE-acetaminophen (PERCOCET) 5-325 mg per tablet Take 1 Tab by mouth every four (4) hours as needed for Pain. Max Daily Amount: 6 Tabs. Qty: 40 Tab, Refills: 0    Associated Diagnoses: Gallbladder disease      polyethylene glycol (MIRALAX) 17 gram packet Take 1 Packet by mouth daily. Qty: 30 Packet, Refills: 0      oxyCODONE IR (ROXICODONE) 5 mg immediate release tablet Take 1 Tab by mouth every four (4) hours as needed. Max Daily Amount: 30 mg.  Qty: 10 Tab, Refills: 0    Associated Diagnoses: S/P laparoscopic cholecystectomy      esomeprazole (NEXIUM 24HR) 20 mg capsule Take 1 Cap by mouth daily.   Qty: 30 Cap, Refills: 0         CONTINUE these medications which have NOT CHANGED    Details   amLODIPine (NORVASC) 10 mg tablet Take 1 Tab by mouth daily. Qty: 90 Tab, Refills: 1    Associated Diagnoses: Essential hypertension, malignant      metoprolol tartrate (LOPRESSOR) 25 mg tablet Take 1 Tab by mouth every twelve (12) hours. Qty: 180 Tab, Refills: 1    Associated Diagnoses: Essential hypertension, malignant      chlorthalidone (HYGROTEN) 25 mg tablet Take 25 mg by mouth daily. acetaminophen (TYLENOL) 500 mg tablet Take 1,000 mg by mouth every six (6) hours as needed for Pain. ondansetron (ZOFRAN ODT) 4 mg disintegrating tablet Take 1 Tab by mouth every eight (8) hours as needed for Nausea. Qty: 30 Tab, Refills: 1    Associated Diagnoses: Nausea; Hypokalemia; Generalized abdominal pain; Polycystic kidney disease      cetirizine (ZYRTEC) 10 mg tablet Take 10 mg by mouth daily. STOP taking these medications       potassium chloride SR (KLOR-CON 10) 10 mEq tablet Comments:   Reason for Stopping:               My Recommended Diet, Activity, Wound Care, and follow-up labs are listed in the patient's Discharge Insturctions which I have personally completed and reviewed.     _______________________________________________________________________  DISPOSITION:    Home with Family: y   Home with HH/PT/OT/RN:    SNF/LTC:    MEME:    OTHER:        Condition at Discharge:  Stable  _______________________________________________________________________  Follow up with:   PCP : Bo Brooks MD  Follow-up Information     Follow up With Details 450 Elizabeth Nieto MD   [address] [place] [address] 7 900 [address] [place] [address]      Meri Agosto MD Schedule an appointment as soon as possible for a visit to be seen in 2 weeks [address] [place] [address] 815 [facility]  Virginia Katz MD Schedule an appointment as soon as possible for a visit to be seen within 1-2 weeks [address] [place] [address]  [phone] Total time in minutes spent coordinating this discharge (includes going over instructions, follow-up, prescriptions, and preparing report for sign off to her PCP) :  30 minutes    Signed:  Carolyn Lanza NP

## 2018-09-21 NOTE — PROGRESS NOTES
PCP YASMINE appt scheduled with Dr. Keanu Kim on 9/28/2018 at 11:00am. Appt added to AVS. Jose Torres CM Specialist

## 2018-09-21 NOTE — PROGRESS NOTES
CM reviewed medical record, met with patient, verified information, lives in one story duplex with 5 steps into entrance with 2 children ages 8 and 16 years. Independent with ADL's and IADL's, active, driving, working. Never used HH,DME, SNF/Rehab in past.  
 
Discharge plan discussed. Pt friend, Didi Loza, is coming to transport to home today by car and pt will transport self to follow up or Didi Loza will assist. Follow up appt discussed and on AVS. CM acknowledges consult for follow up appt. No services recommended. Pt does not feel warranted, feels okay to go home. No further needs from CM, bedside nurse aware. Care Management Interventions PCP Verified by CM: Yes Transition of Care Consult (CM Consult): Discharge Planning MyChart Signup: No 
Health Maintenance Reviewed: Yes Physical Therapy Consult: No 
Occupational Therapy Consult: No 
Speech Therapy Consult: No 
Current Support Network: Lives with Caregiver, Family Lives [place] Confirm Follow Up Transport: Family Plan discussed with Pt/Family/Caregiver: Yes Freedom of Choice Offered: Yes Discharge Location Discharge Placement: Home YANN Jha, RN Care Manager [phone] 
[phone]

## 2018-09-21 NOTE — PROGRESS NOTES
General Surgery End of Shift Nursing Note Bedside shift change report given to Duane Velazco (oncoming nurse) by Nirmal Sullivan (offgoing nurse). Report included the following information SBAR, Kardex, OR Summary, Intake/Output, MAR and Accordion. Shift worked:   7 am - 7 pm  
Summary of shift:    Pt VS remained WNL upon arrival from unit from OR. Started on reg diet which was tolerated well. No complaints of nausea or episodes of vomiting. Issues for physician to address:   none Number times ambulated in hallway past shift: 0 Number of times OOB to chair past shift: 0 Pain Management: 
Current medication: See [facility] Patient states pain is manageable on current pain medication: YES 
 
GI: 
 
Current diet:  DIET REGULAR 50GM Fat Tolerating current diet: YES Passing flatus: YES Last Bowel Movement: several days ago Appearance:  
 
Respiratory: 
 
Incentive Spirometer at bedside: YES Patient instructed on use: YES Patient Safety: 
 
Falls Score: 1 Bed Alarm On? Not applicable Sitter? Not applicable Leslee Hickman

## 2018-09-21 NOTE — PROGRESS NOTES
Spiritual Care Partner Volunteer visited patient in 9/21/18 on Gen Surg. Documented by: 
Nikita Parry M.Div.  Paging Service [phone])

## 2018-09-21 NOTE — PROGRESS NOTES
Discharge orders given. Scripts given. IV taken out, dressing C/D/I. Ride here in room. Michelle PCA to take patient to entrance.

## 2018-09-21 NOTE — PROGRESS NOTES
SURGERY PROGRESS NOTE Admit Date: 2018 POD 1 Day Post-Op Procedure: Procedure(s): CHOLECYSTECTOMY LAPAROSCOPIC WITH GRAMS Subjective:  
 
Feeling well other than incisional pain. Tolerating diet. No emesis overnight. Of note, patient reports that she hasn't had BM for about 1 week. Objective:  
 
Visit Vitals  /82 (BP 1 Location: Left arm, BP Patient Position: At rest)  Pulse 75  Temp 96.1 °F (35.6 °C)  Resp 16  
 Ht 5' 1\" (1.549 m)  Wt 68.4 kg (150 lb 12.7 oz)  LMP 2017  SpO2 98%  BMI 28.49 kg/m2 Temp (24hrs), Av.6 °F (36.4 °C), Min:96.1 °F (35.6 °C), Max:98.4 °F (36.9 °C) Physical Exam: Abdomen:  Soft. Non-distended. Incision C/D/I. Lab Results Component Value Date/Time WBC 7.6 2018 02:47 AM  
HGB 11.5 2018 02:47 AM  
Hemoglobin (POC) 12.3 2013 11:21 AM  
HCT 33.1 (L) 2018 02:47 AM  
PLATELET 958 [id number] 02:47 AM  
MCV 94.6 2018 02:47 AM  
 
Lab Results Component Value Date/Time GFR est non-AA >60 2018 04:25 AM  
GFR est AA >60 2018 04:25 AM  
Creatinine 0.89 2018 04:25 AM  
BUN 5 (L) 2018 04:25 AM  
Sodium 137 2018 04:25 AM  
Potassium 3.8 2018 04:25 AM  
Chloride 105 2018 04:25 AM  
CO2 24 2018 04:25 AM  
Magnesium 1.8 2018 10:34 AM  
Phosphorus 2.5 (L) 2018 10:34 AM  
 
 
Assessment:  
 
Active Problems: 
  Essential hypertension, malignant (2012) Cannabis abuse (2013) Overview: UDS +VE 13 Nausea & vomiting (2018) Plan/Recommendations/Medical Decision Making:  
 
Dulcolax Clear for discharge from surgery standpoint. F/u in 2 weeks

## 2018-09-21 NOTE — DISCHARGE INSTRUCTIONS
Patient Discharge Instructions    Yvette Potts / [id number] : 1975    Admitted 2018 Discharged: 2018         What to do at Home    Recommended diet: Low fat, Low cholesterol    Recommended activity: no heavy lifting > 20 lbs x 2 weeks; no driving while taking narcotics for pain. May take shower, but no bath x 2 weeks. Keep ice over the incision to minimize swelling. Please take over the counter stool softener or miralax while taking narcotics for pain. However, stop taking the stool softener if you develop diarrhea. If you experience a lot of drainage, develop redness around the wound, or a fever over 101 F occurs please call the office. Narcotics and anesthesia sometimes cause nausea and vomiting. If persistent please call the office. Do not hesitate to call with questions or concerns. Follow-up with Dr. Teressa Bernal in 2 weeks. Please call [phone] for an appointment. Information obtained by :  I understand that if any problems occur once I am at home I am to contact my physician. I understand and acknowledge receipt of the instructions indicated above. [de-identified] or R.N.'s Signature                                                                  Date/Time                                                                                                                                              Patient or Representative Signature                                                          Date/Time        Patient Discharge Instructions     Pt Name  Yvette Potts   Date of Birth 1975   Age  39 y.o.    Medical Record Number  [id number]   PCP Fleet Area, MD    Admit date:  2018 @    [address]    Room Number  4776/39   Date of Discharge 2018     Admission Diagnoses:     S/P laparoscopic cholecystectomy        No Known Allergies     You were admitted to [facility] for  S/P laparoscopic cholecystectomy    YOUR OTHER MEDICAL DIAGNOSES INCLUDE (BUT NOT LIMITED TO ):  Present on Admission:   Nausea & vomiting   Essential hypertension, malignant   Cannabis abuse   S/P laparoscopic cholecystectomy   Tobacco abuse   Polycystic kidney disease, autosomal dominant   Hypertension, uncontrolled   Polycystic kidney disease   Hyperemesis      DIET:  Cardiac Diet and Low fat, Low cholesterol     Recommended activity: Activity as tolerated     Stopping Smoking: Care Instructions  Your Care Instructions  Cigarette smokers crave the nicotine in cigarettes. Giving it up is much harder than simply changing a habit. Your body has to stop craving the nicotine. It is hard to quit, but you can do it. There are many tools that people use to quit smoking. You may find that combining tools works best for you. There are several steps to quitting. First you get ready to quit. Then you get support to help you. After that, you learn new skills and behaviors to become a nonsmoker. For many people, a necessary step is getting and using medicine. Your doctor will help you set up the plan that best meets your needs. You may want to attend a smoking cessation program to help you quit smoking. When you choose a program, look for one that has proven success. Ask your doctor for ideas. You will greatly increase your chances of success if you take medicine as well as get counseling or join a cessation program.  Some of the changes you feel when you first quit tobacco are uncomfortable. Your body will miss the nicotine at first, and you may feel short-tempered and grumpy. You may have trouble sleeping or concentrating. Medicine can help you deal with these symptoms. You may struggle with changing your smoking habits and rituals. The last step is the tricky one:  Be prepared for the smoking urge to continue for a time. This is a lot to deal with, but keep at it. You will feel better. Follow-up care is a key part of your treatment and safety. Be sure to make and go to all appointments, and call your doctor if you are having problems. It's also a good idea to know your test results and keep a list of the medicines you take. How can you care for yourself at home? · Ask your family, friends, and coworkers for support. You have a better chance of quitting if you have help and support. · Join a support group, such as Nicotine Anonymous, for people who are trying to quit smoking. · Consider signing up for a smoking cessation program, such as the American Lung Association's Freedom from Smoking program.  · Get text messaging support. Go to the website at www.smokefree. gov to sign up for the [facility] program.  · Set a quit date. Pick your date carefully so that it is not right in the middle of a big deadline or stressful time. Once you quit, do not even take a puff. Get rid of all ashtrays and lighters after your last cigarette. Clean your house and your clothes so that they do not smell of smoke. · Learn how to be a nonsmoker. Think about ways you can avoid those things that make you reach for a cigarette. ¨ Avoid situations that put you at greatest risk for smoking. For some people, it is hard to have a drink with friends without smoking. For others, they might skip a coffee break with coworkers who smoke. ¨ Change your daily routine. Take a different route to work or eat a meal in a different place. · Cut down on stress. Calm yourself or release tension by doing an activity you enjoy, such as reading a book, taking a hot bath, or gardening. · Talk to your doctor or pharmacist about nicotine replacement therapy, which replaces the nicotine in your body. You still get nicotine but you do not use tobacco. Nicotine replacement products help you slowly reduce the amount of nicotine you need.  These products come in several forms, many of them available over-the-counter:  ¨ Nicotine patches  ¨ Nicotine gum and lozenges  ¨ Nicotine inhaler  · Ask your doctor about bupropion (Wellbutrin) or varenicline (Chantix), which are prescription medicines. They do not contain nicotine. They help you by reducing withdrawal symptoms, such as stress and anxiety. · Some people find hypnosis, acupuncture, and massage helpful for ending the smoking habit. · Eat a healthy diet and get regular exercise. Having healthy habits will help your body move past its craving for nicotine. · Be prepared to keep trying. Most people are not successful the first few times they try to quit. Do not get mad at yourself if you smoke again. Make a list of things you learned and think about when you want to try again, such as next week, next month, or next year. Where can you learn more? Go to http://shanmagnetUvalentina.info/. Enter U435 in the search box to learn more about \"Stopping Smoking: Care Instructions. \"  Current as of: November 29, 2017  Content Version: 11.7  © 1336-9859 [employer]. Care instructions adapted under license by [employer] (which disclaims liability or warranty for this information). If you have questions about a medical condition or this instruction, always ask your healthcare professional. [place] [address] any warranty or liability for your use of this information. Follow up :    Follow-up Information     Follow up With Details Comments [address], [place]   [place]  [address] Dar[address]ams [address]      Golden Becerra MD Schedule an appointment as soon as possible for a visit to be seen in 2 weeks [address]  [facility]  Jeanmarie Olson MD Schedule an appointment as soon as possible for a visit to be seen within 1-2 weeks [address]  [address]  [address] [facility] [address] [facility] MD responsible for above upon discharge. · It is important that you take the medication exactly as they are prescribed. · Keep your medication in the bottles provided by the pharmacist and keep a list of the medication names, dosages, and times to be taken in your wallet. · Do not take other medications without consulting your doctor. ADDITIONAL INFORMATION: If you experience any of the following symptoms or have any health problem not listed below, then please call your primary care physician or return to the emergency room if you cannot get hold of your doctor: Fever, chills, nausea, vomiting, diarrhea, change in mentation, falling, bleeding, shortness of breath. I understand that if any problems occur once I am discharged, I am supposed to call my Primary care physician for further care or seek help in the Emergency Department at the nearest Healthcare facility. I have had an opportunity to discuss my clinical issues with my doctor and nursing staff. I understand and acknowledge receipt of the above instructions.                                                                                                                                            Physician's or R.N.'s Signature                                                            Date/Time                                                                                                                                              Patient or Representative Signature                                                 Date/Time

## 2018-09-21 NOTE — PROGRESS NOTES
Luis Fernando Araiza To whom it may concern:  
 
 
RE: Jeannine Chester (YOB: 1975) Dear Sir/Madam: 
 
This is to certify that the above referenced patient was hospitalized at [facility] from 9/18/2018 through 9/21/2018. She is expected to go home and follow up with her Primary Care physician. It is recommended that Ms. Jeannine Chester should be excused from work until she follows up with her surgical provider in 2 weeks, by 10/5/2018. If you have questions please feel to contact the [facility] hospitalist office at [facility] at [phone]. Sincerely  
 
 
________________________________________ Carolyn Scott Head, MSN, FNP-C, APRN-BC Hospitalist, [address], [address] Tel: [phone]

## 2018-09-28 ENCOUNTER — OFFICE VISIT (OUTPATIENT)
Dept: INTERNAL MEDICINE CLINIC | Age: 43
End: 2018-09-28

## 2018-09-28 VITALS
OXYGEN SATURATION: 99 % | RESPIRATION RATE: 18 BRPM | HEIGHT: 61 IN | WEIGHT: 147 LBS | TEMPERATURE: 97.1 F | BODY MASS INDEX: 27.75 KG/M2 | DIASTOLIC BLOOD PRESSURE: 90 MMHG | HEART RATE: 96 BPM | SYSTOLIC BLOOD PRESSURE: 120 MMHG

## 2018-09-28 DIAGNOSIS — Z90.49 S/P CHOLECYSTECTOMY: ICD-10-CM

## 2018-09-28 DIAGNOSIS — I10 ESSENTIAL HYPERTENSION: ICD-10-CM

## 2018-09-28 RX ORDER — POTASSIUM CHLORIDE 750 MG/1
TABLET, FILM COATED, EXTENDED RELEASE ORAL
COMMUNITY
Start: 2018-09-12 | End: 2018-09-28

## 2018-09-28 NOTE — PROGRESS NOTES
Chief Complaint   Patient presents with    Transitions Of Care     1. Have you been to the ER, urgent care clinic since your last visit? Hospitalized since your last visit? Yes When: 9/16/2018 [facility]     2. Have you seen or consulted any other health care providers outside of the [facility] since your last visit? Include any pap smears or colon screening.  No

## 2018-09-28 NOTE — PROGRESS NOTES
Leelee Cortez is a 39 y.o. female and presents with Transitions Of Care (s/p gallbladder removal)  . Subjective:    Pt has been having intarctable n/v x few weeks. Ed w/u's in the past have been unrevealing. Pt went BACK to ED 9/18/18 and was admitted. CT scan NEGATIVE for etiology of n/v. HIDA scan + cholecystitis. Pt is s/p cholecystectomy 9/20/18. Pt feels better. Sxs resolved    Review of Systems  Constitutional: negative for fevers, chills, anorexia and weight loss  Eyes:   negative for visual disturbance and irritation  ENT:   negative for tinnitus,sore throat,nasal congestion,ear pains. hoarseness  Respiratory:  negative for cough, hemoptysis, dyspnea,wheezing  CV:   negative for chest pain, palpitations, lower extremity edema  Musculoskel: negative for myalgias, arthralgias, back pain, muscle weakness, joint pain  Neurological:  negative for headaches, dizziness, vertigo, memory problems and gait   Behavl/Psych: negative for feelings of anxiety, depression, mood changes    Past Medical History:   Diagnosis Date    Hypertension     Kidney anomaly, congenital     Tubal pregnancy      Past Surgical History:   Procedure Laterality Date    HX CHOLECYSTECTOMY      HX GYN  10/07/2013    cyst removed    HX HYSTERECTOMY  10/7/2013    partial hysterectomy     Social History     Social History    Marital status:      Spouse name: N/A    Number of children: N/A    Years of education: N/A     Social History Main Topics    Smoking status: Current Some Day Smoker     Packs/day: 0.25     Years: 25.00    Smokeless tobacco: Never Used      Comment: 1-3 day    Alcohol use Yes      Comment: occasionally/socially    Drug use: No      Comment: stopped in 2013    Sexual activity: Yes     Partners: Male     Other Topics Concern    None     Social History Narrative     Family History   Problem Relation Age of Onset    Hypertension Mother     Hypertension Father     Hypertension Sister      Current Outpatient Prescriptions   Medication Sig Dispense Refill    oxyCODONE-acetaminophen (PERCOCET) 5-325 mg per tablet Take 1 Tab by mouth every four (4) hours as needed for Pain. Max Daily Amount: 6 Tabs. 40 Tab 0    polyethylene glycol (MIRALAX) 17 gram packet Take 1 Packet by mouth daily. 30 Packet 0    esomeprazole (NEXIUM 24HR) 20 mg capsule Take 1 Cap by mouth daily. 30 Cap 0    amLODIPine (NORVASC) 10 mg tablet Take 1 Tab by mouth daily. 90 Tab 1    metoprolol tartrate (LOPRESSOR) 25 mg tablet Take 1 Tab by mouth every twelve (12) hours. 180 Tab 1    chlorthalidone (HYGROTEN) 25 mg tablet Take 25 mg by mouth daily.  cetirizine (ZYRTEC) 10 mg tablet Take 10 mg by mouth daily. No Known Allergies    Objective:  Visit Vitals    /90 (BP 1 Location: Left arm, BP Patient Position: Sitting)    Pulse 96    Temp 97.1 °F (36.2 °C) (Oral)    Resp 18    Ht 5' 1\" (1.549 m)    Wt 147 lb (66.7 kg)    SpO2 99%    BMI 27.78 kg/m2     Physical Exam:   General appearance - alert, well appearing, and in no distress  Mental status - alert, oriented to person, place, and time  EYE-GONZALO, EOMI, corneas normal, no foreign bodies  ENT-ENT exam normal, no neck nodes or sinus tenderness  Nose - normal and patent, no erythema, discharge or polyps  Mouth - mucous membranes moist, pharynx normal without lesions  Neck - supple, no significant adenopathy   Chest - clear to auscultation, no wheezes, rales or rhonchi, symmetric air entry   Heart - normal rate, regular rhythm, normal S1, S2, no murmurs, rubs, clicks or gallops   Abdomen - soft, nontender, nondistended, no masses or organomegaly  Lymph- no adenopathy palpable  Ext-peripheral pulses normal, no pedal edema, no clubbing or cyanosis  Skin-Warm and dry.  no hyperpigmentation, vitiligo, or suspicious lesions  Neuro -alert, oriented, normal speech, no focal findings or movement disorder noted  Neck-normal C-spine, no tenderness, full ROM without pain  Feet-no nail deformities or callus formation with good pulses noted      Results for orders placed or performed during the hospital encounter of 09/18/18   CULTURE, URINE   Result Value Ref Range    Special Requests: NO SPECIAL REQUESTS  Reflexed from [id number]        [place] Count >100,000  COLONIES/mL        Culture result: MIXED UROGENITAL NADER ISOLATED     CHLAMYDIA/GC PCR   Result Value Ref Range    Sample type SWAB      Source ENDOCERVICAL      Chlamydia amplified NEGATIVE  NEG      N. gonorrhea, amplified NEGATIVE  NEG      Comment        Testing performed by the [employer] Miller CT/NG method, utilizing PCR amplification to identify DNA of the pathogens. This method is not recommended as the sole method of evaluation of cases of sexual abuse nor for other medico-legal indications.    WET PREP   Result Value Ref Range    Clue cells CLUE CELLS PRESENT      Wet prep NO TRICHOMONAS SEEN     ALBARO, OTHER SOURCES   Result Value Ref Range    Special Requests: NO SPECIAL REQUESTS      KOH NO YEAST SEEN     CBC W/O DIFF   Result Value Ref Range    WBC 11.3 (H) 3.6 - 11.0 K/uL    RBC 4.61 3.80 - 5.20 M/uL    HGB 15.0 11.5 - 16.0 g/dL    HCT 42.0 35.0 - 47.0 %    MCV 91.1 80.0 - 99.0 FL    MCH 32.5 26.0 - 34.0 PG    MCHC 35.7 30.0 - 36.5 g/dL    RDW 12.8 11.5 - 14.5 %    PLATELET 232 (H) 184 - 400 K/uL    MPV 8.4 (L) 8.9 - 12.9 FL    NRBC 0.0 0  WBC    ABSOLUTE NRBC 0.00 0.00 - 6.25 K/uL   METABOLIC PANEL, COMPREHENSIVE   Result Value Ref Range    Sodium 134 (L) 136 - 145 mmol/L    Potassium 2.9 (L) 3.5 - 5.1 mmol/L    Chloride 98 97 - 108 mmol/L    CO2 24 21 - 32 mmol/L    Anion gap 12 5 - 15 mmol/L    Glucose 107 (H) 65 - 100 mg/dL    BUN 9 6 - 20 MG/DL    Creatinine 0.95 0.55 - 1.02 MG/DL    BUN/Creatinine ratio 9 (L) 12 - 20      GFR est AA >60 >60 ml/min/1.73m2    GFR est non-AA >60 >60 ml/min/1.73m2    Calcium 10.5 (H) 8.5 - 10.1 MG/DL    Bilirubin, total 0.3 0.2 - 1.0 MG/DL    ALT (SGPT) 29 12 - 78 U/L    AST (SGOT) 18 15 - 37 U/L    Alk.  phosphatase 89 45 - 117 U/L    Protein, total 9.2 (H) 6.4 - 8.2 g/dL    Albumin 4.2 3.5 - 5.0 g/dL    Globulin 5.0 (H) 2.0 - 4.0 g/dL    A-G Ratio 0.8 (L) 1.1 - 2.2     URINALYSIS W/ REFLEX CULTURE   Result Value Ref Range    Color YELLOW/STRAW      Appearance CLEAR CLEAR      Specific gravity 1.025 1.003 - 1.030      pH (UA) 7.5 5.0 - 8.0      Protein TRACE (A) NEG mg/dL    Glucose NEGATIVE  NEG mg/dL    Ketone 15 (A) NEG mg/dL    Bilirubin NEGATIVE  NEG      Blood NEGATIVE  NEG      Urobilinogen 0.2 0.2 - 1.0 EU/dL    Nitrites NEGATIVE  NEG      Leukocyte Esterase NEGATIVE  NEG      WBC 0-4 0 - 4 /hpf    RBC 0-5 0 - 5 /hpf    Epithelial cells MODERATE (A) FEW /lpf    Bacteria 1+ (A) NEG /hpf    UA:UC IF INDICATED URINE CULTURE ORDERED (A) CNI     LIPASE   Result Value Ref Range    Lipase 306 73 - 393 U/L   MAGNESIUM   Result Value Ref Range    Magnesium 1.8 1.6 - 2.4 mg/dL   METABOLIC PANEL, BASIC   Result Value Ref Range    Sodium 136 136 - 145 mmol/L    Potassium 2.8 (L) 3.5 - 5.1 mmol/L    Chloride 100 97 - 108 mmol/L    CO2 23 21 - 32 mmol/L    Anion gap 13 5 - 15 mmol/L    Glucose 80 65 - 100 mg/dL    BUN 10 6 - 20 MG/DL    Creatinine 0.83 0.55 - 1.02 MG/DL    BUN/Creatinine ratio 12 12 - 20      GFR est AA >60 >60 ml/min/1.73m2    GFR est non-AA >60 >60 ml/min/1.73m2    Calcium 8.7 8.5 - 10.1 MG/DL   CBC W/O DIFF   Result Value Ref Range    WBC 14.9 (H) 3.6 - 11.0 K/uL    RBC 3.69 (L) 3.80 - 5.20 M/uL    HGB 12.4 11.5 - 16.0 g/dL    HCT 34.5 (L) 35.0 - 47.0 %    MCV 93.5 80.0 - 99.0 FL    MCH 33.6 26.0 - 34.0 PG    MCHC 35.9 30.0 - 36.5 g/dL    RDW 13.2 11.5 - 14.5 %    PLATELET 722 131 - 837 K/uL    MPV 8.9 8.9 - 12.9 FL    NRBC 0.0 0  WBC    ABSOLUTE NRBC 0.00 0.00 - 0.12 K/uL   METABOLIC PANEL, BASIC   Result Value Ref Range    Sodium 138 136 - 145 mmol/L    Potassium 3.0 (L) 3.5 - 5.1 mmol/L    Chloride 106 97 - 108 mmol/L    CO2 22 21 - 32 mmol/L    Anion gap 10 5 - 15 mmol/L Glucose 125 (H) 65 - 100 mg/dL    BUN 6 6 - 20 MG/DL    Creatinine 0.84 0.55 - 1.02 MG/DL    BUN/Creatinine ratio 7 (L) 12 - 20      GFR est AA >60 >60 ml/min/1.73m2    GFR est non-AA >60 >60 ml/min/1.73m2    Calcium 8.0 (L) 8.5 - 10.1 MG/DL   TROPONIN I   Result Value Ref Range    Troponin-I, Qt. <0.05 <0.05 ng/mL   CK W/ CKMB & INDEX   Result Value Ref Range    CK 43 26 - 192 U/L    CK - MB 1.9 <3.6 NG/ML    CK-MB Index 4.4 (H) 0 - 2.5     MAGNESIUM   Result Value Ref Range    Magnesium 1.8 1.6 - 2.4 mg/dL   PHOSPHORUS   Result Value Ref Range    Phosphorus 2.6 2.6 - 4.7 MG/DL   CBC W/O DIFF   Result Value Ref Range    WBC 7.6 3.6 - 11.0 K/uL    RBC 3.50 (L) 3.80 - 5.20 M/uL    HGB 11.5 11.5 - 16.0 g/dL    HCT 33.1 (L) 35.0 - 47.0 %    MCV 94.6 80.0 - 99.0 FL    MCH 32.9 26.0 - 34.0 PG    MCHC 34.7 30.0 - 36.5 g/dL    RDW 13.6 11.5 - 14.5 %    PLATELET 471 292 - 286 K/uL    MPV 8.6 (L) 8.9 - 12.9 FL    NRBC 0.0 0  WBC    ABSOLUTE NRBC 0.00 0.00 - 0.01 K/uL   SAMPLES BEING HELD   Result Value Ref Range    SAMPLES BEING HELD 1 LT GRN     COMMENT        Add-on orders for these samples will be processed based on acceptable specimen integrity and analyte stability, which may vary by analyte. METABOLIC PANEL, COMPREHENSIVE   Result Value Ref Range    Sodium 138 136 - 145 mmol/L    Potassium 3.7 3.5 - 5.1 mmol/L    Chloride 108 97 - 108 mmol/L    CO2 21 21 - 32 mmol/L    Anion gap 9 5 - 15 mmol/L    Glucose 84 65 - 100 mg/dL    BUN 6 6 - 20 MG/DL    Creatinine 0.78 0.55 - 1.02 MG/DL    BUN/Creatinine ratio 8 (L) 12 - 20      GFR est AA >60 >60 ml/min/1.73m2    GFR est non-AA >60 >60 ml/min/1.73m2    Calcium 8.0 (L) 8.5 - 10.1 MG/DL    Bilirubin, total 0.3 0.2 - 1.0 MG/DL    ALT (SGPT) 22 12 - 78 U/L    AST (SGOT) 18 15 - 37 U/L    Alk.  phosphatase 57 45 - 117 U/L    Protein, total 6.5 6.4 - 8.2 g/dL    Albumin 2.8 (L) 3.5 - 5.0 g/dL    Globulin 3.7 2.0 - 4.0 g/dL    A-G Ratio 0.8 (L) 1.1 - 2.2     MAGNESIUM Result Value Ref Range    Magnesium 1.8 1.6 - 2.4 mg/dL   PHOSPHORUS   Result Value Ref Range    Phosphorus 2.5 (L) 2.6 - 4.7 MG/DL   METABOLIC PANEL, BASIC   Result Value Ref Range    Sodium 137 136 - 145 mmol/L    Potassium 3.8 3.5 - 5.1 mmol/L    Chloride 105 97 - 108 mmol/L    CO2 24 21 - 32 mmol/L    Anion gap 8 5 - 15 mmol/L    Glucose 108 (H) 65 - 100 mg/dL    BUN 5 (L) 6 - 20 MG/DL    Creatinine 0.89 0.55 - 1.02 MG/DL    BUN/Creatinine ratio 6 (L) 12 - 20      GFR est AA >60 >60 ml/min/1.73m2    GFR est non-AA >60 >60 ml/min/1.73m2    Calcium 8.8 8.5 - 10.1 MG/DL   CBC W/O DIFF   Result Value Ref Range    WBC 13.0 (H) 3.6 - 11.0 K/uL    RBC 3.46 (L) 3.80 - 5.20 M/uL    HGB 11.4 (L) 11.5 - 16.0 g/dL    HCT 32.6 (L) 35.0 - 47.0 %    MCV 94.2 80.0 - 99.0 FL    MCH 32.9 26.0 - 34.0 PG    MCHC 35.0 30.0 - 36.5 g/dL    RDW 13.2 11.5 - 14.5 %    PLATELET 222 748 - 949 K/uL    MPV 8.6 (L) 8.9 - 12.9 FL    NRBC 0.0 0  WBC    ABSOLUTE NRBC 0.00 0.00 - 0.01 K/uL   EKG, 12 LEAD, INITIAL   Result Value Ref Range    Ventricular Rate 72 BPM    Atrial Rate 72 BPM    P-R Interval 122 ms    QRS Duration 76 ms    Q-T Interval 426 ms    QTC Calculation (Bezet) 466 ms    Calculated R Axis 145 degrees    Calculated T Axis 156 degrees    Diagnosis       ** Suspect arm lead reversal, interpretation assumes no reversal  Normal sinus rhythm  Right axis deviation  Nonspecific T wave abnormality  Prolonged QT  Abnormal ECG    Confirmed by Shoshana Flores ([id number]) on 9/20/2018 1:29:27 PM         Assessment/Plan:    ICD-10-CM ICD-9-CM    1. Transition of care performed with sharing of clinical summary Z91.89 V15.89    2. S/P cholecystectomy Z90.49 V45.79 DISCONTINUED: potassium chloride SR (KLOR-CON 10) 10 mEq tablet   3. Essential hypertension I10 401.9      Orders Placed This Encounter    DISCONTD: potassium chloride SR (KLOR-CON 10) 10 mEq tablet     1.  Transition of care performed with sharing of clinical summary  Completed    2. S/P cholecystectomy  F/u surgery  - potassium chloride SR (KLOR-CON 10) 10 mEq tablet;     3. Essential hypertension  Improved    There are no Patient Instructions on file for this visit. Follow-up Disposition:  Return in about 8 weeks (around 11/23/2018) for bp check. I have reviewed with the patient details of the assessment and plan and all questions were answered. Relevent patient education was performed. The most recent lab findings were reviewed with the patient. An After Visit Summary was printed and given to the patient.

## 2018-10-05 ENCOUNTER — APPOINTMENT (OUTPATIENT)
Dept: CT IMAGING | Age: 43
End: 2018-10-05
Attending: EMERGENCY MEDICINE
Payer: COMMERCIAL

## 2018-10-05 ENCOUNTER — HOSPITAL ENCOUNTER (EMERGENCY)
Age: 43
Discharge: HOME | End: 2018-10-06
Attending: EMERGENCY MEDICINE | Admitting: EMERGENCY MEDICINE
Payer: COMMERCIAL

## 2018-10-05 ENCOUNTER — OFFICE VISIT (OUTPATIENT)
Dept: SURGERY | Age: 43
End: 2018-10-05

## 2018-10-05 VITALS
HEIGHT: 61 IN | OXYGEN SATURATION: 100 % | BODY MASS INDEX: 25.98 KG/M2 | RESPIRATION RATE: 18 BRPM | WEIGHT: 137.6 LBS | HEART RATE: 102 BPM | TEMPERATURE: 97.8 F | SYSTOLIC BLOOD PRESSURE: 173 MMHG | DIASTOLIC BLOOD PRESSURE: 126 MMHG

## 2018-10-05 VITALS
OXYGEN SATURATION: 98 % | BODY MASS INDEX: 28.18 KG/M2 | RESPIRATION RATE: 27 BRPM | DIASTOLIC BLOOD PRESSURE: 117 MMHG | SYSTOLIC BLOOD PRESSURE: 178 MMHG | TEMPERATURE: 97.8 F | WEIGHT: 149.25 LBS | HEART RATE: 95 BPM | HEIGHT: 61 IN

## 2018-10-05 DIAGNOSIS — R10.9 ABDOMINAL PAIN, UNSPECIFIED ABDOMINAL LOCATION: Primary | ICD-10-CM

## 2018-10-05 DIAGNOSIS — R11.15 NON-INTRACTABLE CYCLICAL VOMITING WITH NAUSEA: Primary | ICD-10-CM

## 2018-10-05 DIAGNOSIS — K59.03 DRUG-INDUCED CONSTIPATION: ICD-10-CM

## 2018-10-05 DIAGNOSIS — E87.6 HYPOKALEMIA: ICD-10-CM

## 2018-10-05 DIAGNOSIS — F12.90 MARIJUANA USE: ICD-10-CM

## 2018-10-05 DIAGNOSIS — Z09 POSTOPERATIVE EXAMINATION: ICD-10-CM

## 2018-10-05 LAB
ALBUMIN SERPL-MCNC: 4.4 G/DL (ref 3.5–5)
ALBUMIN/GLOB SERPL: 0.8 {RATIO} (ref 1.1–2.2)
ALP SERPL-CCNC: 117 U/L (ref 45–117)
ALT SERPL-CCNC: 27 U/L (ref 12–78)
AMPHET UR QL SCN: NEGATIVE
ANION GAP SERPL CALC-SCNC: 9 MMOL/L (ref 5–15)
AST SERPL-CCNC: 20 U/L (ref 15–37)
BARBITURATES UR QL SCN: NEGATIVE
BASOPHILS # BLD: 0.1 K/UL (ref 0–0.1)
BASOPHILS NFR BLD: 1 % (ref 0–1)
BENZODIAZ UR QL: NEGATIVE
BILIRUB SERPL-MCNC: 0.3 MG/DL (ref 0.2–1)
BUN SERPL-MCNC: 10 MG/DL (ref 6–20)
BUN/CREAT SERPL: 10 (ref 12–20)
CALCIUM SERPL-MCNC: 10.5 MG/DL (ref 8.5–10.1)
CANNABINOIDS UR QL SCN: POSITIVE
CHLORIDE SERPL-SCNC: 97 MMOL/L (ref 97–108)
CO2 SERPL-SCNC: 29 MMOL/L (ref 21–32)
COCAINE UR QL SCN: NEGATIVE
CREAT SERPL-MCNC: 1.03 MG/DL (ref 0.55–1.02)
DIFFERENTIAL METHOD BLD: ABNORMAL
DRUG SCRN COMMENT,DRGCM: ABNORMAL
EOSINOPHIL # BLD: 0.1 K/UL (ref 0–0.4)
EOSINOPHIL NFR BLD: 1 % (ref 0–7)
ERYTHROCYTE [DISTWIDTH] IN BLOOD BY AUTOMATED COUNT: 12.9 % (ref 11.5–14.5)
GLOBULIN SER CALC-MCNC: 5.2 G/DL (ref 2–4)
GLUCOSE SERPL-MCNC: 124 MG/DL (ref 65–100)
HCT VFR BLD AUTO: 42.1 % (ref 35–47)
HGB BLD-MCNC: 15.1 G/DL (ref 11.5–16)
IMM GRANULOCYTES # BLD: 0 K/UL (ref 0–0.04)
IMM GRANULOCYTES NFR BLD AUTO: 0 % (ref 0–0.5)
LIPASE SERPL-CCNC: 586 U/L (ref 73–393)
LYMPHOCYTES # BLD: 2.8 K/UL (ref 0.8–3.5)
LYMPHOCYTES NFR BLD: 26 % (ref 12–49)
MCH RBC QN AUTO: 32.5 PG (ref 26–34)
MCHC RBC AUTO-ENTMCNC: 35.9 G/DL (ref 30–36.5)
MCV RBC AUTO: 90.7 FL (ref 80–99)
METHADONE UR QL: NEGATIVE
MONOCYTES # BLD: 0.5 K/UL (ref 0–1)
MONOCYTES NFR BLD: 5 % (ref 5–13)
NEUTS SEG # BLD: 7.1 K/UL (ref 1.8–8)
NEUTS SEG NFR BLD: 67 % (ref 32–75)
NRBC # BLD: 0 K/UL (ref 0–0.01)
NRBC BLD-RTO: 0 PER 100 WBC
OPIATES UR QL: POSITIVE
PCP UR QL: NEGATIVE
PLATELET # BLD AUTO: 463 K/UL (ref 150–400)
PMV BLD AUTO: 8.8 FL (ref 8.9–12.9)
POTASSIUM SERPL-SCNC: 2.9 MMOL/L (ref 3.5–5.1)
PROT SERPL-MCNC: 9.6 G/DL (ref 6.4–8.2)
RBC # BLD AUTO: 4.64 M/UL (ref 3.8–5.2)
SODIUM SERPL-SCNC: 135 MMOL/L (ref 136–145)
WBC # BLD AUTO: 10.5 K/UL (ref 3.6–11)

## 2018-10-05 PROCEDURE — 99285 EMERGENCY DEPT VISIT HI MDM: CPT

## 2018-10-05 PROCEDURE — 96361 HYDRATE IV INFUSION ADD-ON: CPT

## 2018-10-05 PROCEDURE — 96376 TX/PRO/DX INJ SAME DRUG ADON: CPT

## 2018-10-05 PROCEDURE — 74177 CT ABD & PELVIS W/CONTRAST: CPT

## 2018-10-05 PROCEDURE — 36415 COLL VENOUS BLD VENIPUNCTURE: CPT | Performed by: EMERGENCY MEDICINE

## 2018-10-05 PROCEDURE — 96365 THER/PROPH/DIAG IV INF INIT: CPT

## 2018-10-05 PROCEDURE — 74011250637 HC RX REV CODE- 250/637: Performed by: EMERGENCY MEDICINE

## 2018-10-05 PROCEDURE — 74011636320 HC RX REV CODE- 636/320: Performed by: EMERGENCY MEDICINE

## 2018-10-05 PROCEDURE — 85025 COMPLETE CBC W/AUTO DIFF WBC: CPT | Performed by: EMERGENCY MEDICINE

## 2018-10-05 PROCEDURE — 83690 ASSAY OF LIPASE: CPT | Performed by: EMERGENCY MEDICINE

## 2018-10-05 PROCEDURE — 74011250636 HC RX REV CODE- 250/636: Performed by: EMERGENCY MEDICINE

## 2018-10-05 PROCEDURE — 80307 DRUG TEST PRSMV CHEM ANLYZR: CPT | Performed by: EMERGENCY MEDICINE

## 2018-10-05 PROCEDURE — 96375 TX/PRO/DX INJ NEW DRUG ADDON: CPT

## 2018-10-05 PROCEDURE — 93005 ELECTROCARDIOGRAM TRACING: CPT

## 2018-10-05 PROCEDURE — 96374 THER/PROPH/DIAG INJ IV PUSH: CPT

## 2018-10-05 PROCEDURE — 80053 COMPREHEN METABOLIC PANEL: CPT | Performed by: EMERGENCY MEDICINE

## 2018-10-05 RX ORDER — DICYCLOMINE HYDROCHLORIDE 10 MG/1
10 CAPSULE ORAL 4 TIMES DAILY
Qty: 20 CAP | Refills: 0 | Status: SHIPPED | OUTPATIENT
Start: 2018-10-05 | End: 2018-10-10

## 2018-10-05 RX ORDER — PROMETHAZINE HYDROCHLORIDE 25 MG/1
25 TABLET ORAL
Status: DISCONTINUED | OUTPATIENT
Start: 2018-10-05 | End: 2018-10-05

## 2018-10-05 RX ORDER — PROMETHAZINE HYDROCHLORIDE 25 MG/1
25 SUPPOSITORY RECTAL
Qty: 12 SUPPOSITORY | Refills: 0 | Status: SHIPPED | OUTPATIENT
Start: 2018-10-05 | End: 2018-10-09

## 2018-10-05 RX ORDER — SODIUM CHLORIDE 0.9 % (FLUSH) 0.9 %
5-10 SYRINGE (ML) INJECTION AS NEEDED
Status: DISCONTINUED | OUTPATIENT
Start: 2018-10-05 | End: 2018-10-06 | Stop reason: HOSPADM

## 2018-10-05 RX ORDER — ONDANSETRON 2 MG/ML
4 INJECTION INTRAMUSCULAR; INTRAVENOUS
Status: COMPLETED | OUTPATIENT
Start: 2018-10-05 | End: 2018-10-05

## 2018-10-05 RX ORDER — MAGNESIUM CITRATE
296 SOLUTION, ORAL ORAL
Qty: 1 BOTTLE | Refills: 0 | Status: SHIPPED | OUTPATIENT
Start: 2018-10-05 | End: 2018-10-05

## 2018-10-05 RX ORDER — PROMETHAZINE HYDROCHLORIDE 25 MG/1
50 TABLET ORAL
Status: COMPLETED | OUTPATIENT
Start: 2018-10-05 | End: 2018-10-05

## 2018-10-05 RX ORDER — POTASSIUM CHLORIDE 7.45 MG/ML
10 INJECTION INTRAVENOUS
Status: DISCONTINUED | OUTPATIENT
Start: 2018-10-05 | End: 2018-10-06 | Stop reason: HOSPADM

## 2018-10-05 RX ORDER — LORAZEPAM 2 MG/ML
1 INJECTION INTRAMUSCULAR
Status: COMPLETED | OUTPATIENT
Start: 2018-10-05 | End: 2018-10-05

## 2018-10-05 RX ORDER — SODIUM CHLORIDE 0.9 % (FLUSH) 0.9 %
10 SYRINGE (ML) INJECTION
Status: COMPLETED | OUTPATIENT
Start: 2018-10-05 | End: 2018-10-05

## 2018-10-05 RX ORDER — POTASSIUM CHLORIDE 750 MG/1
20 TABLET, FILM COATED, EXTENDED RELEASE ORAL 2 TIMES DAILY
Qty: 30 TAB | Refills: 0 | Status: SHIPPED | OUTPATIENT
Start: 2018-10-05 | End: 2019-01-02 | Stop reason: ALTCHOICE

## 2018-10-05 RX ORDER — POTASSIUM CHLORIDE 750 MG/1
40 TABLET, FILM COATED, EXTENDED RELEASE ORAL
Status: COMPLETED | OUTPATIENT
Start: 2018-10-05 | End: 2018-10-05

## 2018-10-05 RX ORDER — MORPHINE SULFATE 2 MG/ML
4 INJECTION, SOLUTION INTRAMUSCULAR; INTRAVENOUS
Status: COMPLETED | OUTPATIENT
Start: 2018-10-05 | End: 2018-10-05

## 2018-10-05 RX ORDER — SODIUM CHLORIDE 9 MG/ML
50 INJECTION, SOLUTION INTRAVENOUS
Status: COMPLETED | OUTPATIENT
Start: 2018-10-05 | End: 2018-10-05

## 2018-10-05 RX ADMIN — ONDANSETRON 4 MG: 2 INJECTION INTRAMUSCULAR; INTRAVENOUS at 22:08

## 2018-10-05 RX ADMIN — PROMETHAZINE HYDROCHLORIDE 50 MG: 25 TABLET ORAL at 22:40

## 2018-10-05 RX ADMIN — POTASSIUM CHLORIDE 40 MEQ: 750 TABLET, FILM COATED, EXTENDED RELEASE ORAL at 19:09

## 2018-10-05 RX ADMIN — ONDANSETRON 4 MG: 2 INJECTION, SOLUTION INTRAMUSCULAR; INTRAVENOUS at 18:35

## 2018-10-05 RX ADMIN — LORAZEPAM 1 MG: 2 INJECTION INTRAMUSCULAR; INTRAVENOUS at 18:35

## 2018-10-05 RX ADMIN — MORPHINE SULFATE 4 MG: 2 INJECTION, SOLUTION INTRAMUSCULAR; INTRAVENOUS at 18:36

## 2018-10-05 RX ADMIN — SODIUM CHLORIDE 1000 ML: 900 INJECTION, SOLUTION INTRAVENOUS at 18:35

## 2018-10-05 RX ADMIN — SODIUM CHLORIDE 50 ML/HR: 900 INJECTION, SOLUTION INTRAVENOUS at 20:43

## 2018-10-05 RX ADMIN — Medication 10 ML: at 20:44

## 2018-10-05 RX ADMIN — IOPAMIDOL 100 ML: 755 INJECTION, SOLUTION INTRAVENOUS at 20:44

## 2018-10-05 NOTE — ED NOTES
IV infiltrated in CT. CT tech attempted to start another IV but was unsuccessful.  ED tech to CT to attempt IV

## 2018-10-05 NOTE — ED NOTES
Patient presents to ED with complaints of severe abdominal pain that has been going on for 2 weeks. Patient states she had her gallbladder removed x2 weeks ago by MD Kathy Hale and has been having the pain since, however, it has become worse than onset. Patient states she has been taking Prilosec without relief. She had a follow up with MD Kathy Hale today who told her to come to the ED. Patient placed on the monitor x3, call bell within reach. Side rails x2.

## 2018-10-05 NOTE — PROGRESS NOTES
Shanthi Obrien is a 39 y.o. female     Chief Complaint   Patient presents with    Surgical Follow-up     po lap talita 9/17/18. Visit Vitals    BP (!) 173/126 (BP 1 Location: Left arm, BP Patient Position: Sitting)    Pulse (!) 102    Temp 97.8 °F (36.6 °C) (Oral)    Resp 18    Ht 5' 1\" (1.549 m)    Wt 137 lb 9.6 oz (62.4 kg)    SpO2 100%    BMI 26 kg/m2       Health Maintenance Due   Topic Date Due    BREAST CANCER SCRN MAMMOGRAM  03/23/1992    Pneumococcal 19-64 Medium Risk (1 of 1 - PPSV23) 03/23/1993    DTaP/Tdap/Td series (1 - Tdap) 03/23/1995       1. Have you been to the ER, urgent care clinic since your last visit? Hospitalized since your last visit? No    2. Have you seen or consulted any other health care providers outside of the [address] [place] [address] since your last visit? Include any pap smears or colon screening. No    Do you have an Advance Directive? No

## 2018-10-05 NOTE — ED NOTES
Patient to CT at this time. Will medication with IV Potassium upon return. Patient drowsy and having a hard time staying awake. Patient requesting medication and this RN informed her that none will be ordered at this time. MD Alexi Doherty aware.

## 2018-10-05 NOTE — ED NOTES
Pt returned from CT with unsuccessful attempts with IV. Soke with Magda from CT and CT unable to complete scan and will need to return once patient has gotten an IV.

## 2018-10-05 NOTE — PROGRESS NOTES
Patient is here for follow-up. Patient underwent lap talita/IOC on 9/20/18. Patient reports still having symptoms that she had before the surgery. Patient complains of intermittent diffuse abdominal pain with nausea and vomiting.  +flatus, but last BM is 4 days ago. Currently taking miralax. Patient also complains of having chills, but no documented temperature. PE:  Visit Vitals    BP (!) 173/126 (BP 1 Location: Left arm, BP Patient Position: Sitting)    Pulse (!) 102    Temp 97.8 °F (36.6 °C) (Oral)    Resp 18    Ht 5' 1\" (1.549 m)    Wt 62.4 kg (137 lb 9.6 oz)    SpO2 100%    BMI 26 kg/m2     Abdomen:  Soft, [employer] C/D/I. Diffuse tenderness. No rebound or guarding.     Assessment:    S/p lap talita/IOC  Diffuse abdominal pain with N/V  Hypertensive and tachycardiac     Plan:  -Send patient to ER for further work up

## 2018-10-05 NOTE — ED NOTES
Bedside and Verbal shift change report given to Simona Fuller RN (oncoming nurse) by JOVANA Krishna (offgoing nurse). Report included the following information SBAR, Kardex, ED Summary, [facility] and Recent Results.

## 2018-10-05 NOTE — ED NOTES
Pt at bedside and states she will not be driving  home at the time of ED discharge. Pt is alert and oriented x 4, answers questions appropriately and presents with appropriate decision making skills. Pt instructed not to drive herself secondary to the risks associated with operating a motor vehicle while under the influence of narcotics, sedatives and other medications which can, in some instances, cause a change in mental status. Pt verbalizes understanding. Medications will be administered as ordered by ED provider. Pt stated she will have a ride come and transport her home.

## 2018-10-05 NOTE — ED NOTES
Bedside and Verbal shift change report given to Bishnu Galdamez RN (oncoming nurse) by Pattie Cates RN (offgoing nurse). Report included the following information SBAR, Kardex, ED Summary, MAR, Recent Results and Med Rec Status.

## 2018-10-06 LAB
ATRIAL RATE: 86 BPM
CALCULATED P AXIS, ECG09: 72 DEGREES
CALCULATED R AXIS, ECG10: 85 DEGREES
CALCULATED T AXIS, ECG11: 52 DEGREES
DIAGNOSIS, 93000: NORMAL
P-R INTERVAL, ECG05: 120 MS
Q-T INTERVAL, ECG07: 352 MS
QRS DURATION, ECG06: 76 MS
QTC CALCULATION (BEZET), ECG08: 421 MS
VENTRICULAR RATE, ECG03: 86 BPM

## 2018-10-06 NOTE — ED PROVIDER NOTES
EMERGENCY DEPARTMENT HISTORY AND PHYSICAL EXAM 
 
 
Date: 10/5/2018 Patient Name: Daniel Landry History of Presenting Illness Chief Complaint Patient presents with  Vomiting  
  abdominal pain stomach and back 1 week of symptoms; had gallbladder removed two weeks ago; saw surgeon today who sent her here. History Provided By: Patient HPI: Daniel Landry, 39 y.o. female with PMHx significant for tubal pregnancy, kidney anomaly, HTN, cholecystectomy, presents ambulatory to the ED with cc of gradual onset nausea/vomiting, diffuse abdominal pain, and back pain, onset before cholecystectomy, performed by Lisa Ortiz MD.  Pt reports constant diarrhea since onset, and reports only minimal episodes of defecation. Pt describes abdominal pain as 10/10 on pain scale. Pt denies dysuria. Pt reports currently on oxycodone, which has provided her with no relief. Pt reports being sent her by Lisa Ortiz MD. There are no other complaints, changes, or physical findings at this time. PCP: Africa Nath MD 
 
Current Facility-Administered Medications Medication Dose Route Frequency Provider Last Rate Last Dose  sodium chloride (NS) flush 5-10 mL  5-10 mL IntraVENous PRN Severa Gale, DO      
 potassium chloride 10 mEq in 100 ml IVPB  10 mEq IntraVENous NOW Herbie Bryant MD      
 
Current Outpatient Prescriptions Medication Sig Dispense Refill  omeprazole (PRILOSEC PO) Take  by mouth.  promethazine (PHENERGAN) 25 mg suppository Insert 1 Suppository into rectum every six (6) hours as needed for Nausea for up to 7 days. 12 Suppository 0  
 magnesium citrate solution Take 296 mL by mouth now for 1 dose. 1 Bottle 0  
 dicyclomine (BENTYL) 10 mg capsule Take 1 Cap by mouth four (4) times daily for 5 days. 20 Cap 0  
 potassium chloride SR (KLOR-CON 10) 10 mEq tablet Take 2 Tabs by mouth two (2) times a day.  30 Tab 0  
  oxyCODONE-acetaminophen (PERCOCET) 5-325 mg per tablet Take 1 Tab by mouth every four (4) hours as needed for Pain. Max Daily Amount: 6 Tabs. 40 Tab 0  
 polyethylene glycol (MIRALAX) 17 gram packet Take 1 Packet by mouth daily. 30 Packet 0  
 esomeprazole (NEXIUM 24HR) 20 mg capsule Take 1 Cap by mouth daily. 30 Cap 0  
 amLODIPine (NORVASC) 10 mg tablet Take 1 Tab by mouth daily. 90 Tab 1  
 metoprolol tartrate (LOPRESSOR) 25 mg tablet Take 1 Tab by mouth every twelve (12) hours. 180 Tab 1  chlorthalidone (HYGROTEN) 25 mg tablet Take 25 mg by mouth daily.  cetirizine (ZYRTEC) 10 mg tablet Take 10 mg by mouth daily. Past History Past Medical History: 
Past Medical History:  
Diagnosis Date  Hypertension  Kidney anomaly, congenital   
 Tubal pregnancy Past Surgical History: 
Past Surgical History:  
Procedure Laterality Date  HX CHOLECYSTECTOMY  HX GYN  10/07/2013 cyst removed  HX HYSTERECTOMY  10/7/2013  
 partial hysterectomy Family History: 
Family History Problem Relation Age of Onset  Hypertension Mother  Hypertension Father  Hypertension Sister Social History: 
Social History Substance Use Topics  Smoking status: Current Some Day Smoker Packs/day: 0.25 Years: 25.00  Smokeless tobacco: Never Used Comment: 1-3 day  Alcohol use Yes Comment: occasionally/socially Allergies: 
No Known Allergies Review of Systems Review of Systems Constitutional: Negative for appetite change, chills and fever. Respiratory: Negative for cough and shortness of breath. Cardiovascular: Negative for chest pain. Gastrointestinal: Positive for abdominal pain, nausea and vomiting. Negative for constipation and diarrhea. Musculoskeletal: Positive for back pain. Neurological: Negative for weakness and numbness. All other systems reviewed and are negative.  
 
 
Physical Exam  
Physical Exam  
 Constitutional: She is oriented to person, place, and time. She appears well-developed and well-nourished. Tearful, anxious HENT:  
Head: Normocephalic and atraumatic. Eyes: Conjunctivae and EOM are normal.  
Neck: Normal range of motion. Neck supple. Cardiovascular: Regular rhythm. Tachycardia present. Pulmonary/Chest: Effort normal and breath sounds normal. No respiratory distress. Abdominal: Soft. Tenderness to abdomen; distended and soft Musculoskeletal: Normal range of motion. Neurological: She is alert and oriented to person, place, and time. Skin: Skin is warm. She is diaphoretic. Psychiatric: She has a normal mood and affect. Nursing note and vitals reviewed. Diagnostic Study Results Labs - Recent Results (from the past 12 hour(s)) EKG, 12 LEAD, INITIAL Collection Time: 10/05/18  4:58 PM  
Result Value Ref Range Ventricular Rate 86 BPM  
 Atrial Rate 86 BPM  
 P-R Interval 120 ms QRS Duration 76 ms  
 Q-T Interval 352 ms QTC Calculation (Bezet) 421 ms Calculated P Axis 72 degrees Calculated R Axis 85 degrees Calculated T Axis 52 degrees Diagnosis Normal sinus rhythm Possible Left atrial enlargement When compared with ECG of 19-SEP-2018 19:14, Nonspecific T wave abnormality no longer evident in Inferior leads Nonspecific T wave abnormality has replaced inverted T waves in Anterior  
leads CBC WITH AUTOMATED DIFF Collection Time: 10/05/18  6:06 PM  
Result Value Ref Range WBC 10.5 3.6 - 11.0 K/uL  
 RBC 4.64 3.80 - 5.20 M/uL  
 HGB 15.1 11.5 - 16.0 g/dL HCT 42.1 35.0 - 47.0 % MCV 90.7 80.0 - 99.0 FL  
 MCH 32.5 26.0 - 34.0 PG  
 MCHC 35.9 30.0 - 36.5 g/dL  
 RDW 12.9 11.5 - 14.5 % PLATELET 292 (H) 276 - 400 K/uL MPV 8.8 (L) 8.9 - 12.9 FL  
 NRBC 0.0 0  WBC ABSOLUTE NRBC 0.00 0.00 - 0.01 K/uL NEUTROPHILS 67 32 - 75 % LYMPHOCYTES 26 12 - 49 % MONOCYTES 5 5 - 13 % EOSINOPHILS 1 0 - 7 % BASOPHILS 1 0 - 1 % IMMATURE GRANULOCYTES 0 0.0 - 0.5 % ABS. NEUTROPHILS 7.1 1.8 - 8.0 K/UL  
 ABS. LYMPHOCYTES 2.8 0.8 - 3.5 K/UL  
 ABS. MONOCYTES 0.5 0.0 - 1.0 K/UL  
 ABS. EOSINOPHILS 0.1 0.0 - 0.4 K/UL  
 ABS. BASOPHILS 0.1 0.0 - 0.1 K/UL  
 ABS. IMM. GRANS. 0.0 0.00 - 0.04 K/UL  
 DF AUTOMATED METABOLIC PANEL, COMPREHENSIVE Collection Time: 10/05/18  6:06 PM  
Result Value Ref Range Sodium 135 (L) 136 - 145 mmol/L Potassium 2.9 (L) 3.5 - 5.1 mmol/L Chloride 97 97 - 108 mmol/L  
 CO2 29 21 - 32 mmol/L Anion gap 9 5 - 15 mmol/L Glucose 124 (H) 65 - 100 mg/dL BUN 10 6 - 20 MG/DL Creatinine 1.03 (H) 0.55 - 1.02 MG/DL  
 BUN/Creatinine ratio 10 (L) 12 - 20 GFR est AA >60 >60 ml/min/1.73m2 GFR est non-AA 58 (L) >60 ml/min/1.73m2 Calcium 10.5 (H) 8.5 - 10.1 MG/DL Bilirubin, total 0.3 0.2 - 1.0 MG/DL  
 ALT (SGPT) 27 12 - 78 U/L  
 AST (SGOT) 20 15 - 37 U/L Alk. phosphatase 117 45 - 117 U/L Protein, total 9.6 (H) 6.4 - 8.2 g/dL Albumin 4.4 3.5 - 5.0 g/dL Globulin 5.2 (H) 2.0 - 4.0 g/dL A-G Ratio 0.8 (L) 1.1 - 2.2 LIPASE Collection Time: 10/05/18  6:06 PM  
Result Value Ref Range Lipase 586 (H) 73 - 393 U/L  
DRUG SCREEN, URINE Collection Time: 10/05/18  8:10 PM  
Result Value Ref Range AMPHETAMINES NEGATIVE  NEG    
 BARBITURATES NEGATIVE  NEG BENZODIAZEPINES NEGATIVE  NEG    
 COCAINE NEGATIVE  NEG METHADONE NEGATIVE  NEG    
 OPIATES POSITIVE (A) NEG    
 PCP(PHENCYCLIDINE) NEGATIVE  NEG    
 THC (TH-CANNABINOL) POSITIVE (A) NEG Drug screen comment (NOTE) Radiologic Studies -  
CT ABD PELV W CONT Final Result CT Results  (Last 48 hours) 10/05/18 2043  CT ABD PELV W CONT Final result Impression:  IMPRESSION:   
1. Status post cholecystectomy since previous study. No complications  
demonstrated. 2. Improvement in previously demonstrated right hydrosalpinx.  Unchanged left  
ovarian cyst.  
 3. Multiple bilateral renal cysts suggesting polycystic kidney disease. No  
hydronephrosis. Gerardo Mendoza Narrative:  EXAMINATION:  CT ABD PELV W CONT INDICATION:  Abdominal pain; nv abd pain, recent surg COMPARISON: CT of 9/18/2018. CONTRAST: 100 mL of Isovue-370. TECHNIQUE:   
Multislice helical CT was performed from the diaphragm to the symphysis pubis  
during uneventful rapid bolus intravenous contrast administration. Oral contrast  
was not administered. Contiguous 5 mm axial images were reconstructed and lung  
and soft tissue windows were generated. Coronal and sagittal reformations were  
generated. CT dose reduction was achieved through use of a standardized protocol  
tailored for this examination and automatic exposure control for dose  
modulation. FINDINGS:  
LOWER CHEST: The visualized portions of the lung bases are clear. ABDOMEN:  
Liver: Several subcentimeter low-attenuation lesions which are unchanged. Gallbladder and bile ducts: Status post cholecystectomy. No biliary dilatation. Unremarkable appearance of the gallbladder fossa, without fluid collection. Spleen: No abnormality. Pancreas: No abnormality. Adrenal glands: No abnormality. Kidneys: Numerous bilateral renal cysts, as previously demonstrated. No  
hydronephrosis. Excreted contrast material noted in the collecting systems. BOWEL AND MESENTERY: Undissolved medication in the stomach. Stomach otherwise  
unremarkable. No small bowel abnormalities. No significant colonic  
abnormalities. No mesenteric mass or adenopathy. Appendix normal.  
   
PERITONEUM: No ascites or free intraperitoneal air. RETROPERITONEUM: Aorta  tapers without aneurysm. No retroperitoneal adenopathy  
or mass. No pelvic mass or adenopathy. PELVIS:  
Reproductive organs:  Unchanged left ovarian cyst. Right fallopian tube mildly  
distended but much improved since previous study. Bladder: No abnormality. BONES AND SOFT TISSUES: No significant bony abnormalities. CXR Results  (Last 48 hours) None Medical Decision Making I am the first provider for this patient. I reviewed the vital signs, available nursing notes, past medical history, past surgical history, family history and social history. Vital Signs-Reviewed the patient's vital signs. Patient Vitals for the past 12 hrs: 
 Temp Pulse Resp BP SpO2  
10/05/18 2200 - - - (!) 178/117 98 % 10/05/18 1900 - 95 27 (!) 168/102 99 % 10/05/18 1800 - 99 10 (!) 166/122 100 % 10/05/18 1652 - - - (!) 174/129 -  
10/05/18 1648 97.8 °F (36.6 °C) (!) 104 18 (!) 185/129 100 % Pulse Oximetry Analysis - 100% on RA Cardiac Monitor:  
Rate: 104 bpm 
Rhythm: Sinus Tachycardia 1648 Records Reviewed: Nursing Notes and Old Medical Records Provider Notes (Medical Decision Making):  
Patient presents with nausea and vomiting in setting of constipation and recent surgery. . Differential includes SBO given recent surgery, opioid withdrawal, Cyclic vomiting syndrome due to THC, gastritis/GERD, pancreatitis, hepatitis, renal pathology, ACS, gastroenteritis Will obtain labs, UDS, and possibly imaging. ED Course:  
Initial assessment performed. The patients presenting problems have been discussed, and they are in agreement with the care plan formulated and outlined with them. I have encouraged them to ask questions as they arise throughout their visit. Progress Note: 
7:19 PM 
According to nurse, pt keeps asking for more pain medication, however pt is falling asleep in front of her, and nurse does not feel comfortable giving any more narcotics. Pt has already received morphine and Ativan. Myself and nurse have concern for withdrawal, due to pt opioid use for past several days. Decision pending CT. Progress Note: 
8:34 PM 
Pt sleeping comfortably. Her line blew in CT, so have to repeat.   
 
Progress Note: 
9:16 PM 
 Drug screen came back positive for opioid and THC. Given pt has not had active vomiting here anymore, will send home and have follow up with Dr. Kellee Saldaña and GI. Progress Note: 
9:26 PM 
Updated pt on THC use and told her it could be cause of nausea/vomiting in addition of possible opioid withdrawal as well. Pt asking for something for pain and asking for refill of oxycodone. I told her opioids are making problem worse and also constipation, and offered to prescribed her Bentyl to pharmacy. Also given Phenergan suppositories. Progress Note: 
9:57 PM 
Pt stating IV potassium burning her, and does not want anymore. Will be discharged. Sent potassium to pharmacy. Progress Note: 
10:34 PM 
Pt IV was removed and started dry heaving. Will try PO Phenergan, and if she fails, she will get IV Phenergan and possible admission 11:15pm 
Pt tolerated PO phenergan. Still slightly nauseated but no more vomiting. Advised to f/u with GI. Watch what she eats in terms of triggers of gastritis, take the magnesium citrate for constipation. If after she has tried this treatments, her symptoms continue, advised to return to the ER. Her BF and pt verbalize understanding. Critical Care Time:  
0 minutes Disposition: 
Discharge Note: 
11:28 PM 
The pt is ready for discharge. The pt's signs, symptoms, diagnosis, and discharge instructions have been discussed and pt has conveyed their understanding. The pt is to follow up as recommended or return to ER should their symptoms worsen. Plan has been discussed and pt is in agreement. PLAN: 
1. Current Discharge Medication List  
  
START taking these medications Details  
promethazine (PHENERGAN) 25 mg suppository Insert 1 Suppository into rectum every six (6) hours as needed for Nausea for up to 7 days. Qty: 12 Suppository, Refills: 0  
  
magnesium citrate solution Take 296 mL by mouth now for 1 dose. Qty: 1 Bottle, Refills: 0 dicyclomine (BENTYL) 10 mg capsule Take 1 Cap by mouth four (4) times daily for 5 days. Qty: 20 Cap, Refills: 0  
  
potassium chloride SR (KLOR-CON 10) 10 mEq tablet Take 2 Tabs by mouth two (2) times a day. Qty: 30 Tab, Refills: 0  
  
  
 
2. Follow-up Information Follow up With Details Comments Contact Info Joe Swartz MD  As needed [facility] [address] [place] [address] [phone] Return to ED if worse Diagnosis Clinical Impression: 1. Non-intractable cyclical vomiting with nausea 2. Marijuana use 3. Drug-induced constipation 4. Hypokalemia Attestations: This note is prepared by Alok Fishman, acting as Scribe for Kelvin Brown M.D. Kelvin Brown M.D.: The scribe's documentation has been prepared under my direction and personally reviewed by me in its entirety. I confirm that the note above accurately reflects all work, treatment, procedures, and medical decision making performed by me.

## 2018-10-06 NOTE — ED NOTES
Pt states that she does not want the potassium if it is going to burn. Rn told patient that her potassium is low and needs replenishing. MD hamm notified that jd is refusing potassium

## 2018-10-06 NOTE — ED NOTES
Pt discharged by MD Refugio Lozoya. Pt provided with discharge instructions Rx and instructions on follow up care. Pt out of ED in stable condition accompanied by friend.

## 2018-10-06 NOTE — DISCHARGE INSTRUCTIONS
Learning About Cyclic Vomiting Syndrome  What is it? An adult or child who has cyclic vomiting syndrome (CVS) has repeated bouts of severe vomiting and nausea. Between the vomiting episodes, the person's health is normal. The cause of CVS isn't known, but it may be related to migraine headaches. What happens when you have CVS?  CVS causes severe nausea, vomiting, and drooling. You may not be able to walk or talk during an episode. You may look pale. You may have a fever and feel very tired and thirsty. Some people with CVS have belly pain and diarrhea. Bouts of vomiting can last for a few hours or a few days. People with this condition tend to have a typical pattern of attacks. For example, one person may have bouts of CVS 4 times a year and always in the morning. Another person may have 8 bouts a year and always in the evening. Some people with CVS have triggers that set off the vomiting. People have reported an infection, such as a cold, as a trigger. Other triggers include stress, menstrual cycles, and certain foods like chocolate or cheese. Children tend to have more triggers than adults do. How is CVS treated? Treatment is centered around easing the nausea and vomiting. The doctor may prescribe medicine. During very bad bouts of vomiting, your doctor may want you to stay in the hospital for a while. You may get fluids through a vein (IV) to prevent dehydration. Dehydration can be serious. Your body needs fluids to make enough blood. Without a good supply of blood, vital organs such as the heart and brain can't work as well as they should. When should you call for help? Call your doctor now or seek immediate medical care if:  · You have new or worse belly pain. · You have a fever. · You are vomiting. · You cannot pass stools or gas. · You have symptoms of dehydration, such as:  ¨ Dry eyes and a dry mouth. ¨ Passing only a little urine.   ¨ Feeling thirstier than usual.  Watch closely for changes in your health, and be sure to contact your doctor if you have any problems. Follow-up care is a key part of your treatment and safety. Be sure to make and go to all appointments, and call your doctor if you are having problems. It's also a good idea to know your test results and keep a list of the medicines you take. Where can you learn more? Go to http://shan-valentina.info/. Enter O060 in the search box to learn more about \"Learning About Cyclic Vomiting Syndrome. \"  Current as of: March 28, 2018  Content Version: 11.8  © 3763-2032 [employer]. Care instructions adapted under license by [employer] (which disclaims liability or warranty for this information). If you have questions about a medical condition or this instruction, always ask your healthcare professional. [address] any warranty or liability for your use of this information.

## 2018-10-09 ENCOUNTER — OFFICE VISIT (OUTPATIENT)
Dept: INTERNAL MEDICINE CLINIC | Age: 43
End: 2018-10-09

## 2018-10-09 VITALS
TEMPERATURE: 97.3 F | BODY MASS INDEX: 27.19 KG/M2 | RESPIRATION RATE: 18 BRPM | HEART RATE: 106 BPM | OXYGEN SATURATION: 99 % | HEIGHT: 61 IN | SYSTOLIC BLOOD PRESSURE: 104 MMHG | WEIGHT: 144 LBS | DIASTOLIC BLOOD PRESSURE: 81 MMHG

## 2018-10-09 DIAGNOSIS — R11.10 CHRONIC VOMITING: Primary | ICD-10-CM

## 2018-10-09 DIAGNOSIS — E87.6 HYPOKALEMIA: ICD-10-CM

## 2018-10-09 RX ORDER — ONDANSETRON 4 MG/1
TABLET, ORALLY DISINTEGRATING ORAL
Refills: 1 | COMMUNITY
Start: 2018-09-17 | End: 2019-01-02

## 2018-10-09 NOTE — LETTER
NOTIFICATION RETURN TO WORK / SCHOOL 
 
10/9/2018 11:27 AM 
 
Ms. Pratima Johnson [address] [place] [phone] To Whom It May Concern: 
 
Pratima Johnson is currently under the care of [facility]. She will return to work/school on: 10/15/18. If there are questions or concerns please have the patient contact our office. Sincerely, Vanessa Del Angel MD

## 2018-10-09 NOTE — PROGRESS NOTES
Chief Complaint Patient presents with  ED Follow-up 1. Have you been to the ER, urgent care clinic since your last visit? Hospitalized since your last visit? Yes When: 10/5/2018 for abdominal pain 2. Have you seen or consulted any other health care providers outside of the [address] [place] [address] since your last visit? Include any pap smears or colon screening.  No

## 2018-10-09 NOTE — PROGRESS NOTES
Nishi Ortega is a 39 y.o. female and presents with ED Follow-up Danilo Hickman Subjective: 
 
Pt had to RETURN to ED again for abd pain and vomiting. Pt is s/p cholecystectomy 9/18/18 and went to her surgeons post -op visit 10/5/18. She was sent to ED for abd pain, nausea and vomiting. ED w/u unrevealing. Pts ct scan did NOT demonstrate an etiology for her sxs. She was instructed to d/c her pain pills and given bentyl. Of note, urine tox + marijuana. Pt relays she has not had a recurrence of the abd pain nor vomiting since. Pt was supposed to rtw yesterday, so will need an extended note. Review of Systems Constitutional: negative for fevers, chills, anorexia and weight loss Eyes:   negative for visual disturbance and irritation ENT:   negative for tinnitus,sore throat,nasal congestion,ear pains. hoarseness Respiratory:  negative for cough, hemoptysis, dyspnea,wheezing CV:   negative for chest pain, palpitations, lower extremity edema Musculoskel: negative for myalgias, arthralgias, back pain, muscle weakness, joint pain Neurological:  negative for headaches, dizziness, vertigo, memory problems and gait Behavl/Psych: negative for feelings of anxiety, depression, mood changes Past Medical History:  
Diagnosis Date  Hypertension  Kidney anomaly, congenital   
 Tubal pregnancy Past Surgical History:  
Procedure Laterality Date  HX CHOLECYSTECTOMY  HX GYN  10/07/2013 cyst removed  HX HYSTERECTOMY  10/7/2013  
 partial hysterectomy Social History Social History  Marital status: SINGLE Spouse name: N/A  
 Number of children: N/A  
 Years of education: N/A Social History Main Topics  Smoking status: Current Some Day Smoker Packs/day: 0.25 Years: 25.00  Smokeless tobacco: Never Used Comment: 1-3 day  Alcohol use Yes Comment: occasionally/socially  Drug use: Yes Special: Marijuana Comment: stopped in 2013  Sexual activity: Yes  
  Partners: Male Other Topics Concern  None Social History Narrative Family History Problem Relation Age of Onset  Hypertension Mother  Hypertension Father  Hypertension Sister Current Outpatient Prescriptions Medication Sig Dispense Refill  ondansetron (ZOFRAN ODT) 4 mg disintegrating tablet DIS 1 T ON THE TONGUE Q 8 H PRF NAUSEA  1  
 omeprazole (PRILOSEC PO) Take  by mouth.  dicyclomine (BENTYL) 10 mg capsule Take 1 Cap by mouth four (4) times daily for 5 days. 20 Cap 0  
 potassium chloride SR (KLOR-CON 10) 10 mEq tablet Take 2 Tabs by mouth two (2) times a day. 30 Tab 0  
 esomeprazole (NEXIUM 24HR) 20 mg capsule Take 1 Cap by mouth daily. 30 Cap 0  
 amLODIPine (NORVASC) 10 mg tablet Take 1 Tab by mouth daily. 90 Tab 1  
 metoprolol tartrate (LOPRESSOR) 25 mg tablet Take 1 Tab by mouth every twelve (12) hours. 180 Tab 1  chlorthalidone (HYGROTEN) 25 mg tablet Take 25 mg by mouth daily.  cetirizine (ZYRTEC) 10 mg tablet Take 10 mg by mouth daily.  promethazine (PHENERGAN) 25 mg suppository Insert 1 Suppository into rectum every six (6) hours as needed for Nausea for up to 7 days. 12 Suppository 0  
 oxyCODONE-acetaminophen (PERCOCET) 5-325 mg per tablet Take 1 Tab by mouth every four (4) hours as needed for Pain. Max Daily Amount: 6 Tabs. 40 Tab 0  
 polyethylene glycol (MIRALAX) 17 gram packet Take 1 Packet by mouth daily. 30 Packet 0 No Known Allergies Objective: 
Visit Vitals  /81 (BP 1 Location: Left arm, BP Patient Position: Sitting)  Pulse (!) 106  Temp 97.3 °F (36.3 °C) (Oral)  Resp 18  Ht 5' 1\" (1.549 m)  Wt 144 lb (65.3 kg)  SpO2 99%  BMI 27.21 kg/m2 Physical Exam:  
General appearance - alert, well appearing, and in no distress Mental status - alert, oriented to person, place, and time EYE-EOMI Chest - clear to auscultation, no wheezes, rales or rhonchi, symmetric air entry Heart - normal rate, regular rhythm, normal S1, S2 Abdomen - soft, nontender, nondistended, no masses or organomegaly Ext-peripheral pulses normal, no pedal edema, no clubbing or cyanosis Skin-Warm and dry. no hyperpigmentation, vitiligo, or suspicious lesions Neuro -alert, oriented, normal speech, no focal findings or movement disorder noted Results for orders placed or performed during the hospital encounter of 10/05/18 CBC WITH AUTOMATED DIFF Result Value Ref Range WBC 10.5 3.6 - 11.0 K/uL  
 RBC 4.64 3.80 - 5.20 M/uL  
 HGB 15.1 11.5 - 16.0 g/dL HCT 42.1 35.0 - 47.0 % MCV 90.7 80.0 - 99.0 FL  
 MCH 32.5 26.0 - 34.0 PG  
 MCHC 35.9 30.0 - 36.5 g/dL  
 RDW 12.9 11.5 - 14.5 % PLATELET 013 (H) 814 - 400 K/uL MPV 8.8 (L) 8.9 - 12.9 FL  
 NRBC 0.0 0  WBC ABSOLUTE NRBC 0.00 0.00 - 0.01 K/uL NEUTROPHILS 67 32 - 75 % LYMPHOCYTES 26 12 - 49 % MONOCYTES 5 5 - 13 % EOSINOPHILS 1 0 - 7 % BASOPHILS 1 0 - 1 % IMMATURE GRANULOCYTES 0 0.0 - 0.5 % ABS. NEUTROPHILS 7.1 1.8 - 8.0 K/UL  
 ABS. LYMPHOCYTES 2.8 0.8 - 3.5 K/UL  
 ABS. MONOCYTES 0.5 0.0 - 1.0 K/UL  
 ABS. EOSINOPHILS 0.1 0.0 - 0.4 K/UL  
 ABS. BASOPHILS 0.1 0.0 - 0.1 K/UL  
 ABS. IMM. GRANS. 0.0 0.00 - 0.04 K/UL  
 DF AUTOMATED METABOLIC PANEL, COMPREHENSIVE Result Value Ref Range Sodium 135 (L) 136 - 145 mmol/L Potassium 2.9 (L) 3.5 - 5.1 mmol/L Chloride 97 97 - 108 mmol/L  
 CO2 29 21 - 32 mmol/L Anion gap 9 5 - 15 mmol/L Glucose 124 (H) 65 - 100 mg/dL BUN 10 6 - 20 MG/DL Creatinine 1.03 (H) 0.55 - 1.02 MG/DL  
 BUN/Creatinine ratio 10 (L) 12 - 20 GFR est AA >60 >60 ml/min/1.73m2 GFR est non-AA 58 (L) >60 ml/min/1.73m2 Calcium 10.5 (H) 8.5 - 10.1 MG/DL Bilirubin, total 0.3 0.2 - 1.0 MG/DL  
 ALT (SGPT) 27 12 - 78 U/L  
 AST (SGOT) 20 15 - 37 U/L Alk. phosphatase 117 45 - 117 U/L Protein, total 9.6 (H) 6.4 - 8.2 g/dL Albumin 4.4 3.5 - 5.0 g/dL Globulin 5.2 (H) 2.0 - 4.0 g/dL A-G Ratio 0.8 (L) 1.1 - 2.2 LIPASE Result Value Ref Range Lipase 586 (H) 73 - 393 U/L  
DRUG SCREEN, URINE Result Value Ref Range AMPHETAMINES NEGATIVE  NEG    
 BARBITURATES NEGATIVE  NEG BENZODIAZEPINES NEGATIVE  NEG    
 COCAINE NEGATIVE  NEG METHADONE NEGATIVE  NEG    
 OPIATES POSITIVE (A) NEG    
 PCP(PHENCYCLIDINE) NEGATIVE  NEG    
 THC (TH-CANNABINOL) POSITIVE (A) NEG Drug screen comment (NOTE) EKG, 12 LEAD, INITIAL Result Value Ref Range Ventricular Rate 86 BPM  
 Atrial Rate 86 BPM  
 P-R Interval 120 ms QRS Duration 76 ms  
 Q-T Interval 352 ms QTC Calculation (Bezet) 421 ms Calculated P Axis 72 degrees Calculated R Axis 85 degrees Calculated T Axis 52 degrees Diagnosis Normal sinus rhythm Possible Left atrial enlargement Confirmed by Fransisco Chow MD, Miles Aldrich ([id number]) on 10/6/2018 2:44:49 PM 
  
 
 
Assessment/Plan: ICD-10-CM ICD-9-CM 1. Chronic vomiting R11.10 787.03 REFERRAL TO GASTROENTEROLOGY POTASSIUM 2. Hypokalemia E87.6 276.8 POTASSIUM Orders Placed This Encounter [address] [employer] Referral Priority:   Routine Referral Type:   Consultation Referral Reason:   Specialty Services Required Referral Location:   [facility]  
  Referred to Provider:   Olivia Dominguez MD  
 [facility] mg disintegrating tablet Sig: DIS 1 T ON THE TONGUE Q 8 H PRF NAUSEA Refill:  1 1. Chronic vomiting Unclear etiology S/p cholecystectomy w/o resolution 
- Cuate Rodriguez \[facility] [place] [facility]\""C 
- POTASSIUM 2. Hypokalemia Recheck 
- POTASSIUM There are no Patient Instructions on file for this visit. Follow-up Disposition: Not on File I have reviewed with the patient details of the assessment and plan and all questions were answered. Relevent patient education was performed. The most recent lab findings were reviewed with the patient. An After Visit Summary was printed and given to the patient.

## 2018-10-10 LAB — POTASSIUM SERPL-SCNC: 4 MMOL/L (ref 3.5–5.2)

## 2018-10-11 ENCOUNTER — HOSPITAL ENCOUNTER (OUTPATIENT)
Dept: MAMMOGRAPHY | Age: 43
Discharge: HOME | End: 2018-10-11
Payer: COMMERCIAL

## 2018-10-11 DIAGNOSIS — Z12.39 BREAST CANCER SCREENING: ICD-10-CM

## 2018-10-11 PROCEDURE — 77067 SCR MAMMO BI INCL CAD: CPT

## 2018-11-15 RX ORDER — HYDROGEN PEROXIDE 3 %
20 SOLUTION, NON-ORAL MISCELLANEOUS DAILY
Qty: 30 CAP | Refills: 2 | Status: SHIPPED | OUTPATIENT
Start: 2018-11-15 | End: 2020-10-06 | Stop reason: ALTCHOICE

## 2019-01-02 ENCOUNTER — OFFICE VISIT (OUTPATIENT)
Dept: INTERNAL MEDICINE CLINIC | Age: 44
End: 2019-01-02

## 2019-01-02 VITALS
SYSTOLIC BLOOD PRESSURE: 137 MMHG | HEART RATE: 78 BPM | DIASTOLIC BLOOD PRESSURE: 95 MMHG | RESPIRATION RATE: 18 BRPM | TEMPERATURE: 97.9 F | OXYGEN SATURATION: 97 % | HEIGHT: 61 IN | BODY MASS INDEX: 29.36 KG/M2 | WEIGHT: 155.5 LBS

## 2019-01-02 DIAGNOSIS — Q61.3 POLYCYSTIC KIDNEY DISEASE: ICD-10-CM

## 2019-01-02 DIAGNOSIS — H05.20 EXOPHTHALMOS: ICD-10-CM

## 2019-01-02 DIAGNOSIS — I10 ESSENTIAL HYPERTENSION: Primary | ICD-10-CM

## 2019-01-02 NOTE — PROGRESS NOTES
Mylene Waite is a 39 y.o. female and presents with Vomiting (follow up) Mp Reynolds Subjective: 
 
Recurrent vomiting-pts sxs have resolved and pt has gained weight Pt is s/p EGD, but has yet to receive the results of her bx ( we have not as yet, either) Hypertension Review: 
The patient has essential hypertension Diet and Lifestyle: generally follows a  low sodium diet, exercises sporadically Home BP Monitoring: is not measured at home. Pertinent ROS: taking medications as instructed, no medication side effects noted, no TIA's, no chest pain on exertion, no dyspnea on exertion, no swelling of ankles. BP Readings from Last 3 Encounters:  
01/02/19 (!) 137/95  
10/09/18 104/81  
10/05/18 (!) 178/117 Review of Systems Constitutional: negative for fevers, chills, anorexia and weight loss Eyes:   negative for visual disturbance and irritation ENT:   negative for tinnitus,sore throat,nasal congestion,ear pains. hoarseness Respiratory:  negative for cough, hemoptysis, dyspnea,wheezing CV:   negative for chest pain, palpitations, lower extremity edema Musculoskel: negative for myalgias, arthralgias, back pain, muscle weakness, joint pain Neurological:  negative for headaches, dizziness, vertigo, memory problems and gait Behavl/Psych: negative for feelings of anxiety, depression, mood changes Past Medical History:  
Diagnosis Date  Hypertension  Kidney anomaly, congenital   
 Tubal pregnancy Past Surgical History:  
Procedure Laterality Date  HX CHOLECYSTECTOMY  HX GYN  10/07/2013 cyst removed  HX HYSTERECTOMY  10/7/2013  
 partial hysterectomy Social History Socioeconomic History  Marital status: SINGLE Spouse name: Not on file  Number of children: Not on file  Years of education: Not on file  Highest education level: Not on file Tobacco Use  Smoking status: Current Some Day Smoker Packs/day: 0.25 Years: 25.00   Pack years: 6.25  
  Smokeless tobacco: Never Used  Tobacco comment: 1-3 day Substance and Sexual Activity  Alcohol use: Yes Comment: occasionally/socially  Drug use: Yes Types: Marijuana Comment: stopped in 2013  Sexual activity: Yes  
  Partners: Male Family History Problem Relation Age of Onset  Hypertension Mother  Hypertension Father  Hypertension Sister Current Outpatient Medications Medication Sig Dispense Refill  esomeprazole (NEXIUM 24HR) 20 mg capsule Take 1 Cap by mouth daily. 30 Cap 2  
 amLODIPine (NORVASC) 10 mg tablet Take 1 Tab by mouth daily. 90 Tab 1  
 metoprolol tartrate (LOPRESSOR) 25 mg tablet Take 1 Tab by mouth every twelve (12) hours. 180 Tab 1  chlorthalidone (HYGROTEN) 25 mg tablet Take 25 mg by mouth daily.  cetirizine (ZYRTEC) 10 mg tablet Take 10 mg by mouth daily. No Known Allergies Objective: 
Visit Vitals BP (!) 137/95 (BP 1 Location: Left arm, BP Patient Position: Sitting) Pulse 78 Temp 97.9 °F (36.6 °C) (Oral) Resp 18 Ht 5' 1\" (1.549 m) Wt 155 lb 8 oz (70.5 kg) SpO2 97% BMI 29.38 kg/m² Physical Exam:  
General appearance - alert, well appearing, and in no distress Mental status - alert, oriented to person, place, and time EYE-EOMI + deya exopthalmos Chest - clear to auscultation, no wheezes, rales or rhonchi, symmetric air entry Heart - normal rate, regular rhythm, normal S1, S2 Abdomen - soft, nontender, nondistended, no masses or organomegaly Ext-peripheral pulses normal, no pedal edema, no clubbing or cyanosis Skin-Warm and dry. no hyperpigmentation, vitiligo, or suspicious lesions Neuro -alert, oriented, normal speech, no focal findings or movement disorder noted Results for orders placed or performed in visit on 10/09/18 POTASSIUM Result Value Ref Range Potassium 4.0 3.5 - 5.2 mmol/L Assessment/Plan: ICD-10-CM ICD-9-CM 1. Essential hypertension I10 401.9 2. Polycystic kidney disease Q61.3 753.12   
3. Exophthalmos H05.20 376.30 No orders of the defined types were placed in this encounter. 1. Essential hypertension Cont current regimen WAS well-controlled last visit, will monitor 2. Polycystic kidney disease Noted 3. Exophthalmos Last TSH 2015 WNL 2013 TSH was ordered and pt did not f/u 5/18 Will reorder ENCOURAGE PATIENT TO F/U W GI There are no Patient Instructions on file for this visit. Follow-up Disposition: 
Return in about 3 months (around 4/2/2019) for bp check. I have reviewed with the patient details of the assessment and plan and all questions were answered. Relevent patient education was performed. The most recent lab findings were reviewed with the patient. An After Visit Summary was printed and given to the patient.

## 2019-01-02 NOTE — PROGRESS NOTES
Chief Complaint Patient presents with  Vomiting  
  follow up 1. Have you been to the ER, urgent care clinic since your last visit? Hospitalized since your last visit? No 
 
2. Have you seen or consulted any other health care providers outside of the [address] [place] [address] since your last visit? Include any pap smears or colon screening.  No

## 2019-04-03 ENCOUNTER — TELEPHONE (OUTPATIENT)
Dept: INTERNAL MEDICINE CLINIC | Age: 44
End: 2019-04-03

## 2019-04-03 NOTE — TELEPHONE ENCOUNTER
I did speak with patient today regarding missed appointment on yesterday. Patient rescheduled for 04/16/19.

## 2019-04-16 ENCOUNTER — OFFICE VISIT (OUTPATIENT)
Dept: INTERNAL MEDICINE CLINIC | Age: 44
End: 2019-04-16

## 2019-04-16 VITALS
HEART RATE: 94 BPM | OXYGEN SATURATION: 100 % | BODY MASS INDEX: 29.27 KG/M2 | TEMPERATURE: 98.4 F | SYSTOLIC BLOOD PRESSURE: 136 MMHG | HEIGHT: 61 IN | RESPIRATION RATE: 18 BRPM | DIASTOLIC BLOOD PRESSURE: 89 MMHG | WEIGHT: 155 LBS

## 2019-04-16 DIAGNOSIS — I10 ESSENTIAL HYPERTENSION: Primary | ICD-10-CM

## 2019-04-16 DIAGNOSIS — Q61.2 POLYCYSTIC KIDNEY DISEASE, AUTOSOMAL DOMINANT: ICD-10-CM

## 2019-04-16 RX ORDER — METOPROLOL TARTRATE 25 MG/1
25 TABLET, FILM COATED ORAL EVERY 12 HOURS
Qty: 180 TAB | Refills: 1 | Status: SHIPPED | OUTPATIENT
Start: 2019-04-16 | End: 2019-11-21 | Stop reason: SDUPTHER

## 2019-04-16 RX ORDER — CHLORTHALIDONE 25 MG/1
25 TABLET ORAL DAILY
Qty: 90 TAB | Refills: 2 | Status: SHIPPED | OUTPATIENT
Start: 2019-04-16 | End: 2019-09-26

## 2019-04-16 RX ORDER — AMLODIPINE BESYLATE 10 MG/1
10 TABLET ORAL DAILY
Qty: 90 TAB | Refills: 1 | Status: SHIPPED | OUTPATIENT
Start: 2019-04-16 | End: 2019-09-26

## 2019-04-16 NOTE — PROGRESS NOTES
Анна Roldan is a 38 y.o. female and presents with Hypertension (Pt taking BP meds as prescribed) Wesley Tate Subjective: 
 
Recurrent vomiting-pts sxs have resolved and pt has gained weight Hypertension Review: 
The patient has essential hypertension Diet and Lifestyle: generally follows a  low sodium diet, exercises sporadically Home BP Monitoring: is not measured at home. Pertinent ROS: taking medications as instructed, no medication side effects noted, no TIA's, no chest pain on exertion, no dyspnea on exertion, no swelling of ankles. BP Readings from Last 3 Encounters:  
04/16/19 136/89  
01/02/19 (!) 137/95  
10/09/18 104/81 PCKD-pt has not established w renal as yet Review of Systems Constitutional: negative for fevers, chills, anorexia and weight loss Eyes:   negative for visual disturbance and irritation ENT:   negative for tinnitus,sore throat,nasal congestion,ear pains. hoarseness Respiratory:  negative for cough, hemoptysis, dyspnea,wheezing CV:   negative for chest pain, palpitations, lower extremity edema Musculoskel: negative for myalgias, arthralgias, back pain, muscle weakness, joint pain Neurological:  negative for headaches, dizziness, vertigo, memory problems and gait Behavl/Psych: negative for feelings of anxiety, depression, mood changes Past Medical History:  
Diagnosis Date  Hypertension  Kidney anomaly, congenital   
 Tubal pregnancy Past Surgical History:  
Procedure Laterality Date  HX CHOLECYSTECTOMY  HX GYN  10/07/2013 cyst removed  HX HYSTERECTOMY  10/7/2013  
 partial hysterectomy Social History Socioeconomic History  Marital status: SINGLE Spouse name: Not on file  Number of children: Not on file  Years of education: Not on file  Highest education level: Not on file Tobacco Use  Smoking status: Current Some Day Smoker Packs/day: 0.25 Years: 25.00   Pack years: 6.25  
  Smokeless tobacco: Never Used  Tobacco comment: 1-3 day Substance and Sexual Activity  Alcohol use: Yes Comment: occasionally/socially  Drug use: Yes Types: Marijuana Comment: stopped in 2013  Sexual activity: Yes  
  Partners: Male Family History Problem Relation Age of Onset  Hypertension Mother  Hypertension Father  Hypertension Sister Current Outpatient Medications Medication Sig Dispense Refill  amLODIPine (NORVASC) 10 mg tablet Take 1 Tab by mouth daily. 90 Tab 1  
 metoprolol tartrate (LOPRESSOR) 25 mg tablet Take 1 Tab by mouth every twelve (12) hours. 180 Tab 1  chlorthalidone (HYGROTEN) 25 mg tablet Take 1 Tab by mouth daily. 90 Tab 2  
 esomeprazole (NEXIUM 24HR) 20 mg capsule Take 1 Cap by mouth daily. 30 Cap 2  cetirizine (ZYRTEC) 10 mg tablet Take 10 mg by mouth daily. No Known Allergies Objective: 
Visit Vitals /89 (BP 1 Location: Left arm, BP Patient Position: Sitting) Pulse 94 Temp 98.4 °F (36.9 °C) (Oral) Resp 18 Ht 5' 1\" (1.549 m) Wt 155 lb (70.3 kg) SpO2 100% BMI 29.29 kg/m² Physical Exam:  
General appearance - alert, well appearing, and in no distress Mental status - alert, oriented to person, place, and time EYE-EOMI + deya exopthalmos Chest - clear to auscultation, no wheezes, rales or rhonchi, symmetric air entry Heart - normal rate, regular rhythm, normal S1, S2 2/6 systolic murmur Abdomen - soft, nontender, nondistended, no masses or organomegaly Ext-peripheral pulses normal, no pedal edema, no clubbing or cyanosis Skin-Warm and dry. no hyperpigmentation, vitiligo, or suspicious lesions Neuro -alert, oriented, normal speech, no focal findings or movement disorder noted Results for orders placed or performed in visit on 10/09/18 POTASSIUM Result Value Ref Range Potassium 4.0 3.5 - 5.2 mmol/L Assessment/Plan: ICD-10-CM ICD-9-CM 1. Essential hypertension I10 401.9 amLODIPine (NORVASC) 10 mg tablet  
   metoprolol tartrate (LOPRESSOR) 25 mg tablet  
   chlorthalidone (HYGROTEN) 25 mg tablet REFERRAL TO NEPHROLOGY 2. Polycystic kidney disease, autosomal dominant Q61.2 753.13 amLODIPine (NORVASC) 10 mg tablet  
   metoprolol tartrate (LOPRESSOR) 25 mg tablet  
   chlorthalidone (HYGROTEN) 25 mg tablet REFERRAL TO NEPHROLOGY Orders Placed This Encounter  REFERRAL TO NEPHROLOGY Referral Priority:   Routine Referral Type:   Consultation Referral Reason:   Specialty Services Required Referred to Provider:   Jerry Murray MD  
  Requested Specialty:   Nephrology Number of Visits Requested:   1  
 amLODIPine (NORVASC) 10 mg tablet Sig: Take 1 Tab by mouth daily. Dispense:  90 Tab Refill:  1  
 metoprolol tartrate (LOPRESSOR) 25 mg tablet Sig: Take 1 Tab by mouth every twelve (12) hours. Dispense:  180 Tab Refill:  1  chlorthalidone (HYGROTEN) 25 mg tablet Sig: Take 1 Tab by mouth daily. Dispense:  90 Tab Refill:  2 1. Essential hypertension Relatively controlled on multidrug regimen 
- amLODIPine (NORVASC) 10 mg tablet; Take 1 Tab by mouth daily. Dispense: 90 Tab; Refill: 1 
- metoprolol tartrate (LOPRESSOR) 25 mg tablet; Take 1 Tab by mouth every twelve (12) hours. Dispense: 180 Tab; Refill: 1 
- chlorthalidone (HYGROTEN) 25 mg tablet; Take 1 Tab by mouth daily. Dispense: 90 Tab; Refill: 2 
- REFERRAL TO NEPHROLOGY 2. Polycystic kidney disease, autosomal dominant Refer to renal 
- amLODIPine (NORVASC) 10 mg tablet; Take 1 Tab by mouth daily. Dispense: 90 Tab; Refill: 1 
- metoprolol tartrate (LOPRESSOR) 25 mg tablet; Take 1 Tab by mouth every twelve (12) hours. Dispense: 180 Tab; Refill: 1 
- chlorthalidone (HYGROTEN) 25 mg tablet; Take 1 Tab by mouth daily. Dispense: 90 Tab; Refill: 2 
- REFERRAL TO NEPHROLOGY There are no Patient Instructions on file for this visit. Follow-up and Dispositions · Return in about 3 months (around 7/16/2019) for bp check. I have reviewed with the patient details of the assessment and plan and all questions were answered. Relevent patient education was performed. The most recent lab findings were reviewed with the patient. An After Visit Summary was printed and given to the patient.

## 2019-04-16 NOTE — PROGRESS NOTES
Chief Complaint Patient presents with  Hypertension Pt taking BP meds as prescribed 1. Have you been to the ER, urgent care clinic since your last visit? Hospitalized since your last visit? No 
 
2. Have you seen or consulted any other health care providers outside of the [address] [place] [address] since your last visit? Include any pap smears or colon screening.  No

## 2019-08-25 ENCOUNTER — APPOINTMENT (OUTPATIENT)
Dept: CT IMAGING | Age: 44
End: 2019-08-25
Attending: EMERGENCY MEDICINE
Payer: COMMERCIAL

## 2019-08-25 ENCOUNTER — HOSPITAL ENCOUNTER (EMERGENCY)
Age: 44
Discharge: HOME | End: 2019-08-25
Attending: EMERGENCY MEDICINE
Payer: COMMERCIAL

## 2019-08-25 VITALS
DIASTOLIC BLOOD PRESSURE: 97 MMHG | TEMPERATURE: 98.1 F | SYSTOLIC BLOOD PRESSURE: 138 MMHG | WEIGHT: 155.5 LBS | HEIGHT: 64 IN | OXYGEN SATURATION: 97 % | RESPIRATION RATE: 20 BRPM | BODY MASS INDEX: 26.55 KG/M2 | HEART RATE: 93 BPM

## 2019-08-25 DIAGNOSIS — R11.15 NON-INTRACTABLE CYCLICAL VOMITING WITH NAUSEA: Primary | ICD-10-CM

## 2019-08-25 DIAGNOSIS — E87.6 ACUTE HYPOKALEMIA: ICD-10-CM

## 2019-08-25 DIAGNOSIS — R10.9 ACUTE ABDOMINAL PAIN: ICD-10-CM

## 2019-08-25 LAB
ALBUMIN SERPL-MCNC: 4 G/DL (ref 3.5–5)
ALBUMIN/GLOB SERPL: 0.8 {RATIO} (ref 1.1–2.2)
ALP SERPL-CCNC: 108 U/L (ref 45–117)
ALT SERPL-CCNC: 25 U/L (ref 12–78)
AMPHET UR QL SCN: NEGATIVE
ANION GAP SERPL CALC-SCNC: 16 MMOL/L (ref 5–15)
APPEARANCE UR: CLEAR
AST SERPL-CCNC: 23 U/L (ref 15–37)
BACTERIA URNS QL MICRO: ABNORMAL /HPF
BARBITURATES UR QL SCN: NEGATIVE
BASOPHILS # BLD: 0.1 K/UL (ref 0–0.1)
BASOPHILS NFR BLD: 1 % (ref 0–1)
BENZODIAZ UR QL: NEGATIVE
BILIRUB SERPL-MCNC: 0.3 MG/DL (ref 0.2–1)
BILIRUB UR QL: NEGATIVE
BUN SERPL-MCNC: 13 MG/DL (ref 6–20)
BUN/CREAT SERPL: 12 (ref 12–20)
CALCIUM SERPL-MCNC: 9.8 MG/DL (ref 8.5–10.1)
CANNABINOIDS UR QL SCN: POSITIVE
CHLORIDE SERPL-SCNC: 97 MMOL/L (ref 97–108)
CO2 SERPL-SCNC: 24 MMOL/L (ref 21–32)
COCAINE UR QL SCN: NEGATIVE
COLOR UR: ABNORMAL
CREAT SERPL-MCNC: 1.09 MG/DL (ref 0.55–1.02)
DIFFERENTIAL METHOD BLD: ABNORMAL
DRUG SCRN COMMENT,DRGCM: ABNORMAL
EOSINOPHIL # BLD: 0.1 K/UL (ref 0–0.4)
EOSINOPHIL NFR BLD: 1 % (ref 0–7)
EPITH CASTS URNS QL MICRO: ABNORMAL /LPF
ERYTHROCYTE [DISTWIDTH] IN BLOOD BY AUTOMATED COUNT: 13.2 % (ref 11.5–14.5)
GLOBULIN SER CALC-MCNC: 5 G/DL (ref 2–4)
GLUCOSE SERPL-MCNC: 106 MG/DL (ref 65–100)
GLUCOSE UR STRIP.AUTO-MCNC: NEGATIVE MG/DL
HCT VFR BLD AUTO: 45.1 % (ref 35–47)
HGB BLD-MCNC: 16 G/DL (ref 11.5–16)
HGB UR QL STRIP: ABNORMAL
IMM GRANULOCYTES # BLD AUTO: 0 K/UL (ref 0–0.04)
IMM GRANULOCYTES NFR BLD AUTO: 0 % (ref 0–0.5)
KETONES UR QL STRIP.AUTO: NEGATIVE MG/DL
LEUKOCYTE ESTERASE UR QL STRIP.AUTO: NEGATIVE
LIPASE SERPL-CCNC: 242 U/L (ref 73–393)
LYMPHOCYTES # BLD: 4 K/UL (ref 0.8–3.5)
LYMPHOCYTES NFR BLD: 33 % (ref 12–49)
MAGNESIUM SERPL-MCNC: 1.7 MG/DL (ref 1.6–2.4)
MCH RBC QN AUTO: 32.1 PG (ref 26–34)
MCHC RBC AUTO-ENTMCNC: 35.5 G/DL (ref 30–36.5)
MCV RBC AUTO: 90.4 FL (ref 80–99)
METHADONE UR QL: NEGATIVE
MONOCYTES # BLD: 0.7 K/UL (ref 0–1)
MONOCYTES NFR BLD: 5 % (ref 5–13)
NEUTS SEG # BLD: 7.3 K/UL (ref 1.8–8)
NEUTS SEG NFR BLD: 60 % (ref 32–75)
NITRITE UR QL STRIP.AUTO: NEGATIVE
NRBC # BLD: 0 K/UL (ref 0–0.01)
NRBC BLD-RTO: 0 PER 100 WBC
OPIATES UR QL: NEGATIVE
PCP UR QL: NEGATIVE
PH UR STRIP: 5.5 [PH] (ref 5–8)
PLATELET # BLD AUTO: 430 K/UL (ref 150–400)
PMV BLD AUTO: 8.7 FL (ref 8.9–12.9)
POTASSIUM SERPL-SCNC: 2.6 MMOL/L (ref 3.5–5.1)
PROT SERPL-MCNC: 9 G/DL (ref 6.4–8.2)
PROT UR STRIP-MCNC: 100 MG/DL
RBC # BLD AUTO: 4.99 M/UL (ref 3.8–5.2)
RBC #/AREA URNS HPF: ABNORMAL /HPF (ref 0–5)
SODIUM SERPL-SCNC: 137 MMOL/L (ref 136–145)
SP GR UR REFRACTOMETRY: 1.01 (ref 1–1.03)
UA: UC IF INDICATED,UAUC: ABNORMAL
UROBILINOGEN UR QL STRIP.AUTO: 0.2 EU/DL (ref 0.2–1)
WBC # BLD AUTO: 12.1 K/UL (ref 3.6–11)
WBC URNS QL MICRO: ABNORMAL /HPF (ref 0–4)

## 2019-08-25 PROCEDURE — 83735 ASSAY OF MAGNESIUM: CPT

## 2019-08-25 PROCEDURE — 74011250636 HC RX REV CODE- 250/636: Performed by: EMERGENCY MEDICINE

## 2019-08-25 PROCEDURE — 74177 CT ABD & PELVIS W/CONTRAST: CPT

## 2019-08-25 PROCEDURE — 87086 URINE CULTURE/COLONY COUNT: CPT

## 2019-08-25 PROCEDURE — 74011000250 HC RX REV CODE- 250: Performed by: EMERGENCY MEDICINE

## 2019-08-25 PROCEDURE — 36415 COLL VENOUS BLD VENIPUNCTURE: CPT

## 2019-08-25 PROCEDURE — 81001 URINALYSIS AUTO W/SCOPE: CPT

## 2019-08-25 PROCEDURE — 74011250637 HC RX REV CODE- 250/637: Performed by: EMERGENCY MEDICINE

## 2019-08-25 PROCEDURE — 74011636320 HC RX REV CODE- 636/320: Performed by: EMERGENCY MEDICINE

## 2019-08-25 PROCEDURE — 85025 COMPLETE CBC W/AUTO DIFF WBC: CPT

## 2019-08-25 PROCEDURE — 96372 THER/PROPH/DIAG INJ SC/IM: CPT

## 2019-08-25 PROCEDURE — 80053 COMPREHEN METABOLIC PANEL: CPT

## 2019-08-25 PROCEDURE — 96374 THER/PROPH/DIAG INJ IV PUSH: CPT

## 2019-08-25 PROCEDURE — 83690 ASSAY OF LIPASE: CPT

## 2019-08-25 PROCEDURE — 96376 TX/PRO/DX INJ SAME DRUG ADON: CPT

## 2019-08-25 PROCEDURE — 99284 EMERGENCY DEPT VISIT MOD MDM: CPT

## 2019-08-25 PROCEDURE — 96375 TX/PRO/DX INJ NEW DRUG ADDON: CPT

## 2019-08-25 PROCEDURE — 80307 DRUG TEST PRSMV CHEM ANLYZR: CPT

## 2019-08-25 RX ORDER — KETOROLAC TROMETHAMINE 30 MG/ML
30 INJECTION, SOLUTION INTRAMUSCULAR; INTRAVENOUS
Status: COMPLETED | OUTPATIENT
Start: 2019-08-25 | End: 2019-08-25

## 2019-08-25 RX ORDER — DICYCLOMINE HYDROCHLORIDE 10 MG/ML
20 INJECTION INTRAMUSCULAR
Status: COMPLETED | OUTPATIENT
Start: 2019-08-25 | End: 2019-08-25

## 2019-08-25 RX ORDER — HALOPERIDOL 5 MG/ML
2 INJECTION INTRAMUSCULAR ONCE
Status: COMPLETED | OUTPATIENT
Start: 2019-08-25 | End: 2019-08-25

## 2019-08-25 RX ORDER — ONDANSETRON 2 MG/ML
4 INJECTION INTRAMUSCULAR; INTRAVENOUS
Status: COMPLETED | OUTPATIENT
Start: 2019-08-25 | End: 2019-08-25

## 2019-08-25 RX ORDER — POTASSIUM CHLORIDE 750 MG/1
40 TABLET, FILM COATED, EXTENDED RELEASE ORAL DAILY
Qty: 28 TAB | Refills: 0 | Status: SHIPPED | OUTPATIENT
Start: 2019-08-25 | End: 2019-09-01

## 2019-08-25 RX ORDER — DICYCLOMINE HYDROCHLORIDE 10 MG/1
10 CAPSULE ORAL 4 TIMES DAILY
Qty: 20 CAP | Refills: 0 | Status: SHIPPED | OUTPATIENT
Start: 2019-08-25 | End: 2019-08-30

## 2019-08-25 RX ORDER — POTASSIUM CHLORIDE 750 MG/1
40 TABLET, FILM COATED, EXTENDED RELEASE ORAL
Status: COMPLETED | OUTPATIENT
Start: 2019-08-25 | End: 2019-08-25

## 2019-08-25 RX ORDER — SODIUM CHLORIDE 0.9 % (FLUSH) 0.9 %
10 SYRINGE (ML) INJECTION
Status: COMPLETED | OUTPATIENT
Start: 2019-08-25 | End: 2019-08-25

## 2019-08-25 RX ORDER — ONDANSETRON 4 MG/1
4 TABLET, ORALLY DISINTEGRATING ORAL
Qty: 20 TAB | Refills: 0 | Status: SHIPPED | OUTPATIENT
Start: 2019-08-25 | End: 2019-09-22

## 2019-08-25 RX ADMIN — HALOPERIDOL LACTATE 2 MG: 5 INJECTION INTRAMUSCULAR at 19:50

## 2019-08-25 RX ADMIN — Medication 10 ML: at 19:22

## 2019-08-25 RX ADMIN — DICYCLOMINE HYDROCHLORIDE 20 MG: 20 INJECTION, SOLUTION INTRAMUSCULAR at 19:49

## 2019-08-25 RX ADMIN — ONDANSETRON HYDROCHLORIDE 4 MG: 2 SOLUTION INTRAMUSCULAR; INTRAVENOUS at 18:51

## 2019-08-25 RX ADMIN — ONDANSETRON 4 MG: 2 INJECTION INTRAMUSCULAR; INTRAVENOUS at 17:30

## 2019-08-25 RX ADMIN — PROCHLORPERAZINE EDISYLATE 10 MG: 5 INJECTION INTRAMUSCULAR; INTRAVENOUS at 18:01

## 2019-08-25 RX ADMIN — IOPAMIDOL 100 ML: 755 INJECTION, SOLUTION INTRAVENOUS at 19:22

## 2019-08-25 RX ADMIN — POTASSIUM CHLORIDE 40 MEQ: 750 TABLET, EXTENDED RELEASE ORAL at 19:49

## 2019-08-25 RX ADMIN — SODIUM CHLORIDE 1000 ML: 900 INJECTION, SOLUTION INTRAVENOUS at 17:30

## 2019-08-25 RX ADMIN — KETOROLAC TROMETHAMINE 30 MG: 30 INJECTION, SOLUTION INTRAMUSCULAR; INTRAVENOUS at 17:30

## 2019-08-25 NOTE — LETTER
[facility] EMERGENCY DEPT 
[address] [id number] 
[phone] Work/School Note Date: 8/25/2019 To Whom It May concern: 
 
Nolan Carson was seen and treated today in the emergency room by the following provider(s): 
Attending Provider: Yelena Martínez MD.   
 
Nolan Carson may return to work on 8/27/19. Sincerely, Mela Bowles MD

## 2019-08-25 NOTE — ED NOTES
Emergency Department Nursing Plan of Care       The Nursing Plan of Care is developed from the Nursing assessment and Emergency Department Attending provider initial evaluation. The plan of care may be reviewed in the ED Provider note.     The Plan of Care was developed with the following considerations:   Patient / Family readiness to learn indicated by:verbalized understanding  Persons(s) to be included in education: patient  Barriers to Learning/Limitations:No    Signed     Margy Prince    8/25/2019   5:22 PM

## 2019-08-25 NOTE — ED PROVIDER NOTES
EMERGENCY DEPARTMENT HISTORY AND PHYSICAL EXAM      Date: 8/25/2019  Patient Name: Timothy Subramanian    History of Presenting Illness     Chief Complaint   Patient presents with    Diarrhea    Vomiting       History Provided By: Patient    HPI: Timothy Subramanian, 38 y.o. female presents to the ED with cc of nausea, vomiting and diarrhea starting Friday 4 hours after eating a salad. The patient has a history of polycystic kidney disease and hypertension. She is not sure if the salad is what made her sick. She is had persistent nausea and vomiting over the weekend. She now has diffuse abdominal pain. She states it feels somewhat like when she had her gallbladder out a year ago. She does admit to tobacco and alcohol use but denies any during this illness. There are no other surgical histories. Patient denies any chest pain, shortness of breath, cough, hematuria or flank pain. There was no treatment prior to arrival pain is described as aching and cramping in nature. There are no other complaints, changes, or physical findings at this time. PCP: Bhavana Stewart MD    No current facility-administered medications on file prior to encounter. Current Outpatient Medications on File Prior to Encounter   Medication Sig Dispense Refill    amLODIPine (NORVASC) 10 mg tablet Take 1 Tab by mouth daily. 90 Tab 1    metoprolol tartrate (LOPRESSOR) 25 mg tablet Take 1 Tab by mouth every twelve (12) hours. 180 Tab 1    chlorthalidone (HYGROTEN) 25 mg tablet Take 1 Tab by mouth daily. 90 Tab 2    cetirizine (ZYRTEC) 10 mg tablet Take 10 mg by mouth daily.  esomeprazole (NEXIUM 24HR) 20 mg capsule Take 1 Cap by mouth daily.  30 Cap 2       Past History     Past Medical History:  Past Medical History:   Diagnosis Date    Hypertension     Kidney anomaly, congenital     Tubal pregnancy        Past Surgical History:  Past Surgical History:   Procedure Laterality Date    HX CHOLECYSTECTOMY      HX GYN 10/07/2013    cyst removed    HX HYSTERECTOMY  10/7/2013    partial hysterectomy       Family History:  Family History   Problem Relation Age of Onset   [facility] Hypertension Mother     Hypertension Father     Hypertension Sister        Social History:  Social History     Tobacco Use    Smoking status: Current Some Day Smoker     Packs/day: 0.25     Years: 25.00     Pack years: 6.25    Smokeless tobacco: Never Used    Tobacco comment: 1-3 day   Substance Use Topics    Alcohol use: Yes     Comment: occasionally/socially    Drug use: Yes     Types: Marijuana     Comment: occ       Allergies:  No Known Allergies      Review of Systems   Review of Systems   Constitutional: Positive for chills and fever. HENT: Negative. Negative for congestion and rhinorrhea. Respiratory: Negative. Negative for cough, chest tightness and wheezing. Cardiovascular: Negative. Negative for chest pain and palpitations. Gastrointestinal: Positive for abdominal pain and diarrhea. Negative for constipation. Endocrine: Negative. Genitourinary: Negative. Negative for decreased urine volume, flank pain, hematuria and pelvic pain. Musculoskeletal: Negative. Negative for back pain and neck pain. Skin: Negative. Negative for color change, pallor and rash. Neurological: Negative. Negative for dizziness, seizures, weakness, numbness and headaches. Hematological: Negative. Negative for adenopathy. Psychiatric/Behavioral: Negative. All other systems reviewed and are negative. Physical Exam   Physical Exam   Constitutional: She is oriented to person, place, and time. She appears well-developed and well-nourished. No distress. HENT:   Head: Normocephalic and atraumatic. Mouth/Throat: No oropharyngeal exudate. Eyes: Pupils are equal, round, and reactive to light. Conjunctivae are normal. Right eye exhibits no discharge. Left eye exhibits no discharge. No scleral icterus. Neck: Normal range of motion.  Neck supple. No JVD present. Cardiovascular: Normal rate, regular rhythm, normal heart sounds and intact distal pulses. Exam reveals no gallop and no friction rub. No murmur heard. Pulmonary/Chest: Effort normal and breath sounds normal. No stridor. No respiratory distress. She has no wheezes. She has no rales. She exhibits no tenderness. Abdominal: Soft. Bowel sounds are normal. She exhibits no distension and no mass. There is generalized tenderness. There is no rigidity, no rebound, no guarding, no CVA tenderness, no tenderness at McBurney's point and negative Lin's sign. Neurological: She is alert and oriented to person, place, and time. She displays normal reflexes. No cranial nerve deficit. She exhibits normal muscle tone. Coordination normal.   Skin: Skin is warm. No rash noted. She is not diaphoretic. No pallor. Vitals reviewed. 5:54 PM patient still vomiting. Will order a dose of Compazine. We will possibly consider x-ray or CT rule out bowel obstruction pending the lab results. 6:45 PM.  Patient has mild nausea. Review of the records reveals she has a history of recurrent vomiting. She states she has not used marijuana for 1 week.     Diagnostic Study Results     Labs -     Recent Results (from the past 12 hour(s))   LIPASE    Collection Time: 08/25/19  6:24 PM   Result Value Ref Range    Lipase 242 73 - 620 U/L   METABOLIC PANEL, COMPREHENSIVE    Collection Time: 08/25/19  6:24 PM   Result Value Ref Range    Sodium 137 136 - 145 mmol/L    Potassium 2.6 (LL) 3.5 - 5.1 mmol/L    Chloride 97 97 - 108 mmol/L    CO2 24 21 - 32 mmol/L    Anion gap 16 (H) 5 - 15 mmol/L    Glucose 106 (H) 65 - 100 mg/dL    BUN 13 6 - 20 MG/DL    Creatinine 1.09 (H) 0.55 - 1.02 MG/DL    BUN/Creatinine ratio 12 12 - 20      GFR est AA >60 >60 ml/min/1.73m2    GFR est non-AA 54 (L) >60 ml/min/1.73m2    Calcium 9.8 8.5 - 10.1 MG/DL    Bilirubin, total 0.3 0.2 - 1.0 MG/DL    ALT (SGPT) 25 12 - 78 U/L    AST (SGOT) 23 15 - 37 U/L    Alk. phosphatase 108 45 - 117 U/L    Protein, total 9.0 (H) 6.4 - 8.2 g/dL    Albumin 4.0 3.5 - 5.0 g/dL    Globulin 5.0 (H) 2.0 - 4.0 g/dL    A-G Ratio 0.8 (L) 1.1 - 2.2     URINALYSIS W/ REFLEX CULTURE    Collection Time: 08/25/19  6:52 PM   Result Value Ref Range    Color YELLOW/STRAW      Appearance CLEAR CLEAR      Specific gravity 1.015 1.003 - 1.030      pH (UA) 5.5 5.0 - 8.0      Protein 100 (A) NEG mg/dL    Glucose NEGATIVE  NEG mg/dL    Ketone NEGATIVE  NEG mg/dL    Bilirubin NEGATIVE  NEG      Blood TRACE (A) NEG      Urobilinogen 0.2 0.2 - 1.0 EU/dL    Nitrites NEGATIVE  NEG      Leukocyte Esterase NEGATIVE  NEG      WBC 0-4 0 - 4 /hpf    RBC 0-5 0 - 5 /hpf    Epithelial cells FEW FEW /lpf    Bacteria 1+ (A) NEG /hpf    UA:UC IF INDICATED URINE CULTURE ORDERED (A) CNI     DRUG SCREEN, URINE    Collection Time: 08/25/19  6:52 PM   Result Value Ref Range    AMPHETAMINES NEGATIVE  NEG      BARBITURATES NEGATIVE  NEG      BENZODIAZEPINES NEGATIVE  NEG      COCAINE NEGATIVE  NEG      METHADONE NEGATIVE  NEG      OPIATES NEGATIVE  NEG      PCP(PHENCYCLIDINE) NEGATIVE  NEG      THC (TH-CANNABINOL) POSITIVE (A) NEG      Drug screen comment (NOTE)    MAGNESIUM    Collection Time: 08/25/19  7:30 PM   Result Value Ref Range    Magnesium 1.7 1.6 - 2.4 mg/dL       Radiologic Studies -   CT ABD PELV W CONT   Final Result   IMPRESSION:      1. No evidence of acute process in the abdomen or pelvis. Normal appendix. CT Results  (Last 48 hours)               08/25/19 1923  CT ABD PELV W CONT Final result    Impression:  IMPRESSION:       1. No evidence of acute process in the abdomen or pelvis. Normal appendix. Narrative:  EXAM:  CT ABD PELV W CONT       INDICATION: nausea/vomitng ?obstruction        COMPARISON: CT abdomen pelvis 10/5/2018. CONTRAST:  100 mL of Isovue-370.        TECHNIQUE:    Following the uneventful intravenous administration of contrast, thin axial   images were obtained through the abdomen and pelvis. Coronal and sagittal   reconstructions were generated. Oral contrast not administered. CT dose   reduction was achieved through use of a standardized protocol tailored for this   examination and automatic exposure control for dose modulation. FINDINGS:    Lower Thorax:   Lung Bases: Clear. Heart: The heart is normal in size. No pericardial effusion. Abdomen/Pelvis:   Liver:  Subcentimeter hypodensities scattered in the liver, statistically benign   cysts. Biliary system: Gallbladder is surgically absent. No intrahepatic or   extrahepatic biliary ductal dilatation. Spleen: Normal.       Pancreas: Normal.       Kidneys/Ureters/Bladder: Numerous bilateral simple renal cysts are noted, which   are stable. No renal or ureteral calculi. No hydronephrosis or hydroureter. The   bladder is normal.       Adrenals: Normal.       Stomach/bowel: No dilation or abnormal wall thickening is present. No free   intraperitoneal air noted. Normal appendix. Reproductive Organs: Uterus is present. No suspicious adnexal masses. Vasculature: Normal caliber arteries. Moderate calcific atherosclerosis of the   infrarenal abdominal aorta and iliac vasculature. Portal vein, SMV, and splenic   vein are patent. Nodes: No pathologically enlarged lymph nodes. Fluid: No free fluid. Bones/Soft Tissue: No acute fractures or aggressive osseous lesions are seen. CXR Results  (Last 48 hours)    None          Medical Decision Making   I am the first provider for this patient. I reviewed the vital signs, available nursing notes, past medical history, past surgical history, family history and social history. Vital Signs-Reviewed the patient's vital signs.   Patient Vitals for the past 12 hrs:   BP SpO2   08/25/19 2000 (!) 138/97 97 %           Records Reviewed: Nursing Notes and Old Medical Records    Provider Notes (Medical Decision Making):   Differential diagnosis-gastroenteritis, hepatitis, pancreatitis, bowel obstruction, colitis, perforated viscus, appendicitis, diverticulitis, renal colic, pyelonephritis    ED Course:   Initial assessment performed. The patients presenting problems have been discussed, and they are in agreement with the care plan formulated and outlined with them. I have encouraged them to ask questions as they arise throughout their visit. Critical Care Time:   0    Disposition:  Home    PLAN:  1. Discharge Medication List as of 8/25/2019  8:29 PM      START taking these medications    Details   ondansetron (ZOFRAN ODT) 4 mg disintegrating tablet Take 1 Tab by mouth every eight (8) hours as needed for Nausea. , Print, Disp-20 Tab, R-0      dicyclomine (BENTYL) 10 mg capsule Take 1 Cap by mouth four (4) times daily for 5 days. , Print, Disp-20 Cap, R-0      potassium chloride SR (KLOR-CON 10) 10 mEq tablet Take 4 Tabs by mouth daily for 7 days. , Print, Disp-28 Tab, R-0         CONTINUE these medications which have NOT CHANGED    Details   amLODIPine (NORVASC) 10 mg tablet Take 1 Tab by mouth daily. , Normal, Disp-90 Tab, R-1      metoprolol tartrate (LOPRESSOR) 25 mg tablet Take 1 Tab by mouth every twelve (12) hours. , Normal, Disp-180 Tab, R-1      chlorthalidone (HYGROTEN) 25 mg tablet Take 1 Tab by mouth daily. , Normal, Disp-90 Tab, R-2      cetirizine (ZYRTEC) 10 mg tablet Take 10 mg by mouth daily. , Historical Med      esomeprazole (NEXIUM 24HR) 20 mg capsule Take 1 Cap by mouth daily. , Normal, Disp-30 Cap, R-2           2. Follow-up Information    None       Return to ED if worse     Diagnosis     Clinical Impression:   1. Non-intractable cyclical vomiting with nausea    2. Acute abdominal pain    3. Acute hypokalemia        Attestations:    Jennifer Mcdermott MD    Please note that this dictation was completed with [employer], the [employer] voice recognition software.   Quite often unanticipated grammatical, syntax, homophones, and other interpretive errors are inadvertently transcribed by the computer software. Please disregard these errors. Please excuse any errors that have escaped final proofreading. Thank you.

## 2019-08-25 NOTE — ED NOTES
Verbal shift change report given to JOVANA Gomez (oncoming nurse) by Brie Donahue (offgoing nurse). Report included the following information SBAR, ED Summary, Procedure Summary, MAR, Recent Results and Med Rec Status.

## 2019-08-27 LAB
BACTERIA SPEC CULT: NORMAL
CC UR VC: NORMAL
SERVICE CMNT-IMP: NORMAL

## 2019-08-28 ENCOUNTER — HOSPITAL ENCOUNTER (INPATIENT)
Age: 44
LOS: 2 days | Discharge: HOME | DRG: 392 | End: 2019-08-30
Attending: EMERGENCY MEDICINE | Admitting: FAMILY MEDICINE
Payer: COMMERCIAL

## 2019-08-28 DIAGNOSIS — N18.9 ACUTE ON CHRONIC RENAL INSUFFICIENCY: ICD-10-CM

## 2019-08-28 DIAGNOSIS — N28.9 ACUTE ON CHRONIC RENAL INSUFFICIENCY: ICD-10-CM

## 2019-08-28 DIAGNOSIS — K52.9 GASTROENTERITIS, ACUTE: ICD-10-CM

## 2019-08-28 DIAGNOSIS — E87.6 HYPOKALEMIA: ICD-10-CM

## 2019-08-28 DIAGNOSIS — R11.2 INTRACTABLE VOMITING WITH NAUSEA, UNSPECIFIED VOMITING TYPE: Primary | ICD-10-CM

## 2019-08-28 PROBLEM — K85.90 ACUTE PANCREATITIS: Status: ACTIVE | Noted: 2019-08-28

## 2019-08-28 PROBLEM — R11.10 INTRACTABLE VOMITING: Status: ACTIVE | Noted: 2019-08-28

## 2019-08-28 LAB
ALBUMIN SERPL-MCNC: 4.3 G/DL (ref 3.5–5)
ALBUMIN/GLOB SERPL: 0.9 {RATIO} (ref 1.1–2.2)
ALP SERPL-CCNC: 104 U/L (ref 45–117)
ALT SERPL-CCNC: 32 U/L (ref 12–78)
AMPHET UR QL SCN: NEGATIVE
ANION GAP SERPL CALC-SCNC: 16 MMOL/L (ref 5–15)
AST SERPL-CCNC: 28 U/L (ref 15–37)
BARBITURATES UR QL SCN: NEGATIVE
BASOPHILS # BLD: 0.1 K/UL (ref 0–0.1)
BASOPHILS NFR BLD: 1 % (ref 0–1)
BENZODIAZ UR QL: NEGATIVE
BILIRUB SERPL-MCNC: 0.4 MG/DL (ref 0.2–1)
BUN SERPL-MCNC: 20 MG/DL (ref 6–20)
BUN/CREAT SERPL: 17 (ref 12–20)
CALCIUM SERPL-MCNC: 10.2 MG/DL (ref 8.5–10.1)
CANNABINOIDS UR QL SCN: POSITIVE
CHLORIDE SERPL-SCNC: 95 MMOL/L (ref 97–108)
CO2 SERPL-SCNC: 24 MMOL/L (ref 21–32)
COCAINE UR QL SCN: NEGATIVE
CREAT SERPL-MCNC: 1.2 MG/DL (ref 0.55–1.02)
DIFFERENTIAL METHOD BLD: ABNORMAL
DRUG SCRN COMMENT,DRGCM: ABNORMAL
EOSINOPHIL # BLD: 0.1 K/UL (ref 0–0.4)
EOSINOPHIL NFR BLD: 1 % (ref 0–7)
ERYTHROCYTE [DISTWIDTH] IN BLOOD BY AUTOMATED COUNT: 12.6 % (ref 11.5–14.5)
GLOBULIN SER CALC-MCNC: 5 G/DL (ref 2–4)
GLUCOSE SERPL-MCNC: 108 MG/DL (ref 65–100)
HCT VFR BLD AUTO: 41.8 % (ref 35–47)
HGB BLD-MCNC: 15 G/DL (ref 11.5–16)
IMM GRANULOCYTES # BLD AUTO: 0 K/UL (ref 0–0.04)
IMM GRANULOCYTES NFR BLD AUTO: 0 % (ref 0–0.5)
LACTATE SERPL-SCNC: 1.2 MMOL/L (ref 0.4–2)
LIPASE SERPL-CCNC: 441 U/L (ref 73–393)
LYMPHOCYTES # BLD: 2.7 K/UL (ref 0.8–3.5)
LYMPHOCYTES NFR BLD: 25 % (ref 12–49)
MAGNESIUM SERPL-MCNC: 2.1 MG/DL (ref 1.6–2.4)
MAGNESIUM SERPL-MCNC: 2.3 MG/DL (ref 1.6–2.4)
MCH RBC QN AUTO: 32.5 PG (ref 26–34)
MCHC RBC AUTO-ENTMCNC: 35.9 G/DL (ref 30–36.5)
MCV RBC AUTO: 90.7 FL (ref 80–99)
METHADONE UR QL: NEGATIVE
MONOCYTES # BLD: 0.6 K/UL (ref 0–1)
MONOCYTES NFR BLD: 5 % (ref 5–13)
NEUTS SEG # BLD: 7.2 K/UL (ref 1.8–8)
NEUTS SEG NFR BLD: 68 % (ref 32–75)
NRBC # BLD: 0 K/UL (ref 0–0.01)
NRBC BLD-RTO: 0 PER 100 WBC
OPIATES UR QL: NEGATIVE
PCP UR QL: NEGATIVE
PLATELET # BLD AUTO: 453 K/UL (ref 150–400)
PMV BLD AUTO: 8.6 FL (ref 8.9–12.9)
POTASSIUM SERPL-SCNC: 3.2 MMOL/L (ref 3.5–5.1)
PROT SERPL-MCNC: 9.3 G/DL (ref 6.4–8.2)
RBC # BLD AUTO: 4.61 M/UL (ref 3.8–5.2)
SODIUM SERPL-SCNC: 135 MMOL/L (ref 136–145)
WBC # BLD AUTO: 10.6 K/UL (ref 3.6–11)

## 2019-08-28 PROCEDURE — 80307 DRUG TEST PRSMV CHEM ANLYZR: CPT

## 2019-08-28 PROCEDURE — 80053 COMPREHEN METABOLIC PANEL: CPT

## 2019-08-28 PROCEDURE — 93005 ELECTROCARDIOGRAM TRACING: CPT

## 2019-08-28 PROCEDURE — 36415 COLL VENOUS BLD VENIPUNCTURE: CPT

## 2019-08-28 PROCEDURE — 83605 ASSAY OF LACTIC ACID: CPT

## 2019-08-28 PROCEDURE — 96374 THER/PROPH/DIAG INJ IV PUSH: CPT

## 2019-08-28 PROCEDURE — 74011000250 HC RX REV CODE- 250: Performed by: FAMILY MEDICINE

## 2019-08-28 PROCEDURE — 74011250636 HC RX REV CODE- 250/636: Performed by: EMERGENCY MEDICINE

## 2019-08-28 PROCEDURE — 74011250636 HC RX REV CODE- 250/636: Performed by: FAMILY MEDICINE

## 2019-08-28 PROCEDURE — C9113 INJ PANTOPRAZOLE SODIUM, VIA: HCPCS | Performed by: FAMILY MEDICINE

## 2019-08-28 PROCEDURE — 02HV33Z INSERTION OF INFUSION DEVICE INTO SUPERIOR VENA CAVA, PERCUTANEOUS APPROACH: ICD-10-PCS | Performed by: FAMILY MEDICINE

## 2019-08-28 PROCEDURE — 75810000457 HC INSERT PICC CATHETER LVL 3 5183

## 2019-08-28 PROCEDURE — 74011250637 HC RX REV CODE- 250/637: Performed by: FAMILY MEDICINE

## 2019-08-28 PROCEDURE — 85025 COMPLETE CBC W/AUTO DIFF WBC: CPT

## 2019-08-28 PROCEDURE — 83735 ASSAY OF MAGNESIUM: CPT

## 2019-08-28 PROCEDURE — 65270000032 HC RM SEMIPRIVATE

## 2019-08-28 PROCEDURE — 83690 ASSAY OF LIPASE: CPT

## 2019-08-28 PROCEDURE — 94762 N-INVAS EAR/PLS OXIMTRY CONT: CPT

## 2019-08-28 PROCEDURE — 96375 TX/PRO/DX INJ NEW DRUG ADDON: CPT

## 2019-08-28 PROCEDURE — 99285 EMERGENCY DEPT VISIT HI MDM: CPT

## 2019-08-28 RX ORDER — CLONIDINE 0.1 MG/24H
1 PATCH, EXTENDED RELEASE TRANSDERMAL
Status: DISCONTINUED | OUTPATIENT
Start: 2019-08-28 | End: 2019-08-29

## 2019-08-28 RX ORDER — PROCHLORPERAZINE EDISYLATE 5 MG/ML
10 INJECTION INTRAMUSCULAR; INTRAVENOUS
Status: COMPLETED | OUTPATIENT
Start: 2019-08-28 | End: 2019-08-28

## 2019-08-28 RX ORDER — HEPARIN SODIUM 5000 [USP'U]/ML
5000 INJECTION, SOLUTION INTRAVENOUS; SUBCUTANEOUS EVERY 8 HOURS
Status: DISCONTINUED | OUTPATIENT
Start: 2019-08-28 | End: 2019-08-30 | Stop reason: HOSPADM

## 2019-08-28 RX ORDER — METOPROLOL TARTRATE 25 MG/1
25 TABLET, FILM COATED ORAL EVERY 12 HOURS
Status: DISCONTINUED | OUTPATIENT
Start: 2019-08-28 | End: 2019-08-30 | Stop reason: HOSPADM

## 2019-08-28 RX ORDER — LABETALOL HCL 20 MG/4 ML
10 SYRINGE (ML) INTRAVENOUS
Status: COMPLETED | OUTPATIENT
Start: 2019-08-28 | End: 2019-08-28

## 2019-08-28 RX ORDER — SODIUM CHLORIDE AND POTASSIUM CHLORIDE .9; .15 G/100ML; G/100ML
SOLUTION INTRAVENOUS CONTINUOUS
Status: DISCONTINUED | OUTPATIENT
Start: 2019-08-28 | End: 2019-08-29

## 2019-08-28 RX ORDER — AMLODIPINE BESYLATE 5 MG/1
10 TABLET ORAL DAILY
Status: DISCONTINUED | OUTPATIENT
Start: 2019-08-29 | End: 2019-08-30 | Stop reason: HOSPADM

## 2019-08-28 RX ORDER — KETOROLAC TROMETHAMINE 30 MG/ML
30 INJECTION, SOLUTION INTRAMUSCULAR; INTRAVENOUS
Status: COMPLETED | OUTPATIENT
Start: 2019-08-28 | End: 2019-08-28

## 2019-08-28 RX ORDER — NALOXONE HYDROCHLORIDE 0.4 MG/ML
0.4 INJECTION, SOLUTION INTRAMUSCULAR; INTRAVENOUS; SUBCUTANEOUS AS NEEDED
Status: DISCONTINUED | OUTPATIENT
Start: 2019-08-28 | End: 2019-08-30

## 2019-08-28 RX ORDER — SODIUM CHLORIDE 0.9 % (FLUSH) 0.9 %
5-40 SYRINGE (ML) INJECTION EVERY 8 HOURS
Status: DISCONTINUED | OUTPATIENT
Start: 2019-08-28 | End: 2019-08-30 | Stop reason: HOSPADM

## 2019-08-28 RX ORDER — MORPHINE SULFATE 10 MG/ML
2 INJECTION, SOLUTION INTRAMUSCULAR; INTRAVENOUS
Status: DISCONTINUED | OUTPATIENT
Start: 2019-08-28 | End: 2019-08-29 | Stop reason: CLARIF

## 2019-08-28 RX ORDER — SODIUM CHLORIDE 0.9 % (FLUSH) 0.9 %
5-40 SYRINGE (ML) INJECTION AS NEEDED
Status: DISCONTINUED | OUTPATIENT
Start: 2019-08-28 | End: 2019-08-30 | Stop reason: HOSPADM

## 2019-08-28 RX ADMIN — SODIUM CHLORIDE AND POTASSIUM CHLORIDE: .9; .15 SOLUTION INTRAVENOUS at 22:13

## 2019-08-28 RX ADMIN — Medication 10 ML: at 22:00

## 2019-08-28 RX ADMIN — METOPROLOL TARTRATE 25 MG: 25 TABLET ORAL at 21:49

## 2019-08-28 RX ADMIN — PROCHLORPERAZINE EDISYLATE 10 MG: 5 INJECTION INTRAMUSCULAR; INTRAVENOUS at 17:27

## 2019-08-28 RX ADMIN — MORPHINE SULFATE 2 MG: 10 INJECTION, SOLUTION INTRAMUSCULAR; INTRAVENOUS at 22:14

## 2019-08-28 RX ADMIN — KETOROLAC TROMETHAMINE 30 MG: 30 INJECTION, SOLUTION INTRAMUSCULAR; INTRAVENOUS at 17:30

## 2019-08-28 RX ADMIN — SODIUM CHLORIDE 40 MG: 9 INJECTION INTRAMUSCULAR; INTRAVENOUS; SUBCUTANEOUS at 21:50

## 2019-08-28 RX ADMIN — HEPARIN SODIUM 5000 UNITS: 5000 INJECTION INTRAVENOUS; SUBCUTANEOUS at 21:50

## 2019-08-28 RX ADMIN — SODIUM CHLORIDE 1000 ML: 900 INJECTION, SOLUTION INTRAVENOUS at 17:27

## 2019-08-28 RX ADMIN — LABETALOL 20 MG/4 ML (5 MG/ML) INTRAVENOUS SYRINGE 10 MG: at 18:12

## 2019-08-28 NOTE — ED PROVIDER NOTES
EMERGENCY DEPARTMENT HISTORY AND PHYSICAL EXAM      Date: 8/28/2019  Patient Name: Aleksandra Kingston    History of Presenting Illness     Chief Complaint   Patient presents with    Vomiting     History Provided By: Patient    HPI: Aleksandra Kingston, 38 y.o. female with past medical history significant for polycystic kidney disease and hypertension who presents via private vehicle to the ED with cc of continued vomiting, diarrhea, and generalized abdominal pain. Patient states she was seen here a few days ago for similar symptoms. At that time, she had labs, CT, and IV fluids, antiemetics, and analgesics. She states she felt minimally improved when she was discharged, but states that her symptoms have persisted. She states she is vomiting approximately 6-7 times a day and having a similar number of bowel movements. She denies recent antibiotic use or any other sick contacts. She denies any history of illegal drug use, specifically denying any narcotics. PMHx: Polycystic kidney disease and hypertension  PSHx: Cholecystectomy and a partial hysterectomy  Social Hx: Smokes 1/4 pack/day, occasional alcohol use, denies illegal drug use (per chart review occasionally smokes marijuana)    PCP: Giana Daniel MD    There are no other complaints, changes, or physical findings at this time. No current facility-administered medications on file prior to encounter. Current Outpatient Medications on File Prior to Encounter   Medication Sig Dispense Refill    ondansetron (ZOFRAN ODT) 4 mg disintegrating tablet Take 1 Tab by mouth every eight (8) hours as needed for Nausea. 20 Tab 0    dicyclomine (BENTYL) 10 mg capsule Take 1 Cap by mouth four (4) times daily for 5 days. 20 Cap 0    potassium chloride SR (KLOR-CON 10) 10 mEq tablet Take 4 Tabs by mouth daily for 7 days. 28 Tab 0    amLODIPine (NORVASC) 10 mg tablet Take 1 Tab by mouth daily.  90 Tab 1    metoprolol tartrate (LOPRESSOR) 25 mg tablet Take 1 Tab by mouth every twelve (12) hours. 180 Tab 1    chlorthalidone (HYGROTEN) 25 mg tablet Take 1 Tab by mouth daily. 90 Tab 2    esomeprazole (NEXIUM 24HR) 20 mg capsule Take 1 Cap by mouth daily. 30 Cap 2    cetirizine (ZYRTEC) 10 mg tablet Take 10 mg by mouth daily. Past History     Past Medical History:  Past Medical History:   Diagnosis Date    Hypertension     Kidney anomaly, congenital     Tubal pregnancy      Past Surgical History:  Past Surgical History:   Procedure Laterality Date    HX CHOLECYSTECTOMY      HX GYN  10/07/2013    cyst removed    HX HYSTERECTOMY  10/7/2013    partial hysterectomy     Family History:  Family History   Problem Relation Age of Onset    Hypertension Mother     Hypertension Father     Hypertension Sister      Social History:  Social History     Tobacco Use    Smoking status: Current Some Day Smoker     Packs/day: 0.25     Years: 25.00     Pack years: 6.25    Smokeless tobacco: Never Used    Tobacco comment: 1-3 day   Substance Use Topics    Alcohol use: Yes     Comment: occasionally/socially    Drug use: Yes     Types: Marijuana     Comment: occ     Allergies:  No Known Allergies  Review of Systems   Review of Systems   Constitutional: Negative for chills and fever. HENT: Negative for congestion, rhinorrhea, sneezing and sore throat. Respiratory: Negative for shortness of breath. Cardiovascular: Negative for chest pain. Gastrointestinal: Positive for abdominal pain, diarrhea, nausea and vomiting. Musculoskeletal: Negative for back pain, myalgias and neck stiffness. Skin: Negative for rash. Neurological: Negative for dizziness, weakness and headaches. All other systems reviewed and are negative. Physical Exam   Physical Exam   Constitutional: She is oriented to person, place, and time. She appears well-developed and well-nourished. Appears uncomfortable in moderate distress   HENT:   Head: Normocephalic and atraumatic.    Mouth/Throat: Oropharynx is clear and moist.   Eyes: Conjunctivae and EOM are normal.   Neck: Normal range of motion and full passive range of motion without pain. Neck supple. Cardiovascular: Regular rhythm, S1 normal, S2 normal, normal heart sounds, intact distal pulses and normal pulses. Tachycardia present. No murmur heard. Pulmonary/Chest: Effort normal and breath sounds normal. No respiratory distress. She has no wheezes. Abdominal: Soft. Normal appearance and bowel sounds are normal. She exhibits no distension. There is generalized tenderness. There is guarding. There is no rebound. Musculoskeletal: Normal range of motion. Neurological: She is alert and oriented to person, place, and time. She has normal strength. Skin: Skin is warm, dry and intact. No rash noted. Psychiatric: She has a normal mood and affect. Her speech is normal and behavior is normal. Judgment and thought content normal.   Nursing note and vitals reviewed. Diagnostic Study Results   Labs -     Recent Results (from the past 12 hour(s))   CBC WITH AUTOMATED DIFF    Collection Time: 08/28/19  4:46 PM   Result Value Ref Range    WBC 10.6 3.6 - 11.0 K/uL    RBC 4.61 3.80 - 5.20 M/uL    HGB 15.0 11.5 - 16.0 g/dL    HCT 41.8 35.0 - 47.0 %    MCV 90.7 80.0 - 99.0 FL    MCH 32.5 26.0 - 34.0 PG    MCHC 35.9 30.0 - 36.5 g/dL    RDW 12.6 11.5 - 14.5 %    PLATELET 343 (H) 714 - 400 K/uL    MPV 8.6 (L) 8.9 - 12.9 FL    NRBC 0.0 0  WBC    ABSOLUTE NRBC 0.00 0.00 - 0.01 K/uL    NEUTROPHILS 68 32 - 75 %    LYMPHOCYTES 25 12 - 49 %    MONOCYTES 5 5 - 13 %    EOSINOPHILS 1 0 - 7 %    BASOPHILS 1 0 - 1 %    IMMATURE GRANULOCYTES 0 0.0 - 0.5 %    ABS. NEUTROPHILS 7.2 1.8 - 8.0 K/UL    ABS. LYMPHOCYTES 2.7 0.8 - 3.5 K/UL    ABS. MONOCYTES 0.6 0.0 - 1.0 K/UL    ABS. EOSINOPHILS 0.1 0.0 - 0.4 K/UL    ABS. BASOPHILS 0.1 0.0 - 0.1 K/UL    ABS. IMM.  GRANS. 0.0 0.00 - 0.04 K/UL    DF AUTOMATED     METABOLIC PANEL, COMPREHENSIVE    Collection Time: 08/28/19 4:46 PM   Result Value Ref Range    Sodium 135 (L) 136 - 145 mmol/L    Potassium 3.2 (L) 3.5 - 5.1 mmol/L    Chloride 95 (L) 97 - 108 mmol/L    CO2 24 21 - 32 mmol/L    Anion gap 16 (H) 5 - 15 mmol/L    Glucose 108 (H) 65 - 100 mg/dL    BUN 20 6 - 20 MG/DL    Creatinine 1.20 (H) 0.55 - 1.02 MG/DL    BUN/Creatinine ratio 17 12 - 20      GFR est AA 59 (L) >60 ml/min/1.73m2    GFR est non-AA 49 (L) >60 ml/min/1.73m2    Calcium 10.2 (H) 8.5 - 10.1 MG/DL    Bilirubin, total 0.4 0.2 - 1.0 MG/DL    ALT (SGPT) 32 12 - 78 U/L    AST (SGOT) 28 15 - 37 U/L    Alk.  phosphatase 104 45 - 117 U/L    Protein, total 9.3 (H) 6.4 - 8.2 g/dL    Albumin 4.3 3.5 - 5.0 g/dL    Globulin 5.0 (H) 2.0 - 4.0 g/dL    A-G Ratio 0.9 (L) 1.1 - 2.2     DRUG SCREEN, URINE    Collection Time: 08/28/19  4:46 PM   Result Value Ref Range    AMPHETAMINES NEGATIVE  NEG      BARBITURATES NEGATIVE  NEG      BENZODIAZEPINES NEGATIVE  NEG      COCAINE NEGATIVE  NEG      METHADONE NEGATIVE  NEG      OPIATES NEGATIVE  NEG      PCP(PHENCYCLIDINE) NEGATIVE  NEG      THC (TH-CANNABINOL) POSITIVE (A) NEG      Drug screen comment (NOTE)    LACTIC ACID    Collection Time: 08/28/19  4:46 PM   Result Value Ref Range    Lactic acid 1.2 0.4 - 2.0 MMOL/L   LIPASE    Collection Time: 08/28/19  4:46 PM   Result Value Ref Range    Lipase 441 (H) 73 - 393 U/L   MAGNESIUM    Collection Time: 08/28/19  4:46 PM   Result Value Ref Range    Magnesium 2.1 1.6 - 2.4 mg/dL   EKG, 12 LEAD, INITIAL    Collection Time: 08/28/19  5:49 PM   Result Value Ref Range    Ventricular Rate 86 BPM    Atrial Rate 86 BPM    P-R Interval 126 ms    QRS Duration 80 ms    Q-T Interval 384 ms    QTC Calculation (Bezet) 459 ms    Calculated P Axis 47 degrees    Calculated R Axis 65 degrees    Calculated T Axis 20 degrees    Diagnosis       Normal sinus rhythm  Possible Left atrial enlargement  Borderline ECG  When compared with ECG of 05-OCT-2018 16:58,  No significant change was found Radiologic Studies -   No orders to display     No results found. Medical Decision Making   I am the first provider for this patient. I reviewed the vital signs, available nursing notes, past medical history, past surgical history, family history and social history. Vital Signs-Reviewed the patient's vital signs. Patient Vitals for the past 12 hrs:   Temp Pulse Resp BP SpO2   08/28/19 1800  (!) 108 16 (!) 162/104 100 %   08/28/19 1614 97.6 °F (36.4 °C) (!) 112 16 (!) 148/110 99 %     Pulse Oximetry Analysis - 99% on RA    Cardiac Monitor:   Rate: 108 bpm  Rhythm: Sinus Tachycardia     ED EKG interpretation: 17:49  Rhythm: normal sinus rhythm; and regular . Rate (approx.): 86; Axis: normal; P wave: normal; QRS interval: normal ; ST/T wave: normal; Other findings: normal. This EKG was interpreted by Ashley Heaton MD,ED Provider. Records Reviewed: Nursing Notes, Old Medical Records, Previous Radiology Studies and Previous Laboratory Studies    Provider Notes (Medical Decision Making):   76-year-old female presents back to the emergency department for continued vomiting, abdominal pain, and diarrhea. I have reviewed her chart from 3 days ago where she had labs and imaging done. Will repeat her labs today and treat symptoms. ED Course:   Initial assessment performed. The patients presenting problems have been discussed, and they are in agreement with the care plan formulated and outlined with them. I have encouraged them to ask questions as they arise throughout their visit. Procedure Note- Peripheral IV Access  5:21 PM  Performed by: MD Kaushik East MD gained IV access using a 20 gauge needle because the patient had no vascular access. After cleaning the site with alcohol prep, the Right Basilic vein was localized with ultrasound guidance in an anterior approach. Line confirmation was obtained by direct visualization and good blood return.  No anaesthetic was used. The line was successfully flushed with normal saline and was secured with transparent tape. Estimated blood loss: 0 mL  The procedure took 15 minutes, and pt tolerated well. Progress Note  6:06 PM  I have re-evaluated pt and she continues to complain of cramping abdominal pain and nausea. Her white blood cell count is better than it was at her last visit, but she is slightly more dehydrated. Given her continued symptoms, will discuss with hospitalist for admission for IV fluids, bowel rest, and symptomatic management. 6:10 PM  Kimberly Simon MD spoke with Dr. Kathleen Lopez, Consult for Hospitalist. Discussed HPI and PE, available diagnostic tests and clinical findings. He is in agreement with care plans as outlined. He agrees to admit the patient. Progress Note:   Updated pt on all returned results and findings. Discussed the importance of proper follow up as referred below along with return precautions. Pt in agreement with the care plan and expresses agreement with and understanding of all items discussed. Disposition:  ADMIT NOTE:  6:10 PM  The patient is being admitted to the hospital by Dr. Kathleen Lopez. The results of their tests and reasons for their admission have been discussed with the patient and/or available family. They convey agreement and understanding for the need to be admitted and for their admission diagnosis. PLAN:  1. Admit    Diagnosis     Clinical Impression:   1. Intractable vomiting with nausea, unspecified vomiting type    2. Gastroenteritis, acute    3. Hypokalemia    4. Acute on chronic renal insufficiency            Please note that this dictation was completed with Dragon, computer voice recognition software. Quite often unanticipated grammatical, syntax, homophones, and other interpretive errors are inadvertently transcribed by the computer software. Please disregard these errors. Additionally, please excuse any errors that have escaped final proofreading.

## 2019-08-28 NOTE — ED TRIAGE NOTES
Pt reports vomiting and diarrhea x5 days. Pt reports drinking broth but states that she vomits afterwards. Pt reports taking the medication we prescribed on Sunday but unsure of the names. Pt denies any urinary symptoms.

## 2019-08-28 NOTE — ED NOTES
TRANSFER - OUT REPORT:    Verbal report given to Lowell Gaxiola RN(name) on Nikia Momin  being transferred to telemetry(unit) for routine progression of care       Report consisted of patients Situation, Background, Assessment and   Recommendations(SBAR). Information from the following report(s) SBAR, ED Summary, MAR, Recent Results and Cardiac Rhythm Sinus Tach/NSR was reviewed with the receiving nurse. Lines:   Peripheral IV 05/71/48 Right Basilic (Active)   Site Assessment Clean, dry, & intact 8/28/2019  5:26 PM   Phlebitis Assessment 0 8/28/2019  5:26 PM   Infiltration Assessment 0 8/28/2019  5:26 PM   Dressing Status Clean, dry, & intact 8/28/2019  5:26 PM   Hub Color/Line Status Pink 8/28/2019  5:26 PM        Opportunity for questions and clarification was provided.       Patient transported with:   Monitor  Registered Nurse

## 2019-08-28 NOTE — ED NOTES
Previously attempted two IV sticks to right AC and right hand prior to Dr. Heather Ruano placing IV right upper arm

## 2019-08-28 NOTE — ED NOTES
Emergency Department Nursing Plan of Care       The Nursing Plan of Care is developed from the Nursing assessment and Emergency Department Attending provider initial evaluation. The plan of care may be reviewed in the ED Provider note.     The Plan of Care was developed with the following considerations:   Patient / Family readiness to learn indicated by:verbalized understanding  Persons(s) to be included in education: patient  Barriers to Learning/Limitations:No    Signed     Irish Merrill RN    8/28/2019   5:36 PM

## 2019-08-28 NOTE — LETTER
[facility], [place] [id number] 
[phone] Work/School Note Date: 8/28/2019 To Whom It May concern: 
 
Xiomara Logan was seen and treated today in the emergency room and admitted to the hospital for a hospital stay, treated by the following:  Dr Nicholas Bianchily may return to work on 9/2/2019. Sincerely, Aerial JOVANA

## 2019-08-29 LAB
ALBUMIN SERPL-MCNC: 3.2 G/DL (ref 3.5–5)
ALBUMIN/GLOB SERPL: 0.8 {RATIO} (ref 1.1–2.2)
ALP SERPL-CCNC: 77 U/L (ref 45–117)
ALT SERPL-CCNC: 25 U/L (ref 12–78)
ANION GAP SERPL CALC-SCNC: 14 MMOL/L (ref 5–15)
AST SERPL-CCNC: 21 U/L (ref 15–37)
ATRIAL RATE: 86 BPM
BASOPHILS # BLD: 0.2 K/UL (ref 0–0.1)
BASOPHILS NFR BLD: 2 % (ref 0–1)
BILIRUB SERPL-MCNC: 0.4 MG/DL (ref 0.2–1)
BUN SERPL-MCNC: 16 MG/DL (ref 6–20)
BUN/CREAT SERPL: 16 (ref 12–20)
CALCIUM SERPL-MCNC: 8.3 MG/DL (ref 8.5–10.1)
CALCULATED P AXIS, ECG09: 47 DEGREES
CALCULATED R AXIS, ECG10: 65 DEGREES
CALCULATED T AXIS, ECG11: 20 DEGREES
CHLORIDE SERPL-SCNC: 101 MMOL/L (ref 97–108)
CO2 SERPL-SCNC: 23 MMOL/L (ref 21–32)
CREAT SERPL-MCNC: 1.01 MG/DL (ref 0.55–1.02)
DIAGNOSIS, 93000: NORMAL
DIFFERENTIAL METHOD BLD: ABNORMAL
EOSINOPHIL # BLD: 0.2 K/UL (ref 0–0.4)
EOSINOPHIL NFR BLD: 2 % (ref 0–7)
ERYTHROCYTE [DISTWIDTH] IN BLOOD BY AUTOMATED COUNT: 12.9 % (ref 11.5–14.5)
GLOBULIN SER CALC-MCNC: 3.8 G/DL (ref 2–4)
GLUCOSE SERPL-MCNC: 94 MG/DL (ref 65–100)
HCT VFR BLD AUTO: 34 % (ref 35–47)
HGB BLD-MCNC: 11.8 G/DL (ref 11.5–16)
IMM GRANULOCYTES # BLD AUTO: 0 K/UL
IMM GRANULOCYTES NFR BLD AUTO: 0 %
LIPASE SERPL-CCNC: 318 U/L (ref 73–393)
LYMPHOCYTES # BLD: 4.7 K/UL (ref 0.8–3.5)
LYMPHOCYTES NFR BLD: 54 % (ref 12–49)
MCH RBC QN AUTO: 32.1 PG (ref 26–34)
MCHC RBC AUTO-ENTMCNC: 34.7 G/DL (ref 30–36.5)
MCV RBC AUTO: 92.4 FL (ref 80–99)
MONOCYTES # BLD: 0.6 K/UL (ref 0–1)
MONOCYTES NFR BLD: 7 % (ref 5–13)
NEUTS SEG # BLD: 3.1 K/UL (ref 1.8–8)
NEUTS SEG NFR BLD: 35 % (ref 32–75)
NRBC # BLD: 0 K/UL (ref 0–0.01)
NRBC BLD-RTO: 0 PER 100 WBC
P-R INTERVAL, ECG05: 126 MS
PLATELET # BLD AUTO: 368 K/UL (ref 150–400)
PMV BLD AUTO: 9 FL (ref 8.9–12.9)
POTASSIUM SERPL-SCNC: 3 MMOL/L (ref 3.5–5.1)
PROT SERPL-MCNC: 7 G/DL (ref 6.4–8.2)
Q-T INTERVAL, ECG07: 384 MS
QRS DURATION, ECG06: 80 MS
QTC CALCULATION (BEZET), ECG08: 459 MS
RBC # BLD AUTO: 3.68 M/UL (ref 3.8–5.2)
RBC MORPH BLD: ABNORMAL
SODIUM SERPL-SCNC: 138 MMOL/L (ref 136–145)
VENTRICULAR RATE, ECG03: 86 BPM
WBC # BLD AUTO: 8.8 K/UL (ref 3.6–11)

## 2019-08-29 PROCEDURE — 36415 COLL VENOUS BLD VENIPUNCTURE: CPT

## 2019-08-29 PROCEDURE — 80053 COMPREHEN METABOLIC PANEL: CPT

## 2019-08-29 PROCEDURE — 74011250636 HC RX REV CODE- 250/636: Performed by: INTERNAL MEDICINE

## 2019-08-29 PROCEDURE — 74011250636 HC RX REV CODE- 250/636: Performed by: HOSPITALIST

## 2019-08-29 PROCEDURE — 85025 COMPLETE CBC W/AUTO DIFF WBC: CPT

## 2019-08-29 PROCEDURE — 74011250636 HC RX REV CODE- 250/636: Performed by: FAMILY MEDICINE

## 2019-08-29 PROCEDURE — 74011250637 HC RX REV CODE- 250/637: Performed by: FAMILY MEDICINE

## 2019-08-29 PROCEDURE — 83690 ASSAY OF LIPASE: CPT

## 2019-08-29 PROCEDURE — 65270000032 HC RM SEMIPRIVATE

## 2019-08-29 RX ORDER — HYDROMORPHONE HYDROCHLORIDE 1 MG/ML
0.2 INJECTION, SOLUTION INTRAMUSCULAR; INTRAVENOUS; SUBCUTANEOUS ONCE
Status: COMPLETED | OUTPATIENT
Start: 2019-08-29 | End: 2019-08-29

## 2019-08-29 RX ORDER — MORPHINE SULFATE 2 MG/ML
2 INJECTION, SOLUTION INTRAMUSCULAR; INTRAVENOUS
Status: DISCONTINUED | OUTPATIENT
Start: 2019-08-29 | End: 2019-08-30

## 2019-08-29 RX ORDER — SODIUM CHLORIDE, SODIUM LACTATE, POTASSIUM CHLORIDE, CALCIUM CHLORIDE 600; 310; 30; 20 MG/100ML; MG/100ML; MG/100ML; MG/100ML
75 INJECTION, SOLUTION INTRAVENOUS CONTINUOUS
Status: DISCONTINUED | OUTPATIENT
Start: 2019-08-29 | End: 2019-08-30 | Stop reason: HOSPADM

## 2019-08-29 RX ORDER — PROCHLORPERAZINE EDISYLATE 5 MG/ML
5 INJECTION INTRAMUSCULAR; INTRAVENOUS
Status: DISCONTINUED | OUTPATIENT
Start: 2019-08-29 | End: 2019-08-30

## 2019-08-29 RX ORDER — POTASSIUM CHLORIDE 7.45 MG/ML
10 INJECTION INTRAVENOUS
Status: COMPLETED | OUTPATIENT
Start: 2019-08-29 | End: 2019-08-29

## 2019-08-29 RX ORDER — POTASSIUM CHLORIDE 7.45 MG/ML
10 INJECTION INTRAVENOUS
Status: DISPENSED | OUTPATIENT
Start: 2019-08-29 | End: 2019-08-29

## 2019-08-29 RX ORDER — ONDANSETRON 2 MG/ML
4 INJECTION INTRAMUSCULAR; INTRAVENOUS
Status: DISCONTINUED | OUTPATIENT
Start: 2019-08-29 | End: 2019-08-30

## 2019-08-29 RX ORDER — MORPHINE SULFATE 2 MG/ML
2 INJECTION, SOLUTION INTRAMUSCULAR; INTRAVENOUS
Status: DISCONTINUED | OUTPATIENT
Start: 2019-08-29 | End: 2019-08-29

## 2019-08-29 RX ADMIN — HYDROMORPHONE HYDROCHLORIDE 1 MG: 1 INJECTION, SOLUTION INTRAMUSCULAR; INTRAVENOUS; SUBCUTANEOUS at 20:50

## 2019-08-29 RX ADMIN — HEPARIN SODIUM 5000 UNITS: 5000 INJECTION INTRAVENOUS; SUBCUTANEOUS at 21:01

## 2019-08-29 RX ADMIN — ONDANSETRON 4 MG: 2 INJECTION INTRAMUSCULAR; INTRAVENOUS at 22:58

## 2019-08-29 RX ADMIN — SODIUM CHLORIDE, SODIUM LACTATE, POTASSIUM CHLORIDE, AND CALCIUM CHLORIDE 125 ML/HR: 600; 310; 30; 20 INJECTION, SOLUTION INTRAVENOUS at 22:47

## 2019-08-29 RX ADMIN — MORPHINE SULFATE 2 MG: 10 INJECTION, SOLUTION INTRAMUSCULAR; INTRAVENOUS at 09:49

## 2019-08-29 RX ADMIN — POTASSIUM CHLORIDE 10 MEQ: 10 INJECTION, SOLUTION INTRAVENOUS at 06:44

## 2019-08-29 RX ADMIN — HEPARIN SODIUM 5000 UNITS: 5000 INJECTION INTRAVENOUS; SUBCUTANEOUS at 06:44

## 2019-08-29 RX ADMIN — MORPHINE SULFATE 2 MG: 10 INJECTION, SOLUTION INTRAMUSCULAR; INTRAVENOUS at 03:50

## 2019-08-29 RX ADMIN — POTASSIUM CHLORIDE 10 MEQ: 10 INJECTION, SOLUTION INTRAVENOUS at 17:52

## 2019-08-29 RX ADMIN — Medication 10 ML: at 06:45

## 2019-08-29 RX ADMIN — METOPROLOL TARTRATE 25 MG: 25 TABLET ORAL at 09:49

## 2019-08-29 RX ADMIN — Medication 10 ML: at 21:02

## 2019-08-29 RX ADMIN — AMLODIPINE BESYLATE 10 MG: 5 TABLET ORAL at 09:49

## 2019-08-29 RX ADMIN — HEPARIN SODIUM 5000 UNITS: 5000 INJECTION INTRAVENOUS; SUBCUTANEOUS at 15:22

## 2019-08-29 RX ADMIN — MORPHINE SULFATE 2 MG: 2 INJECTION, SOLUTION INTRAMUSCULAR; INTRAVENOUS at 18:27

## 2019-08-29 RX ADMIN — POTASSIUM CHLORIDE 10 MEQ: 10 INJECTION, SOLUTION INTRAVENOUS at 11:26

## 2019-08-29 RX ADMIN — MORPHINE SULFATE 2 MG: 2 INJECTION, SOLUTION INTRAMUSCULAR; INTRAVENOUS at 15:21

## 2019-08-29 RX ADMIN — ONDANSETRON 4 MG: 2 INJECTION INTRAMUSCULAR; INTRAVENOUS at 15:21

## 2019-08-29 RX ADMIN — METOPROLOL TARTRATE 25 MG: 25 TABLET ORAL at 21:01

## 2019-08-29 RX ADMIN — Medication 10 ML: at 15:22

## 2019-08-29 RX ADMIN — MORPHINE SULFATE 2 MG: 2 INJECTION, SOLUTION INTRAMUSCULAR; INTRAVENOUS at 22:51

## 2019-08-29 RX ADMIN — POTASSIUM CHLORIDE 10 MEQ: 10 INJECTION, SOLUTION INTRAVENOUS at 15:21

## 2019-08-29 RX ADMIN — SODIUM CHLORIDE, SODIUM LACTATE, POTASSIUM CHLORIDE, AND CALCIUM CHLORIDE 125 ML/HR: 600; 310; 30; 20 INJECTION, SOLUTION INTRAVENOUS at 06:44

## 2019-08-29 NOTE — PROGRESS NOTES
[facility]: MED RECONCILIATION    Comments/Recommendations:   Dicyclomine, ondansetron, and potassium were prescribed in the ED on 8/25/19 x 5-7 days. Patient reports she does not normally take these. Information obtained from: Patient    Allergies: Patient has no known allergies. Prior to Admission Medications:   Prior to Admission Medications   Prescriptions Last Dose Informant Patient Reported? Taking? amLODIPine (NORVASC) 10 mg tablet  Self No Yes   Sig: Take 1 Tab by mouth daily. cetirizine (ZYRTEC) 10 mg tablet  Self Yes Yes   Sig: Take 10 mg by mouth daily. chlorthalidone (HYGROTEN) 25 mg tablet  Self No Yes   Sig: Take 1 Tab by mouth daily. dicyclomine (BENTYL) 10 mg capsule  Self No Yes   Sig: Take 1 Cap by mouth four (4) times daily for 5 days. esomeprazole (NEXIUM 24HR) 20 mg capsule  Self No Yes   Sig: Take 1 Cap by mouth daily. metoprolol tartrate (LOPRESSOR) 25 mg tablet  Self No Yes   Sig: Take 1 Tab by mouth every twelve (12) hours. ondansetron (ZOFRAN ODT) 4 mg disintegrating tablet  Self No Yes   Sig: Take 1 Tab by mouth every eight (8) hours as needed for Nausea. potassium chloride SR (KLOR-CON 10) 10 mEq tablet  Self No Yes   Sig: Take 4 Tabs by mouth daily for 7 days.         Thank you,  Ralf Rucker, PharmD, BCPS  [phone]

## 2019-08-29 NOTE — PROGRESS NOTES
CM went to see patient patient unable to participate in assessment ask CM to return at a later time request the RN bring her some ice chips.     [address] [place] [address]  [phone]

## 2019-08-29 NOTE — PROGRESS NOTES
Coordination of Services in Progress.   IDR Rounds this am.   PCP appointment for 9-10-@ 11:00am     Pearl HESS RN   [phone]

## 2019-08-29 NOTE — PROGRESS NOTES
Hospitalist Progress Note    NAME: Silverio Cummings   :  1975   MRN:  [id number]       Assessment / Plan:  ASSESSMENT AND PLAN:  1. Abdominal pain. Elevated lipase. continue  IV fluids, treated for pancreatitis, consider repeating imaging studies if the patient has persistent elevated lipase,  or no improvement, fever, chills, or any septic symptoms. GI prophylaxis as well as pain management with morphine. 2.  Hypertension. We will start the patient on antihypertensive medication,continue home medication, monitor blood pressure. 3.  Intake and output. 4.  For hyperkalemia, continue IVF with KCL and KCL x3   5. Deep venous thrombosis prophylaxis, heparin. 6.  Gastrointestinal prophylaxis, Protonix. 7.  Consultation, consider surgery, GI consult if symptoms not resolving. 8.  Disposition, home when medically stable. 25.0 - 29.9 Overweight / Body mass index is 29.1 kg/m². Code status: Full  Prophylaxis: Hep SQ  Recommended Disposition: Home w/Family     Subjective:     Chief Complaint / Reason for Physician Visit  Deborah David has severe abdominal pain 8/10 \". Discussed with RN events overnight. Review of Systems:  Symptom Y/N Comments  Symptom Y/N Comments   Fever/Chills    Chest Pain     Poor Appetite    Edema     Cough    Abdominal Pain     Sputum    Joint Pain     SOB/ABRAHAM    Pruritis/Rash     Nausea/vomit    Tolerating PT/OT     Diarrhea    Tolerating Diet     Constipation    Other       Could NOT obtain due to:      Objective:     VITALS:   Last 24hrs VS reviewed since prior progress note.  Most recent are:  Patient Vitals for the past 24 hrs:   Temp Pulse Resp BP SpO2   19 0926 97.4 °F (36.3 °C) 76 16 118/83 100 %   19 0400 97.6 °F (36.4 °C) 68 16 106/72 98 %   19     100 %   19 1937 97.6 °F (36.4 °C) 92 14 117/81 100 %   19 1817  79 23 148/86 100 %   19 1816  84 21 148/86 100 %   19 1815  96 17  100 %   194  90 22  100 %   08/28/19 1813  86 21 (!) 169/104 100 %   08/28/19 1812  88 18  100 %   08/28/19 1811  90 22  100 %   08/28/19 1810  89 23  100 %   08/28/19 1809  89 23  100 %   08/28/19 1808  90 13  100 %   08/28/19 1807  93 22  100 %   08/28/19 1806  94 14  100 %   08/28/19 1805  (!) 107 19  100 %   08/28/19 1804  (!) 104 12  100 %   08/28/19 1803  (!) 103 12  100 %   08/28/19 1802  (!) 112 11  100 %   08/28/19 1801  (!) 103 14  100 %   08/28/19 1800  (!) 108 16 (!) 162/104 100 %   08/28/19 1759  (!) 104 11  100 %   08/28/19 1758  (!) 103 17  100 %   08/28/19 1757    (!) 154/101    08/28/19 1614 97.6 °F (36.4 °C) (!) 112 16 (!) 148/110 99 %       Intake/Output Summary (Last 24 hours) at 8/29/2019 1227  Last data filed at 8/28/2019 1937  Gross per 24 hour   Intake 0 ml   Output    Net 0 ml        PHYSICAL EXAM:  General: WD, WN. Alert, cooperative, no acute distress    EENT:  EOMI. Anicteric sclerae. MMM  Resp:  CTA bilaterally, no wheezing or rales. No accessory muscle use  CV:  Regular  rhythm,  No edema  GI:  Soft, Non distended, upper abdomen  tender.  +Bowel sounds  Neurologic:  Alert and oriented X 3, normal speech,   Psych:   Good insight. Not anxious nor agitated  Skin:  No rashes. No jaundice    Reviewed most current lab test results and cultures  YES  Reviewed most current radiology test results   YES  Review and summation of old records today    NO  Reviewed patient's current orders and MAR    YES  PMH/SH reviewed - no change compared to H&P  ________________________________________________________________________  Care Plan discussed with:    Comments   Patient     Family      RN     Care Manager     Consultant                        Multidiciplinary team rounds were held today with , nursing, pharmacist and clinical coordinator. Patient's plan of care was discussed; medications were reviewed and discharge planning was addressed. ________________________________________________________________________  Total NON critical care TIME:  35 Minutes    Total CRITICAL CARE TIME Spent:   Minutes non procedure based      Comments   >50% of visit spent in counseling and coordination of care     ________________________________________________________________________  Jose Antonio Hunt MD     Procedures: see electronic medical records for all procedures/Xrays and details which were not copied into this note but were reviewed prior to creation of Plan. LABS:  I reviewed today's most current labs and imaging studies.   Pertinent labs include:  Recent Labs     08/29/19 0402 08/28/19 1646   WBC 8.8 10.6   HGB 11.8 15.0   HCT 34.0* 41.8    453*     Recent Labs     08/29/19  0402 08/28/19  1715 08/28/19 1646     --  135*   K 3.0*  --  3.2*     --  95*   CO2 23  --  24   GLU 94  --  108*   BUN 16  --  20   CREA 1.01  --  1.20*   CA 8.3*  --  10.2*   MG  --  2.3 2.1   ALB 3.2*  --  4.3   TBILI 0.4  --  0.4   SGOT 21  --  28   ALT 25  --  32       Signed: Jose Antonio Hunt MD

## 2019-08-29 NOTE — H&P
[facility]  HISTORY AND PHYSICAL    Name:  Gregory Garcia  MR#:  [id number]  :  1975  ACCOUNT #:  [de-identified]  ADMIT DATE:  2019    CHIEF COMPLAINT:  Nausea, vomiting, and abdominal pain. HISTORY OF PRESENT ILLNESS:  The patient is a 70-year-old female with past medical history significant for polycystic kidney disease, hypertension, presented to the emergency room with chief complaint of continuous vomiting, diarrhea and general abdominal pain. The patient had presented to the emergency room with similar symptoms. At that time, the patient had a CT scan, IV fluids, antiemetic and analgesics. Symptoms resolved, the patient was discharged home. The patient's symptoms persisted so had to come back to the emergency room. She has been vomiting multiple times a day, with multiple loose bowel movements. the patient denies recent antibiotic use or any sick contact. The patient denies any blood in the vomitus or diarrhea. Hospitalist consulted to admit the patient. The patient's imaging studies were nonsignificant which was three days ago, CT scan of the abdomen showing no acute intraabdominal pathology. The patient returned today, no repeat abdominal ultrasound or CT scan, but white blood cells are less than at presentation on 2019. Chemistry is significant for elevated lipase and magnesium was normal.  The patient is still hypokalemic. The patient was given potassium to take at home when she was here three days ago. Hospitalist consulted inpatient for pancreatitis, the patient has vomiting and diarrhea.     PAST MEDICAL HISTORY:    Past Medical History:   Diagnosis Date    Hypertension     Kidney anomaly, congenital     Tubal pregnancy          PAST SURGICAL HISTORY:    Past Surgical History:   Procedure Laterality Date    HX CHOLECYSTECTOMY      HX GYN  10/07/2013    cyst removed    HX HYSTERECTOMY  10/7/2013    partial hysterectomy         SOCIAL HISTORY: Social History     Socioeconomic History    Marital status: SINGLE     Spouse name: Not on file    Number of children: Not on file    Years of education: Not on file    Highest education level: Not on file   Occupational History    Not on file   Social Needs    Financial resource strain: Not on file    Food insecurity:     Worry: Not on file     Inability: Not on file    Transportation needs:     Medical: Not on file     Non-medical: Not on file   Tobacco Use    Smoking status: Current Some Day Smoker     Packs/day: 0.25     Years: 25.00     Pack years: 6.25    Smokeless tobacco: Never Used    Tobacco comment: 1-3 day   Substance and Sexual Activity    Alcohol use: Yes     Comment: occasionally/socially    Drug use: Yes     Types: Marijuana     Comment: occ    Sexual activity: Yes     Partners: Male   Lifestyle    Physical activity:     Days per week: Not on file     Minutes per session: Not on file    Stress: Not on file   Relationships    Social connections:     Talks on phone: Not on file     Gets together: Not on file     Attends Yarsanism service: Not on file     Active member of club or organization: Not on file     Attends meetings of clubs or organizations: Not on file     Relationship status: Not on file    Intimate partner violence:     Fear of current or ex partner: Not on file     Emotionally abused: Not on file     Physically abused: Not on file     Forced sexual activity: Not on file   Other Topics Concern    Not on file   Social History Narrative    Not on file         FAMILY HISTORY:    Family History   Problem Relation Age of Onset    Hypertension Mother     Hypertension Father     Hypertension Sister          ALLERGIES:  No Known Allergies      PRIOR TO ADMISSION MEDICATIONS:    No current facility-administered medications on file prior to encounter.       Current Outpatient Medications on File Prior to Encounter   Medication Sig Dispense Refill    ondansetron (ZOFRAN ODT) 4 mg disintegrating tablet Take 1 Tab by mouth every eight (8) hours as needed for Nausea. 20 Tab 0    potassium chloride SR (KLOR-CON 10) 10 mEq tablet Take 4 Tabs by mouth daily for 7 days. 28 Tab 0    amLODIPine (NORVASC) 10 mg tablet Take 1 Tab by mouth daily. 90 Tab 1    metoprolol tartrate (LOPRESSOR) 25 mg tablet Take 1 Tab by mouth every twelve (12) hours. 180 Tab 1    chlorthalidone (HYGROTEN) 25 mg tablet Take 1 Tab by mouth daily. 90 Tab 2    esomeprazole (NEXIUM 24HR) 20 mg capsule Take 1 Cap by mouth daily. 30 Cap 2    cetirizine (ZYRTEC) 10 mg tablet Take 10 mg by mouth daily.  dicyclomine (BENTYL) 10 mg capsule Take 1 Cap by mouth four (4) times daily for 5 days. 20 Cap 0         REVIEW OF SYSTEMS:  Review of Systems   Constitutional: Negative for chills, fever and weight loss. Respiratory: Negative for cough and shortness of breath. Cardiovascular: Negative for chest pain and palpitations. Gastrointestinal: Positive for abdominal pain, diarrhea, heartburn, nausea and vomiting. Negative for blood in stool and melena. Musculoskeletal: Negative for back pain, falls, joint pain, myalgias and neck pain. Skin: Negative for rash. Neurological: Negative for dizziness, tingling, tremors, sensory change, focal weakness, weakness and headaches. Psychiatric/Behavioral: Negative for depression, hallucinations, substance abuse and suicidal ideas. The patient is not nervous/anxious and does not have insomnia. PHYSICAL EXAMINATION:   Visit Vitals  /72   Pulse 68   Temp 97.6 °F (36.4 °C)   Resp 16   Ht 5' 1\" (1.549 m)   Wt 69.9 kg (154 lb)   SpO2 98%   Breastfeeding? No   BMI 29.10 kg/m²     Physical Exam   Constitutional: She is oriented to person, place, and time. No distress. HENT:   Head: Normocephalic and atraumatic. Eyes: Right eye exhibits no discharge. Left eye exhibits no discharge. No scleral icterus. Neck: No tracheal deviation present.  No thyromegaly present. Cardiovascular: Normal rate, regular rhythm, normal heart sounds and intact distal pulses. Exam reveals no gallop. No murmur heard. Pulmonary/Chest: No respiratory distress. She has no wheezes. She has no rales. She exhibits no tenderness. Abdominal: She exhibits no distension and no mass. There is tenderness. There is no rebound and no guarding. Musculoskeletal: She exhibits no edema or tenderness. Lymphadenopathy:     She has no cervical adenopathy. Neurological: She is alert and oriented to person, place, and time. She displays normal reflexes. No cranial nerve deficit. She exhibits normal muscle tone. Coordination normal. GCS score is 15. Skin: No rash noted. She is not diaphoretic. No erythema. No pallor. Psychiatric: Affect and judgment normal.       IMAGING:  CT scan of abdomen 8.25.19: no acute finding    LABORATORIES:   Recent Results (from the past 24 hour(s))   CBC WITH AUTOMATED DIFF    Collection Time: 08/28/19  4:46 PM   Result Value Ref Range    WBC 10.6 3.6 - 11.0 K/uL    RBC 4.61 3.80 - 5.20 M/uL    HGB 15.0 11.5 - 16.0 g/dL    HCT 41.8 35.0 - 47.0 %    MCV 90.7 80.0 - 99.0 FL    MCH 32.5 26.0 - 34.0 PG    MCHC 35.9 30.0 - 36.5 g/dL    RDW 12.6 11.5 - 14.5 %    PLATELET 867 (H) 832 - 400 K/uL    MPV 8.6 (L) 8.9 - 12.9 FL    NRBC 0.0 0  WBC    ABSOLUTE NRBC 0.00 0.00 - 0.01 K/uL    NEUTROPHILS 68 32 - 75 %    LYMPHOCYTES 25 12 - 49 %    MONOCYTES 5 5 - 13 %    EOSINOPHILS 1 0 - 7 %    BASOPHILS 1 0 - 1 %    IMMATURE GRANULOCYTES 0 0.0 - 0.5 %    ABS. NEUTROPHILS 7.2 1.8 - 8.0 K/UL    ABS. LYMPHOCYTES 2.7 0.8 - 3.5 K/UL    ABS. MONOCYTES 0.6 0.0 - 1.0 K/UL    ABS. EOSINOPHILS 0.1 0.0 - 0.4 K/UL    ABS. BASOPHILS 0.1 0.0 - 0.1 K/UL    ABS. IMM.  GRANS. 0.0 0.00 - 0.04 K/UL    DF AUTOMATED     METABOLIC PANEL, COMPREHENSIVE    Collection Time: 08/28/19  4:46 PM   Result Value Ref Range    Sodium 135 (L) 136 - 145 mmol/L    Potassium 3.2 (L) 3.5 - 5.1 mmol/L Chloride 95 (L) 97 - 108 mmol/L    CO2 24 21 - 32 mmol/L    Anion gap 16 (H) 5 - 15 mmol/L    Glucose 108 (H) 65 - 100 mg/dL    BUN 20 6 - 20 MG/DL    Creatinine 1.20 (H) 0.55 - 1.02 MG/DL    BUN/Creatinine ratio 17 12 - 20      GFR est AA 59 (L) >60 ml/min/1.73m2    GFR est non-AA 49 (L) >60 ml/min/1.73m2    Calcium 10.2 (H) 8.5 - 10.1 MG/DL    Bilirubin, total 0.4 0.2 - 1.0 MG/DL    ALT (SGPT) 32 12 - 78 U/L    AST (SGOT) 28 15 - 37 U/L    Alk.  phosphatase 104 45 - 117 U/L    Protein, total 9.3 (H) 6.4 - 8.2 g/dL    Albumin 4.3 3.5 - 5.0 g/dL    Globulin 5.0 (H) 2.0 - 4.0 g/dL    A-G Ratio 0.9 (L) 1.1 - 2.2     DRUG SCREEN, URINE    Collection Time: 08/28/19  4:46 PM   Result Value Ref Range    AMPHETAMINES NEGATIVE  NEG      BARBITURATES NEGATIVE  NEG      BENZODIAZEPINES NEGATIVE  NEG      COCAINE NEGATIVE  NEG      METHADONE NEGATIVE  NEG      OPIATES NEGATIVE  NEG      PCP(PHENCYCLIDINE) NEGATIVE  NEG      THC (TH-CANNABINOL) POSITIVE (A) NEG      Drug screen comment (NOTE)    LACTIC ACID    Collection Time: 08/28/19  4:46 PM   Result Value Ref Range    Lactic acid 1.2 0.4 - 2.0 MMOL/L   LIPASE    Collection Time: 08/28/19  4:46 PM   Result Value Ref Range    Lipase 441 (H) 73 - 393 U/L   MAGNESIUM    Collection Time: 08/28/19  4:46 PM   Result Value Ref Range    Magnesium 2.1 1.6 - 2.4 mg/dL   MAGNESIUM    Collection Time: 08/28/19  5:15 PM   Result Value Ref Range    Magnesium 2.3 1.6 - 2.4 mg/dL   EKG, 12 LEAD, INITIAL    Collection Time: 08/28/19  5:49 PM   Result Value Ref Range    Ventricular Rate 86 BPM    Atrial Rate 86 BPM    P-R Interval 126 ms    QRS Duration 80 ms    Q-T Interval 384 ms    QTC Calculation (Bezet) 459 ms    Calculated P Axis 47 degrees    Calculated R Axis 65 degrees    Calculated T Axis 20 degrees    Diagnosis       Normal sinus rhythm  Possible Left atrial enlargement  Borderline ECG  When compared with ECG of 05-OCT-2018 16:58,  No significant change was found     CBC WITH AUTOMATED DIFF    Collection Time: 08/29/19  4:02 AM   Result Value Ref Range    WBC 8.8 3.6 - 11.0 K/uL    RBC 3.68 (L) 3.80 - 5.20 M/uL    HGB 11.8 11.5 - 16.0 g/dL    HCT 34.0 (L) 35.0 - 47.0 %    MCV 92.4 80.0 - 99.0 FL    MCH 32.1 26.0 - 34.0 PG    MCHC 34.7 30.0 - 36.5 g/dL    RDW 12.9 11.5 - 14.5 %    PLATELET 188 692 - 092 K/uL    MPV 9.0 8.9 - 12.9 FL    NRBC 0.0 0  WBC    ABSOLUTE NRBC 0.00 0.00 - 0.01 K/uL    NEUTROPHILS 35 32 - 75 %    LYMPHOCYTES 54 (H) 12 - 49 %    MONOCYTES 7 5 - 13 %    EOSINOPHILS 2 0 - 7 %    BASOPHILS 2 (H) 0 - 1 %    IMMATURE GRANULOCYTES 0 %    ABS. NEUTROPHILS 3.1 1.8 - 8.0 K/UL    ABS. LYMPHOCYTES 4.7 (H) 0.8 - 3.5 K/UL    ABS. MONOCYTES 0.6 0.0 - 1.0 K/UL    ABS. EOSINOPHILS 0.2 0.0 - 0.4 K/UL    ABS. BASOPHILS 0.2 (H) 0.0 - 0.1 K/UL    ABS. IMM. GRANS. 0.0 K/UL    DF MANUAL      RBC COMMENTS NORMOCYTIC, NORMOCHROMIC     LIPASE    Collection Time: 08/29/19  4:02 AM   Result Value Ref Range    Lipase 318 73 - 520 U/L   METABOLIC PANEL, COMPREHENSIVE    Collection Time: 08/29/19  4:02 AM   Result Value Ref Range    Sodium 138 136 - 145 mmol/L    Potassium 3.0 (L) 3.5 - 5.1 mmol/L    Chloride 101 97 - 108 mmol/L    CO2 23 21 - 32 mmol/L    Anion gap 14 5 - 15 mmol/L    Glucose 94 65 - 100 mg/dL    BUN 16 6 - 20 MG/DL    Creatinine 1.01 0.55 - 1.02 MG/DL    BUN/Creatinine ratio 16 12 - 20      GFR est AA >60 >60 ml/min/1.73m2    GFR est non-AA 59 (L) >60 ml/min/1.73m2    Calcium 8.3 (L) 8.5 - 10.1 MG/DL    Bilirubin, total 0.4 0.2 - 1.0 MG/DL    ALT (SGPT) 25 12 - 78 U/L    AST (SGOT) 21 15 - 37 U/L    Alk. phosphatase 77 45 - 117 U/L    Protein, total 7.0 6.4 - 8.2 g/dL    Albumin 3.2 (L) 3.5 - 5.0 g/dL    Globulin 3.8 2.0 - 4.0 g/dL    A-G Ratio 0.8 (L) 1.1 - 2.2           ASSESSMENT AND PLAN:  1. Abdominal pain. Elevated lipase.   We started the patient on IV fluids, treated for pancreatitis, consider repeating imaging studies if the patient has persistent elevated lipase,  no improvement, fever, chills, or any septic symptoms. PPF or GI prophylaxis as well as pain management with morphine. 2.  Hypertension. We will start the patient on antihypertensive medication, clonidine patch and home medication, monitor blood pressure. 3.  Intake and output. 4.  For hyperkalemia, we will start the patient on IV fluids, repeat potassium at 12 noon. 5.  Deep venous thrombosis prophylaxis, heparin. 6.  Gastrointestinal prophylaxis, Protonix. 7.  Consultation, consider surgery, GI consult if symptoms not resolving. 8.  Disposition, home when medically stable.         Juan Diego Villa MD      [id number]  D:  08/28/2019 19:57  T:  08/28/2019 23:33  JOB #:  [id number]

## 2019-08-29 NOTE — PROGRESS NOTES
Notified the hospitalist in regards to the patient's K+ at 3.0 and hemoglobin at 11.8 from 15. New orders received.  Lactated ringers at 125ml/hr continuously and Potassium chloride 10meq in 100ml

## 2019-08-29 NOTE — PROGRESS NOTES
Spiritual Care Assessment/Progress Note  [facility]      NAME: Derik Lambert      MRN: [id number]  AGE: 38 y.o. SEX: female  Nondenominational Affiliation: Zoroastrianism   Language: English     8/29/2019     Total Time (in minutes): 19     Spiritual Assessment begun in [address] through conversation with:         [x]Patient        [] Family    [] Friend(s)        Reason for Consult: Initial/Spiritual assessment, patient floor     Spiritual beliefs: (Please include comment if needed)     [x] Identifies with a brendan tradition:         [] Supported by a brendan community:            [] Claims no spiritual orientation:           [] Seeking spiritual identity:                [] Adheres to an individual form of spirituality:           [] Not able to assess:                           Identified resources for coping:      [x] Prayer                               [] Music                  [] Guided Imagery     [x] Family/friends                 [] Pet visits     [] Devotional reading                         [] Unknown     [] Other:                                          Interventions offered during this visit: (See comments for more details)    Patient Interventions: Affirmation of brendan, Affirmation of emotions/emotional suffering, Iconic (affirming the presence of God/Higher Power), Prayer (assurance of)           Plan of Care:     [] Support spiritual and/or cultural needs    [] Support AMD and/or advance care planning process      [] Support grieving process   [] Coordinate Rites and/or Rituals    [] Coordination with community clergy   [x] No spiritual needs identified at this time   [] Detailed Plan of Care below (See Comments)  [] Make referral to Music Therapy  [] Make referral to Pet Therapy     [] Make referral to Addiction services  [] Make referral to [facility]  [] Make referral to Spiritual Care Partner  [] No future visits requested        [] Follow up visits as needed     Comments:   The patient was visited in [facility] to make a spiritual assessment. The patient was ending a phone conversation with her daughter as I knocked and entered the room. The patient was alone and did not have visitors during the  visit. Patient shared little about her spirituality, brendan, or beliefs. Patient shared about her family history and her anticipated discharge and return to work. Provided ministry of presence and a conversational relationship. Advised of  availability, although patient expects to be discharged today.  will follow as able and/or needed. Rev.  Seth Clement MDiv   For  Assistance Page [phone])

## 2019-08-29 NOTE — PROGRESS NOTES
Spoke to pharmacy in regards to clonidine patch not being available and also  Made pharmacy and nursing supervisor aware that patient was stable a current time. If an hypertension episode occurred, this nurse assured them that the hospitalist would be notified.

## 2019-08-29 NOTE — PROGRESS NOTES
Bedside and Verbal shift change report given to Marisa Lemus RN (oncoming nurse) by Delgado Garcia (offgoing nurse). Report included the following information SBAR, Kardex, Intake/Output, MAR and Recent Results.

## 2019-08-30 VITALS
HEIGHT: 61 IN | OXYGEN SATURATION: 100 % | RESPIRATION RATE: 16 BRPM | WEIGHT: 154 LBS | BODY MASS INDEX: 29.07 KG/M2 | HEART RATE: 68 BPM | TEMPERATURE: 96.5 F | DIASTOLIC BLOOD PRESSURE: 88 MMHG | SYSTOLIC BLOOD PRESSURE: 123 MMHG

## 2019-08-30 LAB
ALBUMIN SERPL-MCNC: 3 G/DL (ref 3.5–5)
ALBUMIN/GLOB SERPL: 0.9 {RATIO} (ref 1.1–2.2)
ALP SERPL-CCNC: 70 U/L (ref 45–117)
ALT SERPL-CCNC: 26 U/L (ref 12–78)
ANION GAP SERPL CALC-SCNC: 11 MMOL/L (ref 5–15)
AST SERPL-CCNC: 24 U/L (ref 15–37)
BASOPHILS # BLD: 0 K/UL (ref 0–0.1)
BASOPHILS NFR BLD: 0 % (ref 0–1)
BILIRUB SERPL-MCNC: 0.3 MG/DL (ref 0.2–1)
BUN SERPL-MCNC: 10 MG/DL (ref 6–20)
BUN/CREAT SERPL: 10 (ref 12–20)
CALCIUM SERPL-MCNC: 8.4 MG/DL (ref 8.5–10.1)
CHLORIDE SERPL-SCNC: 103 MMOL/L (ref 97–108)
CO2 SERPL-SCNC: 25 MMOL/L (ref 21–32)
CREAT SERPL-MCNC: 0.97 MG/DL (ref 0.55–1.02)
DIFFERENTIAL METHOD BLD: ABNORMAL
EOSINOPHIL # BLD: 0.2 K/UL (ref 0–0.4)
EOSINOPHIL NFR BLD: 3 % (ref 0–7)
ERYTHROCYTE [DISTWIDTH] IN BLOOD BY AUTOMATED COUNT: 13.1 % (ref 11.5–14.5)
GLOBULIN SER CALC-MCNC: 3.5 G/DL (ref 2–4)
GLUCOSE SERPL-MCNC: 93 MG/DL (ref 65–100)
HCT VFR BLD AUTO: 32.5 % (ref 35–47)
HGB BLD-MCNC: 11.3 G/DL (ref 11.5–16)
IMM GRANULOCYTES # BLD AUTO: 0 K/UL
IMM GRANULOCYTES NFR BLD AUTO: 0 %
LIPASE SERPL-CCNC: 265 U/L (ref 73–393)
LYMPHOCYTES # BLD: 4.5 K/UL (ref 0.8–3.5)
LYMPHOCYTES NFR BLD: 66 % (ref 12–49)
MCH RBC QN AUTO: 32.7 PG (ref 26–34)
MCHC RBC AUTO-ENTMCNC: 34.8 G/DL (ref 30–36.5)
MCV RBC AUTO: 93.9 FL (ref 80–99)
MONOCYTES # BLD: 0.4 K/UL (ref 0–1)
MONOCYTES NFR BLD: 6 % (ref 5–13)
NEUTS SEG # BLD: 1.7 K/UL (ref 1.8–8)
NEUTS SEG NFR BLD: 25 % (ref 32–75)
NRBC # BLD: 0 K/UL (ref 0–0.01)
NRBC BLD-RTO: 0 PER 100 WBC
PLATELET # BLD AUTO: 344 K/UL (ref 150–400)
PMV BLD AUTO: 8.5 FL (ref 8.9–12.9)
POTASSIUM SERPL-SCNC: 3.4 MMOL/L (ref 3.5–5.1)
PROT SERPL-MCNC: 6.5 G/DL (ref 6.4–8.2)
RBC # BLD AUTO: 3.46 M/UL (ref 3.8–5.2)
RBC MORPH BLD: ABNORMAL
SODIUM SERPL-SCNC: 139 MMOL/L (ref 136–145)
WBC # BLD AUTO: 6.8 K/UL (ref 3.6–11)

## 2019-08-30 PROCEDURE — 74011000250 HC RX REV CODE- 250: Performed by: FAMILY MEDICINE

## 2019-08-30 PROCEDURE — 80053 COMPREHEN METABOLIC PANEL: CPT

## 2019-08-30 PROCEDURE — 83690 ASSAY OF LIPASE: CPT

## 2019-08-30 PROCEDURE — 74011250636 HC RX REV CODE- 250/636: Performed by: FAMILY MEDICINE

## 2019-08-30 PROCEDURE — C9113 INJ PANTOPRAZOLE SODIUM, VIA: HCPCS | Performed by: FAMILY MEDICINE

## 2019-08-30 PROCEDURE — 74011250636 HC RX REV CODE- 250/636: Performed by: INTERNAL MEDICINE

## 2019-08-30 PROCEDURE — 74011250637 HC RX REV CODE- 250/637: Performed by: FAMILY MEDICINE

## 2019-08-30 PROCEDURE — 36415 COLL VENOUS BLD VENIPUNCTURE: CPT

## 2019-08-30 PROCEDURE — 85025 COMPLETE CBC W/AUTO DIFF WBC: CPT

## 2019-08-30 PROCEDURE — 74011250636 HC RX REV CODE- 250/636: Performed by: HOSPITALIST

## 2019-08-30 RX ADMIN — MORPHINE SULFATE 2 MG: 2 INJECTION, SOLUTION INTRAMUSCULAR; INTRAVENOUS at 05:44

## 2019-08-30 RX ADMIN — SODIUM CHLORIDE 40 MG: 9 INJECTION INTRAMUSCULAR; INTRAVENOUS; SUBCUTANEOUS at 09:29

## 2019-08-30 RX ADMIN — Medication 10 ML: at 05:28

## 2019-08-30 RX ADMIN — MORPHINE SULFATE 2 MG: 2 INJECTION, SOLUTION INTRAMUSCULAR; INTRAVENOUS at 01:13

## 2019-08-30 RX ADMIN — AMLODIPINE BESYLATE 10 MG: 5 TABLET ORAL at 09:28

## 2019-08-30 RX ADMIN — HEPARIN SODIUM 5000 UNITS: 5000 INJECTION INTRAVENOUS; SUBCUTANEOUS at 05:19

## 2019-08-30 RX ADMIN — MORPHINE SULFATE 2 MG: 2 INJECTION, SOLUTION INTRAMUSCULAR; INTRAVENOUS at 03:40

## 2019-08-30 RX ADMIN — METOPROLOL TARTRATE 25 MG: 25 TABLET ORAL at 09:28

## 2019-08-30 RX ADMIN — HEPARIN SODIUM 5000 UNITS: 5000 INJECTION INTRAVENOUS; SUBCUTANEOUS at 15:56

## 2019-08-30 RX ADMIN — SODIUM CHLORIDE, SODIUM LACTATE, POTASSIUM CHLORIDE, AND CALCIUM CHLORIDE 125 ML/HR: 600; 310; 30; 20 INJECTION, SOLUTION INTRAVENOUS at 06:53

## 2019-08-30 NOTE — PROGRESS NOTES
Patient's \"boyfriend\" showed up around [phone] and appeared groggy and unable to walk straight. Upon RN entering the patient's room, the boyfriend was asleep in the bed. When patient told her boyfriend to \"get up and sit in the chair\", he was very unsteady on his feet, unable to open eyes all the way, and had his pants half undone. Patient stated to RN that \"his mom just  yesterday and that he's inebriated. \". I asked the boyfriend if he had driven to the hospital and he stated \"yes\" so I told him \"I do not feel comfortable with you driving home like this\". The patient did not want him to stay and sleep in the room, so we agreed that she would call her boyfriend a cab. When the [facility] arrived, RN wheeled patient's boyfriend down to the ER, along with the house supervisor, and , and RN assisted him safely into the cab.   Lauren Escobar RN

## 2019-08-30 NOTE — PROGRESS NOTES
Patient c/o 8/10 abdominal pain, unrelieved by Morphine q3h. Paged hospitalist who ordered 1x dose Dilaudid and changed PRN Morphine to q2h. Patient has received Morphine 4x this shift, and still states that the pain is [employer] relieved for the first hour after each dose\".         Chante WHALEY

## 2019-08-30 NOTE — PROGRESS NOTES
Patient's diet order on 8/29 stated \"NPO w/ sips of clears starting at 0000 on 8/30/19\". Because of this, dietary sent up a dinner tray and patient \"ate mashed potatos with gravy, green beans, and bread\" before the dayshift RN noticed the mistake. RN stated in shift report that \"patient orders have been NPO this whole time\", but after re-checking the orders, the only diet order was to start NPO at 0000. Patient received Zofran once for nausea and Morphine order was increased to q2h for pain, as q3h was not lasting long enough for breakthrough pain. Informed patient that current \"abdominal discomfort may be related to eating dinner when she was supposed to be on bowel rest\". Patient agreed to follow current diet of NPO w/ sips of clears going forward.   Samantha Nunez RN

## 2019-08-30 NOTE — CDMP QUERY
Patient admitted with N/V, treated for pancreatitis noted to have Low serum potassium,  If possible, please document in progress notes and d/c summary if you are evaluating and/or treating any of the following:     Hypokalemia POA   Other, please specify   Unable to Determine    The medical record reflects the following:      Risk Factors: N/V x 5 days PTA; pancreatitis (elevated lipase)       Clinical Indicators:   8/28 K 3.2  8/29 K 3.0  8/30 K 3.4    Hyperkalemia noted per progress note      Treatment: Monitor daily labs, IVFs with KCL; KCL supplements as ordered    Thank you,  Eugene Dutta, MSN, [address] [place] [address]

## 2019-08-30 NOTE — DISCHARGE SUMMARY
Hospitalist Discharge Summary     Patient ID:  Libby Mcconnell  [id number]  65 y.o.  1975  8/28/2019    PCP on record: Jignesh Guadarrama MD    Admit date: 8/28/2019  Discharge date and time: 8/30/2019    DISCHARGE DIAGNOSIS:  Pancreatitis   abd pain    CONSULTATIONS:  IP CONSULT TO HOSPITALIST    Excerpted HPI from H&P of Nirmal Cho MD:  HISTORY OF PRESENT ILLNESS:  The patient is a 71-year-old female with past medical history significant for polycystic kidney disease, hypertension, presented to the emergency room with chief complaint of continuous vomiting, diarrhea and general abdominal pain. The patient had presented to the emergency room with similar symptoms. At that time, the patient had a CT scan, IV fluids, antiemetic and analgesics. Symptoms resolved, the patient was discharged home. The patient's symptoms persisted so had to come back to the emergency room. She has been vomiting multiple times a day, with multiple loose bowel movements. the patient denies recent antibiotic use or any sick contact. The patient denies any blood in the vomitus or diarrhea.     Hospitalist consulted to admit the patient. The patient's imaging studies were nonsignificant which was three days ago, CT scan of the abdomen showing no acute intraabdominal pathology. The patient returned today, no repeat abdominal ultrasound or CT scan, but white blood cells are less than at presentation on 08/25/2019. Chemistry is significant for elevated lipase and magnesium was normal.  The patient is still hypokalemic. The patient was given potassium to take at home when she was here three days ago.     Hospitalist consulted inpatient for pancreatitis, the patient has vomiting and diarrhea.       ______________________________________________________________________  DISCHARGE SUMMARY/HOSPITAL COURSE:  for full details see H&P, daily progress notes, labs, consult notes.    ASSESSMENT AND PLAN:  1.  Abdominal pain.  Elevated lipase.  continue  IV fluids, treated for pancreatitis, consider repeating imaging studies if the patient has persistent elevated lipase,  or no improvement, fever, chills, or any septic symptoms. GI prophylaxis as well as pain management with morphine. ADVANCE DIET TODAY   2.  Hypertension.  We will start the patient on antihypertensive medication,continue home medication, monitor blood pressure. 3.  Intake and output. 4.  For hyperkalemia, continue IVF with KCL and KCL x3   5.  Deep venous thrombosis prophylaxis, heparin. 6.  Gastrointestinal prophylaxis, Protonix. 7.  Consultation, consider surgery, GI consult if symptoms not resolving. 8.  Disposition, home when medically stable.        25.0 - 29.9 Overweight / Body mass index is 29.1 kg/m².     Code status: Full  Prophylaxis: Hep SQ  Recommended Disposition: Home w/Family          _______________________________________________________________________  Patient seen and examined by me on discharge day. Pertinent Findings:  Gen:    Not in distress  Chest: Clear lungs  CVS:   Regular rhythm. No edema  Abd:  Soft, not distended, not tender  Neuro:  Alert, oriented x4  _______________________________________________________________________  DISCHARGE MEDICATIONS:   Current Discharge Medication List      CONTINUE these medications which have NOT CHANGED    Details   ondansetron (ZOFRAN ODT) 4 mg disintegrating tablet Take 1 Tab by mouth every eight (8) hours as needed for Nausea. Qty: 20 Tab, Refills: 0      dicyclomine (BENTYL) 10 mg capsule Take 1 Cap by mouth four (4) times daily for 5 days. Qty: 20 Cap, Refills: 0      potassium chloride SR (KLOR-CON 10) 10 mEq tablet Take 4 Tabs by mouth daily for 7 days. Qty: 28 Tab, Refills: 0      amLODIPine (NORVASC) 10 mg tablet Take 1 Tab by mouth daily. Qty: 90 Tab, Refills: 1    Associated Diagnoses: Essential hypertension;  Polycystic kidney disease, autosomal dominant      metoprolol tartrate (LOPRESSOR) 25 mg tablet Take 1 Tab by mouth every twelve (12) hours. Qty: 180 Tab, Refills: 1    Associated Diagnoses: Essential hypertension; Polycystic kidney disease, autosomal dominant      chlorthalidone (HYGROTEN) 25 mg tablet Take 1 Tab by mouth daily. Qty: 90 Tab, Refills: 2    Associated Diagnoses: Essential hypertension; Polycystic kidney disease, autosomal dominant      esomeprazole (NEXIUM 24HR) 20 mg capsule Take 1 Cap by mouth daily. Qty: 30 Cap, Refills: 2      cetirizine (ZYRTEC) 10 mg tablet Take 10 mg by mouth daily. Patient Follow Up Instructions: Activity: Activity as tolerated  Diet: Resume previous diet  Wound Care: None needed    Follow-up with PCP and GI  in 2 weeks.   Follow-up tests/labs NOT pending MRCP if not resolved   Follow-up Information     Follow up With Specialties Details Why Contact Tatiana Marin MD Internal Medicine On 9/10/2019 please keep your appointment 9-10-@ 11:00am  [address]  [phone]          ________________________________________________________________    Risk of deterioration: Low    Condition at Discharge:  Stable  __________________________________________________________________    Disposition  Home with family, no needs    ____________________________________________________________________    Code Status: Full Code  ___________________________________________________________________      Total time in minutes spent coordinating this discharge (includes going over instructions, follow-up, prescriptions, and preparing report for sign off to her PCP) :  35 minutes    Signed:  Milo Curling, MD

## 2019-08-30 NOTE — PROGRESS NOTES
Reason for Admission:      presents via private vehicle to the ED with cc of continued vomiting, diarrhea, and generalized abdominal pain. Patient states she was seen here a few days ago for similar symptoms. At that time, she had labs, CT, and IV fluids, antiemetics, and analgesics. She states she felt minimally improved when she was discharged, but states that her symptoms have persisted. She states she is vomiting approximately 6-7 times a day and having a similar number of bowel movements                     RRAT Score:   8                  Plan for utilizing home health:  Not at this time.                            Likelihood of Readmission:                             Transition of Care Plan:

## 2019-08-30 NOTE — DISCHARGE INSTRUCTIONS
Patient Education        Nausea and Vomiting: Care Instructions  Your Care Instructions    When you are nauseated, you may feel weak and sweaty and notice a lot of saliva in your mouth. Nausea often leads to vomiting. Most of the time you do not need to worry about nausea and vomiting, but they can be signs of other illnesses. Two common causes of nausea and vomiting are stomach flu and food poisoning. Nausea and vomiting from viral stomach flu will usually start to improve within 24 hours. Nausea and vomiting from food poisoning may last from 12 to 48 hours. The doctor has checked you carefully, but problems can develop later. If you notice any problems or new symptoms, get medical treatment right away. Follow-up care is a key part of your treatment and safety. Be sure to make and go to all appointments, and call your doctor if you are having problems. It's also a good idea to know your test results and keep a list of the medicines you take. How can you care for yourself at home? · To prevent dehydration, drink plenty of fluids, enough so that your urine is light yellow or clear like water. Choose water and other caffeine-free clear liquids until you feel better. If you have kidney, heart, or liver disease and have to limit fluids, talk with your doctor before you increase the amount of fluids you drink. · Rest in bed until you feel better. · When you are able to eat, try clear soups, mild foods, and liquids until all symptoms are gone for 12 to 48 hours. Other good choices include dry toast, crackers, cooked cereal, and gelatin dessert, such as Jell-O. When should you call for help? Call 911 anytime you think you may need emergency care. For example, call if:    · You passed out (lost consciousness).    Call your doctor now or seek immediate medical care if:    · You have symptoms of dehydration, such as:  ? Dry eyes and a dry mouth. ? Passing only a little dark urine. ?  Feeling thirstier than usual.   · You have new or worsening belly pain.     · You have a new or higher fever.     · You vomit blood or what looks like coffee grounds.    Watch closely for changes in your health, and be sure to contact your doctor if:    · You have ongoing nausea and vomiting.     · Your vomiting is getting worse.     · Your vomiting lasts longer than 2 days.     · You are not getting better as expected. Where can you learn more? Go to http://shan-valentina.info/. Enter 25 [id number] in the search box to learn more about \"Nausea and Vomiting: Care Instructions. \"  Current as of: September 23, 2018  Content Version: 12.1  © 5884-0867 [employer], [employer]. Care instructions adapted under license by [employer] (which disclaims liability or warranty for this information). If you have questions about a medical condition or this instruction, always ask your healthcare professional. [address] any warranty or liability for your use of this information.

## 2019-08-30 NOTE — PROGRESS NOTES
Problem: Pain - Acute  Goal: *Pain is controlled to three or less  Outcome: Progressing Towards Goal     Problem: Falls - Risk of  Goal: *Absence of Falls  Outcome: Progressing Towards Goal     Problem: Nausea/Vomiting (Adult)  Goal: *Absence of nausea/vomiting  Outcome: Progressing Towards Goal     Problem: Falls - Risk of  Goal: *Absence of Falls  Description  Document [place] Fall Risk and appropriate interventions in the flowsheet. Outcome: Progressing Towards Goal  Note:   Fall Risk Interventions:            Medication Interventions: Teach patient to arise slowly         History of Falls Interventions:  Investigate reason for fall         Problem: Patient Education: Go to Patient Education Activity  Goal: Patient/Family Education  Outcome: Progressing Towards Goal

## 2019-08-30 NOTE — PROGRESS NOTES
Hospitalist Progress Note    NAME: Ilene Mcdonough   :  1975   MRN:  [id number]       Assessment / Plan:  ASSESSMENT AND PLAN:  1. Abdominal pain. Elevated lipase. continue  IV fluids, treated for pancreatitis, consider repeating imaging studies if the patient has persistent elevated lipase,  or no improvement, fever, chills, or any septic symptoms. GI prophylaxis as well as pain management with morphine. ADVANCE DIET TODAY   2. Hypertension. We will start the patient on antihypertensive medication,continue home medication, monitor blood pressure. 3.  Intake and output. 4.  For hyperkalemia, continue IVF with KCL and KCL x3   5. Deep venous thrombosis prophylaxis, heparin. 6.  Gastrointestinal prophylaxis, Protonix. 7.  Consultation, consider surgery, GI consult if symptoms not resolving. 8.  Disposition, home when medically stable. 25.0 - 29.9 Overweight / Body mass index is 29.1 kg/m². Code status: Full  Prophylaxis: Hep SQ  Recommended Disposition: Home w/Family     Subjective:     Chief Complaint / Reason for Physician Visit  \"less abdominal pain today  \". Discussed with RN events overnight. Review of Systems:  Symptom Y/N Comments  Symptom Y/N Comments   Fever/Chills    Chest Pain     Poor Appetite    Edema     Cough    Abdominal Pain     Sputum    Joint Pain     SOB/ABRAHAM    Pruritis/Rash     Nausea/vomit    Tolerating PT/OT     Diarrhea    Tolerating Diet     Constipation    Other       Could NOT obtain due to:      Objective:     VITALS:   Last 24hrs VS reviewed since prior progress note.  Most recent are:  Patient Vitals for the past 24 hrs:   Temp Pulse Resp BP SpO2   19 0805 97.8 °F (36.6 °C) 69 16 126/81 100 %   19 0343 97.3 °F (36.3 °C) 71 18 121/85 100 %   19 2350 97.4 °F (36.3 °C) 88 18 149/82 100 %   19 2038 96.3 °F (35.7 °C) 83 18 110/75 98 %   19 1513     100 %   19 1511 98.4 °F (36.9 °C) 68 16 111/80 100 % Intake/Output Summary (Last 24 hours) at 8/30/2019 0950  Last data filed at 8/30/2019 1297  Gross per 24 hour   Intake 3828.25 ml   Output    Net 3828.25 ml        PHYSICAL EXAM:  General: WD, WN. Alert, cooperative, no acute distress    EENT:  EOMI. Anicteric sclerae. MMM  Resp:  CTA bilaterally, no wheezing or rales. No accessory muscle use  CV:  Regular  rhythm,  No edema  GI:  Soft, Non distended, upper abdomen  MUCH LESS tender.  +Bowel sounds  Neurologic:  Alert and oriented X 3, normal speech,   Psych:   Good insight. Not anxious nor agitated  Skin:  No rashes. No jaundice    Reviewed most current lab test results and cultures  YES  Reviewed most current radiology test results   YES  Review and summation of old records today    NO  Reviewed patient's current orders and MAR    YES  PMH/SH reviewed - no change compared to H&P  ________________________________________________________________________  Care Plan discussed with:    Comments   Patient     Family      RN     Care Manager     Consultant                        Multidiciplinary team rounds were held today with , nursing, pharmacist and clinical coordinator. Patient's plan of care was discussed; medications were reviewed and discharge planning was addressed. ________________________________________________________________________  Total NON critical care TIME:  35 Minutes    Total CRITICAL CARE TIME Spent:   Minutes non procedure based      Comments   >50% of visit spent in counseling and coordination of care     ________________________________________________________________________  Yumiko Escalona MD     Procedures: see electronic medical records for all procedures/Xrays and details which were not copied into this note but were reviewed prior to creation of Plan. LABS:  I reviewed today's most current labs and imaging studies.   Pertinent labs include:  Recent Labs     08/30/19  0336 08/29/19  0402 08/28/19  1646   WBC 6.8 8. 8 10.6   HGB 11.3* 11.8 15.0   HCT 32.5* 34.0* 41.8    368 453*     Recent Labs     08/30/19  0336 08/29/19  0402 08/28/19  1715 08/28/19  1646    138  --  135*   K 3.4* 3.0*  --  3.2*    101  --  95*   CO2 25 23  --  24   GLU 93 94  --  108*   BUN 10 16  --  20   CREA 0.97 1.01  --  1.20*   CA 8.4* 8.3*  --  10.2*   MG  --   --  2.3 2.1   ALB 3.0* 3.2*  --  4.3   TBILI 0.3 0.4  --  0.4   SGOT 24 21  --  28   ALT 26 25  --  32       Signed: Anabell Baron MD

## 2019-08-31 NOTE — PROGRESS NOTES
Pt d/c 8/30/2019 @ 1831. Pt had post-discharge f/u appointments documented on her AVS: René Zuniga MD Internal Medicine (PCP) On 9/10/2019 @ 11:00am  ([phone]) and  [facility], hospitalist wants you to see someone in 1-2 wks. Margarito Mcdonnell. Pt had medical insurance coverage through [facility].

## 2019-09-02 ENCOUNTER — TELEPHONE (OUTPATIENT)
Dept: CASE MANAGEMENT | Age: 44
End: 2019-09-02

## 2019-09-03 ENCOUNTER — TELEPHONE (OUTPATIENT)
Dept: CASE MANAGEMENT | Age: 44
End: 2019-09-03

## 2019-09-03 NOTE — TELEPHONE ENCOUNTER
CM called pt contact number ([phone]) and left voice message for pt to call back for hospital f/u.

## 2019-09-20 ENCOUNTER — HOSPITAL ENCOUNTER (EMERGENCY)
Age: 44
Discharge: HOME | End: 2019-09-20
Attending: EMERGENCY MEDICINE | Admitting: EMERGENCY MEDICINE
Payer: COMMERCIAL

## 2019-09-20 VITALS
WEIGHT: 155 LBS | OXYGEN SATURATION: 99 % | TEMPERATURE: 97.7 F | SYSTOLIC BLOOD PRESSURE: 137 MMHG | RESPIRATION RATE: 19 BRPM | BODY MASS INDEX: 29.27 KG/M2 | HEIGHT: 61 IN | DIASTOLIC BLOOD PRESSURE: 85 MMHG | HEART RATE: 82 BPM

## 2019-09-20 DIAGNOSIS — F12.10 MARIJUANA ABUSE: ICD-10-CM

## 2019-09-20 DIAGNOSIS — R10.84 ABDOMINAL PAIN, GENERALIZED: ICD-10-CM

## 2019-09-20 DIAGNOSIS — E86.0 DEHYDRATION: ICD-10-CM

## 2019-09-20 DIAGNOSIS — E87.6 HYPOKALEMIA: ICD-10-CM

## 2019-09-20 DIAGNOSIS — R11.2 NAUSEA AND VOMITING, INTRACTABILITY OF VOMITING NOT SPECIFIED, UNSPECIFIED VOMITING TYPE: Primary | ICD-10-CM

## 2019-09-20 LAB
ALBUMIN SERPL-MCNC: 4.4 G/DL (ref 3.5–5)
ALBUMIN/GLOB SERPL: 0.8 {RATIO} (ref 1.1–2.2)
ALP SERPL-CCNC: 108 U/L (ref 45–117)
ALT SERPL-CCNC: 24 U/L (ref 12–78)
ANION GAP SERPL CALC-SCNC: 13 MMOL/L (ref 5–15)
APPEARANCE UR: ABNORMAL
AST SERPL-CCNC: 23 U/L (ref 15–37)
BACTERIA URNS QL MICRO: NEGATIVE /HPF
BASOPHILS # BLD: 0.1 K/UL (ref 0–0.1)
BASOPHILS NFR BLD: 1 % (ref 0–1)
BILIRUB SERPL-MCNC: 0.3 MG/DL (ref 0.2–1)
BILIRUB UR QL: NEGATIVE
BUN SERPL-MCNC: 12 MG/DL (ref 6–20)
BUN/CREAT SERPL: 12 (ref 12–20)
CALCIUM SERPL-MCNC: 10.5 MG/DL (ref 8.5–10.1)
CHLORIDE SERPL-SCNC: 96 MMOL/L (ref 97–108)
CO2 SERPL-SCNC: 24 MMOL/L (ref 21–32)
COLOR UR: ABNORMAL
CREAT SERPL-MCNC: 1 MG/DL (ref 0.55–1.02)
DIFFERENTIAL METHOD BLD: ABNORMAL
EOSINOPHIL # BLD: 0.1 K/UL (ref 0–0.4)
EOSINOPHIL NFR BLD: 1 % (ref 0–7)
EPITH CASTS URNS QL MICRO: ABNORMAL /LPF
ERYTHROCYTE [DISTWIDTH] IN BLOOD BY AUTOMATED COUNT: 13 % (ref 11.5–14.5)
GLOBULIN SER CALC-MCNC: 5.2 G/DL (ref 2–4)
GLUCOSE SERPL-MCNC: 118 MG/DL (ref 65–100)
GLUCOSE UR STRIP.AUTO-MCNC: NEGATIVE MG/DL
HCG UR QL: NEGATIVE
HCT VFR BLD AUTO: 41.6 % (ref 35–47)
HGB BLD-MCNC: 14.7 G/DL (ref 11.5–16)
HGB UR QL STRIP: ABNORMAL
IMM GRANULOCYTES # BLD AUTO: 0 K/UL (ref 0–0.04)
IMM GRANULOCYTES NFR BLD AUTO: 0 % (ref 0–0.5)
KETONES UR QL STRIP.AUTO: NEGATIVE MG/DL
LEUKOCYTE ESTERASE UR QL STRIP.AUTO: NEGATIVE
LIPASE SERPL-CCNC: 369 U/L (ref 73–393)
LYMPHOCYTES # BLD: 2.3 K/UL (ref 0.8–3.5)
LYMPHOCYTES NFR BLD: 24 % (ref 12–49)
MCH RBC QN AUTO: 32 PG (ref 26–34)
MCHC RBC AUTO-ENTMCNC: 35.3 G/DL (ref 30–36.5)
MCV RBC AUTO: 90.6 FL (ref 80–99)
MONOCYTES # BLD: 0.6 K/UL (ref 0–1)
MONOCYTES NFR BLD: 6 % (ref 5–13)
NEUTS SEG # BLD: 6.5 K/UL (ref 1.8–8)
NEUTS SEG NFR BLD: 68 % (ref 32–75)
NITRITE UR QL STRIP.AUTO: NEGATIVE
NRBC # BLD: 0 K/UL (ref 0–0.01)
NRBC BLD-RTO: 0 PER 100 WBC
PH UR STRIP: 6 [PH] (ref 5–8)
PLATELET # BLD AUTO: 427 K/UL (ref 150–400)
PMV BLD AUTO: 8.9 FL (ref 8.9–12.9)
POTASSIUM SERPL-SCNC: 2.6 MMOL/L (ref 3.5–5.1)
POTASSIUM SERPL-SCNC: 2.8 MMOL/L (ref 3.5–5.1)
PROT SERPL-MCNC: 9.6 G/DL (ref 6.4–8.2)
PROT UR STRIP-MCNC: 30 MG/DL
RBC # BLD AUTO: 4.59 M/UL (ref 3.8–5.2)
RBC #/AREA URNS HPF: ABNORMAL /HPF (ref 0–5)
SODIUM SERPL-SCNC: 133 MMOL/L (ref 136–145)
SP GR UR REFRACTOMETRY: 1.01 (ref 1–1.03)
UROBILINOGEN UR QL STRIP.AUTO: 0.2 EU/DL (ref 0.2–1)
WBC # BLD AUTO: 9.6 K/UL (ref 3.6–11)
WBC URNS QL MICRO: ABNORMAL /HPF (ref 0–4)

## 2019-09-20 PROCEDURE — 84132 ASSAY OF SERUM POTASSIUM: CPT

## 2019-09-20 PROCEDURE — 74011250637 HC RX REV CODE- 250/637: Performed by: EMERGENCY MEDICINE

## 2019-09-20 PROCEDURE — 96374 THER/PROPH/DIAG INJ IV PUSH: CPT

## 2019-09-20 PROCEDURE — 93005 ELECTROCARDIOGRAM TRACING: CPT

## 2019-09-20 PROCEDURE — 36415 COLL VENOUS BLD VENIPUNCTURE: CPT

## 2019-09-20 PROCEDURE — 81001 URINALYSIS AUTO W/SCOPE: CPT

## 2019-09-20 PROCEDURE — 80053 COMPREHEN METABOLIC PANEL: CPT

## 2019-09-20 PROCEDURE — 74011000250 HC RX REV CODE- 250: Performed by: EMERGENCY MEDICINE

## 2019-09-20 PROCEDURE — 74011250636 HC RX REV CODE- 250/636: Performed by: EMERGENCY MEDICINE

## 2019-09-20 PROCEDURE — 81025 URINE PREGNANCY TEST: CPT

## 2019-09-20 PROCEDURE — 85025 COMPLETE CBC W/AUTO DIFF WBC: CPT

## 2019-09-20 PROCEDURE — 83690 ASSAY OF LIPASE: CPT

## 2019-09-20 PROCEDURE — 96361 HYDRATE IV INFUSION ADD-ON: CPT

## 2019-09-20 PROCEDURE — 99285 EMERGENCY DEPT VISIT HI MDM: CPT

## 2019-09-20 RX ORDER — LANOLIN ALCOHOL/MO/W.PET/CERES
400 CREAM (GRAM) TOPICAL ONCE
Status: COMPLETED | OUTPATIENT
Start: 2019-09-20 | End: 2019-09-20

## 2019-09-20 RX ORDER — POTASSIUM CHLORIDE 1.5 G/1.77G
40 POWDER, FOR SOLUTION ORAL
Status: COMPLETED | OUTPATIENT
Start: 2019-09-20 | End: 2019-09-20

## 2019-09-20 RX ORDER — UREA 10 %
2 LOTION (ML) TOPICAL 3 TIMES DAILY
Status: DISCONTINUED | OUTPATIENT
Start: 2019-09-20 | End: 2019-09-20

## 2019-09-20 RX ORDER — POTASSIUM CHLORIDE 7.45 MG/ML
10 INJECTION INTRAVENOUS
Status: DISCONTINUED | OUTPATIENT
Start: 2019-09-20 | End: 2019-09-20 | Stop reason: HOSPADM

## 2019-09-20 RX ORDER — PROMETHAZINE HYDROCHLORIDE 25 MG/1
25 SUPPOSITORY RECTAL
Qty: 12 SUPPOSITORY | Refills: 0 | Status: SHIPPED | OUTPATIENT
Start: 2019-09-20 | End: 2019-09-26

## 2019-09-20 RX ORDER — HALOPERIDOL 5 MG/ML
5 INJECTION INTRAMUSCULAR ONCE
Status: COMPLETED | OUTPATIENT
Start: 2019-09-20 | End: 2019-09-20

## 2019-09-20 RX ORDER — POTASSIUM CHLORIDE 1.5 G/1.77G
20 POWDER, FOR SOLUTION ORAL 2 TIMES DAILY WITH MEALS
Qty: 10 PACKET | Refills: 0 | Status: SHIPPED | OUTPATIENT
Start: 2019-09-20 | End: 2019-09-26

## 2019-09-20 RX ORDER — DICYCLOMINE HYDROCHLORIDE 10 MG/1
10 CAPSULE ORAL
Status: COMPLETED | OUTPATIENT
Start: 2019-09-20 | End: 2019-09-20

## 2019-09-20 RX ORDER — UREA 10 %
2 LOTION (ML) TOPICAL ONCE
Status: DISCONTINUED | OUTPATIENT
Start: 2019-09-20 | End: 2019-09-20 | Stop reason: CLARIF

## 2019-09-20 RX ADMIN — SODIUM CHLORIDE 1000 ML: 900 INJECTION, SOLUTION INTRAVENOUS at 11:48

## 2019-09-20 RX ADMIN — POTASSIUM CHLORIDE 40 MEQ: 1.5 POWDER, FOR SOLUTION ORAL at 13:59

## 2019-09-20 RX ADMIN — HALOPERIDOL LACTATE 5 MG: 5 INJECTION, SOLUTION INTRAMUSCULAR at 11:48

## 2019-09-20 RX ADMIN — Medication 400 MG: at 14:07

## 2019-09-20 RX ADMIN — DICYCLOMINE HYDROCHLORIDE 10 MG: 10 CAPSULE ORAL at 12:19

## 2019-09-20 RX ADMIN — LIDOCAINE HYDROCHLORIDE 40 ML: 20 SOLUTION ORAL; TOPICAL at 12:19

## 2019-09-20 NOTE — ED TRIAGE NOTES
Pt reports diarrhea x3 days and vomiting x2 days. Pt reports being seen 2 weeks ago for the same symptoms. Pt reports taking pepto bismaul.

## 2019-09-20 NOTE — ED PROVIDER NOTES
EMERGENCY DEPARTMENT HISTORY AND PHYSICAL EXAM      Date: 9/20/2019  Patient Name: Bryce Sainz    Please note that this dictation was completed with Prist[employer], the computer voice recognition software. Quite often unanticipated grammatical, syntax, homophones, and other interpretive errors are inadvertently transcribed by the computer software. Please disregard these errors. Please excuse any errors that have escaped final proofreading. History of Presenting Illness     Chief Complaint   Patient presents with    Abdominal Pain       History Provided By: Patient    HPI: Bryce Sainz, 38 y.o. female, with a past medical history significant for hypertension, cyclic vomiting, marijuana abuse presented the emergency department complaining of nausea, vomiting, diarrhea. Patient states that symptoms been going on intermittently for 3 days. Associated with diffuse abdominal pain. Pain is rated as moderate, diffuse, nonradiating. Is tried Pepto and Tums with no relief. Denies any fevers or chills. Denies any urinary or vaginal symptoms. No hematochezia or melena. Patient denies any recent antibiotics. His prior history of cholecystectomy. PCP: Rayna Devine MD    No current facility-administered medications on file prior to encounter. Current Outpatient Medications on File Prior to Encounter   Medication Sig Dispense Refill    ondansetron (ZOFRAN ODT) 4 mg disintegrating tablet Take 1 Tab by mouth every eight (8) hours as needed for Nausea. 20 Tab 0    amLODIPine (NORVASC) 10 mg tablet Take 1 Tab by mouth daily. 90 Tab 1    metoprolol tartrate (LOPRESSOR) 25 mg tablet Take 1 Tab by mouth every twelve (12) hours. 180 Tab 1    chlorthalidone (HYGROTEN) 25 mg tablet Take 1 Tab by mouth daily. 90 Tab 2    esomeprazole (NEXIUM 24HR) 20 mg capsule Take 1 Cap by mouth daily. 30 Cap 2    cetirizine (ZYRTEC) 10 mg tablet Take 10 mg by mouth daily.          Past History     Past Medical History:  Past Medical History:   Diagnosis Date    Hypertension     Kidney anomaly, congenital     Tubal pregnancy        Past Surgical History:  Past Surgical History:   Procedure Laterality Date    HX CHOLECYSTECTOMY      HX GYN  10/07/2013    cyst removed    HX HYSTERECTOMY  10/7/2013    partial hysterectomy       Family History:  Family History   Problem Relation Age of Onset    Hypertension Mother     Hypertension Father     Hypertension Sister        Social History:  Social History     Tobacco Use    Smoking status: Current Some Day Smoker     Packs/day: 0.25     Years: 25.00     Pack years: 6.25    Smokeless tobacco: Never Used    Tobacco comment: 1-3 day   Substance Use Topics    Alcohol use: Yes     Comment: occasionally/socially    Drug use: Yes     Types: Marijuana     Comment: occ       Allergies:  No Known Allergies      Review of Systems   Review of Systems   Constitutional: Negative for chills and fever. HENT: Negative for congestion and sore throat. Eyes: Negative for visual disturbance. Respiratory: Negative for cough and shortness of breath. Cardiovascular: Negative for chest pain and leg swelling. Gastrointestinal: Positive for abdominal pain, nausea and vomiting. Negative for blood in stool and diarrhea. Endocrine: Negative for polyuria. Genitourinary: Negative for dysuria, flank pain, vaginal bleeding and vaginal discharge. Musculoskeletal: Negative for myalgias. Skin: Negative for rash. Allergic/Immunologic: Negative for immunocompromised state. Neurological: Negative for weakness and headaches. Psychiatric/Behavioral: Negative for confusion. Physical Exam   Physical Exam   Constitutional: She is oriented to person, place, and time. She appears well-developed and well-nourished. HENT:   Head: Normocephalic and atraumatic. Moist mucous membranes   Eyes: Pupils are equal, round, and reactive to light.  Conjunctivae are normal. Right eye exhibits no discharge. Left eye exhibits no discharge. Neck: Normal range of motion. Neck supple. No tracheal deviation present. Cardiovascular: Normal rate, regular rhythm and normal heart sounds. No murmur heard. Pulmonary/Chest: Effort normal and breath sounds normal. No respiratory distress. She has no wheezes. She has no rales. Abdominal: Soft. Bowel sounds are normal. There is tenderness (Diffuse mild). There is no rebound and no guarding. Musculoskeletal: Normal range of motion. She exhibits no edema, tenderness or deformity. Neurological: She is alert and oriented to person, place, and time. Skin: Skin is warm and dry. No rash noted. No erythema. Psychiatric: Her behavior is normal.   Nursing note and vitals reviewed. Diagnostic Study Results     Labs -     No results found for this or any previous visit (from the past 12 hour(s)). Radiologic Studies -   No orders to display     CT Results  (Last 48 hours)    None        CXR Results  (Last 48 hours)    None            Medical Decision Making   I am the first provider for this patient. I reviewed the vital signs, available nursing notes, past medical history, past surgical history, family history and social history. Vital Signs-Reviewed the patient's vital signs. No data found. Records Reviewed: Nursing Notes and Old Medical Records     EKG interpretation: Time of ECG 1401. Sinus rhythm, rate 73. Normal axis. Normal intervals. No evidence of acute ischemic ST or T wave changes. QTc measuring 469. Interpreted by me    Provider Notes (Medical Decision Making):   Patient has diffuse, mild abdominal tenderness. No guarding or rebound. Smells of marijuana. Consistent with patient's prior episodes of cyclic vomiting. Doubt acute intra-abdominal process. Will check labs, provide IV fluids, treat with haloperidol.   No imaging needed at this time given patient's presentation, physical exam.  If labs show significant abnormality, or patient symptoms cannot be improved will consider CT imaging at that time. ED Course:   Initial assessment performed. The patients presenting problems have been discussed, and they are in agreement with the care plan formulated and outlined with them. I have encouraged them to ask questions as they arise throughout their visit. ED Course as of Sep 21 0844   Fri Sep 20, 2019   1314 Patient feeling better. Awaiting on repeat potassium        [AR]   0173 Patient alerted to low potassium. Will begin p.o. trial, will attempt p.o. potassium. If she is unable to talk to tolerate it we will consider hospitalization.    [AR]      ED Course User Index  [AR] Beni Staley DO         Critical Care Time:   none    Disposition:  DISCHARGE NOTE  Patients results have been reviewed with them. Patient and/or family have verbally conveyed their understanding and agreement of the patient's signs, symptoms, diagnosis, treatment and prognosis and additionally agree to follow up as recommended or return to the Emergency Room should their condition change or have any new concerns prior to their follow-up appointment. Patient verbally agrees with the care-plan and verbally conveys that all of their questions have been answered. Discharge instructions have also been provided to the patient with some educational information regarding their diagnosis as well a list of reasons why they would want to return to the ER prior to their follow-up appointment should their condition change. PLAN:  1. Discharge Medication List as of 9/20/2019  2:00 PM      START taking these medications    Details   potassium chloride (KLOR-CON) 20 mEq pack Take 1 Packet by mouth two (2) times daily (with meals). , Normal, Disp-10 Packet, R-0      promethazine (PHENERGAN) 25 mg suppository Insert 1 Suppository into rectum every six (6) hours as needed for Nausea for up to 7 days. , Normal, Disp-12 Suppository, R-0         CONTINUE these medications which have NOT CHANGED    Details   ondansetron (ZOFRAN ODT) 4 mg disintegrating tablet Take 1 Tab by mouth every eight (8) hours as needed for Nausea. , Print, Disp-20 Tab, R-0      amLODIPine (NORVASC) 10 mg tablet Take 1 Tab by mouth daily. , Normal, Disp-90 Tab, R-1      metoprolol tartrate (LOPRESSOR) 25 mg tablet Take 1 Tab by mouth every twelve (12) hours. , Normal, Disp-180 Tab, R-1      chlorthalidone (HYGROTEN) 25 mg tablet Take 1 Tab by mouth daily. , Normal, Disp-90 Tab, R-2      esomeprazole (NEXIUM 24HR) 20 mg capsule Take 1 Cap by mouth daily. , Normal, Disp-30 Cap, R-2      cetirizine (ZYRTEC) 10 mg tablet Take 10 mg by mouth daily. , Historical Med           2. Follow-up Information     Follow up With Specialties Details Why Contact Info    Soumya Doshi MD Internal Medicine Schedule an appointment as soon as possible for a visit  [address] [place] [address] EMERGENCY DEPT Emergency Medicine  If symptoms worsen [facility]  [phone]          Return to ED if worse     Diagnosis     Clinical Impression:   1. Nausea and vomiting, intractability of vomiting not specified, unspecified vomiting type    2. Abdominal pain, generalized    3. Hypokalemia    4. Dehydration    5. Marijuana abuse        Attestations:   This note was completed by Angel Flaherty DO

## 2019-09-20 NOTE — ED NOTES
Pt presents ambulatory to ED complaining of nausea, vomiting, and diarrhea x2 days. Pt denies fever, denies recent abx use, reports taking pepto and tums with no relief. Pt reports frequent marijuana use. Pt is alert and oriented x 4, RR even and unlabored, skin is warm and dry. Assesment completed and pt updated on plan of care. Emergency Department Nursing Plan of Care       The Nursing Plan of Care is developed from the Nursing assessment and Emergency Department Attending provider initial evaluation. The plan of care may be reviewed in the ED Provider note.     The Plan of Care was developed with the following considerations:   Patient / Family readiness to learn indicated by:verbalized understanding  Persons(s) to be included in education: patient  Barriers to Learning/Limitations:No    Signed     Mago Proctor RN    9/20/2019   11:22 AM

## 2019-09-20 NOTE — DISCHARGE INSTRUCTIONS
Patient Education        Abdominal Pain: Care Instructions  Your Care Instructions    Abdominal pain has many possible causes. Some aren't serious and get better on their own in a few days. Others need more testing and treatment. If your pain continues or gets worse, you need to be rechecked and may need more tests to find out what is wrong. You may need surgery to correct the problem. Don't ignore new symptoms, such as fever, nausea and vomiting, urination problems, pain that gets worse, and dizziness. These may be signs of a more serious problem. Your doctor may have recommended a follow-up visit in the next 8 to 12 hours. If you are not getting better, you may need more tests or treatment. The doctor has checked you carefully, but problems can develop later. If you notice any problems or new symptoms, get medical treatment right away. Follow-up care is a key part of your treatment and safety. Be sure to make and go to all appointments, and call your doctor if you are having problems. It's also a good idea to know your test results and keep a list of the medicines you take. How can you care for yourself at home? · Rest until you feel better. · To prevent dehydration, drink plenty of fluids, enough so that your urine is light yellow or clear like water. Choose water and other caffeine-free clear liquids until you feel better. If you have kidney, heart, or liver disease and have to limit fluids, talk with your doctor before you increase the amount of fluids you drink. · If your stomach is upset, eat mild foods, such as rice, dry toast or crackers, bananas, and applesauce. Try eating several small meals instead of two or three large ones. · Wait until 48 hours after all symptoms have gone away before you have spicy foods, alcohol, and drinks that contain caffeine. · Do not eat foods that are high in fat. · Avoid anti-inflammatory medicines such as aspirin, ibuprofen (Advil, Motrin), and naproxen (Aleve). These can cause stomach upset. Talk to your doctor if you take daily aspirin for another health problem. When should you call for help? Call 911 anytime you think you may need emergency care. For example, call if:    · You passed out (lost consciousness).     · You pass maroon or very bloody stools.     · You vomit blood or what looks like coffee grounds.     · You have new, severe belly pain.    Call your doctor now or seek immediate medical care if:    · Your pain gets worse, especially if it becomes focused in one area of your belly.     · You have a new or higher fever.     · Your stools are black and look like tar, or they have streaks of blood.     · You have unexpected vaginal bleeding.     · You have symptoms of a urinary tract infection. These may include:  ? Pain when you urinate. ? Urinating more often than usual.  ? Blood in your urine.     · You are dizzy or lightheaded, or you feel like you may faint.    Watch closely for changes in your health, and be sure to contact your doctor if:    · You are not getting better after 1 day (24 hours). Where can you learn more? Go to http://shan-valentina.info/. Enter D962 in the search box to learn more about \"Abdominal Pain: Care Instructions. \"  Current as of: June 26, 2019  Content Version: 12.2  © 2638-4279 [employer]. Care instructions adapted under license by [employer] (which disclaims liability or warranty for this information). If you have questions about a medical condition or this instruction, always ask your healthcare professional. [place] [address] any warranty or liability for your use of this information. Patient Education        Dehydration: Care Instructions  Your Care Instructions  Dehydration happens when your body loses too much fluid.  This might happen when you do not drink enough water or you lose large amounts of fluids from your body because of diarrhea, vomiting, or sweating. Severe dehydration can be life-threatening. Water and minerals called electrolytes help put your body fluids back in balance. Learn the early signs of fluid loss, and drink more fluids to prevent dehydration. Follow-up care is a key part of your treatment and safety. Be sure to make and go to all appointments, and call your doctor if you are having problems. It's also a good idea to know your test results and keep a list of the medicines you take. How can you care for yourself at home? · To prevent dehydration, drink plenty of fluids, enough so that your urine is light yellow or clear like water. Choose water and other caffeine-free clear liquids until you feel better. If you have kidney, heart, or liver disease and have to limit fluids, talk with your doctor before you increase the amount of fluids you drink. · If you do not feel like eating or drinking, try taking small sips of water, sports drinks, or other rehydration drinks. · Get plenty of rest.  To prevent dehydration  · Add more fluids to your diet and daily routine, unless your doctor has told you not to. · During hot weather, drink more fluids. Drink even more fluids if you exercise a lot. Stay away from drinks with alcohol or caffeine. · Watch for the symptoms of dehydration. These include:  ? A dry, sticky mouth. ? Dark yellow urine, and not much of it. ? Dry and sunken eyes. ? Feeling very tired. · Learn what problems can lead to dehydration. These include:  ? Diarrhea, fever, and vomiting. ? Any illness with a fever, such as pneumonia or the flu. ? Activities that cause heavy sweating, such as endurance races and heavy outdoor work in hot or humid weather. ? Alcohol or drug use or problems caused by quitting their use (withdrawal). ? Certain medicines, such as cold and allergy pills (antihistamines), diet pills (diuretics), and laxatives. ? Certain diseases, such as diabetes, cancer, and heart or kidney disease.   When should you call for help? Call 911 anytime you think you may need emergency care. For example, call if:    · You passed out (lost consciousness).    Call your doctor now or seek immediate medical care if:    · You are confused and cannot think clearly.     · You are dizzy or lightheaded, or you feel like you may faint.     · You have signs of needing more fluids. You have sunken eyes and a dry mouth, and you pass only a little dark urine.     · You cannot keep fluids down.    Watch closely for changes in your health, and be sure to contact your doctor if:    · You are not making tears.     · Your skin is very dry and sags slowly back into place after you pinch it.     · Your mouth and eyes are very dry. Where can you learn more? Go to http://shan-valentina.info/. Enter Y735 in the search box to learn more about \"Dehydration: Care Instructions. \"  Current as of: June 26, 2019  Content Version: 12.2  © 6278-7759 [employer]. Care instructions adapted under license by [employer] (which disclaims liability or warranty for this information). If you have questions about a medical condition or this instruction, always ask your healthcare professional. [place] [address] any warranty or liability for your use of this information. Patient Education        Hypokalemia: Care Instructions  Your Care Instructions    Hypokalemia (say \"ao-bu-upw-PREETHI-travis-uh\") is a low level of potassium. The heart, muscles, kidneys, and nervous system all need potassium to work well. This problem has many different causes. Kidney problems, diet, and medicines like diuretics and laxatives can cause it. So can vomiting or diarrhea. In some cases, cancer is the cause. Your doctor may do tests to find the cause of your low potassium levels. You may need medicines to bring your potassium levels back to normal. You may also need regular blood tests to check your potassium.   If you have very low potassium, you may need intravenous (IV) medicines. You also may need tests to check the electrical activity of your heart. Heart problems caused by low potassium levels can be very serious. Follow-up care is a key part of your treatment and safety. Be sure to make and go to all appointments, and call your doctor if you are having problems. It's also a good idea to know your test results and keep a list of the medicines you take. How can you care for yourself at home? · If your doctor recommends it, eat foods that have a lot of potassium. These include fresh fruits, juices, and vegetables. They also include nuts, beans, and milk. · Be safe with medicines. If your doctor prescribes medicines or potassium supplements, take them exactly as directed. Call your doctor if you have any problems with your medicines. · Get your potassium levels tested as often as your doctor tells you. When should you call for help? Call 911 anytime you think you may need emergency care. For example, call if:    · You feel like your heart is missing beats. Heart problems caused by low potassium can cause death.     · You passed out (lost consciousness).     · You have a seizure.    Call your doctor now or seek immediate medical care if:    · You feel weak or unusually tired.     · You have severe arm or leg cramps.     · You have tingling or numbness.     · You feel sick to your stomach, or you vomit.     · You have belly cramps.     · You feel bloated or constipated.     · You have to urinate a lot.     · You feel very thirsty most of the time.     · You are dizzy or lightheaded, or you feel like you may faint.     · You feel depressed, or you lose touch with reality.    Watch closely for changes in your health, and be sure to contact your doctor if:    · You do not get better as expected. Where can you learn more? Go to http://shan-valentina.info/.   Enter [id number] in the search box to learn more about \"Hypokalemia: Care Instructions. \"  Current as of: November 6, 2018  Content Version: 12.2  © 1687-9213 [employer]. Care instructions adapted under license by [employer] (which disclaims liability or warranty for this information). If you have questions about a medical condition or this instruction, always ask your healthcare professional. [address] any warranty or liability for your use of this information. Patient Education        Marijuana Use: Care Instructions  Overview  During your exam, traces of marijuana were found in your body. The two most active chemicals in marijuana are THC and CBD. THC affects how you think, act, and feel. It can make you feel very happy or \"high. \" CBD can help you feel relaxed without the \"high. \" Marijuana products usually contain both THC and CBD. THC usually can be found in urine for a few days after marijuana is used. If you regularly use a lot of marijuana, THC may be found for weeks after use has stopped. There are many types, or strains, of marijuana. Each strain has specific THC-to-CBD ratios. Because of this, some strains have different kinds of effects than others. For example, if a strain of marijuana has a higher ratio of THC to CBD, it's more likely to affect your judgment, coordination, and decision making. In the [place], it's against federal law to possess, sell, give away, or grow marijuana for any purpose. But many states allow people with certain health problems to buy or grow it for their own use. And some states allow people to use it for recreational reasons. These laws vary from state to state. You can call your state department of health or health services to learn more about the laws in your state. If you live in a state where marijuana is legal, know your employer's policies about use. A positive drug test might cause you to lose your job. Or it might keep you from getting hired.   If you use marijuana, take steps to lower your risk. Follow-up care is a key part of your treatment and safety. Be sure to make and go to all appointments, and call your doctor if you are having problems. It's also a good idea to know your test results and keep a list of the medicines you take. How can you care for yourself at home? · To have the lowest risk, don't use marijuana. But if you do use it, limit your use. · Know what you're using. Choose products that have low levels of THC. The type (or strain), strength, and effects of marijuana can vary greatly. And understand how soon you may feel the effects of the product you use and how long those effects may last. The product label may have this information. · Don't drive or operate machinery after using marijuana. Using marijuana may affect your judgment, coordination, and decision making. · Don't smoke marijuana. The smoke can damage your lungs. If you do smoke it, don't breathe in deeply and don't hold your breath. · Don't use marijuana with alcohol, tobacco, or illegal drugs. · Reduce the risk of medicine interactions. Marijuana can be dangerous if you take it with blood thinners or with medicines that make you sleepy, control your mood, or lower your blood pressure. Talk to your doctor about other medicines you use before you try marijuana. · Keep others safe. Store marijuana in a safe and secure place. This is even more important with edible marijuana, which can be easily mistaken for treats or snacks. Make sure that children, friends, family, and pets can't get to it. And protect others from secondhand smoke. When should you call for help? Call your doctor now or seek immediate medical care if:    · You have new or worse symptoms of cannabis hyperemesis syndrome (CHS), such as:  ? Vomiting that doesn't stop. ? Not being able to keep down fluids. ? Belly pain. ? Symptoms that go away briefly when you take a hot bath or shower.  This is one of the signs of [facility].     · You have symptoms of dehydration, such as:  ? Dry eyes and a dry mouth. ? Passing only a little dark urine. ? Feeling thirstier than usual.    Watch closely for changes in your health, and contact your doctor if:    · You think you have a problem with marijuana use. Where can you learn more? Go to http://shan-valentina.info/. Enter N628 in the search box to learn more about \"Marijuana Use: Care Instructions. \"  Current as of: February 5, 2019  Content Version: 12.2  © 6748-5521 [employer]. Care instructions adapted under license by [employer] (which disclaims liability or warranty for this information). If you have questions about a medical condition or this instruction, always ask your healthcare professional. [address] any warranty or liability for your use of this information.

## 2019-09-22 ENCOUNTER — HOSPITAL ENCOUNTER (EMERGENCY)
Age: 44
Discharge: HOME | End: 2019-09-22
Attending: EMERGENCY MEDICINE
Payer: COMMERCIAL

## 2019-09-22 VITALS
RESPIRATION RATE: 16 BRPM | SYSTOLIC BLOOD PRESSURE: 177 MMHG | HEART RATE: 93 BPM | BODY MASS INDEX: 29.26 KG/M2 | DIASTOLIC BLOOD PRESSURE: 107 MMHG | HEIGHT: 61 IN | WEIGHT: 154.98 LBS | OXYGEN SATURATION: 100 % | TEMPERATURE: 98.2 F

## 2019-09-22 DIAGNOSIS — E87.6 ACUTE HYPOKALEMIA: ICD-10-CM

## 2019-09-22 DIAGNOSIS — R11.15 INTRACTABLE CYCLICAL VOMITING WITH NAUSEA: Primary | ICD-10-CM

## 2019-09-22 LAB
ALBUMIN SERPL-MCNC: 4.5 G/DL (ref 3.5–5)
ALBUMIN/GLOB SERPL: 0.9 {RATIO} (ref 1.1–2.2)
ALP SERPL-CCNC: 99 U/L (ref 45–117)
ALT SERPL-CCNC: 30 U/L (ref 12–78)
AMPHET UR QL SCN: NEGATIVE
ANION GAP SERPL CALC-SCNC: 16 MMOL/L (ref 5–15)
APPEARANCE UR: ABNORMAL
AST SERPL-CCNC: 28 U/L (ref 15–37)
ATRIAL RATE: 73 BPM
BACTERIA URNS QL MICRO: ABNORMAL /HPF
BARBITURATES UR QL SCN: NEGATIVE
BASOPHILS # BLD: 0.1 K/UL (ref 0–0.1)
BASOPHILS NFR BLD: 1 % (ref 0–1)
BENZODIAZ UR QL: NEGATIVE
BILIRUB SERPL-MCNC: 0.4 MG/DL (ref 0.2–1)
BILIRUB UR QL: NEGATIVE
BUN SERPL-MCNC: 13 MG/DL (ref 6–20)
BUN/CREAT SERPL: 13 (ref 12–20)
CALCIUM SERPL-MCNC: 10.8 MG/DL (ref 8.5–10.1)
CALCULATED P AXIS, ECG09: 58 DEGREES
CALCULATED R AXIS, ECG10: 70 DEGREES
CALCULATED T AXIS, ECG11: 50 DEGREES
CANNABINOIDS UR QL SCN: POSITIVE
CHLORIDE SERPL-SCNC: 95 MMOL/L (ref 97–108)
CO2 SERPL-SCNC: 24 MMOL/L (ref 21–32)
COCAINE UR QL SCN: NEGATIVE
COLOR UR: ABNORMAL
CREAT SERPL-MCNC: 1 MG/DL (ref 0.55–1.02)
DIAGNOSIS, 93000: NORMAL
DIFFERENTIAL METHOD BLD: ABNORMAL
DRUG SCRN COMMENT,DRGCM: ABNORMAL
EOSINOPHIL # BLD: 0.1 K/UL (ref 0–0.4)
EOSINOPHIL NFR BLD: 1 % (ref 0–7)
EPITH CASTS URNS QL MICRO: ABNORMAL /LPF
ERYTHROCYTE [DISTWIDTH] IN BLOOD BY AUTOMATED COUNT: 12.8 % (ref 11.5–14.5)
GLOBULIN SER CALC-MCNC: 5.2 G/DL (ref 2–4)
GLUCOSE SERPL-MCNC: 118 MG/DL (ref 65–100)
GLUCOSE UR STRIP.AUTO-MCNC: NEGATIVE MG/DL
HCT VFR BLD AUTO: 42.8 % (ref 35–47)
HGB BLD-MCNC: 15.2 G/DL (ref 11.5–16)
HGB UR QL STRIP: ABNORMAL
IMM GRANULOCYTES # BLD AUTO: 0 K/UL (ref 0–0.04)
IMM GRANULOCYTES NFR BLD AUTO: 0 % (ref 0–0.5)
KETONES UR QL STRIP.AUTO: ABNORMAL MG/DL
LEUKOCYTE ESTERASE UR QL STRIP.AUTO: NEGATIVE
LIPASE SERPL-CCNC: 337 U/L (ref 73–393)
LYMPHOCYTES # BLD: 2.9 K/UL (ref 0.8–3.5)
LYMPHOCYTES NFR BLD: 32 % (ref 12–49)
MAGNESIUM SERPL-MCNC: 1.9 MG/DL (ref 1.6–2.4)
MCH RBC QN AUTO: 32.1 PG (ref 26–34)
MCHC RBC AUTO-ENTMCNC: 35.5 G/DL (ref 30–36.5)
MCV RBC AUTO: 90.5 FL (ref 80–99)
METHADONE UR QL: NEGATIVE
MONOCYTES # BLD: 0.5 K/UL (ref 0–1)
MONOCYTES NFR BLD: 5 % (ref 5–13)
MUCOUS THREADS URNS QL MICRO: ABNORMAL /LPF
NEUTS SEG # BLD: 5.7 K/UL (ref 1.8–8)
NEUTS SEG NFR BLD: 61 % (ref 32–75)
NITRITE UR QL STRIP.AUTO: NEGATIVE
NRBC # BLD: 0 K/UL (ref 0–0.01)
NRBC BLD-RTO: 0 PER 100 WBC
OPIATES UR QL: NEGATIVE
P-R INTERVAL, ECG05: 142 MS
PCP UR QL: NEGATIVE
PH UR STRIP: 6 [PH] (ref 5–8)
PLATELET # BLD AUTO: 414 K/UL (ref 150–400)
PMV BLD AUTO: 9 FL (ref 8.9–12.9)
POTASSIUM SERPL-SCNC: 2.7 MMOL/L (ref 3.5–5.1)
PROT SERPL-MCNC: 9.7 G/DL (ref 6.4–8.2)
PROT UR STRIP-MCNC: 100 MG/DL
Q-T INTERVAL, ECG07: 426 MS
QRS DURATION, ECG06: 78 MS
QTC CALCULATION (BEZET), ECG08: 469 MS
RBC # BLD AUTO: 4.73 M/UL (ref 3.8–5.2)
RBC #/AREA URNS HPF: ABNORMAL /HPF (ref 0–5)
SODIUM SERPL-SCNC: 135 MMOL/L (ref 136–145)
SP GR UR REFRACTOMETRY: 1.01 (ref 1–1.03)
TROPONIN I SERPL-MCNC: <0.05 NG/ML
UA: UC IF INDICATED,UAUC: ABNORMAL
UROBILINOGEN UR QL STRIP.AUTO: 0.2 EU/DL (ref 0.2–1)
VENTRICULAR RATE, ECG03: 73 BPM
WBC # BLD AUTO: 9.2 K/UL (ref 3.6–11)
WBC URNS QL MICRO: ABNORMAL /HPF (ref 0–4)

## 2019-09-22 PROCEDURE — 36415 COLL VENOUS BLD VENIPUNCTURE: CPT

## 2019-09-22 PROCEDURE — 87086 URINE CULTURE/COLONY COUNT: CPT

## 2019-09-22 PROCEDURE — 81001 URINALYSIS AUTO W/SCOPE: CPT

## 2019-09-22 PROCEDURE — 83735 ASSAY OF MAGNESIUM: CPT

## 2019-09-22 PROCEDURE — 84484 ASSAY OF TROPONIN QUANT: CPT

## 2019-09-22 PROCEDURE — 96375 TX/PRO/DX INJ NEW DRUG ADDON: CPT

## 2019-09-22 PROCEDURE — 74011250636 HC RX REV CODE- 250/636: Performed by: EMERGENCY MEDICINE

## 2019-09-22 PROCEDURE — 96361 HYDRATE IV INFUSION ADD-ON: CPT

## 2019-09-22 PROCEDURE — 74011000250 HC RX REV CODE- 250: Performed by: EMERGENCY MEDICINE

## 2019-09-22 PROCEDURE — 93005 ELECTROCARDIOGRAM TRACING: CPT

## 2019-09-22 PROCEDURE — 80307 DRUG TEST PRSMV CHEM ANLYZR: CPT

## 2019-09-22 PROCEDURE — 83690 ASSAY OF LIPASE: CPT

## 2019-09-22 PROCEDURE — 74011250637 HC RX REV CODE- 250/637: Performed by: EMERGENCY MEDICINE

## 2019-09-22 PROCEDURE — 85025 COMPLETE CBC W/AUTO DIFF WBC: CPT

## 2019-09-22 PROCEDURE — 80053 COMPREHEN METABOLIC PANEL: CPT

## 2019-09-22 PROCEDURE — 99284 EMERGENCY DEPT VISIT MOD MDM: CPT

## 2019-09-22 PROCEDURE — 96374 THER/PROPH/DIAG INJ IV PUSH: CPT

## 2019-09-22 RX ORDER — LORAZEPAM 2 MG/ML
1 INJECTION INTRAMUSCULAR
Status: DISCONTINUED | OUTPATIENT
Start: 2019-09-22 | End: 2019-09-22

## 2019-09-22 RX ORDER — POTASSIUM CHLORIDE 750 MG/1
40 TABLET, FILM COATED, EXTENDED RELEASE ORAL
Status: DISCONTINUED | OUTPATIENT
Start: 2019-09-22 | End: 2019-09-22

## 2019-09-22 RX ORDER — HALOPERIDOL 5 MG/ML
2 INJECTION INTRAMUSCULAR ONCE
Status: COMPLETED | OUTPATIENT
Start: 2019-09-22 | End: 2019-09-22

## 2019-09-22 RX ORDER — DICYCLOMINE HYDROCHLORIDE 10 MG/1
10 CAPSULE ORAL 4 TIMES DAILY
Qty: 20 CAP | Refills: 0 | Status: SHIPPED | OUTPATIENT
Start: 2019-09-22 | End: 2019-09-26

## 2019-09-22 RX ORDER — KETOROLAC TROMETHAMINE 30 MG/ML
30 INJECTION, SOLUTION INTRAMUSCULAR; INTRAVENOUS
Status: COMPLETED | OUTPATIENT
Start: 2019-09-22 | End: 2019-09-22

## 2019-09-22 RX ORDER — DICYCLOMINE HYDROCHLORIDE 10 MG/1
20 CAPSULE ORAL
Status: COMPLETED | OUTPATIENT
Start: 2019-09-22 | End: 2019-09-22

## 2019-09-22 RX ORDER — FENTANYL CITRATE 50 UG/ML
50 INJECTION, SOLUTION INTRAMUSCULAR; INTRAVENOUS
Status: COMPLETED | OUTPATIENT
Start: 2019-09-22 | End: 2019-09-22

## 2019-09-22 RX ORDER — ONDANSETRON 4 MG/1
4 TABLET, ORALLY DISINTEGRATING ORAL
Qty: 20 TAB | Refills: 0 | Status: SHIPPED | OUTPATIENT
Start: 2019-09-22 | End: 2019-09-26

## 2019-09-22 RX ORDER — POTASSIUM CHLORIDE 1.5 G/1.77G
20 POWDER, FOR SOLUTION ORAL DAILY
Qty: 30 PACKET | Refills: 0 | Status: SHIPPED | OUTPATIENT
Start: 2019-09-22 | End: 2019-09-24

## 2019-09-22 RX ORDER — CAPSAICIN 0.1 %
CREAM (GRAM) TOPICAL 3 TIMES DAILY
Qty: 1 TUBE | Refills: 0 | Status: SHIPPED | OUTPATIENT
Start: 2019-09-22 | End: 2019-09-24

## 2019-09-22 RX ORDER — POTASSIUM CHLORIDE 1.5 G/1.77G
40 POWDER, FOR SOLUTION ORAL ONCE
Status: DISCONTINUED | OUTPATIENT
Start: 2019-09-22 | End: 2019-09-23 | Stop reason: HOSPADM

## 2019-09-22 RX ADMIN — FENTANYL CITRATE 50 MCG: 50 INJECTION, SOLUTION INTRAMUSCULAR; INTRAVENOUS at 21:43

## 2019-09-22 RX ADMIN — SODIUM CHLORIDE 1000 ML: 900 INJECTION, SOLUTION INTRAVENOUS at 19:30

## 2019-09-22 RX ADMIN — DICYCLOMINE HYDROCHLORIDE 20 MG: 10 CAPSULE ORAL at 20:18

## 2019-09-22 RX ADMIN — HALOPERIDOL LACTATE 2 MG: 5 INJECTION, SOLUTION INTRAMUSCULAR at 20:15

## 2019-09-22 RX ADMIN — PROCHLORPERAZINE EDISYLATE 10 MG: 5 INJECTION INTRAMUSCULAR; INTRAVENOUS at 19:19

## 2019-09-22 RX ADMIN — KETOROLAC TROMETHAMINE 30 MG: 30 INJECTION, SOLUTION INTRAMUSCULAR; INTRAVENOUS at 19:19

## 2019-09-22 NOTE — ED NOTES
Patient here with c/o nausea and vomiting. Patient reports symptoms ongoing for several days. Patient states that she was seen here two days ago and sent home with a prescription for Zofran however has not been effective. Patient states that the only thing she ate today was a banana. Reports fevers but afebrile on arrival.  Reports removal of gallbladder in the past.  Denies recent surgeries or injuries or trauma. Denies chance of pregnancy, states no longer has periods. Emergency Department Nursing Plan of Care       The Nursing Plan of Care is developed from the Nursing assessment and Emergency Department Attending provider initial evaluation. The plan of care may be reviewed in the ED Provider note.     The Plan of Care was developed with the following considerations:   Patient / Family readiness to learn indicated by:verbalized understanding  Persons(s) to be included in education: patient  Barriers to Learning/Limitations:No    Signed     José Miguel Gao RN    9/22/2019   5:37 PM

## 2019-09-22 NOTE — LETTER
[facility] EMERGENCY DEPT 
[address] [address] 
[phone] Work/School Note Date: 9/22/2019 To Whom It May concern: 
 
Gilson Serra was seen and treated today in the emergency room by the following provider(s): 
Attending Provider: Michael Null MD.   
 
Gilson Serra may return to work on 9/24/19. Sincerely, Nereyda Ryan MD

## 2019-09-22 NOTE — ED NOTES
Bedside and Verbal shift change report given to [facility] RN (oncoming nurse) by Bruce Ho RN (offgoing nurse). Report included the following information SBAR, Kardex, ED Summary, Intake/Output, MAR and Recent Results.

## 2019-09-22 NOTE — ED PROVIDER NOTES
EMERGENCY DEPARTMENT HISTORY AND PHYSICAL EXAM      Date: 9/22/2019  Patient Name: Marilyn Mccormack    History of Presenting Illness     Chief Complaint   Patient presents with    Vomiting       History Provided By: Patient    HPI: Marilyn Mccormack, 38 y.o. female presents to the ED with cc of nausea, vomiting and diarrhea starting Friday. Patient was seen in the ER Friday for the same complaint. The patient has a history of polycystic kidney disease and hypertension. She is not sure if the salad is what made her sick. She is had persistent nausea and vomiting over the weekend. She now has diffuse abdominal pain. She states it feels somewhat like when she had her gallbladder out a year ago. She does admit to tobacco and alcohol use but denies any during this illness. There are no other surgical histories. Patient denies any chest pain, shortness of breath, cough, hematuria or flank pain. There was no treatment prior to arrival pain is described as aching and cramping in nature. Review of the records reveal multiple visits for history of recurrent cyclic vomiting possibly related to marijuana abuse. She has had her gallbladder taken out which was thought to be the cause of her issues in the past.    There are no other complaints, changes, or physical findings at this time. PCP: Raad Cohn MD    No current facility-administered medications on file prior to encounter. Current Outpatient Medications on File Prior to Encounter   Medication Sig Dispense Refill    potassium chloride (KLOR-CON) 20 mEq pack Take 1 Packet by mouth two (2) times daily (with meals). 10 Packet 0    promethazine (PHENERGAN) 25 mg suppository Insert 1 Suppository into rectum every six (6) hours as needed for Nausea for up to 7 days. 12 Suppository 0    amLODIPine (NORVASC) 10 mg tablet Take 1 Tab by mouth daily. 90 Tab 1    metoprolol tartrate (LOPRESSOR) 25 mg tablet Take 1 Tab by mouth every twelve (12) hours. 180 Tab 1    chlorthalidone (HYGROTEN) 25 mg tablet Take 1 Tab by mouth daily. 90 Tab 2    esomeprazole (NEXIUM 24HR) 20 mg capsule Take 1 Cap by mouth daily. 30 Cap 2    cetirizine (ZYRTEC) 10 mg tablet Take 10 mg by mouth daily. Past History     Past Medical History:  Past Medical History:   Diagnosis Date    Hypertension     Kidney anomaly, congenital     Tubal pregnancy        Past Surgical History:  Past Surgical History:   Procedure Laterality Date    HX CHOLECYSTECTOMY      HX GYN  10/07/2013    cyst removed    HX HYSTERECTOMY  10/7/2013    partial hysterectomy       Family History:  Family History   Problem Relation Age of Onset    Hypertension Mother     Hypertension Father     Hypertension Sister        Social History:  Social History     Tobacco Use    Smoking status: Current Some Day Smoker     Packs/day: 0.25     Years: 25.00     Pack years: 6.25    Smokeless tobacco: Never Used    Tobacco comment: 1-3 day   Substance Use Topics    Alcohol use: Yes     Comment: occasionally/socially    Drug use: Yes     Types: Marijuana     Comment: occ       Allergies:  No Known Allergies      Review of Systems   Review of Systems   Constitutional: Negative. Negative for chills and fever. HENT: Negative. Negative for congestion and rhinorrhea. Respiratory: Negative. Negative for cough, chest tightness and wheezing. Cardiovascular: Negative. Negative for chest pain and palpitations. Gastrointestinal: Positive for abdominal pain, nausea and vomiting. Negative for constipation. Endocrine: Negative. Genitourinary: Negative. Negative for decreased urine volume, flank pain, hematuria and pelvic pain. Musculoskeletal: Negative. Negative for back pain and neck pain. Skin: Negative. Negative for color change, pallor and rash. Neurological: Negative. Negative for dizziness, seizures, weakness, numbness and headaches. Hematological: Negative. Negative for adenopathy. Psychiatric/Behavioral: Negative. All other systems reviewed and are negative. Physical Exam   Physical Exam   Constitutional: She is oriented to person, place, and time. She appears well-developed and well-nourished. No distress. HENT:   Head: Normocephalic and atraumatic. Mouth/Throat: No oropharyngeal exudate. Eyes: Pupils are equal, round, and reactive to light. Conjunctivae are normal. Right eye exhibits no discharge. Left eye exhibits no discharge. No scleral icterus. Neck: Normal range of motion. Neck supple. No JVD present. Cardiovascular: Normal rate, regular rhythm, normal heart sounds and intact distal pulses. Exam reveals no gallop and no friction rub. No murmur heard. Pulmonary/Chest: Effort normal and breath sounds normal. No stridor. No respiratory distress. She has no wheezes. She has no rales. She exhibits no tenderness. Abdominal: Soft. Bowel sounds are normal. She exhibits no distension and no mass. There is generalized tenderness. There is no rebound and no guarding. Neurological: She is alert and oriented to person, place, and time. She displays normal reflexes. No cranial nerve deficit. She exhibits normal muscle tone. Coordination normal.   Skin: Skin is warm. No rash noted. She is not diaphoretic. No pallor. Vitals reviewed. 7:14 PM patient awaiting blood work. Discussed case with Dr. Janet Turpin who will assist with final disposition. If she is not severely hypokalemic. She is tolerating p.o. she may be discharged home with a diagnosis of cyclic vomiting. Diagnostic Study Results     Labs -   No results found for this or any previous visit (from the past 12 hour(s)). Radiologic Studies -   No orders to display     CT Results  (Last 48 hours)    None        CXR Results  (Last 48 hours)    None          Medical Decision Making   I am the first provider for this patient.     I reviewed the vital signs, available nursing notes, past medical history, past surgical history, family history and social history. Vital Signs-Reviewed the patient's vital signs. Patient Vitals for the past 12 hrs:   Pulse BP   09/22/19 2143 93 (!) 177/107       EKG interpretation: (Preliminary)  Rhythm: normal sinus rhythm; and regular . Rate (approx.): 99; Axis: left axis deviation; AR interval: normal; QRS interval: normal ; ST/T wave: normal; Other findings: borderline ekg. Records Reviewed: Nursing Notes, Old Medical Records, Previous electrocardiograms, Previous Radiology Studies and Previous Laboratory Studies    Provider Notes (Medical Decision Making):   Differential diagnosis-cyclic vomiting due to marijuana abuse, electrolyte abnormality, dehydration, gastroenteritis, bowel obstruction, colitis, hernia, hepatitis, pancreatitis, coronary syndrome    Impression/plan-38year-old female well-known to the emergency room for similar complaints of nausea and vomiting. She returns after being seen Friday for the same. She does admit to marijuana abuse. Plan will be to recheck labs as her potassium was low on her prior visit. We will continue to treat her supportively. If she is unable to tolerate p.o. or does not improve may need admission. We will decide on any further testing pending those lab results. ED Course:   Initial assessment performed. The patients presenting problems have been discussed, and they are in agreement with the care plan formulated and outlined with them. I have encouraged them to ask questions as they arise throughout their visit. Critical Care Time:   0    Disposition:  Home    PLAN:  1. Discharge Medication List as of 9/22/2019  8:23 PM      START taking these medications    Details   capsaicin (CAPZASIN-HP) 0.1 % topical cream Apply  to affected area three (3) times daily. , Print, Disp-1 Tube, R-0      dicyclomine (BENTYL) 10 mg capsule Take 1 Cap by mouth four (4) times daily for 5 days. , Print, Disp-20 Cap, R-0      !! potassium chloride (KLOR-CON) 20 mEq pack Take 1 Packet by mouth daily. , Print, Disp-30 Packet, R-0       !! - Potential duplicate medications found. Please discuss with provider. CONTINUE these medications which have CHANGED    Details   ondansetron (ZOFRAN ODT) 4 mg disintegrating tablet Take 1 Tab by mouth every eight (8) hours as needed for Nausea. , Print, Disp-20 Tab, R-0         CONTINUE these medications which have NOT CHANGED    Details   ! ! potassium chloride (KLOR-CON) 20 mEq pack Take 1 Packet by mouth two (2) times daily (with meals). , Normal, Disp-10 Packet, R-0      promethazine (PHENERGAN) 25 mg suppository Insert 1 Suppository into rectum every six (6) hours as needed for Nausea for up to 7 days. , Normal, Disp-12 Suppository, R-0      amLODIPine (NORVASC) 10 mg tablet Take 1 Tab by mouth daily. , Normal, Disp-90 Tab, R-1      metoprolol tartrate (LOPRESSOR) 25 mg tablet Take 1 Tab by mouth every twelve (12) hours. , Normal, Disp-180 Tab, R-1      chlorthalidone (HYGROTEN) 25 mg tablet Take 1 Tab by mouth daily. , Normal, Disp-90 Tab, R-2      esomeprazole (NEXIUM 24HR) 20 mg capsule Take 1 Cap by mouth daily. , Normal, Disp-30 Cap, R-2      cetirizine (ZYRTEC) 10 mg tablet Take 10 mg by mouth daily. , Historical Med       !! - Potential duplicate medications found. Please discuss with provider. 2.   Follow-up Information    None       Return to ED if worse     Diagnosis     Clinical Impression:   1. Intractable cyclical vomiting with nausea    2. Acute hypokalemia        Attestations:    Kailyn Brito MD    Please note that this dictation was completed with [employer], the [id number] voice recognition software. Quite often unanticipated grammatical, syntax, homophones, and other interpretive errors are inadvertently transcribed by the computer software. Please disregard these errors. Please excuse any errors that have escaped final proofreading. Thank you.

## 2019-09-23 NOTE — ED NOTES
Discharge instructions were given to the patient by [id number], RN. The patient left the Emergency Department ambulatory, alert and oriented and in no acute distress with 4 prescriptions. The patient was encouraged to call or return to the ED for worsening issues or problems and was encouraged to schedule a follow up appointment for continuing care. The patient verbalized understanding of discharge instructions and prescriptions, all questions were answered. The patient has no further concerns at this time.

## 2019-09-23 NOTE — ED NOTES
Patient has been instructed that they have been given fentanyl  which contains opioids, benzodiazepines, or other sedating drugs. Patient is aware that they  will need to refrain from driving or operating heavy machinery after taking this medication. Patient also instructed that they need to avoid drinking alcohol and using other products containing opioids, benzodiazepines, or other sedating drugs. Patient verbalized understanding. Pt's ride at bedside.

## 2019-09-23 NOTE — PROGRESS NOTES
Sign out received from Dr. Shy Saleem. Labs reviewed, K repleted, UDS with +THC; exam unremarkable and non-focal. VSS. Pt tolerated PO. Will discharge patient home with PO Bentyl, K supplements, zofran ODT and capsaicin cream. Advised patient to stop future THC use. Progress Note:  8:12 PM  I have re-examined the patient. she feels much better and symptoms improved. Tolerating oral intake. Abdomen is soft and without guarding, rebound or other peritoneal signs. I have discussed with patient the importance of close f/u and to return to the ED if symptoms don't improve or worsen. Progress Note:  8:22 PM  Pt states she feels much better after ED treatment; pt able to tolerate PO and ambulate per baseline. Pt is clinically safe for discharge. Cindy Brooks's final labs and imaging have been reviewed with her. She has been counseled regarding her diagnosis. She verbally conveys understanding and agreement of the signs, symptoms, diagnosis, treatment and prognosis and additionally agrees to follow up as recommended with Dr. Karen France MD in 24 - 48 hours. All questions have been answered, pt voiced understanding and agreement with plan. Specific return precautions provided as well as instructions to return to the ED should sx worsen at any time. At time of discharge, pt had stable vital signs and had no questions or concerns, and was very satisfied with overall care.       Wisam Soliz MD

## 2019-09-23 NOTE — ED NOTES
Gave pt bentyl tablets and some of the postassium mixed in water. Pt took pills with a few sips. Pt not wanting to drink more of the potassium at this time. She says she will try later.

## 2019-09-24 ENCOUNTER — OFFICE VISIT (OUTPATIENT)
Dept: INTERNAL MEDICINE CLINIC | Age: 44
End: 2019-09-24

## 2019-09-24 ENCOUNTER — APPOINTMENT (OUTPATIENT)
Dept: CT IMAGING | Age: 44
End: 2019-09-24
Attending: EMERGENCY MEDICINE
Payer: COMMERCIAL

## 2019-09-24 ENCOUNTER — HOSPITAL ENCOUNTER (OUTPATIENT)
Age: 44
Setting detail: OBSERVATION
Discharge: HOME | End: 2019-09-26
Attending: EMERGENCY MEDICINE | Admitting: STUDENT IN AN ORGANIZED HEALTH CARE EDUCATION/TRAINING PROGRAM
Payer: COMMERCIAL

## 2019-09-24 VITALS
TEMPERATURE: 98 F | OXYGEN SATURATION: 97 % | HEART RATE: 125 BPM | SYSTOLIC BLOOD PRESSURE: 141 MMHG | DIASTOLIC BLOOD PRESSURE: 103 MMHG | WEIGHT: 148 LBS | RESPIRATION RATE: 19 BRPM | HEIGHT: 61 IN | BODY MASS INDEX: 27.94 KG/M2

## 2019-09-24 DIAGNOSIS — I10 ESSENTIAL HYPERTENSION: ICD-10-CM

## 2019-09-24 DIAGNOSIS — R10.84 ABDOMINAL PAIN, GENERALIZED: ICD-10-CM

## 2019-09-24 DIAGNOSIS — Q61.3 POLYCYSTIC KIDNEY DISEASE: ICD-10-CM

## 2019-09-24 DIAGNOSIS — Q61.2 POLYCYSTIC KIDNEY DISEASE, AUTOSOMAL DOMINANT: ICD-10-CM

## 2019-09-24 DIAGNOSIS — N17.9 AKI (ACUTE KIDNEY INJURY) (HCC): ICD-10-CM

## 2019-09-24 DIAGNOSIS — R00.0 TACHYCARDIA: ICD-10-CM

## 2019-09-24 DIAGNOSIS — R11.15 INTRACTABLE CYCLICAL VOMITING WITH NAUSEA: ICD-10-CM

## 2019-09-24 DIAGNOSIS — E87.6 HYPOKALEMIA: Primary | ICD-10-CM

## 2019-09-24 DIAGNOSIS — R11.15 NON-INTRACTABLE CYCLICAL VOMITING WITH NAUSEA: Primary | ICD-10-CM

## 2019-09-24 LAB
ALBUMIN SERPL-MCNC: 4.3 G/DL (ref 3.5–5)
ALBUMIN/GLOB SERPL: 0.9 {RATIO} (ref 1.1–2.2)
ALP SERPL-CCNC: 99 U/L (ref 45–117)
ALT SERPL-CCNC: 27 U/L (ref 12–78)
ANION GAP SERPL CALC-SCNC: 11 MMOL/L (ref 5–15)
APPEARANCE UR: ABNORMAL
AST SERPL-CCNC: 20 U/L (ref 15–37)
ATRIAL RATE: 99 BPM
BACTERIA SPEC CULT: NORMAL
BACTERIA URNS QL MICRO: ABNORMAL /HPF
BASOPHILS # BLD: 0.1 K/UL (ref 0–0.1)
BASOPHILS NFR BLD: 1 % (ref 0–1)
BILIRUB SERPL-MCNC: 0.5 MG/DL (ref 0.2–1)
BILIRUB UR QL: NEGATIVE
BUN SERPL-MCNC: 18 MG/DL (ref 6–20)
BUN/CREAT SERPL: 12 (ref 12–20)
CALCIUM SERPL-MCNC: 10.5 MG/DL (ref 8.5–10.1)
CALCULATED P AXIS, ECG09: 51 DEGREES
CALCULATED R AXIS, ECG10: 51 DEGREES
CALCULATED T AXIS, ECG11: 21 DEGREES
CC UR VC: NORMAL
CHLORIDE SERPL-SCNC: 95 MMOL/L (ref 97–108)
CO2 SERPL-SCNC: 28 MMOL/L (ref 21–32)
COLOR UR: ABNORMAL
CREAT SERPL-MCNC: 1.53 MG/DL (ref 0.55–1.02)
DIAGNOSIS, 93000: NORMAL
DIFFERENTIAL METHOD BLD: ABNORMAL
EOSINOPHIL # BLD: 0.1 K/UL (ref 0–0.4)
EOSINOPHIL NFR BLD: 1 % (ref 0–7)
EPITH CASTS URNS QL MICRO: ABNORMAL /LPF
ERYTHROCYTE [DISTWIDTH] IN BLOOD BY AUTOMATED COUNT: 13.1 % (ref 11.5–14.5)
GLOBULIN SER CALC-MCNC: 5 G/DL (ref 2–4)
GLUCOSE SERPL-MCNC: 111 MG/DL (ref 65–100)
GLUCOSE UR STRIP.AUTO-MCNC: NEGATIVE MG/DL
HCG UR QL: NEGATIVE
HCT VFR BLD AUTO: 43.6 % (ref 35–47)
HGB BLD-MCNC: 15.3 G/DL (ref 11.5–16)
HGB UR QL STRIP: ABNORMAL
IMM GRANULOCYTES # BLD AUTO: 0 K/UL
IMM GRANULOCYTES NFR BLD AUTO: 0 %
KETONES UR QL STRIP.AUTO: NEGATIVE MG/DL
LEUKOCYTE ESTERASE UR QL STRIP.AUTO: NEGATIVE
LIPASE SERPL-CCNC: 301 U/L (ref 73–393)
LYMPHOCYTES # BLD: 5.7 K/UL (ref 0.8–3.5)
LYMPHOCYTES NFR BLD: 40 % (ref 12–49)
MAGNESIUM SERPL-MCNC: 1.7 MG/DL (ref 1.6–2.4)
MCH RBC QN AUTO: 32.1 PG (ref 26–34)
MCHC RBC AUTO-ENTMCNC: 35.1 G/DL (ref 30–36.5)
MCV RBC AUTO: 91.6 FL (ref 80–99)
MONOCYTES # BLD: 0.9 K/UL (ref 0–1)
MONOCYTES NFR BLD: 6 % (ref 5–13)
NEUTS SEG # BLD: 7.5 K/UL (ref 1.8–8)
NEUTS SEG NFR BLD: 52 % (ref 32–75)
NITRITE UR QL STRIP.AUTO: NEGATIVE
NRBC # BLD: 0 K/UL (ref 0–0.01)
NRBC BLD-RTO: 0 PER 100 WBC
P-R INTERVAL, ECG05: 126 MS
PH UR STRIP: 6.5 [PH] (ref 5–8)
PLATELET # BLD AUTO: 443 K/UL (ref 150–400)
PMV BLD AUTO: 8.8 FL (ref 8.9–12.9)
POTASSIUM SERPL-SCNC: 2.5 MMOL/L (ref 3.5–5.1)
PROT SERPL-MCNC: 9.3 G/DL (ref 6.4–8.2)
PROT UR STRIP-MCNC: NEGATIVE MG/DL
Q-T INTERVAL, ECG07: 352 MS
QRS DURATION, ECG06: 80 MS
QTC CALCULATION (BEZET), ECG08: 451 MS
RBC # BLD AUTO: 4.76 M/UL (ref 3.8–5.2)
RBC #/AREA URNS HPF: ABNORMAL /HPF (ref 0–5)
RBC MORPH BLD: ABNORMAL
SERVICE CMNT-IMP: NORMAL
SODIUM SERPL-SCNC: 134 MMOL/L (ref 136–145)
SP GR UR REFRACTOMETRY: <1.005 (ref 1–1.03)
TSH SERPL DL<=0.05 MIU/L-ACNC: 0.93 UIU/ML (ref 0.36–3.74)
UA: UC IF INDICATED,UAUC: ABNORMAL
UROBILINOGEN UR QL STRIP.AUTO: 0.2 EU/DL (ref 0.2–1)
VENTRICULAR RATE, ECG03: 99 BPM
WBC # BLD AUTO: 14.3 K/UL (ref 3.6–11)
WBC URNS QL MICRO: ABNORMAL /HPF (ref 0–4)

## 2019-09-24 PROCEDURE — 96374 THER/PROPH/DIAG INJ IV PUSH: CPT

## 2019-09-24 PROCEDURE — 74177 CT ABD & PELVIS W/CONTRAST: CPT

## 2019-09-24 PROCEDURE — 99218 HC RM OBSERVATION: CPT

## 2019-09-24 PROCEDURE — 99285 EMERGENCY DEPT VISIT HI MDM: CPT

## 2019-09-24 PROCEDURE — 81025 URINE PREGNANCY TEST: CPT

## 2019-09-24 PROCEDURE — 74011636320 HC RX REV CODE- 636/320: Performed by: EMERGENCY MEDICINE

## 2019-09-24 PROCEDURE — 96375 TX/PRO/DX INJ NEW DRUG ADDON: CPT

## 2019-09-24 PROCEDURE — 74011250637 HC RX REV CODE- 250/637: Performed by: STUDENT IN AN ORGANIZED HEALTH CARE EDUCATION/TRAINING PROGRAM

## 2019-09-24 PROCEDURE — 83690 ASSAY OF LIPASE: CPT

## 2019-09-24 PROCEDURE — 74011250636 HC RX REV CODE- 250/636: Performed by: EMERGENCY MEDICINE

## 2019-09-24 PROCEDURE — 93005 ELECTROCARDIOGRAM TRACING: CPT

## 2019-09-24 PROCEDURE — 85025 COMPLETE CBC W/AUTO DIFF WBC: CPT

## 2019-09-24 PROCEDURE — 74011250636 HC RX REV CODE- 250/636: Performed by: STUDENT IN AN ORGANIZED HEALTH CARE EDUCATION/TRAINING PROGRAM

## 2019-09-24 PROCEDURE — 96361 HYDRATE IV INFUSION ADD-ON: CPT

## 2019-09-24 PROCEDURE — 74011250636 HC RX REV CODE- 250/636: Performed by: HOSPITALIST

## 2019-09-24 PROCEDURE — 96365 THER/PROPH/DIAG IV INF INIT: CPT

## 2019-09-24 PROCEDURE — 87086 URINE CULTURE/COLONY COUNT: CPT

## 2019-09-24 PROCEDURE — 81001 URINALYSIS AUTO W/SCOPE: CPT

## 2019-09-24 PROCEDURE — 96376 TX/PRO/DX INJ SAME DRUG ADON: CPT

## 2019-09-24 PROCEDURE — 36415 COLL VENOUS BLD VENIPUNCTURE: CPT

## 2019-09-24 PROCEDURE — 84443 ASSAY THYROID STIM HORMONE: CPT

## 2019-09-24 PROCEDURE — 96372 THER/PROPH/DIAG INJ SC/IM: CPT

## 2019-09-24 PROCEDURE — 80053 COMPREHEN METABOLIC PANEL: CPT

## 2019-09-24 PROCEDURE — 83735 ASSAY OF MAGNESIUM: CPT

## 2019-09-24 PROCEDURE — 96367 TX/PROPH/DG ADDL SEQ IV INF: CPT

## 2019-09-24 RX ORDER — PROCHLORPERAZINE EDISYLATE 5 MG/ML
10 INJECTION INTRAMUSCULAR; INTRAVENOUS
Status: DISCONTINUED | OUTPATIENT
Start: 2019-09-24 | End: 2019-09-25

## 2019-09-24 RX ORDER — SODIUM CHLORIDE 0.9 % (FLUSH) 0.9 %
5-40 SYRINGE (ML) INJECTION AS NEEDED
Status: DISCONTINUED | OUTPATIENT
Start: 2019-09-24 | End: 2019-09-26 | Stop reason: HOSPADM

## 2019-09-24 RX ORDER — PANTOPRAZOLE SODIUM 40 MG/1
40 TABLET, DELAYED RELEASE ORAL ONCE
Status: COMPLETED | OUTPATIENT
Start: 2019-09-24 | End: 2019-09-24

## 2019-09-24 RX ORDER — POTASSIUM CHLORIDE 1.5 G/1.77G
40 POWDER, FOR SOLUTION ORAL
Status: COMPLETED | OUTPATIENT
Start: 2019-09-24 | End: 2019-09-25

## 2019-09-24 RX ORDER — SODIUM CHLORIDE 0.9 % (FLUSH) 0.9 %
5-10 SYRINGE (ML) INJECTION
Status: COMPLETED | OUTPATIENT
Start: 2019-09-24 | End: 2019-09-24

## 2019-09-24 RX ORDER — PROCHLORPERAZINE EDISYLATE 5 MG/ML
10 INJECTION INTRAMUSCULAR; INTRAVENOUS
Status: COMPLETED | OUTPATIENT
Start: 2019-09-24 | End: 2019-09-24

## 2019-09-24 RX ORDER — KETOROLAC TROMETHAMINE 30 MG/ML
15 INJECTION, SOLUTION INTRAMUSCULAR; INTRAVENOUS
Status: COMPLETED | OUTPATIENT
Start: 2019-09-24 | End: 2019-09-24

## 2019-09-24 RX ORDER — POTASSIUM CHLORIDE 7.45 MG/ML
10 INJECTION INTRAVENOUS
Status: DISPENSED | OUTPATIENT
Start: 2019-09-24 | End: 2019-09-25

## 2019-09-24 RX ORDER — METOPROLOL TARTRATE 25 MG/1
25 TABLET, FILM COATED ORAL EVERY 12 HOURS
Status: DISCONTINUED | OUTPATIENT
Start: 2019-09-24 | End: 2019-09-26 | Stop reason: HOSPADM

## 2019-09-24 RX ORDER — POTASSIUM CHLORIDE AND SODIUM CHLORIDE 900; 300 MG/100ML; MG/100ML
INJECTION, SOLUTION INTRAVENOUS CONTINUOUS
Status: DISCONTINUED | OUTPATIENT
Start: 2019-09-24 | End: 2019-09-26

## 2019-09-24 RX ORDER — LABETALOL HYDROCHLORIDE 5 MG/ML
20 INJECTION, SOLUTION INTRAVENOUS
Status: DISCONTINUED | OUTPATIENT
Start: 2019-09-24 | End: 2019-09-26 | Stop reason: HOSPADM

## 2019-09-24 RX ORDER — ONDANSETRON 4 MG/1
4 TABLET, ORALLY DISINTEGRATING ORAL
Status: DISCONTINUED | OUTPATIENT
Start: 2019-09-24 | End: 2019-09-24

## 2019-09-24 RX ORDER — ACETAMINOPHEN 325 MG/1
650 TABLET ORAL
Status: DISCONTINUED | OUTPATIENT
Start: 2019-09-24 | End: 2019-09-26 | Stop reason: HOSPADM

## 2019-09-24 RX ORDER — POTASSIUM CHLORIDE 1.5 G/1.77G
40 POWDER, FOR SOLUTION ORAL
Status: DISCONTINUED | OUTPATIENT
Start: 2019-09-24 | End: 2019-09-24

## 2019-09-24 RX ORDER — PANTOPRAZOLE SODIUM 40 MG/1
40 TABLET, DELAYED RELEASE ORAL
Status: DISCONTINUED | OUTPATIENT
Start: 2019-09-25 | End: 2019-09-26 | Stop reason: HOSPADM

## 2019-09-24 RX ORDER — DICYCLOMINE HYDROCHLORIDE 10 MG/1
10 CAPSULE ORAL 4 TIMES DAILY
Status: DISCONTINUED | OUTPATIENT
Start: 2019-09-24 | End: 2019-09-26 | Stop reason: HOSPADM

## 2019-09-24 RX ORDER — ONDANSETRON 2 MG/ML
4 INJECTION INTRAMUSCULAR; INTRAVENOUS
Status: COMPLETED | OUTPATIENT
Start: 2019-09-24 | End: 2019-09-24

## 2019-09-24 RX ORDER — HEPARIN SODIUM 5000 [USP'U]/ML
5000 INJECTION, SOLUTION INTRAVENOUS; SUBCUTANEOUS EVERY 8 HOURS
Status: DISCONTINUED | OUTPATIENT
Start: 2019-09-24 | End: 2019-09-26 | Stop reason: HOSPADM

## 2019-09-24 RX ORDER — MAGNESIUM SULFATE 1 G/100ML
1 INJECTION INTRAVENOUS ONCE
Status: COMPLETED | OUTPATIENT
Start: 2019-09-24 | End: 2019-09-25

## 2019-09-24 RX ORDER — MORPHINE SULFATE 2 MG/ML
1 INJECTION, SOLUTION INTRAMUSCULAR; INTRAVENOUS
Status: DISCONTINUED | OUTPATIENT
Start: 2019-09-24 | End: 2019-09-25

## 2019-09-24 RX ORDER — SODIUM CHLORIDE 0.9 % (FLUSH) 0.9 %
5-40 SYRINGE (ML) INJECTION EVERY 8 HOURS
Status: DISCONTINUED | OUTPATIENT
Start: 2019-09-24 | End: 2019-09-26 | Stop reason: HOSPADM

## 2019-09-24 RX ORDER — METOPROLOL TARTRATE 50 MG/1
50 TABLET ORAL EVERY 12 HOURS
Status: DISCONTINUED | OUTPATIENT
Start: 2019-09-24 | End: 2019-09-24

## 2019-09-24 RX ORDER — SODIUM CHLORIDE AND POTASSIUM CHLORIDE .9; .15 G/100ML; G/100ML
SOLUTION INTRAVENOUS CONTINUOUS
Status: DISCONTINUED | OUTPATIENT
Start: 2019-09-24 | End: 2019-09-24

## 2019-09-24 RX ORDER — AMLODIPINE BESYLATE 5 MG/1
10 TABLET ORAL DAILY
Status: DISCONTINUED | OUTPATIENT
Start: 2019-09-24 | End: 2019-09-26 | Stop reason: HOSPADM

## 2019-09-24 RX ORDER — POTASSIUM CHLORIDE 7.45 MG/ML
10 INJECTION INTRAVENOUS
Status: COMPLETED | OUTPATIENT
Start: 2019-09-24 | End: 2019-09-24

## 2019-09-24 RX ADMIN — POTASSIUM CHLORIDE 10 MEQ: 7.46 INJECTION, SOLUTION INTRAVENOUS at 16:39

## 2019-09-24 RX ADMIN — DICYCLOMINE HYDROCHLORIDE 10 MG: 10 CAPSULE ORAL at 22:23

## 2019-09-24 RX ADMIN — POTASSIUM CHLORIDE 40 MEQ: 1.5 POWDER, FOR SOLUTION ORAL at 23:10

## 2019-09-24 RX ADMIN — MORPHINE SULFATE 1 MG: 2 INJECTION, SOLUTION INTRAMUSCULAR; INTRAVENOUS at 23:55

## 2019-09-24 RX ADMIN — POTASSIUM CHLORIDE 10 MEQ: 7.46 INJECTION, SOLUTION INTRAVENOUS at 18:20

## 2019-09-24 RX ADMIN — PROCHLORPERAZINE EDISYLATE 10 MG: 5 INJECTION INTRAMUSCULAR; INTRAVENOUS at 18:16

## 2019-09-24 RX ADMIN — POTASSIUM CHLORIDE 10 MEQ: 10 INJECTION, SOLUTION INTRAVENOUS at 22:42

## 2019-09-24 RX ADMIN — IOPAMIDOL 100 ML: 755 INJECTION, SOLUTION INTRAVENOUS at 17:21

## 2019-09-24 RX ADMIN — KETOROLAC TROMETHAMINE 15 MG: 30 INJECTION INTRAMUSCULAR; INTRAVENOUS at 16:20

## 2019-09-24 RX ADMIN — PANTOPRAZOLE SODIUM 40 MG: 40 TABLET, DELAYED RELEASE ORAL at 22:23

## 2019-09-24 RX ADMIN — ONDANSETRON 4 MG: 2 SOLUTION INTRAMUSCULAR; INTRAVENOUS at 16:39

## 2019-09-24 RX ADMIN — Medication 10 ML: at 17:21

## 2019-09-24 RX ADMIN — MAGNESIUM SULFATE HEPTAHYDRATE 1 G: 1 INJECTION, SOLUTION INTRAVENOUS at 23:41

## 2019-09-24 RX ADMIN — SODIUM CHLORIDE 1000 ML: 900 INJECTION, SOLUTION INTRAVENOUS at 15:56

## 2019-09-24 RX ADMIN — Medication 10 ML: at 22:24

## 2019-09-24 RX ADMIN — HEPARIN SODIUM 5000 UNITS: 5000 INJECTION INTRAVENOUS; SUBCUTANEOUS at 22:23

## 2019-09-24 RX ADMIN — METOPROLOL TARTRATE 25 MG: 25 TABLET ORAL at 23:09

## 2019-09-24 RX ADMIN — POTASSIUM CHLORIDE AND SODIUM CHLORIDE: 900; 300 INJECTION, SOLUTION INTRAVENOUS at 23:38

## 2019-09-24 NOTE — ED NOTES
Pt reporting NVD x4days. Pt had 3 bile emesis episodes PTA. Pt is vomiting small amounts of bile and stomach content that is foamy in appearance. Pt reports one loose BM today PTA. Pt has not been able to tolerate any PO intake including at home meds. Pt has not had any cardiac meds since Thurs. Abd pain is non-reproducible upon palpation. Emergency Department Nursing Plan of Care       The Nursing Plan of Care is developed from the Nursing assessment and Emergency Department Attending provider initial evaluation. The plan of care may be reviewed in the ED Provider note.     The Plan of Care was developed with the following considerations:   Patient / Family readiness to learn indicated by:verbalized understanding  Persons(s) to be included in education: patient  Barriers to Learning/Limitations:No    Signed     Janusz Agee RN    9/24/2019   7:20 PM

## 2019-09-24 NOTE — ED NOTES
Spoke with nursing supervisor Shruthi Hankins regarding admission bed assignment; due to insufficient staffing upstairs at this time there is a temporary delay. She will notify ED when bed assignment becomes available.

## 2019-09-24 NOTE — PROGRESS NOTES
Dre Zimmerman is a 38 y.o. female and presents with ED Follow-up (9/22/2019 [facility] ED for vomiting )  . Subjective:    Recurrent vomiting-pt has been seen x2 in ED this month fo n/v/hypokalemia. Pt relays since her last discharge from ED she STILL has not been able to tolerate POs and she ha s NOT been able to keep the potassium down. She is tachycardic and is actively using the emesis bag as I type. The etiology of her hyperemesis is unclear. She is s/p EGD which demonstrated gastritis 11/2018    Pulse Readings from Last 3 Encounters:   09/24/19 (!) 130   09/24/19 (!) 125   09/22/19 93       Pt denies illicit drug use    Hypertension Review:  The patient has essential hypertension  Diet and Lifestyle: generally follows a  low sodium diet, exercises sporadically  Home BP Monitoring: is not measured at home. Pertinent ROS: taking medications as instructed, no medication side effects noted, no TIA's, no chest pain on exertion, no dyspnea on exertion, no swelling of ankles. BP Readings from Last 3 Encounters:   09/24/19 (!) 129/103   09/24/19 (!) 141/103   09/22/19 (!) 177/107     PCKD-pt has not established w renal as yet      Review of Systems  Constitutional: negative for fevers, chills, anorexia and weight loss  Eyes:   negative for visual disturbance and irritation  ENT:   negative for tinnitus,sore throat,nasal congestion,ear pains. hoarseness  Respiratory:  negative for cough, hemoptysis, dyspnea,wheezing  CV:   negative for chest pain, palpitations, lower extremity edema  Musculoskel: negative for myalgias, arthralgias, back pain, muscle weakness, joint pain  Neurological:  negative for headaches, dizziness, vertigo, memory problems and gait   Behavl/Psych: negative for feelings of anxiety, depression, mood changes    Past Medical History:   Diagnosis Date    Hypertension     Kidney anomaly, congenital     Tubal pregnancy      Past Surgical History:   Procedure Laterality Date    HX CHOLECYSTECTOMY  HX GYN  10/07/2013    cyst removed    HX HYSTERECTOMY  10/7/2013    partial hysterectomy     Social History     Socioeconomic History    Marital status: SINGLE     Spouse name: Not on file    Number of children: Not on file    Years of education: Not on file    Highest education level: Not on file   Tobacco Use    Smoking status: Current Some Day Smoker     Packs/day: 0.25     Years: 25.00     Pack years: 6.25    Smokeless tobacco: Never Used    Tobacco comment: 1-3 day   Substance and Sexual Activity    Alcohol use: Yes     Comment: occasionally/socially    Drug use: Yes     Types: Marijuana     Comment: occ    Sexual activity: Yes     Partners: Male     Family History   Problem Relation Age of Onset    Hypertension Mother     Hypertension Father     Hypertension Sister      Current Outpatient Medications   Medication Sig Dispense Refill    ondansetron (ZOFRAN ODT) 4 mg disintegrating tablet Take 1 Tab by mouth every eight (8) hours as needed for Nausea. 20 Tab 0    capsaicin (CAPZASIN-HP) 0.1 % topical cream Apply  to affected area three (3) times daily. 1 Tube 0    dicyclomine (BENTYL) 10 mg capsule Take 1 Cap by mouth four (4) times daily for 5 days. 20 Cap 0    potassium chloride (KLOR-CON) 20 mEq pack Take 1 Packet by mouth two (2) times daily (with meals). 10 Packet 0    promethazine (PHENERGAN) 25 mg suppository Insert 1 Suppository into rectum every six (6) hours as needed for Nausea for up to 7 days. 12 Suppository 0    amLODIPine (NORVASC) 10 mg tablet Take 1 Tab by mouth daily. 90 Tab 1    metoprolol tartrate (LOPRESSOR) 25 mg tablet Take 1 Tab by mouth every twelve (12) hours. 180 Tab 1    chlorthalidone (HYGROTEN) 25 mg tablet Take 1 Tab by mouth daily. 90 Tab 2    esomeprazole (NEXIUM 24HR) 20 mg capsule Take 1 Cap by mouth daily. 30 Cap 2    cetirizine (ZYRTEC) 10 mg tablet Take 10 mg by mouth daily.       potassium chloride (KLOR-CON) 20 mEq pack Take 1 Packet by mouth daily. 30 Packet 0     No Known Allergies    Objective:  Visit Vitals  BP (!) 141/103 (BP 1 Location: Left arm, BP Patient Position: Sitting)   Pulse (!) 125   Temp 98 °F (36.7 °C) (Oral)   Resp 19   Ht 5' 1\" (1.549 m)   Wt 148 lb (67.1 kg)   SpO2 97%   BMI 27.96 kg/m²     Physical Exam:   General appearance - alert, well appearing, and in no distress  Mental status - alert, oriented to person, place, and time  EYE-EOMI + deya exopthalmos  Chest - clear to auscultation, no wheezes, rales or rhonchi, symmetric air entry   Heart - normal rate, regular rhythm, normal S1, S2 2/6 systolic murmur  Ext-peripheral pulses normal, no pedal edema, no clubbing or cyanosis  Skin-Warm and dry. no hyperpigmentation, vitiligo, or suspicious lesions  Neuro -alert, oriented, normal speech, no focal findings or movement disorder noted      Results for orders placed or performed during the hospital encounter of 09/22/19   CULTURE, URINE   Result Value Ref Range    Special Requests: NO SPECIAL REQUESTS  Reflexed from [id number]        [place] Count >100,000  COLONIES/mL        Culture result: MIXED UROGENITAL NADER ISOLATED     CBC WITH AUTOMATED DIFF   Result Value Ref Range    WBC 9.2 3.6 - 11.0 K/uL    RBC 4.73 3.80 - 5.20 M/uL    HGB 15.2 11.5 - 16.0 g/dL    HCT 42.8 35.0 - 47.0 %    MCV 90.5 80.0 - 99.0 FL    MCH 32.1 26.0 - 34.0 PG    MCHC 35.5 30.0 - 36.5 g/dL    RDW 12.8 11.5 - 14.5 %    PLATELET 883 (H) 972 - 400 K/uL    MPV 9.0 8.9 - 12.9 FL    NRBC 0.0 0  WBC    ABSOLUTE NRBC 0.00 0.00 - 0.01 K/uL    NEUTROPHILS 61 32 - 75 %    LYMPHOCYTES 32 12 - 49 %    MONOCYTES 5 5 - 13 %    EOSINOPHILS 1 0 - 7 %    BASOPHILS 1 0 - 1 %    IMMATURE GRANULOCYTES 0 0.0 - 0.5 %    ABS. NEUTROPHILS 5.7 1.8 - 8.0 K/UL    ABS. LYMPHOCYTES 2.9 0.8 - 3.5 K/UL    ABS. MONOCYTES 0.5 0.0 - 1.0 K/UL    ABS. EOSINOPHILS 0.1 0.0 - 0.4 K/UL    ABS. BASOPHILS 0.1 0.0 - 0.1 K/UL    ABS. IMM.  GRANS. 0.0 0.00 - 0.04 K/UL    DF AUTOMATED     METABOLIC PANEL, COMPREHENSIVE   Result Value Ref Range    Sodium 135 (L) 136 - 145 mmol/L    Potassium 2.7 (LL) 3.5 - 5.1 mmol/L    Chloride 95 (L) 97 - 108 mmol/L    CO2 24 21 - 32 mmol/L    Anion gap 16 (H) 5 - 15 mmol/L    Glucose 118 (H) 65 - 100 mg/dL    BUN 13 6 - 20 MG/DL    Creatinine 1.00 0.55 - 1.02 MG/DL    BUN/Creatinine ratio 13 12 - 20      GFR est AA >60 >60 ml/min/1.73m2    GFR est non-AA 60 (L) >60 ml/min/1.73m2    Calcium 10.8 (H) 8.5 - 10.1 MG/DL    Bilirubin, total 0.4 0.2 - 1.0 MG/DL    ALT (SGPT) 30 12 - 78 U/L    AST (SGOT) 28 15 - 37 U/L    Alk.  phosphatase 99 45 - 117 U/L    Protein, total 9.7 (H) 6.4 - 8.2 g/dL    Albumin 4.5 3.5 - 5.0 g/dL    Globulin 5.2 (H) 2.0 - 4.0 g/dL    A-G Ratio 0.9 (L) 1.1 - 2.2     TROPONIN I   Result Value Ref Range    Troponin-I, Qt. <0.05 <0.05 ng/mL   LIPASE   Result Value Ref Range    Lipase 337 73 - 393 U/L   URINALYSIS W/ REFLEX CULTURE   Result Value Ref Range    Color YELLOW/STRAW      Appearance CLOUDY (A) CLEAR      Specific gravity 1.015 1.003 - 1.030      pH (UA) 6.0 5.0 - 8.0      Protein 100 (A) NEG mg/dL    Glucose NEGATIVE  NEG mg/dL    Ketone TRACE (A) NEG mg/dL    Bilirubin NEGATIVE  NEG      Blood TRACE (A) NEG      Urobilinogen 0.2 0.2 - 1.0 EU/dL    Nitrites NEGATIVE  NEG      Leukocyte Esterase NEGATIVE  NEG      WBC 0-4 0 - 4 /hpf    RBC 5-10 0 - 5 /hpf    Epithelial cells MANY (A) FEW /lpf    Bacteria 2+ (A) NEG /hpf    UA:UC IF INDICATED URINE CULTURE ORDERED (A) CNI      Mucus 2+ (A) NEG /lpf   MAGNESIUM   Result Value Ref Range    Magnesium 1.9 1.6 - 2.4 mg/dL   DRUG SCREEN, URINE   Result Value Ref Range    AMPHETAMINES NEGATIVE  NEG      BARBITURATES NEGATIVE  NEG      BENZODIAZEPINES NEGATIVE  NEG      COCAINE NEGATIVE  NEG      METHADONE NEGATIVE  NEG      OPIATES NEGATIVE  NEG      PCP(PHENCYCLIDINE) NEGATIVE  NEG      THC (TH-CANNABINOL) POSITIVE (A) NEG      Drug screen comment (NOTE)    EKG, 12 LEAD, INITIAL   Result Value Ref Range Ventricular Rate 99 BPM    Atrial Rate 99 BPM    P-R Interval 126 ms    QRS Duration 80 ms    Q-T Interval 352 ms    QTC Calculation (Bezet) 451 ms    Calculated P Axis 51 degrees    Calculated R Axis 51 degrees    Calculated T Axis 21 degrees    Diagnosis       Normal sinus rhythm  Possible Left atrial enlargement  Borderline ECG  When compared with ECG of 20-SEP-2019 14:01,  MANUAL COMPARISON REQUIRED, DATA IS UNCONFIRMED         Assessment/Plan:    ICD-10-CM ICD-9-CM    1. Tachycardia R00.0 785.0 AMB POC EKG ROUTINE W/ 12 LEADS, INTER & REP   2. Non-intractable cyclical vomiting with nausea G43. A0 536.2 REFERRAL TO GASTROENTEROLOGY      CANCELED: REFERRAL TO GASTROENTEROLOGY   3. Essential hypertension I10 401.9 REFERRAL TO NEPHROLOGY   4. Polycystic kidney disease Q61.3 753.12 REFERRAL TO NEPHROLOGY     Orders Placed This Encounter    REFERRAL TO NEPHROLOGY     Referral Priority:   Routine     Referral Type:   Consultation     Referral Reason:   Specialty Services Required     Referred to Provider:   Kristin Calderón MD     Requested Specialty:   Nephrology     Number of Visits Requested:   1    REFERRAL TO GASTROENTEROLOGY     Referral Priority:   Routine     Referral Type:   Consultation     Referral Reason:   Specialty Services Required     Referred to Provider:   Giulia Brambila MD     Number of Visits Requested:   1    AMB POC EKG ROUTINE W/ 12 LEADS, INTER & REP     Order Specific Question:   Reason for Exam:     Answer:   tachycardia     1. Non-intractable cyclical vomiting with nausea  Pt is referred back to ED for IVF hydration and potassium repletion  - REFERRAL TO GASTROENTEROLOGY    2. Tachycardia  As above  - AMB POC EKG ROUTINE W/ 12 LEADS, INTER & REP    3. Essential hypertension    - REFERRAL TO NEPHROLOGY    4. Polycystic kidney disease    - REFERRAL TO NEPHROLOGY        There are no Patient Instructions on file for this visit.          I have reviewed with the patient details of the assessment and plan and all questions were answered. Relevent patient education was performed. The most recent lab findings were reviewed with the patient. An After Visit Summary was printed and given to the patient.

## 2019-09-24 NOTE — PROGRESS NOTES
Chief Complaint   Patient presents with   Aurelia Oneil ED Follow-up     9/22/2019 [facility] ED for vomiting      1. Have you been to the ER, urgent care clinic since your last visit? Hospitalized since your last visit? Yes When: 9/22/2019 [facility] ED for vomiting     2. Have you seen or consulted any other health care providers outside of the [address] [place] [address] since your last visit? Include any pap smears or colon screening.  No

## 2019-09-24 NOTE — PROGRESS NOTES
Pharmacy Medication Reconciliation     Recommendations/Findings:       -Clarified PTA med list with Rx query and patient interview   PTA medication list was corrected to the following:     Prior to Admission Medications   Prescriptions Last Dose Informant Patient Reported? Taking? amLODIPine (NORVASC) 10 mg tablet 9/23/2019 Self No Yes   Sig: Take 1 Tab by mouth daily. cetirizine (ZYRTEC) 10 mg tablet 9/23/2019 Self Yes Yes   Sig: Take 10 mg by mouth daily. chlorthalidone (HYGROTEN) 25 mg tablet 9/23/2019 Self No Yes   Sig: Take 1 Tab by mouth daily. dicyclomine (BENTYL) 10 mg capsule 9/23/2019  No Yes   Sig: Take 1 Cap by mouth four (4) times daily for 5 days. Patient taking differently: Take 10 mg by mouth four (4) times daily. X 5 days from 9/23/19   esomeprazole (NEXIUM 24HR) 20 mg capsule 9/23/2019 Self No Yes   Sig: Take 1 Cap by mouth daily. metoprolol tartrate (LOPRESSOR) 25 mg tablet 9/23/2019 Self No Yes   Sig: Take 1 Tab by mouth every twelve (12) hours. ondansetron (ZOFRAN ODT) 4 mg disintegrating tablet 9/23/2019  No Yes   Sig: Take 1 Tab by mouth every eight (8) hours as needed for Nausea. potassium chloride (KLOR-CON) 20 mEq pack 9/23/2019  No Yes   Sig: Take 1 Packet by mouth two (2) times daily (with meals). Patient taking differently: Take 20 mEq by mouth two (2) times daily (with meals). X 5 days from 9/20/19   promethazine (PHENERGAN) 25 mg suppository   No Yes   Sig: Insert 1 Suppository into rectum every six (6) hours as needed for Nausea for up to 7 days. Patient taking differently: Insert 25 mg into rectum every six (6) hours as needed for Nausea.  X 7 days from 9/20/19      Facility-Administered Medications: None          Thank you,  Olivia Ferrari, [facility]

## 2019-09-24 NOTE — ED PROVIDER NOTES
EMERGENCY DEPARTMENT HISTORY AND PHYSICAL EXAM      Date: 9/24/2019  Patient Name: Evon Echeverria    History of Presenting Illness     Chief Complaint   Patient presents with    Vomiting       History Provided By: Patient    HPI: Evon Echeverria, 38 y.o. female with PMHx significant for hypertension who presents with nausea, vomiting, and abdominal pain. This is the patient's third visit in 4 days for the same symptoms. Symptoms thought to be due to cyclic vomiting related to marijuana use, however to me patient denies any use since \"the summer. \"  Patient was initially seen on 9/20, was given medications and felt improved was discharged home. Was seen again on 9/22 for the same. Followed up with her primary care doctor today and was noted to be tachycardic in the office and reported she had not tolerated anything p.o. for 5 days and was therefore referred back to the ED. Patient endorses ongoing dry heaves, abdominal pain, however states that she is no longer having diarrhea. Was unable to take any of her medications including prescribed oral potassium at home due to nausea and vomiting. Denies any fevers, chest pain, shortness of breath, urinary symptoms. PCP: Kolby Cabrera MD    There are no other complaints, changes, or physical findings at this time. Current Facility-Administered Medications   Medication Dose Route Frequency Provider Last Rate Last Dose    potassium chloride 10 mEq in 100 ml IVPB  10 mEq IntraVENous Q1H Elliot Thornton  mL/hr at 09/24/19 1820 10 mEq at 09/24/19 1820     Current Outpatient Medications   Medication Sig Dispense Refill    ondansetron (ZOFRAN ODT) 4 mg disintegrating tablet Take 1 Tab by mouth every eight (8) hours as needed for Nausea. 20 Tab 0    capsaicin (CAPZASIN-HP) 0.1 % topical cream Apply  to affected area three (3) times daily. 1 Tube 0    dicyclomine (BENTYL) 10 mg capsule Take 1 Cap by mouth four (4) times daily for 5 days.  20 Cap 0    potassium chloride (KLOR-CON) 20 mEq pack Take 1 Packet by mouth daily. 30 Packet 0    potassium chloride (KLOR-CON) 20 mEq pack Take 1 Packet by mouth two (2) times daily (with meals). 10 Packet 0    promethazine (PHENERGAN) 25 mg suppository Insert 1 Suppository into rectum every six (6) hours as needed for Nausea for up to 7 days. 12 Suppository 0    amLODIPine (NORVASC) 10 mg tablet Take 1 Tab by mouth daily. 90 Tab 1    metoprolol tartrate (LOPRESSOR) 25 mg tablet Take 1 Tab by mouth every twelve (12) hours. 180 Tab 1    chlorthalidone (HYGROTEN) 25 mg tablet Take 1 Tab by mouth daily. 90 Tab 2    esomeprazole (NEXIUM 24HR) 20 mg capsule Take 1 Cap by mouth daily. 30 Cap 2    cetirizine (ZYRTEC) 10 mg tablet Take 10 mg by mouth daily. Past History     Past Medical History:  Past Medical History:   Diagnosis Date    Hypertension     Kidney anomaly, congenital     Tubal pregnancy      Past Surgical History:  Past Surgical History:   Procedure Laterality Date    HX CHOLECYSTECTOMY      HX GYN  10/07/2013    cyst removed    HX HYSTERECTOMY  10/7/2013    partial hysterectomy     Family History:  Family History   Problem Relation Age of Onset    Hypertension Mother     Hypertension Father     Hypertension Sister      Social History:  Social History     Tobacco Use    Smoking status: Current Some Day Smoker     Packs/day: 0.25     Years: 25.00     Pack years: 6.25    Smokeless tobacco: Never Used    Tobacco comment: 1-3 day   Substance Use Topics    Alcohol use: Yes     Comment: occasionally/socially    Drug use: Yes     Types: Marijuana     Comment: occ     Allergies:  No Known Allergies  Review of Systems   Review of Systems   Constitutional: Negative for chills and fever. HENT: Negative for congestion, rhinorrhea and sore throat. Respiratory: Negative for cough and shortness of breath. Cardiovascular: Negative for chest pain.    Gastrointestinal: Positive for abdominal pain, nausea and vomiting. Genitourinary: Negative for dysuria and urgency. Skin: Negative for rash. Neurological: Negative for dizziness, light-headedness and headaches. All other systems reviewed and are negative. Physical Exam   Physical Exam   Constitutional: She is oriented to person, place, and time. She appears well-developed and well-nourished. No distress. HENT:   Head: Normocephalic and atraumatic. Eyes: Pupils are equal, round, and reactive to light. Conjunctivae and EOM are normal.   Neck: Normal range of motion. Cardiovascular: Regular rhythm and intact distal pulses. Tachycardia present. Pulmonary/Chest: Effort normal and breath sounds normal. No stridor. No respiratory distress. Abdominal: Soft. She exhibits no distension. There is tenderness (diffuse). There is guarding (voluntary). Musculoskeletal: Normal range of motion. Neurological: She is alert and oriented to person, place, and time. Skin: Skin is warm and dry. Psychiatric: She has a normal mood and affect. Nursing note and vitals reviewed. Diagnostic Study Results   Labs -     Recent Results (from the past 12 hour(s))   CBC WITH AUTOMATED DIFF    Collection Time: 09/24/19  3:56 PM   Result Value Ref Range    WBC 14.3 (H) 3.6 - 11.0 K/uL    RBC 4.76 3.80 - 5.20 M/uL    HGB 15.3 11.5 - 16.0 g/dL    HCT 43.6 35.0 - 47.0 %    MCV 91.6 80.0 - 99.0 FL    MCH 32.1 26.0 - 34.0 PG    MCHC 35.1 30.0 - 36.5 g/dL    RDW 13.1 11.5 - 14.5 %    PLATELET 363 (H) 444 - 400 K/uL    MPV 8.8 (L) 8.9 - 12.9 FL    NRBC 0.0 0  WBC    ABSOLUTE NRBC 0.00 0.00 - 0.01 K/uL    NEUTROPHILS 52 32 - 75 %    LYMPHOCYTES 40 12 - 49 %    MONOCYTES 6 5 - 13 %    EOSINOPHILS 1 0 - 7 %    BASOPHILS 1 0 - 1 %    IMMATURE GRANULOCYTES 0 %    ABS. NEUTROPHILS 7.5 1.8 - 8.0 K/UL    ABS. LYMPHOCYTES 5.7 (H) 0.8 - 3.5 K/UL    ABS. MONOCYTES 0.9 0.0 - 1.0 K/UL    ABS. EOSINOPHILS 0.1 0.0 - 0.4 K/UL    ABS. BASOPHILS 0.1 0.0 - 0.1 K/UL    ABS. IMM. GRANS. 0.0 K/UL    DF SMEAR SCANNED      RBC COMMENTS NORMOCYTIC, NORMOCHROMIC     METABOLIC PANEL, COMPREHENSIVE    Collection Time: 09/24/19  3:56 PM   Result Value Ref Range    Sodium 134 (L) 136 - 145 mmol/L    Potassium 2.5 (LL) 3.5 - 5.1 mmol/L    Chloride 95 (L) 97 - 108 mmol/L    CO2 28 21 - 32 mmol/L    Anion gap 11 5 - 15 mmol/L    Glucose 111 (H) 65 - 100 mg/dL    BUN 18 6 - 20 MG/DL    Creatinine 1.53 (H) 0.55 - 1.02 MG/DL    BUN/Creatinine ratio 12 12 - 20      GFR est AA 44 (L) >60 ml/min/1.73m2    GFR est non-AA 37 (L) >60 ml/min/1.73m2    Calcium 10.5 (H) 8.5 - 10.1 MG/DL    Bilirubin, total 0.5 0.2 - 1.0 MG/DL    ALT (SGPT) 27 12 - 78 U/L    AST (SGOT) 20 15 - 37 U/L    Alk.  phosphatase 99 45 - 117 U/L    Protein, total 9.3 (H) 6.4 - 8.2 g/dL    Albumin 4.3 3.5 - 5.0 g/dL    Globulin 5.0 (H) 2.0 - 4.0 g/dL    A-G Ratio 0.9 (L) 1.1 - 2.2     LIPASE    Collection Time: 09/24/19  3:56 PM   Result Value Ref Range    Lipase 301 73 - 393 U/L   MAGNESIUM    Collection Time: 09/24/19  3:56 PM   Result Value Ref Range    Magnesium 1.7 1.6 - 2.4 mg/dL   TSH 3RD GENERATION    Collection Time: 09/24/19  3:56 PM   Result Value Ref Range    TSH 0.93 0.36 - 3.74 uIU/mL   EKG, 12 LEAD, INITIAL    Collection Time: 09/24/19  4:40 PM   Result Value Ref Range    Ventricular Rate 106 BPM    Atrial Rate 106 BPM    P-R Interval 124 ms    QRS Duration 68 ms    Q-T Interval 362 ms    QTC Calculation (Bezet) 480 ms    Calculated P Axis 59 degrees    Calculated R Axis 58 degrees    Calculated T Axis -35 degrees    Diagnosis       Sinus tachycardia  Possible Left atrial enlargement  ST & T wave abnormality, consider inferior ischemia  Abnormal ECG  When compared with ECG of 22-SEP-2019 17:52,  MANUAL COMPARISON REQUIRED, DATA IS UNCONFIRMED     URINALYSIS W/ REFLEX CULTURE    Collection Time: 09/24/19  4:43 PM   Result Value Ref Range    Color YELLOW/STRAW      Appearance CLOUDY (A) CLEAR      Specific gravity <1.005 1.003 - 1.030    pH (UA) 6.5 5.0 - 8.0      Protein NEGATIVE  NEG mg/dL    Glucose NEGATIVE  NEG mg/dL    Ketone NEGATIVE  NEG mg/dL    Bilirubin NEGATIVE  NEG      Blood TRACE (A) NEG      Urobilinogen 0.2 0.2 - 1.0 EU/dL    Nitrites NEGATIVE  NEG      Leukocyte Esterase NEGATIVE  NEG      WBC 0-4 0 - 4 /hpf    RBC 0-5 0 - 5 /hpf    Epithelial cells FEW FEW /lpf    Bacteria 1+ (A) NEG /hpf    UA:UC IF INDICATED URINE CULTURE ORDERED (A) CNI     HCG URINE, QL. - POC    Collection Time: 09/24/19  4:44 PM   Result Value Ref Range    Pregnancy test,urine (POC) NEGATIVE  NEG         Radiologic Studies -   CT ABD PELV W CONT   Final Result   IMPRESSION:      1. 2 right middle lobe nodules which are larger (medial segment) and new   (lateral segment.)   2. Status post cholecystectomy. 3. Numerous bilateral renal cysts. 4. Bilateral ovarian cysts again shown. Ct Abd Pelv W Cont    Result Date: 9/24/2019  IMPRESSION: 1. 2 right middle lobe nodules which are larger (medial segment) and new (lateral segment.) 2. Status post cholecystectomy. 3. Numerous bilateral renal cysts. 4. Bilateral ovarian cysts again shown. Medical Decision Making   I am the first provider for this patient. I reviewed the vital signs, available nursing notes, past medical history, past surgical history, family history and social history. Vital Signs-Reviewed the patient's vital signs.   Patient Vitals for the past 12 hrs:   Temp Pulse Resp BP SpO2   09/24/19 1815  (!) 129 12 (!) 171/118 99 %   09/24/19 1700  (!) 109 9 (!) 179/124 100 %   09/24/19 1642    (!) 137/115 100 %   09/24/19 1630    (!) 173/120 100 %   09/24/19 1621    (!) 166/104 100 %   09/24/19 1615    (!) 164/115 100 %   09/24/19 1612    (!) 147/113 100 %   09/24/19 1518 97.7 °F (36.5 °C) (!) 130 16 (!) 129/103 98 %       Pulse Oximetry Analysis - 99% on RA    Cardiac Monitor:   Rate: 117 bpm  Rhythm: Sinus Tachycardia      ED EKG interpretation:  Rhythm: sinus tachycardia; and regular . Rate (approx.): 106; Axis: normal; P wave: normal; QRS interval: normal ; ST/T wave: non-specific changes; Other findings: abnormal ekg. This EKG was interpreted by LENCHO Stout MD,ED Provider. Records Reviewed: Nursing Notes and Old Medical Records    Provider Notes (Medical Decision Making):   Patient presents with ongoing nausea, vomiting, and inability to tolerate p.o. This is her third ED visit for the same. Ddx: dehydration, electrolyte imbalance, gastroenteritis, cyclic vomiting. On arrival, she is tachycardic but overall nontoxic-appearing. Will recheck lab work, will also get imaging of her abdomen as she has not yet had this done at her prior visits. Will start IVF, give pain/nausea medication and re-eval    ED Course:   Initial assessment performed. The patients presenting problems have been discussed, and they are in agreement with the care plan formulated and outlined with them. I have encouraged them to ask questions as they arise throughout their visit. K+ 2.5 with Cr of 1.5. CT with no findings to explain ongoing sx. Suspect most likely related to [facility] use. Given electrolyte abnormalities and persistent sx at home, will admit      CONSULT NOTE:   5:48 PM  Mariela Mcdaniel MD spoke with Dr. Ellie Garcia,   Specialty: hospitalist  Discussed pt's HPI and available diagnostic results thus far. Consultant will admit. Mariela Mcdaniel MD        Critical Care:  none    Disposition:    Admission Note:  Patient is being admitted to the hospital by Dr. Ellie Garcia, Service: Hospitalist.  The results of their tests and reasons for their admission have been discussed with them and available family. They convey agreement and understanding for the need to be admitted and for their admission diagnosis. Diagnosis     Clinical Impression:   1. Hypokalemia    2. WISAM (acute kidney injury) ([facility] [address]    3. Intractable cyclical vomiting with nausea    4.  Abdominal pain, generalized        This note will not be viewable in [address] Please note that this dictation was completed with [employer], the computer voice recognition software. Quite often unanticipated grammatical, syntax, homophones, and other interpretive errors are inadvertently transcribed by the computer software. Please disregard these errors.   Please excuse any errors that have escaped final proofreading

## 2019-09-24 NOTE — ED NOTES
Pt reporting decrease in nausea and pain. Increased Potassium drip to 75ml/hr, pt tolerating well. Reduced environmental stimuli, call bell within reach.

## 2019-09-25 LAB
AMPHET UR QL SCN: NEGATIVE
ANION GAP SERPL CALC-SCNC: 10 MMOL/L (ref 5–15)
ATRIAL RATE: 106 BPM
BARBITURATES UR QL SCN: NEGATIVE
BENZODIAZ UR QL: NEGATIVE
BUN SERPL-MCNC: 12 MG/DL (ref 6–20)
BUN/CREAT SERPL: 12 (ref 12–20)
CALCIUM SERPL-MCNC: 8.3 MG/DL (ref 8.5–10.1)
CALCULATED P AXIS, ECG09: 59 DEGREES
CALCULATED R AXIS, ECG10: 58 DEGREES
CALCULATED T AXIS, ECG11: -35 DEGREES
CANNABINOIDS UR QL SCN: POSITIVE
CHLORIDE SERPL-SCNC: 102 MMOL/L (ref 97–108)
CO2 SERPL-SCNC: 25 MMOL/L (ref 21–32)
COCAINE UR QL SCN: NEGATIVE
CREAT SERPL-MCNC: 1.02 MG/DL (ref 0.55–1.02)
DIAGNOSIS, 93000: NORMAL
DRUG SCRN COMMENT,DRGCM: ABNORMAL
GLUCOSE SERPL-MCNC: 103 MG/DL (ref 65–100)
LIPASE SERPL-CCNC: 330 U/L (ref 73–393)
METHADONE UR QL: NEGATIVE
OPIATES UR QL: POSITIVE
P-R INTERVAL, ECG05: 124 MS
PCP UR QL: NEGATIVE
POTASSIUM SERPL-SCNC: 3.8 MMOL/L (ref 3.5–5.1)
Q-T INTERVAL, ECG07: 362 MS
QRS DURATION, ECG06: 68 MS
QTC CALCULATION (BEZET), ECG08: 480 MS
SODIUM SERPL-SCNC: 137 MMOL/L (ref 136–145)
VENTRICULAR RATE, ECG03: 106 BPM

## 2019-09-25 PROCEDURE — 74011000258 HC RX REV CODE- 258: Performed by: STUDENT IN AN ORGANIZED HEALTH CARE EDUCATION/TRAINING PROGRAM

## 2019-09-25 PROCEDURE — 74011250636 HC RX REV CODE- 250/636: Performed by: STUDENT IN AN ORGANIZED HEALTH CARE EDUCATION/TRAINING PROGRAM

## 2019-09-25 PROCEDURE — 74011250636 HC RX REV CODE- 250/636: Performed by: HOSPITALIST

## 2019-09-25 PROCEDURE — 80048 BASIC METABOLIC PNL TOTAL CA: CPT

## 2019-09-25 PROCEDURE — 93005 ELECTROCARDIOGRAM TRACING: CPT

## 2019-09-25 PROCEDURE — 80307 DRUG TEST PRSMV CHEM ANLYZR: CPT

## 2019-09-25 PROCEDURE — 36415 COLL VENOUS BLD VENIPUNCTURE: CPT

## 2019-09-25 PROCEDURE — 96372 THER/PROPH/DIAG INJ SC/IM: CPT

## 2019-09-25 PROCEDURE — 83690 ASSAY OF LIPASE: CPT

## 2019-09-25 PROCEDURE — 74011250637 HC RX REV CODE- 250/637: Performed by: STUDENT IN AN ORGANIZED HEALTH CARE EDUCATION/TRAINING PROGRAM

## 2019-09-25 PROCEDURE — 96376 TX/PRO/DX INJ SAME DRUG ADON: CPT

## 2019-09-25 PROCEDURE — 99218 HC RM OBSERVATION: CPT

## 2019-09-25 RX ORDER — PROCHLORPERAZINE MALEATE 10 MG
10 TABLET ORAL EVERY 6 HOURS
Status: DISCONTINUED | OUTPATIENT
Start: 2019-09-25 | End: 2019-09-26

## 2019-09-25 RX ORDER — PROCHLORPERAZINE MALEATE 10 MG
5 TABLET ORAL EVERY 6 HOURS
Status: DISCONTINUED | OUTPATIENT
Start: 2019-09-25 | End: 2019-09-25

## 2019-09-25 RX ADMIN — Medication 10 ML: at 21:01

## 2019-09-25 RX ADMIN — MORPHINE SULFATE 1 MG: 2 INJECTION, SOLUTION INTRAMUSCULAR; INTRAVENOUS at 08:37

## 2019-09-25 RX ADMIN — PANTOPRAZOLE SODIUM 40 MG: 40 TABLET, DELAYED RELEASE ORAL at 08:37

## 2019-09-25 RX ADMIN — METOPROLOL TARTRATE 25 MG: 25 TABLET ORAL at 08:37

## 2019-09-25 RX ADMIN — PROCHLORPERAZINE EDISYLATE 10 MG: 5 INJECTION INTRAMUSCULAR; INTRAVENOUS at 01:12

## 2019-09-25 RX ADMIN — DICYCLOMINE HYDROCHLORIDE 10 MG: 10 CAPSULE ORAL at 08:37

## 2019-09-25 RX ADMIN — DICYCLOMINE HYDROCHLORIDE 10 MG: 10 CAPSULE ORAL at 17:42

## 2019-09-25 RX ADMIN — DICYCLOMINE HYDROCHLORIDE 10 MG: 10 CAPSULE ORAL at 14:13

## 2019-09-25 RX ADMIN — ACETAMINOPHEN 650 MG: 325 TABLET ORAL at 20:14

## 2019-09-25 RX ADMIN — PROMETHAZINE HYDROCHLORIDE 25 MG: 25 INJECTION INTRAMUSCULAR; INTRAVENOUS at 05:34

## 2019-09-25 RX ADMIN — AMLODIPINE BESYLATE 10 MG: 5 TABLET ORAL at 08:37

## 2019-09-25 RX ADMIN — POTASSIUM CHLORIDE 40 MEQ: 1.5 POWDER, FOR SOLUTION ORAL at 01:12

## 2019-09-25 RX ADMIN — Medication 10 ML: at 05:34

## 2019-09-25 RX ADMIN — DICYCLOMINE HYDROCHLORIDE 10 MG: 10 CAPSULE ORAL at 21:00

## 2019-09-25 RX ADMIN — HEPARIN SODIUM 5000 UNITS: 5000 INJECTION INTRAVENOUS; SUBCUTANEOUS at 21:00

## 2019-09-25 RX ADMIN — Medication 10 ML: at 14:13

## 2019-09-25 RX ADMIN — PROCHLORPERAZINE MALEATE 10 MG: 10 TABLET ORAL at 17:42

## 2019-09-25 RX ADMIN — HEPARIN SODIUM 5000 UNITS: 5000 INJECTION INTRAVENOUS; SUBCUTANEOUS at 14:13

## 2019-09-25 RX ADMIN — HEPARIN SODIUM 5000 UNITS: 5000 INJECTION INTRAVENOUS; SUBCUTANEOUS at 05:33

## 2019-09-25 RX ADMIN — METOPROLOL TARTRATE 25 MG: 25 TABLET ORAL at 20:55

## 2019-09-25 RX ADMIN — PROCHLORPERAZINE MALEATE 10 MG: 10 TABLET ORAL at 11:56

## 2019-09-25 NOTE — PROGRESS NOTES
Bedside and Verbal shift change report given to JOVANA Padilla (oncoming nurse) by Jenna Rasmussen RN (offgoing nurse). Report included the following information SBAR and Kardex.

## 2019-09-25 NOTE — ED NOTES
TRANSFER - OUT REPORT:    Verbal report given to Ambrosio Ruiz RN (name) on Jyl Bounds  being transferred to [address] (unit) for routine progression of care       Report consisted of patients Situation, Background, Assessment and   Recommendations(SBAR). Information from the following report(s) SBAR, Kardex, ED Summary, [facility] and Recent Results was reviewed with the receiving nurse. Lines:   Peripheral IV 09/24/19 Right Hand (Active)   Site Assessment Clean, dry, & intact 9/24/2019  3:55 PM   Phlebitis Assessment 0 9/24/2019  3:55 PM   Infiltration Assessment 0 9/24/2019  3:55 PM   Dressing Status Clean, dry, & intact 9/24/2019  3:55 PM   Dressing Type Transparent 9/24/2019  3:55 PM   Hub Color/Line Status Pink;Flushed 9/24/2019  3:55 PM   Action Taken Blood drawn 9/24/2019  3:55 PM        Opportunity for questions and clarification was provided.       Patient transported with:   Monitor  Registered Nurse

## 2019-09-25 NOTE — PROGRESS NOTES
Problem: Falls - Risk of  Goal: *Absence of Falls  Description  Document Maritza Rahman Fall Risk and appropriate interventions in the flowsheet.   Outcome: Progressing Towards Goal  Note:   Fall Risk Interventions:            Medication Interventions: Assess postural VS orthostatic hypotension, Evaluate medications/consider consulting pharmacy                   Problem: Patient Education: Go to Patient Education Activity  Goal: Patient/Family Education  Outcome: Progressing Towards Goal     Problem: General Medical Care Plan  Goal: *Vital signs within specified parameters  Outcome: Progressing Towards Goal  Goal: *Labs within defined limits  Outcome: Progressing Towards Goal  Goal: *Optimal pain control at patient's stated goal  Outcome: Progressing Towards Goal

## 2019-09-25 NOTE — PROGRESS NOTES
PLEASE NOTE--Encounter / Re-Admission Within 30 Days  This patient has had another encounter or admission within the last 30 days. Reason for Re-Admission: presents with nausea, vomiting, and abdominal pain. This is the patient's third visit in 4 days for the same symptoms. Symptoms thought to be due to cyclic vomiting related to marijuana use, however to me patient denies any use since \"the summer. \"  Patient was initially seen on 9/20, was given medications and felt improved was discharged home. Was seen again on 9/22 for the same. Followed up with her primary care doctor today and was noted to be tachycardic in the office and reported she had not tolerated anything p.o. for 5 days and was therefore referred back to the ED. Patient endorses ongoing dry heaves, abdominal pain, however states that she is no longer having diarrhea. Was unable to take any of her medications including prescribed oral potassium at home due to nausea and vomiting  RRAT Score and Risk level:  8 and low per patient RRAT score. Level of Readmission: low per RRAT score  Resources/supports as identified by patient/family: resources for substance abuse. THC user. Top Challenges facing patient:   Finances: Patient has no need at this time.  Medication cost: Patient has [employer] [place]:  Patient drive self   Support system or lack thereof:     Living arrangements: alone   Self-care/ADLs/Cognition:  Independent    Current Advanced Directive/Advance Care Plan: not on file   Plan for utilizing home health: not at this time. Likelihood of readmission:  Low per patient RRAT score. However patient lifestyle place her in high risk. Transition of Care Plan: Patient will need follow-up with PCP and Neuro.     [address] [place] [address]  [phone]

## 2019-09-25 NOTE — H&P
Hospitalist Admission Note    NAME: Michael House   :  1975   MRN:  [id number]   Room Number: 367/42  @ [facility]     Date/Time:  2019 10:16 PM    Patient PCP: Magdaleno Denny MD  ______________________________________________________________________  Given the patient's current clinical presentation, I have a high level of concern for decompensation if discharged from the emergency department. Complex decision making was performed, which includes reviewing the patient's available past medical records, laboratory results, and x-ray films. My assessment of this patient's clinical condition and my plan of care is as follows. Assessment / Plan: Active Problems:    Hypokalemia due to loss of potassium (2019)      Hyperemesis :   Urine pregnancy test in ED negative. Suspect cannabis induced hyperemesis. CT abdomen/pelvis showed no acute pathology. Pt has a hx of cholecystectomy. - IV prochlorperazine PRN for nausea/vomitting 1st line. Alternate with IV Promethazine PRN.   - UDS  - GI Lite diet. - Outpatient GI Follow up. Hypokalemia :    - Replete IV. Patient has refused oral potassium. Received 20 meq K in the ED IV. Will give another 40 meq IV K. Also administer 0.9 NS with 20 meq K at 100 cc/hr. - telemetry. Admission EKG reviewed, sinus tach, normal morphology. - Control GI w/ antiemetics. Acute kidney injury :   Pre-renal due to hypovolemia.     - IV hydration  - Avoid nephrotoxic meds  - Strict Is and Os. Hyponatremia   Hypochloremia    Due to intravascular volume depletion. GI Losses and poor oral intake. Leukocytosis :   Likely stress response. Thrombocytosis :   D/t hemoconcentration. Hydrate and recheck CBC tomorrow. HTN :    - Continue amlodipine  - Hold chlorthalidone.   - Restart  Lopressor at 1/2 dose 25 mg BID. Polycystic kidney disease :    Follow up with nephrology and PCP at discharge. Pulmonary nodules, incidentally noted on CT Abd :   LUNG BASES: 5 mm right middle lobe nodule, medial segment. 3 mm right middle  lobe nodule, lateral segment. Medial segment nodule larger compared with  10/5/2018 CT. Lateral segment nodule demonstrated in the interval.    - will need outpatient follow up. GERD :   - C/w pantoprazole. Body mass index is 28.15 kg/m². Code Status: FULL       DVT Prophylaxis: Hep SQ and SCD's  GI Prophylaxis: not indicated    Baseline: independent with ambulation, lives at home. Subjective:   CHIEF COMPLAINT : nausea,vomitting     HISTORY OF PRESENT ILLNESS:     Steffanie Panchal is a 38 y.o.  female with PMH of HTN, Polycystic kidney disease who presents to ED with c/o nausea, vomitting, abdominal cramping. Patient has had several ED presentations in the last 6 months for similar complaints, usually gets better with symptomatic management. Past Medical History:   Diagnosis Date    Hypertension     Kidney anomaly, congenital     Tubal pregnancy         Past Surgical History:   Procedure Laterality Date    HX CHOLECYSTECTOMY      HX GYN  10/07/2013    cyst removed    HX HYSTERECTOMY  10/7/2013    partial hysterectomy       Social History     Tobacco Use    Smoking status: Current Some Day Smoker     Packs/day: 0.25     Years: 25.00     Pack years: 6.25    Smokeless tobacco: Never Used    Tobacco comment: 1-3 day   Substance Use Topics    Alcohol use: Yes     Comment: occasionally/socially        Family History   Problem Relation Age of Onset    Hypertension Mother     Hypertension Father     Hypertension Sister      No Known Allergies     Prior to Admission medications    Medication Sig Start Date End Date Taking? Authorizing Provider   ondansetron (ZOFRAN ODT) 4 mg disintegrating tablet Take 1 Tab by mouth every eight (8) hours as needed for Nausea.  9/22/19  Yes Raheem Greenwood MD   dicyclomine (BENTYL) 10 mg capsule Take 1 Cap by mouth four (4) times daily for 5 days. Patient taking differently: Take 10 mg by mouth four (4) times daily. X 5 days from 9/23/19 9/22/19 9/27/19 Yes Farida Cordero MD   potassium chloride (KLOR-CON) 20 mEq pack Take 1 Packet by mouth two (2) times daily (with meals). Patient taking differently: Take 20 mEq by mouth two (2) times daily (with meals). X 5 days from 9/20/19 9/20/19  Yes Beni Staley, DO   promethazine (PHENERGAN) 25 mg suppository Insert 1 Suppository into rectum every six (6) hours as needed for Nausea for up to 7 days. Patient taking differently: Insert 25 mg into rectum every six (6) hours as needed for Nausea. X 7 days from 9/20/19 9/20/19 9/27/19 Yes Talita Jean, DO   amLODIPine (NORVASC) 10 mg tablet Take 1 Tab by mouth daily. 4/16/19  Yes Elizabeth Flores MD   metoprolol tartrate (LOPRESSOR) 25 mg tablet Take 1 Tab by mouth every twelve (12) hours. 4/16/19  Yes Elizabeth Flores MD   chlorthalidone (HYGROTEN) 25 mg tablet Take 1 Tab by mouth daily. 4/16/19  Yes Elizabeth Flores MD   esomeprazole (NEXIUM 24HR) 20 mg capsule Take 1 Cap by mouth daily. 11/15/18  Yes Elizabeth Flores MD   cetirizine (ZYRTEC) 10 mg tablet Take 10 mg by mouth daily. Yes Provider, Historical       REVIEW OF SYSTEMS:     Total of 12 systems reviewed as follows:         General:  Negative for fever, chills, sweats, generalized weakness, weight loss/gain,      loss of appetite   Eyes:    Negative forblurred vision, eye pain, loss of vision, double vision  ENT:    rhinorrhea, pharyngitis   Respiratory:   Negative for cough, sputum production, SOB, ABRAHAM, wheezing, pleuritic pain   Cardiology:   Negative for chest pain, palpitations, orthopnea, PND, edema, syncope   Gastrointestinal:  + nausea, vomitting, abdominal pain, loose stool.  Negative for abdominal pain , N/V, diarrhea, dysphagia,  bleeding   Genitourinary:  Negative for frequency, urgency, dysuria, hematuria, incontinence   Muskuloskeletal : Negative for arthralgia, myalgia, back pain  Hematology:  Negative for easy bruising, nose or gum bleeding, lymphadenopathy   Dermatological: Negative for rash, ulceration, pruritis, color change / jaundice  Endocrine:   Negative for hot flashes or polydipsia   Neurological:  Negative for headache, dizziness, confusion, focal weakness, paresthesia,     Speech difficulties, memory loss, gait difficulty  Psychological: Negative for Feelings of anxiety, depression, agitation    Objective:   VITALS:    Visit Vitals  BP (!) 146/103 (BP 1 Location: Left arm, BP Patient Position: At rest)   Pulse (!) 114   Temp 97.2 °F (36.2 °C)   Resp 18   Ht 5' 1\" (1.549 m)   Wt 67.6 kg (149 lb)   SpO2 99%   BMI 28.15 kg/m²       PHYSICAL EXAM:    General:    Alert, cooperative, no distress, appears stated age. HEENT: Atraumatic, anicteric sclerae, pink conjunctivae     No oral ulcers, mucosa moist, throat clear, dentition fair  Neck:  Supple, symmetrical,  no JVD, thyroid: non tender  Lungs:   Clear to auscultation bilaterally. No Wheezing or Rhonchi. No rales. Chest wall:  No tenderness  No Accessory muscle use. Heart:   Regular  rhythm,  No  murmur   No edema  Abdomen:   Soft, mild diffuse TTP. Not distended. Bowel sounds normal  Extremities: No cyanosis. No clubbing,      Skin turgor normal, Capillary refill normal, Radial dial pulse 2+  Skin:     Not pale. Not Jaundiced  No rashes   Psych:  Good insight. Not depressed. Not anxious or agitated. Neurologic: EOMs intact. No facial asymmetry. No aphasia or slurred speech. Symmetrical strength, Sensation grossly intact.  Alert and oriented X 4.     ______________________________________________________________________    Care Plan discussed with:  Patient/Family    Expected  Disposition:  Home w/Family  ________________________________________________________________________  TOTAL TIME:  30 Minutes    Critical Care Provided     Minutes non procedure based      Comments Reviewed previous records   >50% of visit spent in counseling and coordination of care  Discussion with patient and/or family and questions answered       ________________________________________________________________________  Signed: Marisol Johnston MD    Procedures: see electronic medical records for all procedures/Xrays and details which were not copied into this note but were reviewed prior to creation of Plan. LAB DATA REVIEWED:    Recent Results (from the past 24 hour(s))   CBC WITH AUTOMATED DIFF    Collection Time: 09/24/19  3:56 PM   Result Value Ref Range    WBC 14.3 (H) 3.6 - 11.0 K/uL    RBC 4.76 3.80 - 5.20 M/uL    HGB 15.3 11.5 - 16.0 g/dL    HCT 43.6 35.0 - 47.0 %    MCV 91.6 80.0 - 99.0 FL    MCH 32.1 26.0 - 34.0 PG    MCHC 35.1 30.0 - 36.5 g/dL    RDW 13.1 11.5 - 14.5 %    PLATELET 492 (H) 463 - 400 K/uL    MPV 8.8 (L) 8.9 - 12.9 FL    NRBC 0.0 0  WBC    ABSOLUTE NRBC 0.00 0.00 - 0.01 K/uL    NEUTROPHILS 52 32 - 75 %    LYMPHOCYTES 40 12 - 49 %    MONOCYTES 6 5 - 13 %    EOSINOPHILS 1 0 - 7 %    BASOPHILS 1 0 - 1 %    IMMATURE GRANULOCYTES 0 %    ABS. NEUTROPHILS 7.5 1.8 - 8.0 K/UL    ABS. LYMPHOCYTES 5.7 (H) 0.8 - 3.5 K/UL    ABS. MONOCYTES 0.9 0.0 - 1.0 K/UL    ABS. EOSINOPHILS 0.1 0.0 - 0.4 K/UL    ABS. BASOPHILS 0.1 0.0 - 0.1 K/UL    ABS. IMM.  GRANS. 0.0 K/UL    DF SMEAR SCANNED      RBC COMMENTS NORMOCYTIC, NORMOCHROMIC     METABOLIC PANEL, COMPREHENSIVE    Collection Time: 09/24/19  3:56 PM   Result Value Ref Range    Sodium 134 (L) 136 - 145 mmol/L    Potassium 2.5 (LL) 3.5 - 5.1 mmol/L    Chloride 95 (L) 97 - 108 mmol/L    CO2 28 21 - 32 mmol/L    Anion gap 11 5 - 15 mmol/L    Glucose 111 (H) 65 - 100 mg/dL    BUN 18 6 - 20 MG/DL    Creatinine 1.53 (H) 0.55 - 1.02 MG/DL    BUN/Creatinine ratio 12 12 - 20      GFR est AA 44 (L) >60 ml/min/1.73m2    GFR est non-AA 37 (L) >60 ml/min/1.73m2    Calcium 10.5 (H) 8.5 - 10.1 MG/DL    Bilirubin, total 0.5 0.2 - 1.0 MG/DL    ALT (SGPT) 27 12 - 78 U/L    AST (SGOT) 20 15 - 37 U/L    Alk.  phosphatase 99 45 - 117 U/L    Protein, total 9.3 (H) 6.4 - 8.2 g/dL    Albumin 4.3 3.5 - 5.0 g/dL    Globulin 5.0 (H) 2.0 - 4.0 g/dL    A-G Ratio 0.9 (L) 1.1 - 2.2     LIPASE    Collection Time: 09/24/19  3:56 PM   Result Value Ref Range    Lipase 301 73 - 393 U/L   MAGNESIUM    Collection Time: 09/24/19  3:56 PM   Result Value Ref Range    Magnesium 1.7 1.6 - 2.4 mg/dL   TSH 3RD GENERATION    Collection Time: 09/24/19  3:56 PM   Result Value Ref Range    TSH 0.93 0.36 - 3.74 uIU/mL   EKG, 12 LEAD, INITIAL    Collection Time: 09/24/19  4:40 PM   Result Value Ref Range    Ventricular Rate 106 BPM    Atrial Rate 106 BPM    P-R Interval 124 ms    QRS Duration 68 ms    Q-T Interval 362 ms    QTC Calculation (Bezet) 480 ms    Calculated P Axis 59 degrees    Calculated R Axis 58 degrees    Calculated T Axis -35 degrees    Diagnosis       Sinus tachycardia  Possible Left atrial enlargement  ST & T wave abnormality, consider inferior ischemia  Abnormal ECG  When compared with ECG of 22-SEP-2019 17:52,  MANUAL COMPARISON REQUIRED, DATA IS UNCONFIRMED     URINALYSIS W/ REFLEX CULTURE    Collection Time: 09/24/19  4:43 PM   Result Value Ref Range    Color YELLOW/STRAW      Appearance CLOUDY (A) CLEAR      Specific gravity <1.005 1.003 - 1.030    pH (UA) 6.5 5.0 - 8.0      Protein NEGATIVE  NEG mg/dL    Glucose NEGATIVE  NEG mg/dL    Ketone NEGATIVE  NEG mg/dL    Bilirubin NEGATIVE  NEG      Blood TRACE (A) NEG      Urobilinogen 0.2 0.2 - 1.0 EU/dL    Nitrites NEGATIVE  NEG      Leukocyte Esterase NEGATIVE  NEG      WBC 0-4 0 - 4 /hpf    RBC 0-5 0 - 5 /hpf    Epithelial cells FEW FEW /lpf    Bacteria 1+ (A) NEG /hpf    UA:UC IF INDICATED URINE CULTURE ORDERED (A) CNI     HCG URINE, QL. - POC    Collection Time: 09/24/19  4:44 PM   Result Value Ref Range    Pregnancy test,urine (POC) NEGATIVE  NEG

## 2019-09-25 NOTE — PROGRESS NOTES
Spiritual Care Assessment/Progress Note  [facility]      NAME: Daniel Landry      MRN: [id number]  AGE: 38 y.o. SEX: female  Adventist Affiliation: Christian   Language: English     9/25/2019     Total Time (in minutes): 1423     Spiritual Assessment begun in [address] through conversation with:         []Patient        [] Family    [] Friend(s)        Reason for Consult: Initial/Spiritual assessment, patient floor     Spiritual beliefs: (Please include comment if needed)     [] Identifies with a brendan tradition:         [] Supported by a brendan community:            [] Claims no spiritual orientation:           [] Seeking spiritual identity:                [] Adheres to an individual form of spirituality:           [x] Not able to assess:                           Identified resources for coping:      [] Prayer                               [] Music                  [] Guided Imagery     [] Family/friends                 [] Pet visits     [] Devotional reading                         [x] Unknown     [] Other:                                            Interventions offered during this visit: (See comments for more details)                Plan of Care:     [] Support spiritual and/or cultural needs    [] Support AMD and/or advance care planning process      [] Support grieving process   [] Coordinate Rites and/or Rituals    [] Coordination with community clergy   [x] No spiritual needs identified at this time   [] Detailed Plan of Care below (See Comments)  [] Make referral to Music Therapy  [] Make referral to Pet Therapy     [] Make referral to Addiction services  [] Make referral to [facility]  [] Make referral to Spiritual Care Partner  [] No future visits requested        [x] Follow up visits as needed     Comments: Patient not available. Nurse reports patient showering.  will follow as able and/or needed. Rev.  Hyun Bowman, Seth  [address] [facility] Page [phone])

## 2019-09-25 NOTE — PROGRESS NOTES
Hospitalist Progress Note    NAME: Britton Canas   :  1975   MRN:  [id number]   Room Number:  965/17  @ [facility]       Interim Hospital Summary: 38 y.o. female whom presented on 2019 with      Assessment / Plan:    Hyperemesis :   Urine pregnancy test in ED negative. Suspect cannabis induced hyperemesis. CT abdomen/pelvis showed no acute pathology. Pt has a hx of cholecystectomy. Significantly improved. UDS + for THC and opiates (administered in hospital prior to UDS).    - Scheduled oral Compazine. IV Promethazine PRN. QTc today is 483, avoid zofran. - Scheduled Dicyclomine. Discontinue any opiates as they may exacerbate emesis. - GI Lite diet. - Outpatient GI Follow up for endoscopy. Patient has an apt already with Dr. Ruddy Cano at [facility].   - Will benefit from nutrition and psychiatry follow up as outpatient.               Hypokalemia :  Due to GI losses and poor oral intake. Improved to 3.8. Will encourage oral intake, recheck Chem 10 tomorrow. No ectopy on telemetry.        Acute kidney injury :   Pre-renal due to hypovolemia. Resolved with hydration.      Hyponatremia   Hypochloremia   Due to intravascular volume depletion. GI Losses and poor oral intake. Resolved with hydration.         Leukocytosis :   Likely stress response.      Thrombocytosis :   D/t hemoconcentration. Hydrate and recheck CBC tomorrow.      HTN :     - Continue amlodipine  - Hold chlorthalidone.   - Restart  Lopressor at 1/2 dose 25 mg BID.         Polycystic kidney disease : Follow up with nephrology and PCP at discharge. Nephrologist is Dr. Benjamin Nix.        Pulmonary nodules, incidentally noted on CT Abd :   LUNG BASES: 5 mm right middle lobe nodule, medial segment. 3 mm right middle  lobe nodule, lateral segment. Medial segment nodule larger compared with  10/5/2018 CT.  Lateral segment nodule demonstrated in the interval.     - will need outpatient follow up.        GERD : - C/w pantoprazole.         Body mass index is 28.15 kg/m². Code Status: FULL         DVT Prophylaxis: Hep SQ and SCD's  GI Prophylaxis: not indicated     Baseline: independent with ambulation, lives at home. Subjective:     Chief Complaint / Reason for Physician Visit  No further episodes of emesis, has dry heaves and nausea. Tolerated lunch well. Mild abdominal cramping. Feels significantly better after hot shower. Discussed with RN events overnight. Review of Systems:  No fevers, chills, appetite change, cough, sputum production, shortness of breath, dyspnea on exertion, constipation, chest pain, leg edema, abdominal pain, joint pain, rash, itching. Tolerating diet. Objective:     VITALS:   Last 24hrs VS reviewed since prior progress note. Most recent are:  Patient Vitals for the past 24 hrs:   Temp Pulse Resp BP SpO2   09/25/19 1524 97.4 °F (36.3 °C) 86 16 115/83 100 %   09/25/19 0829 97.4 °F (36.3 °C) 99 16 (!) 127/94 100 %   09/25/19 0503 97.5 °F (36.4 °C) 89 16 90/64 100 %   09/25/19 0115    (!) 148/98    09/25/19 0011 97 °F (36.1 °C) (!) 117 18 (!) 150/106 100 %   09/24/19 2142  (!) 114  (!) 146/103    09/24/19 2140 97.2 °F (36.2 °C) (!) 111 18 (!) 139/106 99 %   09/24/19 1900  100 11 (!) 180/98 100 %   09/24/19 1815  (!) 129 12 (!) 171/118 99 %       Intake/Output Summary (Last 24 hours) at 9/25/2019 1801  Last data filed at 9/25/2019 1315  Gross per 24 hour   Intake 2129.25 ml   Output    Net 2129.25 ml        PHYSICAL EXAM:  General: WD, WN. Alert, cooperative, no acute distress    EENT:  EOMI. Anicteric sclerae. MMM  Resp:  CTA bilaterally, no wheezing or rales. No accessory muscle use  CV:  Regular  rhythm,  normal S1/S2, no murmurs rubs gallops, No edema  GI:  Soft, Non distended, Non tender. +Bowel sounds  Neurologic:  Alert and oriented X 3, normal speech,   Psych:   Good insight. Not anxious nor agitated  Skin:  No rashes.   No jaundice    Reviewed most current lab test results and cultures  YES  Reviewed most current radiology test results   YES  Review and summation of old records today    NO  Reviewed patient's current orders and MAR    YES  PMH/SH reviewed - no change compared to H&P  ________________________________________________________________________  Care Plan discussed with:    Comments   Patient     Family      RN     Care Manager     Consultant                        Multidiciplinary team rounds were held today with , nursing, pharmacist and clinical coordinator. Patient's plan of care was discussed; medications were reviewed and discharge planning was addressed. ________________________________________________________________________  Total NON critical care TIME:  20   Minutes    Total CRITICAL CARE TIME Spent:   Minutes non procedure based      Comments   >50% of visit spent in counseling and coordination of care     ________________________________________________________________________  Micaela Mckenna MD     Procedures: see electronic medical records for all procedures/Xrays and details which were not copied into this note but were reviewed prior to creation of Plan. LABS:  I reviewed today's most current labs and imaging studies.   Pertinent labs include:  Recent Labs     09/24/19  1556 09/22/19  1844   WBC 14.3* 9.2   HGB 15.3 15.2   HCT 43.6 42.8   * 414*     Recent Labs     09/25/19  0436 09/24/19  1556 09/22/19  1844    134* 135*   K 3.8 2.5* 2.7*    95* 95*   CO2 25 28 24   * 111* 118*   BUN 12 18 13   CREA 1.02 1.53* 1.00   CA 8.3* 10.5* 10.8*   MG  --  1.7 1.9   ALB  --  4.3 4.5   TBILI  --  0.5 0.4   SGOT  --  20 28   ALT  --  27 30       Signed: Micaela Mckenna MD

## 2019-09-26 VITALS
HEIGHT: 61 IN | RESPIRATION RATE: 16 BRPM | SYSTOLIC BLOOD PRESSURE: 100 MMHG | HEART RATE: 71 BPM | WEIGHT: 152.34 LBS | BODY MASS INDEX: 28.76 KG/M2 | DIASTOLIC BLOOD PRESSURE: 69 MMHG | TEMPERATURE: 97.8 F | OXYGEN SATURATION: 100 %

## 2019-09-26 LAB
ANION GAP SERPL CALC-SCNC: 10 MMOL/L (ref 5–15)
ANION GAP SERPL CALC-SCNC: 9 MMOL/L (ref 5–15)
BACTERIA SPEC CULT: NORMAL
BUN SERPL-MCNC: 16 MG/DL (ref 6–20)
BUN SERPL-MCNC: 16 MG/DL (ref 6–20)
BUN/CREAT SERPL: 15 (ref 12–20)
BUN/CREAT SERPL: 16 (ref 12–20)
CALCIUM SERPL-MCNC: 8.1 MG/DL (ref 8.5–10.1)
CALCIUM SERPL-MCNC: 8.9 MG/DL (ref 8.5–10.1)
CC UR VC: NORMAL
CHLORIDE SERPL-SCNC: 104 MMOL/L (ref 97–108)
CHLORIDE SERPL-SCNC: 107 MMOL/L (ref 97–108)
CO2 SERPL-SCNC: 21 MMOL/L (ref 21–32)
CO2 SERPL-SCNC: 24 MMOL/L (ref 21–32)
CREAT SERPL-MCNC: 1.03 MG/DL (ref 0.55–1.02)
CREAT SERPL-MCNC: 1.06 MG/DL (ref 0.55–1.02)
GLUCOSE SERPL-MCNC: 88 MG/DL (ref 65–100)
GLUCOSE SERPL-MCNC: 91 MG/DL (ref 65–100)
MAGNESIUM SERPL-MCNC: 1.9 MG/DL (ref 1.6–2.4)
MAGNESIUM SERPL-MCNC: 2.1 MG/DL (ref 1.6–2.4)
PHOSPHATE SERPL-MCNC: 2.2 MG/DL (ref 2.6–4.7)
PHOSPHATE SERPL-MCNC: 2.3 MG/DL (ref 2.6–4.7)
POTASSIUM SERPL-SCNC: 3.3 MMOL/L (ref 3.5–5.1)
POTASSIUM SERPL-SCNC: 4.8 MMOL/L (ref 3.5–5.1)
SERVICE CMNT-IMP: NORMAL
SODIUM SERPL-SCNC: 137 MMOL/L (ref 136–145)
SODIUM SERPL-SCNC: 138 MMOL/L (ref 136–145)

## 2019-09-26 PROCEDURE — 74011250636 HC RX REV CODE- 250/636: Performed by: STUDENT IN AN ORGANIZED HEALTH CARE EDUCATION/TRAINING PROGRAM

## 2019-09-26 PROCEDURE — 84100 ASSAY OF PHOSPHORUS: CPT

## 2019-09-26 PROCEDURE — 96367 TX/PROPH/DG ADDL SEQ IV INF: CPT

## 2019-09-26 PROCEDURE — 93005 ELECTROCARDIOGRAM TRACING: CPT

## 2019-09-26 PROCEDURE — 80048 BASIC METABOLIC PNL TOTAL CA: CPT

## 2019-09-26 PROCEDURE — 99218 HC RM OBSERVATION: CPT

## 2019-09-26 PROCEDURE — 83735 ASSAY OF MAGNESIUM: CPT

## 2019-09-26 PROCEDURE — 36415 COLL VENOUS BLD VENIPUNCTURE: CPT

## 2019-09-26 PROCEDURE — 74011250637 HC RX REV CODE- 250/637: Performed by: STUDENT IN AN ORGANIZED HEALTH CARE EDUCATION/TRAINING PROGRAM

## 2019-09-26 PROCEDURE — 96372 THER/PROPH/DIAG INJ SC/IM: CPT

## 2019-09-26 PROCEDURE — 74011000258 HC RX REV CODE- 258: Performed by: STUDENT IN AN ORGANIZED HEALTH CARE EDUCATION/TRAINING PROGRAM

## 2019-09-26 RX ORDER — LOPERAMIDE HYDROCHLORIDE 2 MG/1
2 CAPSULE ORAL ONCE
Status: COMPLETED | OUTPATIENT
Start: 2019-09-26 | End: 2019-09-26

## 2019-09-26 RX ORDER — ONDANSETRON 4 MG/1
4 TABLET, ORALLY DISINTEGRATING ORAL
Qty: 20 TAB | Refills: 0 | Status: SHIPPED | OUTPATIENT
Start: 2019-09-26 | End: 2019-09-30 | Stop reason: HOSPADM

## 2019-09-26 RX ORDER — POTASSIUM CHLORIDE 1.5 G/1.77G
40 POWDER, FOR SOLUTION ORAL
Status: COMPLETED | OUTPATIENT
Start: 2019-09-26 | End: 2019-09-26

## 2019-09-26 RX ORDER — AMLODIPINE BESYLATE 5 MG/1
5 TABLET ORAL DAILY
Qty: 30 TAB | Refills: 0 | Status: SHIPPED | OUTPATIENT
Start: 2019-09-26 | End: 2019-09-30

## 2019-09-26 RX ORDER — PROMETHAZINE HYDROCHLORIDE 25 MG/1
25 SUPPOSITORY RECTAL
Qty: 20 SUPPOSITORY | Refills: 0 | Status: ON HOLD | OUTPATIENT
Start: 2019-09-26 | End: 2020-09-01

## 2019-09-26 RX ORDER — LOPERAMIDE HYDROCHLORIDE 2 MG/1
2 CAPSULE ORAL
Qty: 10 CAP | Refills: 0 | Status: SHIPPED | OUTPATIENT
Start: 2019-09-26 | End: 2019-10-06

## 2019-09-26 RX ORDER — LANOLIN ALCOHOL/MO/W.PET/CERES
400 CREAM (GRAM) TOPICAL ONCE
Status: COMPLETED | OUTPATIENT
Start: 2019-09-26 | End: 2019-09-26

## 2019-09-26 RX ORDER — PROCHLORPERAZINE MALEATE 10 MG
10 TABLET ORAL
Status: DISCONTINUED | OUTPATIENT
Start: 2019-09-26 | End: 2019-09-26 | Stop reason: HOSPADM

## 2019-09-26 RX ORDER — DICYCLOMINE HYDROCHLORIDE 10 MG/1
10 CAPSULE ORAL 4 TIMES DAILY
Qty: 20 CAP | Refills: 0 | Status: ON HOLD | OUTPATIENT
Start: 2019-09-26 | End: 2020-09-01

## 2019-09-26 RX ORDER — ACETAMINOPHEN 325 MG/1
650 TABLET ORAL
Qty: 30 TAB | Refills: 0 | Status: ON HOLD | OUTPATIENT
Start: 2019-09-26 | End: 2020-09-01

## 2019-09-26 RX ADMIN — CALCIUM GLUCONATE 1 G: 98 INJECTION, SOLUTION INTRAVENOUS at 09:00

## 2019-09-26 RX ADMIN — HEPARIN SODIUM 5000 UNITS: 5000 INJECTION INTRAVENOUS; SUBCUTANEOUS at 14:11

## 2019-09-26 RX ADMIN — DICYCLOMINE HYDROCHLORIDE 10 MG: 10 CAPSULE ORAL at 14:11

## 2019-09-26 RX ADMIN — PROCHLORPERAZINE MALEATE 10 MG: 10 TABLET ORAL at 06:03

## 2019-09-26 RX ADMIN — DICYCLOMINE HYDROCHLORIDE 10 MG: 10 CAPSULE ORAL at 08:15

## 2019-09-26 RX ADMIN — POTASSIUM CHLORIDE 40 MEQ: 1.5 POWDER, FOR SOLUTION ORAL at 11:05

## 2019-09-26 RX ADMIN — POTASSIUM CHLORIDE 40 MEQ: 1.5 POWDER, FOR SOLUTION ORAL at 08:17

## 2019-09-26 RX ADMIN — PROCHLORPERAZINE MALEATE 10 MG: 10 TABLET ORAL at 00:12

## 2019-09-26 RX ADMIN — LOPERAMIDE HYDROCHLORIDE 2 MG: 2 CAPSULE ORAL at 16:36

## 2019-09-26 RX ADMIN — ACETAMINOPHEN 650 MG: 325 TABLET ORAL at 06:07

## 2019-09-26 RX ADMIN — HEPARIN SODIUM 5000 UNITS: 5000 INJECTION INTRAVENOUS; SUBCUTANEOUS at 06:03

## 2019-09-26 RX ADMIN — METOPROLOL TARTRATE 25 MG: 25 TABLET ORAL at 08:16

## 2019-09-26 RX ADMIN — POTASSIUM CHLORIDE AND SODIUM CHLORIDE: 900; 300 INJECTION, SOLUTION INTRAVENOUS at 00:12

## 2019-09-26 RX ADMIN — Medication 400 MG: at 08:16

## 2019-09-26 RX ADMIN — PANTOPRAZOLE SODIUM 40 MG: 40 TABLET, DELAYED RELEASE ORAL at 08:15

## 2019-09-26 RX ADMIN — Medication 2 TABLET: at 08:16

## 2019-09-26 RX ADMIN — Medication 10 ML: at 06:03

## 2019-09-26 NOTE — PROGRESS NOTES
Patient discharge order received and reviewed. PIV removed with cath tip intact. Discharge instructions reviewed with the patient. Questions asked and answered appropriately. The patient was escorted to the main front lobby to her personal vehicle without and incident.

## 2019-09-26 NOTE — PROGRESS NOTES
[facility].       9/26/2019      RE: Jena Narvaez      To Whom it May Concern: This is to certify that Jena Narvaez was admitted to hospital on 9/24/2019   to  She may return to work on October 2nd 2019. Thank you for your assistance in this matter.     Sincerely,      Neo Wall MD

## 2019-09-26 NOTE — DISCHARGE SUMMARY
Hospitalist Discharge Summary     Patient ID:  Deep Ramirez  [id number]  66 y.o.  1975    PCP on record: Julia Tang MD    Admit date: 9/24/2019  Discharge date and time: 9/26/2019      Admission Diagnoses: Hypokalemia due to loss of potassium [E87.6]    Discharge Diagnoses: Active Problems:    Hypokalemia due to loss of potassium (9/24/2019)           Hospital Course:   Hyperemesis :   Urine pregnancy test in ED negative. Suspect cannabis induced hyperemesis. CT abdomen/pelvis showed no acute pathology. Pt has a hx of cholecystectomy. . UDS + for THC and opiates (administered in hospital prior to UDS). She declines current marijuana use but has passive marijuana exposure from her spouse. She significantly improved with intravenous hydration and scheduled/PRN antiemetics and scheduled dicyclomine. Opiates were minimally used as they may exacerbate emesis. Outpatient GI Follow up for endoscopy. Patient has an apt already with Dr. Yaya No at [facility] (formerly [facility]).   She will follow with nutrition as outpatient. She was advised to not use marijuana or have passive exposure to it. She has several psychological stressors at the time (Stressful career, single mother, pursing part time online doctoral degree), will see psychology as outpatient for the same. Provided PRN anti-emetics, bentyl, simethicone at discharge. She was advised to try hot showers if she experiences symptoms again.               Hypokalemia :  Due to GI losses and poor oral intake. Improved to 3.8. Will encourage oral intake, recheck Chem 10 tomorrow. No ectopy on telemetry.        Medication induced diarrhea :   Due to oral potassium and oral magnesium supplementation. Advised hydration and provided reassurance that it is medication related adverse effect and self limited. Loperamide as needed. HTN :  Hold chlorthalidone as it can worsen hypokalemia.  She had soft blood presures on Amlodipine 10 mg daily and half dose of home dose - Lopressor 25 mg BID. Discharged on Amlodipine 5 mg daily and Lopressor 25 mg BID. She will see . Acute kidney injury :   Pre-renal due to hypovolemia. Resolved with hydration.        Hyponatremia   Hypochloremia  Due to intravascular volume depletion. GI Losses and poor oral intake. Resolved with hydration.         Leukocytosis :   Likely stress response.      Thrombocytosis :   D/t hemoconcentration. Hydrate and recheck CBC tomorrow.        Polycystic kidney disease : Follow up with nephrology and PCP at discharge. Nephrologist is Dr. Jonas Puente.         Pulmonary nodules, incidentally noted on CT Abd :   LUNG BASES: 5 mm right middle lobe nodule, medial segment. 3 mm right middle  lobe nodule, lateral segment. Medial segment nodule larger compared with  10/5/2018 CT. Lateral segment nodule demonstrated in the interval. will need outpatient follow up.         GERD :   Received pantoprazole as inpatient for formulary alternative of Nexium. Resumed Esomeprazole at discharge.         Body mass index is 28.15 kg/m². Counseled on Lifestyle modifications, calorie restricted Diet ,weight loss strategies. She will follow up with nutrition services as outpatient. CONSULTATIONS:  None    Excerpted HPI from H&P of Marian Sanchez MD:  38 y.o.  female with PMH of HTN, Polycystic kidney disease who presents to ED with c/o nausea, vomitting, abdominal cramping.      Patient has had several ED presentations in the last 6 months for similar complaints, usually gets better with symptomatic management.     ______________________________________________________________________  DISCHARGE SUMMARY/HOSPITAL COURSE:  for full details see H&P, daily progress notes, labs, consult notes. Visit Vitals  /69   Pulse 71   Temp 97.8 °F (36.6 °C)   Resp 16   Ht 5' 1\" (1.549 m)   Wt 69.1 kg (152 lb 5.4 oz)   SpO2 100%   Breastfeeding?  No   BMI 28.78 kg/m² ____________________________________________________________  Patient seen and examined by me on discharge day. PHYSICAL EXAM:  General: Well developed, well nourished. Alert, cooperative, no acute distress    EENT:  Normocephalic atraumatic. EOMI. Anicteric sclerae. MMM  Resp:  Clear to ausculation bilaterally, no wheezing or rales. No accessory muscle use  CV:  Regular rate rhythm, normal S1/S2, no murmurs, rubs, gallops. GI:  Soft, Non distended, mild tenderness to palpation diffusely, hyperactive bowel sounds  Neurologic:  Alert and oriented X 3, no focal neurological deficits. Psych:   Good insight. Not anxious nor agitated  Skin:  No rashes. No jaundice  Extremities:     DP 2+ symmetric, no edema.     _______________________________________________________________________  DISCHARGE MEDICATIONS:   Current Discharge Medication List      START taking these medications    Details   acetaminophen (TYLENOL) 325 mg tablet Take 2 Tabs by mouth every four (4) hours as needed for Pain or Fever. Qty: 30 Tab, Refills: 0      loperamide (IMODIUM) 2 mg capsule Take 1 Cap by mouth four (4) times daily as needed for Diarrhea for up to 10 days. Qty: 10 Cap, Refills: 0         CONTINUE these medications which have CHANGED    Details   amLODIPine (NORVASC) 5 mg tablet Take 1 Tab by mouth daily. Qty: 30 Tab, Refills: 0      dicyclomine (BENTYL) 10 mg capsule Take 1 Cap by mouth four (4) times daily. Qty: 20 Cap, Refills: 0      ondansetron (ZOFRAN ODT) 4 mg disintegrating tablet Take 1 Tab by mouth every eight (8) hours as needed for Nausea. Qty: 20 Tab, Refills: 0      promethazine (PHENERGAN) 25 mg suppository Insert 1 Suppository into rectum every six (6) hours as needed for Nausea. Qty: 20 Suppository, Refills: 0         CONTINUE these medications which have NOT CHANGED    Details   metoprolol tartrate (LOPRESSOR) 25 mg tablet Take 1 Tab by mouth every twelve (12) hours.   Qty: 180 Tab, Refills: 1 Associated Diagnoses: Essential hypertension; Polycystic kidney disease, autosomal dominant      esomeprazole (NEXIUM 24HR) 20 mg capsule Take 1 Cap by mouth daily. Qty: 30 Cap, Refills: 2      cetirizine (ZYRTEC) 10 mg tablet Take 10 mg by mouth daily. STOP taking these medications       potassium chloride (KLOR-CON) 20 mEq pack Comments:   Reason for Stopping:         chlorthalidone (HYGROTEN) 25 mg tablet Comments:   Reason for Stopping:               My Recommended Diet, Activity, Wound Care, and follow-up labs are listed in the patient's Discharge Insturctions which I have personally completed and reviewed.     _______________________________________________________________________  DISPOSITION:     Home with Family: x   Home with HH/PT/OT/RN:    SNF/LTC:    MEME:    OTHER:        Condition at Discharge:  Stable  _______________________________________________________________________  Follow up with:   PCP : Lindsey Campa MD  Follow-up Information     Follow up With Specialties Details Why Contact Info    Lindsey Campa MD Internal Medicine On 9/30/2019 Your appointment time is 11:30, Please arrive 15 mins early, Please keep this appointment, Bring  INS card, picture ID,and discharge papers [address] [place] [address]  [phone]                Total time in minutes spent coordinating this discharge (includes going over instructions, follow-up, prescriptions, and preparing report for sign off to her PCP) :  30 minutes    Signed:  Neo Wall MD

## 2019-09-26 NOTE — DISCHARGE INSTRUCTIONS
You came to the hospital with excessive nausea and vomitting, likely as a result of 'cannabis hyperemesis syndrome'. CT scan of your belly did not show abnormalities. Pregnancy test was negative and your liver function was normal. Your kidney numbers were a little high but it normalized with hydration. Your symptoms improved with hydration, anti nausea medications and anti-spasmodics. Due to excessive vomiting, your potassium levels became dangerously low. We aggressively repleted potassium via IV and oral pills. Please take loperamide as needed for diarrhea. Your blood pressure was on the lower side while you were in the hospital. We have made changes to your BP meds. Chlorthalidone has discontinued as it can lower potassium levels. Cannabis hyperemesis syndrome will likely take some time to resolve. Please follow up with GI doctor for endoscopy to make sure there is not any other reason. Your CT Scan incidentally showed multiple lung nodules, some of them are new. These need to followed up to make sure there is no serious underlying reason such as cancer. At discharge,   - Please follow up with primary care provider. Please have your electrolytes and kidney function re checked. - Please have potassium rich diet, literature provided. - Please see your Gastroenterology, nephrologist.   - It will be beneficial for you to see a nutritionist and psychologist.   - Changes to blood pressure medications  As follows :   Chlorthalidone discontinued  Amlodipine dose decreased to 5 mg daily  Lopressor 25 mg two times daily.   - If you experience vomitting again, please use anti-emetics (Zofran under the tongue or phenergan suppository) and bentyl as needed. Hot showers help as well. Low potassium   When should you call for help? Call 911 anytime you think you may need emergency care. For example, call if:    · You feel like your heart is missing beats.  Heart problems caused by low potassium can cause death.     · You passed out (lost consciousness).     · You have a seizure.    Call your doctor now or seek immediate medical care if:    · You feel weak or unusually tired.     · You have severe arm or leg cramps.     · You have tingling or numbness.     · You feel sick to your stomach, or you vomit.     · You have belly cramps.     · You feel bloated or constipated.     · You have to urinate a lot.     · You feel very thirsty most of the time.     · You are dizzy or lightheaded, or you feel like you may faint.     · You feel depressed, or you lose touch with reality.    Watch closely for changes in your health, and be sure to contact your doctor if:    · You do not get better as expected. Where can you learn more? Go to http://shan-valentina.info/. Enter G358 in the search box to learn more about \"Hypokalemia: Care Instructions. \"    Diarrhea :   When should you call for help? Call 911 anytime you think you may need emergency care. For example, call if:    · You passed out (lost consciousness).     · Your stools are maroon or very bloody.    Call your doctor now or seek immediate medical care if:    · You are dizzy or lightheaded, or you feel like you may faint.     · Your stools are black and look like tar, or they have streaks of blood.     · You have new or worse belly pain.     · You have symptoms of dehydration, such as:  ? Dry eyes and a dry mouth. ? Passing only a little dark urine. ? Feeling thirstier than usual.     · You have a new or higher fever.    Watch closely for changes in your health, and be sure to contact your doctor if:    · Your diarrhea is getting worse.     · You see pus in the diarrhea.     · You are not getting better after 2 days (48 hours).

## 2019-09-27 LAB
ATRIAL RATE: 69 BPM
ATRIAL RATE: 76 BPM
CALCULATED P AXIS, ECG09: 42 DEGREES
CALCULATED P AXIS, ECG09: 48 DEGREES
CALCULATED R AXIS, ECG10: 55 DEGREES
CALCULATED R AXIS, ECG10: 63 DEGREES
CALCULATED T AXIS, ECG11: 22 DEGREES
CALCULATED T AXIS, ECG11: 26 DEGREES
DIAGNOSIS, 93000: NORMAL
DIAGNOSIS, 93000: NORMAL
P-R INTERVAL, ECG05: 114 MS
P-R INTERVAL, ECG05: 114 MS
Q-T INTERVAL, ECG07: 424 MS
Q-T INTERVAL, ECG07: 430 MS
QRS DURATION, ECG06: 70 MS
QRS DURATION, ECG06: 78 MS
QTC CALCULATION (BEZET), ECG08: 454 MS
QTC CALCULATION (BEZET), ECG08: 483 MS
VENTRICULAR RATE, ECG03: 69 BPM
VENTRICULAR RATE, ECG03: 76 BPM

## 2019-09-27 NOTE — PROGRESS NOTES
Spiritual Care Partner Volunteer visited patient in Med Surg on  9/26/19  Documented on 9/27/19 by:   Rev. Matthias Hermosillo EdD MDiv     For  Assistance Page [phone])

## 2019-09-30 ENCOUNTER — OFFICE VISIT (OUTPATIENT)
Dept: INTERNAL MEDICINE CLINIC | Age: 44
End: 2019-09-30

## 2019-09-30 VITALS
HEART RATE: 80 BPM | TEMPERATURE: 97.1 F | OXYGEN SATURATION: 96 % | HEIGHT: 61 IN | WEIGHT: 157 LBS | BODY MASS INDEX: 29.64 KG/M2 | SYSTOLIC BLOOD PRESSURE: 126 MMHG | DIASTOLIC BLOOD PRESSURE: 85 MMHG | RESPIRATION RATE: 19 BRPM

## 2019-09-30 DIAGNOSIS — Q61.2 POLYCYSTIC KIDNEY DISEASE, AUTOSOMAL DOMINANT: ICD-10-CM

## 2019-09-30 DIAGNOSIS — F12.10 CANNABIS ABUSE: ICD-10-CM

## 2019-09-30 DIAGNOSIS — R11.2 INTRACTABLE VOMITING WITH NAUSEA, UNSPECIFIED VOMITING TYPE: ICD-10-CM

## 2019-09-30 NOTE — PROGRESS NOTES
Chief Complaint   Patient presents with   Makenzie [address]     9/24/19-9/26/19 [facility] for Hypokalemia and hyperemisis     1. Have you been to the ER, urgent care clinic since your last visit? Hospitalized since your last visit? Yes When: 9/24/19-9/26/19 [facility] for Hypokalemia and hyperemisis    2. Have you seen or consulted any other health care providers outside of the [address] [place] [address] since your last visit? Include any pap smears or colon screening.  No

## 2019-09-30 NOTE — PROGRESS NOTES
Sher Davenport is a 38 y.o. female and presents with Transitions Of Care (9/24/19-9/26/19 [facility] for Hypokalemia and hyperemisis)  . Subjective:    Recurrent vomiting-pt has been seen x2 in ED this month fo n/v/hypokalemia. Pt relays since her last discharge from ED she STILL has not been able to tolerate POs and she ha s NOT been able to keep the potassium down. She is tachycardic and is actively using the emesis bag as I type. The etiology of her hyperemesis is unclear. She is s/p EGD which demonstrated gastritis 11/2018    Pt was dx'ed w marijuana induced vomiting, but relays when she has abstained, she was still vomiting  Pts urine drug screen + THC and opiates    Pt has appt w GI pending. Pt will be scheduling renal consult for PCKD. Pt now feels better. Hypertension Review:  The patient has essential hypertension  Diet and Lifestyle: generally follows a  low sodium diet  Home BP Monitoring: is not measured at home. Pertinent ROS:  no medication side effects noted, no TIA's, no chest pain on exertion, no dyspnea on exertion, no swelling of ankles. Pts chlorthalidone AND amlodipine have been held due to low bps in house  BP Readings from Last 3 Encounters:   09/30/19 126/85   09/26/19 100/69   09/24/19 (!) 141/103     PCKD-pt has not established w renal as yet      Review of Systems  Constitutional: negative for fevers, chills, anorexia and weight loss  Eyes:   negative for visual disturbance and irritation  ENT:   negative for tinnitus,sore throat,nasal congestion,ear pains. hoarseness  Respiratory:  negative for cough, hemoptysis, dyspnea,wheezing  CV:   negative for chest pain, palpitations, lower extremity edema  Musculoskel: negative for myalgias, arthralgias, back pain, muscle weakness, joint pain  Neurological:  negative for headaches, dizziness, vertigo, memory problems and gait   Behavl/Psych: negative for feelings of anxiety, depression, mood changes    Past Medical History:   Diagnosis Date  Hypertension     Kidney anomaly, congenital     Tubal pregnancy      Past Surgical History:   Procedure Laterality Date    HX CHOLECYSTECTOMY      HX GYN  10/07/2013    cyst removed    HX HYSTERECTOMY  10/7/2013    partial hysterectomy     Social History     Socioeconomic History    Marital status: SINGLE     Spouse name: Not on file    Number of children: Not on file    Years of education: Not on file    Highest education level: Not on file   Tobacco Use    Smoking status: Current Some Day Smoker     Packs/day: 0.25     Years: 25.00     Pack years: 6.25    Smokeless tobacco: Never Used    Tobacco comment: 1-3 day   Substance and Sexual Activity    Alcohol use: Yes     Comment: occasionally/socially    Drug use: Yes     Types: Marijuana     Comment: occ    Sexual activity: Yes     Partners: Male     Family History   Problem Relation Age of Onset    Hypertension Mother     Hypertension Father     Hypertension Sister      Current Outpatient Medications   Medication Sig Dispense Refill    acetaminophen (TYLENOL) 325 mg tablet Take 2 Tabs by mouth every four (4) hours as needed for Pain or Fever. 30 Tab 0    dicyclomine (BENTYL) 10 mg capsule Take 1 Cap by mouth four (4) times daily. 20 Cap 0    ondansetron (ZOFRAN ODT) 4 mg disintegrating tablet Take 1 Tab by mouth every eight (8) hours as needed for Nausea. 20 Tab 0    promethazine (PHENERGAN) 25 mg suppository Insert 1 Suppository into rectum every six (6) hours as needed for Nausea. 20 Suppository 0    loperamide (IMODIUM) 2 mg capsule Take 1 Cap by mouth four (4) times daily as needed for Diarrhea for up to 10 days. 10 Cap 0    metoprolol tartrate (LOPRESSOR) 25 mg tablet Take 1 Tab by mouth every twelve (12) hours. 180 Tab 1    esomeprazole (NEXIUM 24HR) 20 mg capsule Take 1 Cap by mouth daily. 30 Cap 2    cetirizine (ZYRTEC) 10 mg tablet Take 10 mg by mouth daily.        No Known Allergies    Objective:  Visit Vitals  /85 (BP 1 Location: Left arm, BP Patient Position: Sitting)   Pulse 80   Temp 97.1 °F (36.2 °C) (Oral)   Resp 19   Ht 5' 1\" (1.549 m)   Wt 157 lb (71.2 kg)   SpO2 96%   BMI 29.66 kg/m²     Physical Exam:   General appearance - alert, well appearing, and in no distress  Mental status - alert, oriented to person, place, and time  EYE-EOMI + deya exopthalmos  Chest - clear to auscultation, no wheezes, rales or rhonchi, symmetric air entry   Heart - normal rate, regular rhythm, normal S1, S2 2/6 systolic murmur  Abd- +bs soft, obese  Ext-peripheral pulses normal, no pedal edema, no clubbing or cyanosis  Skin-Warm and dry. no hyperpigmentation, vitiligo, or suspicious lesions  Neuro -alert, oriented, normal speech, no focal findings or movement disorder noted      Results for orders placed or performed during the hospital encounter of 09/24/19   CULTURE, URINE   Result Value Ref Range    Special Requests: NO SPECIAL REQUESTS  Reflexed from [id number]        Norden Count [id number]  COLONIES/mL        Culture result: MIXED UROGENITAL NADER ISOLATED     CBC WITH AUTOMATED DIFF   Result Value Ref Range    WBC 14.3 (H) 3.6 - 11.0 K/uL    RBC 4.76 3.80 - 5.20 M/uL    HGB 15.3 11.5 - 16.0 g/dL    HCT 43.6 35.0 - 47.0 %    MCV 91.6 80.0 - 99.0 FL    MCH 32.1 26.0 - 34.0 PG    MCHC 35.1 30.0 - 36.5 g/dL    RDW 13.1 11.5 - 14.5 %    PLATELET 892 (H) 313 - 400 K/uL    MPV 8.8 (L) 8.9 - 12.9 FL    NRBC 0.0 0  WBC    ABSOLUTE NRBC 0.00 0.00 - 0.01 K/uL    NEUTROPHILS 52 32 - 75 %    LYMPHOCYTES 40 12 - 49 %    MONOCYTES 6 5 - 13 %    EOSINOPHILS 1 0 - 7 %    BASOPHILS 1 0 - 1 %    IMMATURE GRANULOCYTES 0 %    ABS. NEUTROPHILS 7.5 1.8 - 8.0 K/UL    ABS. LYMPHOCYTES 5.7 (H) 0.8 - 3.5 K/UL    ABS. MONOCYTES 0.9 0.0 - 1.0 K/UL    ABS. EOSINOPHILS 0.1 0.0 - 0.4 K/UL    ABS. BASOPHILS 0.1 0.0 - 0.1 K/UL    ABS. IMM.  GRANS. 0.0 K/UL    DF SMEAR SCANNED      RBC COMMENTS NORMOCYTIC, NORMOCHROMIC     METABOLIC PANEL, COMPREHENSIVE   Result Value Ref Range    Sodium 134 (L) 136 - 145 mmol/L    Potassium 2.5 (LL) 3.5 - 5.1 mmol/L    Chloride 95 (L) 97 - 108 mmol/L    CO2 28 21 - 32 mmol/L    Anion gap 11 5 - 15 mmol/L    Glucose 111 (H) 65 - 100 mg/dL    BUN 18 6 - 20 MG/DL    Creatinine 1.53 (H) 0.55 - 1.02 MG/DL    BUN/Creatinine ratio 12 12 - 20      GFR est AA 44 (L) >60 ml/min/1.73m2    GFR est non-AA 37 (L) >60 ml/min/1.73m2    Calcium 10.5 (H) 8.5 - 10.1 MG/DL    Bilirubin, total 0.5 0.2 - 1.0 MG/DL    ALT (SGPT) 27 12 - 78 U/L    AST (SGOT) 20 15 - 37 U/L    Alk.  phosphatase 99 45 - 117 U/L    Protein, total 9.3 (H) 6.4 - 8.2 g/dL    Albumin 4.3 3.5 - 5.0 g/dL    Globulin 5.0 (H) 2.0 - 4.0 g/dL    A-G Ratio 0.9 (L) 1.1 - 2.2     LIPASE   Result Value Ref Range    Lipase 301 73 - 393 U/L   URINALYSIS W/ REFLEX CULTURE   Result Value Ref Range    Color YELLOW/STRAW      Appearance CLOUDY (A) CLEAR      Specific gravity <1.005 1.003 - 1.030    pH (UA) 6.5 5.0 - 8.0      Protein NEGATIVE  NEG mg/dL    Glucose NEGATIVE  NEG mg/dL    Ketone NEGATIVE  NEG mg/dL    Bilirubin NEGATIVE  NEG      Blood TRACE (A) NEG      Urobilinogen 0.2 0.2 - 1.0 EU/dL    Nitrites NEGATIVE  NEG      Leukocyte Esterase NEGATIVE  NEG      WBC 0-4 0 - 4 /hpf    RBC 0-5 0 - 5 /hpf    Epithelial cells FEW FEW /lpf    Bacteria 1+ (A) NEG /hpf    UA:UC IF INDICATED URINE CULTURE ORDERED (A) CNI     MAGNESIUM   Result Value Ref Range    Magnesium 1.7 1.6 - 2.4 mg/dL   TSH 3RD GENERATION   Result Value Ref Range    TSH 0.93 0.36 - 3.74 uIU/mL   DRUG SCREEN, URINE   Result Value Ref Range    AMPHETAMINES NEGATIVE  NEG      BARBITURATES NEGATIVE  NEG      BENZODIAZEPINES NEGATIVE  NEG      COCAINE NEGATIVE  NEG      METHADONE NEGATIVE  NEG      OPIATES POSITIVE (A) NEG      PCP(PHENCYCLIDINE) NEGATIVE  NEG      THC (TH-CANNABINOL) POSITIVE (A) NEG      Drug screen comment (NOTE)    METABOLIC PANEL, BASIC   Result Value Ref Range    Sodium 137 136 - 145 mmol/L    Potassium 3.8 3.5 - 5.1 mmol/L Chloride 102 97 - 108 mmol/L    CO2 25 21 - 32 mmol/L    Anion gap 10 5 - 15 mmol/L    Glucose 103 (H) 65 - 100 mg/dL    BUN 12 6 - 20 MG/DL    Creatinine 1.02 0.55 - 1.02 MG/DL    BUN/Creatinine ratio 12 12 - 20      GFR est AA >60 >60 ml/min/1.73m2    GFR est non-AA 59 (L) >60 ml/min/1.73m2    Calcium 8.3 (L) 8.5 - 10.1 MG/DL   LIPASE   Result Value Ref Range    Lipase 330 73 - 736 U/L   METABOLIC PANEL, BASIC   Result Value Ref Range    Sodium 137 136 - 145 mmol/L    Potassium 3.3 (L) 3.5 - 5.1 mmol/L    Chloride 104 97 - 108 mmol/L    CO2 24 21 - 32 mmol/L    Anion gap 9 5 - 15 mmol/L    Glucose 88 65 - 100 mg/dL    BUN 16 6 - 20 MG/DL    Creatinine 1.03 (H) 0.55 - 1.02 MG/DL    BUN/Creatinine ratio 16 12 - 20      GFR est AA >60 >60 ml/min/1.73m2    GFR est non-AA 58 (L) >60 ml/min/1.73m2    Calcium 8.1 (L) 8.5 - 10.1 MG/DL   MAGNESIUM   Result Value Ref Range    Magnesium 1.9 1.6 - 2.4 mg/dL   PHOSPHORUS   Result Value Ref Range    Phosphorus 2.3 (L) 2.6 - 4.7 MG/DL   METABOLIC PANEL, BASIC   Result Value Ref Range    Sodium 138 136 - 145 mmol/L    Potassium 4.8 3.5 - 5.1 mmol/L    Chloride 107 97 - 108 mmol/L    CO2 21 21 - 32 mmol/L    Anion gap 10 5 - 15 mmol/L    Glucose 91 65 - 100 mg/dL    BUN 16 6 - 20 MG/DL    Creatinine 1.06 (H) 0.55 - 1.02 MG/DL    BUN/Creatinine ratio 15 12 - 20      GFR est AA >60 >60 ml/min/1.73m2    GFR est non-AA 56 (L) >60 ml/min/1.73m2    Calcium 8.9 8.5 - 10.1 MG/DL   MAGNESIUM   Result Value Ref Range    Magnesium 2.1 1.6 - 2.4 mg/dL   PHOSPHORUS   Result Value Ref Range    Phosphorus 2.2 (L) 2.6 - 4.7 MG/DL   HCG URINE, QL. - POC   Result Value Ref Range    Pregnancy test,urine (POC) NEGATIVE  NEG     EKG, 12 LEAD, INITIAL   Result Value Ref Range    Ventricular Rate 106 BPM    Atrial Rate 106 BPM    P-R Interval 124 ms    QRS Duration 68 ms    Q-T Interval 362 ms    QTC Calculation (Bezet) 480 ms    Calculated P Axis 59 degrees    Calculated R Axis 58 degrees Calculated T Axis -35 degrees    Diagnosis       Sinus tachycardia  Possible Left atrial enlargement  ST & T wave abnormality, consider inferior ischemia  prolonged QTc  delayed precordial R wave progression  ST changes are new; please correlate clinically    Confirmed by Angel Wang MD Nurse ([id number]) on 9/25/2019 12:30:00 PM     EKG, 12 LEAD, SUBSEQUENT   Result Value Ref Range    Ventricular Rate 76 BPM    Atrial Rate 76 BPM    P-R Interval 114 ms    QRS Duration 70 ms    Q-T Interval 430 ms    QTC Calculation (Bezet) 483 ms    Calculated P Axis 48 degrees    Calculated R Axis 63 degrees    Calculated T Axis 26 degrees    Diagnosis       Normal sinus rhythm  mild QTc prolongation  short AR interval  Confirmed by Angel Wang MD Nurse [id number]) on 9/27/2019 3:03:04 PM     EKG, 12 LEAD, SUBSEQUENT   Result Value Ref Range    Ventricular Rate 69 BPM    Atrial Rate 69 BPM    P-R Interval 114 ms    QRS Duration 78 ms    Q-T Interval 424 ms    QTC Calculation (Bezet) 454 ms    Calculated P Axis 42 degrees    Calculated R Axis 55 degrees    Calculated T Axis 22 degrees    Diagnosis       Normal sinus rhythm  Normal ECG  When compared with ECG of 25-SEP-2019 13:49,  No significant change was found  Confirmed by Angel Wang MD Nurse ([id number]) on 9/27/2019 4:09:54 PM         Assessment/Plan:    ICD-10-CM ICD-9-CM    1. Transition of care performed with sharing of clinical summary Z91.89 V15.89    2. Intractable vomiting with nausea, unspecified vomiting type R11.2 536.2    3. Polycystic kidney disease, autosomal dominant Q61.2 753.13    4. Cannabis abuse F12.10 305.20      No orders of the defined types were placed in this encounter. 1. Transition of care performed with sharing of clinical summary  Completed    2. Intractable vomiting with nausea, unspecified vomiting type  Resolved  D/w pt complete cessation of marijuana    3. Polycystic kidney disease, autosomal dominant  Noted  Consult w renal    4.  Cannabis abuse  Noted          There are no Patient Instructions on file for this visit. Follow-up and Dispositions    · Return in about 4 weeks (around 10/28/2019) for bp check w lpn off amlodipine, 3 mth f/u w me. I have reviewed with the patient details of the assessment and plan and all questions were answered. Relevent patient education was performed. The most recent lab findings were reviewed with the patient. An After Visit Summary was printed and given to the patient.

## 2020-03-11 ENCOUNTER — HOSPITAL ENCOUNTER (EMERGENCY)
Age: 45
Discharge: HOME | End: 2020-03-12
Attending: EMERGENCY MEDICINE
Payer: COMMERCIAL

## 2020-03-11 ENCOUNTER — APPOINTMENT (OUTPATIENT)
Dept: CT IMAGING | Age: 45
End: 2020-03-11
Attending: PHYSICIAN ASSISTANT
Payer: COMMERCIAL

## 2020-03-11 DIAGNOSIS — E86.0 DEHYDRATION: ICD-10-CM

## 2020-03-11 DIAGNOSIS — F12.10 MARIJUANA ABUSE: ICD-10-CM

## 2020-03-11 DIAGNOSIS — R19.7 NAUSEA, VOMITING, AND DIARRHEA: Primary | ICD-10-CM

## 2020-03-11 DIAGNOSIS — I10 HYPERTENSION, UNSPECIFIED TYPE: ICD-10-CM

## 2020-03-11 DIAGNOSIS — E87.6 HYPOKALEMIA: ICD-10-CM

## 2020-03-11 DIAGNOSIS — R11.2 NAUSEA, VOMITING, AND DIARRHEA: Primary | ICD-10-CM

## 2020-03-11 LAB
ALBUMIN SERPL-MCNC: 3.9 G/DL (ref 3.5–5)
ALBUMIN/GLOB SERPL: 0.9 {RATIO} (ref 1.1–2.2)
ALP SERPL-CCNC: 79 U/L (ref 45–117)
ALT SERPL-CCNC: 37 U/L (ref 12–78)
ANION GAP SERPL CALC-SCNC: 19 MMOL/L (ref 5–15)
APPEARANCE UR: CLEAR
AST SERPL-CCNC: 43 U/L (ref 15–37)
BACTERIA URNS QL MICRO: NEGATIVE /HPF
BASOPHILS # BLD: 0 K/UL (ref 0–0.1)
BASOPHILS NFR BLD: 1 % (ref 0–1)
BILIRUB SERPL-MCNC: 0.2 MG/DL (ref 0.2–1)
BILIRUB UR QL: NEGATIVE
BUN SERPL-MCNC: 12 MG/DL (ref 6–20)
BUN/CREAT SERPL: 14 (ref 12–20)
CALCIUM SERPL-MCNC: 8.9 MG/DL (ref 8.5–10.1)
CHLORIDE SERPL-SCNC: 97 MMOL/L (ref 97–108)
CO2 SERPL-SCNC: 20 MMOL/L (ref 21–32)
COLOR UR: ABNORMAL
CREAT SERPL-MCNC: 0.84 MG/DL (ref 0.55–1.02)
DIFFERENTIAL METHOD BLD: ABNORMAL
EOSINOPHIL # BLD: 0 K/UL (ref 0–0.4)
EOSINOPHIL NFR BLD: 0 % (ref 0–7)
EPITH CASTS URNS QL MICRO: NORMAL /LPF
ERYTHROCYTE [DISTWIDTH] IN BLOOD BY AUTOMATED COUNT: 14.2 % (ref 11.5–14.5)
FLUAV AG NPH QL IA: NEGATIVE
FLUBV AG NOSE QL IA: NEGATIVE
GLOBULIN SER CALC-MCNC: 4.4 G/DL (ref 2–4)
GLUCOSE SERPL-MCNC: 134 MG/DL (ref 65–100)
GLUCOSE UR STRIP.AUTO-MCNC: NEGATIVE MG/DL
HCG UR QL: NEGATIVE
HCT VFR BLD AUTO: 42.6 % (ref 35–47)
HGB BLD-MCNC: 14.7 G/DL (ref 11.5–16)
HGB UR QL STRIP: ABNORMAL
HYALINE CASTS URNS QL MICRO: NORMAL /LPF (ref 0–5)
IMM GRANULOCYTES # BLD AUTO: 0 K/UL (ref 0–0.04)
IMM GRANULOCYTES NFR BLD AUTO: 1 % (ref 0–0.5)
KETONES UR QL STRIP.AUTO: 40 MG/DL
LACTATE SERPL-SCNC: 1.2 MMOL/L (ref 0.4–2)
LEUKOCYTE ESTERASE UR QL STRIP.AUTO: NEGATIVE
LIPASE SERPL-CCNC: 370 U/L (ref 73–393)
LYMPHOCYTES # BLD: 1.5 K/UL (ref 0.8–3.5)
LYMPHOCYTES NFR BLD: 28 % (ref 12–49)
MCH RBC QN AUTO: 31.4 PG (ref 26–34)
MCHC RBC AUTO-ENTMCNC: 34.5 G/DL (ref 30–36.5)
MCV RBC AUTO: 91 FL (ref 80–99)
MONOCYTES # BLD: 0.4 K/UL (ref 0–1)
MONOCYTES NFR BLD: 8 % (ref 5–13)
NEUTS SEG # BLD: 3.3 K/UL (ref 1.8–8)
NEUTS SEG NFR BLD: 62 % (ref 32–75)
NITRITE UR QL STRIP.AUTO: NEGATIVE
NRBC # BLD: 0 K/UL (ref 0–0.01)
NRBC BLD-RTO: 0 PER 100 WBC
PH UR STRIP: 6 [PH] (ref 5–8)
PLATELET # BLD AUTO: 236 K/UL (ref 150–400)
PMV BLD AUTO: 9.8 FL (ref 8.9–12.9)
POTASSIUM SERPL-SCNC: 3.2 MMOL/L (ref 3.5–5.1)
PROT SERPL-MCNC: 8.3 G/DL (ref 6.4–8.2)
PROT UR STRIP-MCNC: >300 MG/DL
RBC # BLD AUTO: 4.68 M/UL (ref 3.8–5.2)
RBC #/AREA URNS HPF: NORMAL /HPF (ref 0–5)
SODIUM SERPL-SCNC: 136 MMOL/L (ref 136–145)
SP GR UR REFRACTOMETRY: 1.02 (ref 1–1.03)
UROBILINOGEN UR QL STRIP.AUTO: 0.2 EU/DL (ref 0.2–1)
WBC # BLD AUTO: 5.2 K/UL (ref 3.6–11)
WBC URNS QL MICRO: NORMAL /HPF (ref 0–4)

## 2020-03-11 PROCEDURE — 74011250636 HC RX REV CODE- 250/636: Performed by: PHYSICIAN ASSISTANT

## 2020-03-11 PROCEDURE — 74177 CT ABD & PELVIS W/CONTRAST: CPT

## 2020-03-11 PROCEDURE — 87804 INFLUENZA ASSAY W/OPTIC: CPT

## 2020-03-11 PROCEDURE — 74011000250 HC RX REV CODE- 250: Performed by: PHYSICIAN ASSISTANT

## 2020-03-11 PROCEDURE — 81025 URINE PREGNANCY TEST: CPT

## 2020-03-11 PROCEDURE — 93005 ELECTROCARDIOGRAM TRACING: CPT

## 2020-03-11 PROCEDURE — 96361 HYDRATE IV INFUSION ADD-ON: CPT

## 2020-03-11 PROCEDURE — 85025 COMPLETE CBC W/AUTO DIFF WBC: CPT

## 2020-03-11 PROCEDURE — 96375 TX/PRO/DX INJ NEW DRUG ADDON: CPT

## 2020-03-11 PROCEDURE — 87086 URINE CULTURE/COLONY COUNT: CPT

## 2020-03-11 PROCEDURE — 74011250637 HC RX REV CODE- 250/637: Performed by: PHYSICIAN ASSISTANT

## 2020-03-11 PROCEDURE — 81001 URINALYSIS AUTO W/SCOPE: CPT

## 2020-03-11 PROCEDURE — 83690 ASSAY OF LIPASE: CPT

## 2020-03-11 PROCEDURE — 80053 COMPREHEN METABOLIC PANEL: CPT

## 2020-03-11 PROCEDURE — 83605 ASSAY OF LACTIC ACID: CPT

## 2020-03-11 PROCEDURE — 36415 COLL VENOUS BLD VENIPUNCTURE: CPT

## 2020-03-11 PROCEDURE — 96374 THER/PROPH/DIAG INJ IV PUSH: CPT

## 2020-03-11 PROCEDURE — 74011636320 HC RX REV CODE- 636/320: Performed by: EMERGENCY MEDICINE

## 2020-03-11 PROCEDURE — 99285 EMERGENCY DEPT VISIT HI MDM: CPT

## 2020-03-11 RX ORDER — MORPHINE SULFATE 2 MG/ML
4 INJECTION, SOLUTION INTRAMUSCULAR; INTRAVENOUS
Status: COMPLETED | OUTPATIENT
Start: 2020-03-11 | End: 2020-03-11

## 2020-03-11 RX ORDER — SODIUM CHLORIDE 0.9 % (FLUSH) 0.9 %
10 SYRINGE (ML) INJECTION
Status: DISCONTINUED | OUTPATIENT
Start: 2020-03-11 | End: 2020-03-12 | Stop reason: HOSPADM

## 2020-03-11 RX ORDER — HALOPERIDOL 5 MG/ML
5 INJECTION INTRAMUSCULAR
Status: COMPLETED | OUTPATIENT
Start: 2020-03-11 | End: 2020-03-11

## 2020-03-11 RX ORDER — POTASSIUM CHLORIDE 750 MG/1
10 TABLET, FILM COATED, EXTENDED RELEASE ORAL
Status: COMPLETED | OUTPATIENT
Start: 2020-03-11 | End: 2020-03-11

## 2020-03-11 RX ORDER — ONDANSETRON 4 MG/1
4 TABLET, ORALLY DISINTEGRATING ORAL
Status: COMPLETED | OUTPATIENT
Start: 2020-03-11 | End: 2020-03-11

## 2020-03-11 RX ORDER — ONDANSETRON 2 MG/ML
4 INJECTION INTRAMUSCULAR; INTRAVENOUS
Status: COMPLETED | OUTPATIENT
Start: 2020-03-11 | End: 2020-03-11

## 2020-03-11 RX ORDER — METOPROLOL TARTRATE 5 MG/5ML
2.5 INJECTION INTRAVENOUS
Status: COMPLETED | OUTPATIENT
Start: 2020-03-11 | End: 2020-03-11

## 2020-03-11 RX ADMIN — POTASSIUM CHLORIDE 10 MEQ: 750 TABLET, EXTENDED RELEASE ORAL at 23:04

## 2020-03-11 RX ADMIN — PROMETHAZINE HYDROCHLORIDE 25 MG: 25 INJECTION INTRAMUSCULAR; INTRAVENOUS at 20:39

## 2020-03-11 RX ADMIN — HALOPERIDOL LACTATE 5 MG: 5 INJECTION, SOLUTION INTRAMUSCULAR at 21:58

## 2020-03-11 RX ADMIN — ONDANSETRON 4 MG: 4 TABLET, ORALLY DISINTEGRATING ORAL at 18:59

## 2020-03-11 RX ADMIN — METOPROLOL TARTRATE 2.5 MG: 5 INJECTION INTRAVENOUS at 21:58

## 2020-03-11 RX ADMIN — IOPAMIDOL 100 ML: 755 INJECTION, SOLUTION INTRAVENOUS at 20:20

## 2020-03-11 RX ADMIN — SODIUM CHLORIDE 1000 ML: 900 INJECTION, SOLUTION INTRAVENOUS at 19:58

## 2020-03-11 RX ADMIN — MORPHINE SULFATE 4 MG: 2 INJECTION, SOLUTION INTRAMUSCULAR; INTRAVENOUS at 19:58

## 2020-03-11 RX ADMIN — ONDANSETRON 4 MG: 2 INJECTION INTRAMUSCULAR; INTRAVENOUS at 19:58

## 2020-03-11 NOTE — ED PROVIDER NOTES
EMERGENCY DEPARTMENT HISTORY AND PHYSICAL EXAM      Date: 3/11/2020  Patient Name: Altagracia Jay    History of Presenting Illness     Chief Complaint   Patient presents with    Abdominal Pain     with vomiting and diarrhea; generalized body aches       History Provided By: Patient    HPI: Altagracia Jay, 38 y.o. female with PMHx significant for HTN, presents BIB self to the ED with cc of nausea, vomiting, diarrhea, abdominal pain x4 days. Abdominal pain is generalized. She reports green nonbloody emesis 6-7 times per day, and 6-7 episodes of watery diarrhea/day. She has been unable to keep down any food or her hypertension medications. Patient also complains of congestion, dry cough, chest pain, shortness of breath, generalized myalgias. No flu shot this year. Denies affected contacts. Admits to smoking marijuana with some frequency, last time was 6 days ago. There are no other complaints, changes, or physical findings at this time. PCP: Wayne West MD    No current facility-administered medications on file prior to encounter. Current Outpatient Medications on File Prior to Encounter   Medication Sig Dispense Refill    metoprolol tartrate (LOPRESSOR) 25 mg tablet TAKE 1 TABLET BY MOUTH EVERY 12 HOURS 180 Tab 1    esomeprazole (NEXIUM 24HR) 20 mg capsule Take 1 Cap by mouth daily. 30 Cap 2    cetirizine (ZYRTEC) 10 mg tablet Take 10 mg by mouth daily.  acetaminophen (TYLENOL) 325 mg tablet Take 2 Tabs by mouth every four (4) hours as needed for Pain or Fever. 30 Tab 0    dicyclomine (BENTYL) 10 mg capsule Take 1 Cap by mouth four (4) times daily. 20 Cap 0    promethazine (PHENERGAN) 25 mg suppository Insert 1 Suppository into rectum every six (6) hours as needed for Nausea.  20 Suppository 0       Past History     Past Medical History:  Past Medical History:   Diagnosis Date    Hypertension     Kidney anomaly, congenital     Tubal pregnancy        Past Surgical History:  Past Surgical History:   Procedure Laterality Date    HX CHOLECYSTECTOMY      HX GYN  10/07/2013    cyst removed    HX HYSTERECTOMY  10/7/2013    partial hysterectomy       Family History:  Family History   Problem Relation Age of Onset    Hypertension Mother     Hypertension Father     Hypertension Sister        Social History:  Social History     Tobacco Use    Smoking status: Current Some Day Smoker     Packs/day: 0.25     Years: 25.00     Pack years: 6.25    Smokeless tobacco: Never Used    Tobacco comment: 1-3 day   Substance Use Topics    Alcohol use: Yes     Comment: occasionally/socially    Drug use: Yes     Types: Marijuana     Comment: occ       Allergies:  No Known Allergies      Review of Systems   Review of Systems   Constitutional: Negative for fever. HENT: Positive for congestion. Eyes: Negative for redness. Respiratory: Positive for cough and shortness of breath. Cardiovascular: Positive for chest pain. Gastrointestinal: Positive for abdominal pain, diarrhea, nausea and vomiting. Endocrine: Negative for polyuria. Genitourinary: Negative for dysuria. Musculoskeletal: Positive for myalgias. Skin: Negative for rash. Allergic/Immunologic:        Nkda   Neurological: Negative for syncope. Hematological: Does not bruise/bleed easily. Psychiatric/Behavioral: Negative for agitation. Physical Exam   Physical Exam  Vitals signs and nursing note reviewed. Constitutional:       General: She is in acute distress (Actively vomiting). Appearance: Normal appearance. She is well-developed. She is ill-appearing and diaphoretic. HENT:      Head: Normocephalic and atraumatic. Nose: Nose normal.      Mouth/Throat:      Mouth: Mucous membranes are dry. Eyes:      General: Lids are normal.      Extraocular Movements: Extraocular movements intact. Conjunctiva/sclera: Conjunctivae normal.      Pupils: Pupils are equal, round, and reactive to light. Neck:      Musculoskeletal: Normal range of motion and neck supple. Cardiovascular:      Rate and Rhythm: Regular rhythm. Tachycardia present. Pulmonary:      Effort: Pulmonary effort is normal.      Breath sounds: Normal breath sounds. Abdominal:      General: There is no distension. Palpations: Abdomen is soft. Tenderness: There is abdominal tenderness (Bilateral lower abdomen). There is no right CVA tenderness, left CVA tenderness or rebound. Musculoskeletal: Normal range of motion. Skin:     General: Skin is warm. Neurological:      General: No focal deficit present. Mental Status: She is alert and oriented to person, place, and time. Psychiatric:         Mood and Affect: Mood normal.         Behavior: Behavior normal. Behavior is cooperative.          Diagnostic Study Results     Labs -     Recent Results (from the past 12 hour(s))   EKG, 12 LEAD, INITIAL    Collection Time: 03/11/20  6:52 PM   Result Value Ref Range    Ventricular Rate 85 BPM    Atrial Rate 85 BPM    P-R Interval 134 ms    QRS Duration 78 ms    Q-T Interval 370 ms    QTC Calculation (Bezet) 440 ms    Calculated P Axis 42 degrees    Calculated R Axis 57 degrees    Calculated T Axis 32 degrees    Diagnosis       Normal sinus rhythm  Possible Left atrial enlargement  Borderline ECG  When compared with ECG of 26-SEP-2019 15:49,  No significant change was found     CBC WITH AUTOMATED DIFF    Collection Time: 03/11/20  7:31 PM   Result Value Ref Range    WBC 5.2 3.6 - 11.0 K/uL    RBC 4.68 3.80 - 5.20 M/uL    HGB 14.7 11.5 - 16.0 g/dL    HCT 42.6 35.0 - 47.0 %    MCV 91.0 80.0 - 99.0 FL    MCH 31.4 26.0 - 34.0 PG    MCHC 34.5 30.0 - 36.5 g/dL    RDW 14.2 11.5 - 14.5 %    PLATELET 188 025 - 935 K/uL    MPV 9.8 8.9 - 12.9 FL    NRBC 0.0 0  WBC    ABSOLUTE NRBC 0.00 0.00 - 0.01 K/uL    NEUTROPHILS 62 32 - 75 %    LYMPHOCYTES 28 12 - 49 %    MONOCYTES 8 5 - 13 %    EOSINOPHILS 0 0 - 7 %    BASOPHILS 1 0 - 1 % IMMATURE GRANULOCYTES 1 (H) 0.0 - 0.5 %    ABS. NEUTROPHILS 3.3 1.8 - 8.0 K/UL    ABS. LYMPHOCYTES 1.5 0.8 - 3.5 K/UL    ABS. MONOCYTES 0.4 0.0 - 1.0 K/UL    ABS. EOSINOPHILS 0.0 0.0 - 0.4 K/UL    ABS. BASOPHILS 0.0 0.0 - 0.1 K/UL    ABS. IMM. GRANS. 0.0 0.00 - 0.04 K/UL    DF AUTOMATED     METABOLIC PANEL, COMPREHENSIVE    Collection Time: 03/11/20  7:31 PM   Result Value Ref Range    Sodium 136 136 - 145 mmol/L    Potassium 3.2 (L) 3.5 - 5.1 mmol/L    Chloride 97 97 - 108 mmol/L    CO2 20 (L) 21 - 32 mmol/L    Anion gap 19 (H) 5 - 15 mmol/L    Glucose 134 (H) 65 - 100 mg/dL    BUN 12 6 - 20 MG/DL    Creatinine 0.84 0.55 - 1.02 MG/DL    BUN/Creatinine ratio 14 12 - 20      GFR est AA >60 >60 ml/min/1.73m2    GFR est non-AA >60 >60 ml/min/1.73m2    Calcium 8.9 8.5 - 10.1 MG/DL    Bilirubin, total 0.2 0.2 - 1.0 MG/DL    ALT (SGPT) 37 12 - 78 U/L    AST (SGOT) 43 (H) 15 - 37 U/L    Alk.  phosphatase 79 45 - 117 U/L    Protein, total 8.3 (H) 6.4 - 8.2 g/dL    Albumin 3.9 3.5 - 5.0 g/dL    Globulin 4.4 (H) 2.0 - 4.0 g/dL    A-G Ratio 0.9 (L) 1.1 - 2.2     LIPASE    Collection Time: 03/11/20  7:31 PM   Result Value Ref Range    Lipase 370 73 - 393 U/L   LACTIC ACID    Collection Time: 03/11/20  7:31 PM   Result Value Ref Range    Lactic acid 1.2 0.4 - 2.0 MMOL/L   INFLUENZA A+B VIRAL AGS    Collection Time: 03/11/20  7:31 PM   Result Value Ref Range    Influenza A Antigen NEGATIVE  NEG      Influenza B Antigen NEGATIVE  NEG     HCG URINE, QL. - POC    Collection Time: 03/11/20  8:25 PM   Result Value Ref Range    Pregnancy test,urine (POC) NEGATIVE  NEG     URINALYSIS W/ RFLX MICROSCOPIC    Collection Time: 03/11/20  8:26 PM   Result Value Ref Range    Color YELLOW/STRAW      Appearance CLEAR CLEAR      Specific gravity 1.025 1.003 - 1.030      pH (UA) 6.0 5.0 - 8.0      Protein >300 (A) NEG mg/dL    Glucose NEGATIVE  NEG mg/dL    Ketone 40 (A) NEG mg/dL    Bilirubin NEGATIVE  NEG      Blood SMALL (A) NEG Urobilinogen 0.2 0.2 - 1.0 EU/dL    Nitrites NEGATIVE  NEG      Leukocyte Esterase NEGATIVE  NEG     URINE MICROSCOPIC ONLY    Collection Time: 03/11/20  8:26 PM   Result Value Ref Range    WBC 0-4 0 - 4 /hpf    RBC 0-5 0 - 5 /hpf    Epithelial cells FEW FEW /lpf    Bacteria NEGATIVE  NEG /hpf    Hyaline cast 0-2 0 - 5 /lpf       Radiologic Studies -   CT ABD PELV W CONT   Final Result   IMPRESSION:      1. No evidence of acute process in the abdomen or pelvis. 2. Additional stable findings as above. CT Results  (Last 48 hours)               03/11/20 2100  CT ABD PELV W CONT Final result    Impression:  IMPRESSION:       1. No evidence of acute process in the abdomen or pelvis. 2. Additional stable findings as above. Narrative:  EXAM:  CT ABD PELV W CONT       INDICATION: N/V/D x 3 days, lower abd pain        COMPARISON: CT abdomen pelvis 9/29/2019. CONTRAST:  100 mL of Isovue-370. TECHNIQUE:    Following the uneventful intravenous administration of contrast, thin axial   images were obtained through the abdomen and pelvis. Coronal and sagittal   reconstructions were generated. Oral contrast was not administered. CT dose   reduction was achieved through use of a standardized protocol tailored for this   examination and automatic exposure control for dose modulation. FINDINGS:    Lower Thorax:   Lung Bases: Clear. Heart: The heart is normal in size. No pericardial effusion. Abdomen/Pelvis:   Liver:  Subcentimeter hypodensities, statistically benign cysts. Biliary system: Gallbladder is surgically absent. No intrahepatic or   extrahepatic biliary ductal dilatation. Spleen: Normal.       Pancreas: Normal.       Kidneys/Ureters/Bladder: Stable appearance of numerous simple cysts throughout   the kidneys. No renal or ureteral calculi. No hydronephrosis or hydroureter.  The   bladder is normal.       Adrenals: Normal.       Stomach/bowel: No dilation or abnormal wall thickening is present. No free   intraperitoneal air noted. Sigmoid diverticulosis. Normal appendix. Reproductive Organs: Small fibroids are noted in the uterus. No suspicious   adnexal masses. Vasculature: Normal caliber arteries. Moderate calcific atherosclerosis of the   infrarenal abdominal aorta and iliac vasculature. Portal vein, SMV, and splenic   vein are patent. Nodes: No pathologically enlarged lymph nodes. Fluid: No free fluid. Bones/Soft Tissue: No acute fractures or aggressive osseous lesions are seen. CXR Results  (Last 48 hours)    None            Medical Decision Making   I am the first provider for this patient. I reviewed the vital signs, available nursing notes, past medical history, past surgical history, family history and social history. Vital Signs-Reviewed the patient's vital signs. Patient Vitals for the past 12 hrs:   Temp Pulse Resp BP SpO2   03/11/20 2158  76  123/89    03/11/20 1836    (!) 198/126    03/11/20 1834 97.5 °F (36.4 °C) (!) 103 20 (!) 201/131 96 %       Records Reviewed: Old Medical Records    Provider Notes (Medical Decision Making):   Patient reports feeling improvement following 2 L IV fluids, Zofran, Phenergan, Haldol. Appears much more comfortable. Repeat abdominal exam benign. Vital signs improved, including blood pressure after IV metoprolol. Patient had not been able to keep her hypertensive meds down over the past 3 days. .  Potassium repleted. Long discussion with patient regarding need for discontinuation of marijuana. Patient states she just started counseling in an effort to stop. Encouraged oral hydration and oral potassium. Follow-up with PCP. ED return precautions given. ED Course:   Initial assessment performed. The patients presenting problems have been discussed, and they are in agreement with the care plan formulated and outlined with them.   I have encouraged them to ask questions as they arise throughout their visit. Critical Care Time: None    Disposition:  D/c    PLAN:  1. Current Discharge Medication List        2. Follow-up Information     Follow up With Specialties Details Why [facility] EM[facility] Emergency Medicine  As needed, If symptoms worsen [address]    Cami Jones MD Internal Medicine  For follow up [address]  [address]  [phone]          Return to ED if worse     Diagnosis     Clinical Impression:   1. Nausea, vomiting, and diarrhea    2. Hypokalemia    3. Dehydration    4. Marijuana abuse    5. Hypertension, unspecified type          Please note that this dictation was completed with [employer], the computer voice recognition software. Quite often unanticipated grammatical, syntax, homophones, and other interpretive errors are inadvertently transcribed by the computer software. Please disregards these errors. Please excuse any errors that have escaped final proofreading. This note will not be viewable in [address]

## 2020-03-11 NOTE — ED TRIAGE NOTES
Pt presents to ED with c/o vomiting, diarrhea, coughing, chest discomfort beginning Sunday. Pt reports taking jessica seltzer cold and flu, with no relief. Pt reports hx of HTN, took meds this morning.

## 2020-03-12 VITALS
BODY MASS INDEX: 30.02 KG/M2 | DIASTOLIC BLOOD PRESSURE: 90 MMHG | WEIGHT: 159 LBS | HEIGHT: 61 IN | RESPIRATION RATE: 20 BRPM | HEART RATE: 63 BPM | TEMPERATURE: 97 F | SYSTOLIC BLOOD PRESSURE: 130 MMHG | OXYGEN SATURATION: 100 %

## 2020-03-12 LAB
ATRIAL RATE: 85 BPM
CALCULATED P AXIS, ECG09: 42 DEGREES
CALCULATED R AXIS, ECG10: 57 DEGREES
CALCULATED T AXIS, ECG11: 32 DEGREES
DIAGNOSIS, 93000: NORMAL
P-R INTERVAL, ECG05: 134 MS
Q-T INTERVAL, ECG07: 370 MS
QRS DURATION, ECG06: 78 MS
QTC CALCULATION (BEZET), ECG08: 440 MS
VENTRICULAR RATE, ECG03: 85 BPM

## 2020-03-12 NOTE — ED NOTES
Pt given 8oz of ginger ale and crackers. Pt denies nausea. Will reassess to determine if pt is able to tolerate PO.

## 2020-03-12 NOTE — ED NOTES
Pt presents to the ED via self c/o n/v/d x3 days. Pt reports she has not been able to tolerate anything PO. Pt denies fevers, chills, blood in stool, abdominal pain. Pt A&Ox4. Pt is vomiting yellow emesis. Pt skin is warm, dry and intact. Pt appears dehydrated. Skin is dusky, oral mucosa appears dry, eyes are red. Pt has a soft non-tender abdomen. Emergency Department Nursing Plan of Care       The Nursing Plan of Care is developed from the Nursing assessment and Emergency Department Attending provider initial evaluation. The plan of care may be reviewed in the ED Provider note.     The Plan of Care was developed with the following considerations:   Patient / Family readiness to learn indicated by:verbalized understanding  Persons(s) to be included in education: patient  Barriers to Learning/Limitations:No    Signed     Magaly Wilson RN    3/11/2020   7:10 PM

## 2020-03-12 NOTE — DISCHARGE INSTRUCTIONS
Patient Education        Learning About Cyclic Vomiting Syndrome  What is it? An adult or child who has cyclic vomiting syndrome (CVS) has repeated bouts of severe vomiting and nausea. Between the vomiting episodes, the person's health is normal. The cause of CVS isn't known, but it may be related to migraine headaches. What happens when you have CVS?  CVS causes severe nausea, vomiting, and drooling. You may not be able to walk or talk during an episode. You may look pale. You may have a fever and feel very tired and thirsty. Some people with CVS have belly pain and diarrhea. Bouts of vomiting can last for a few hours or a few days. People with this condition tend to have a typical pattern of attacks. For example, one person may have bouts of CVS 4 times a year and always in the morning. Another person may have 8 bouts a year and always in the evening. Some people with CVS have triggers that set off the vomiting. People have reported an infection, such as a cold, as a trigger. Other triggers include stress, menstrual cycles, and certain foods like chocolate or cheese. Children tend to have more triggers than adults do. How is CVS treated? Treatment is centered around easing the nausea and vomiting. The doctor may prescribe medicine. During very bad bouts of vomiting, your doctor may want you to stay in the hospital for a while. You may get fluids through a vein (IV) to prevent dehydration. Dehydration can be serious. Your body needs fluids to make enough blood. Without a good supply of blood, vital organs such as the heart and brain can't work as well as they should. When should you call for help? Call your doctor now or seek immediate medical care if:  · You have new or worse belly pain. · You have a fever. · You are vomiting. · You cannot pass stools or gas. · You have symptoms of dehydration, such as:  ? Dry eyes and a dry mouth. ? Passing only a little urine. ?  Feeling thirstier than usual.  Watch closely for changes in your health, and be sure to contact your doctor if you have any problems. Follow-up care is a key part of your treatment and safety. Be sure to make and go to all appointments, and call your doctor if you are having problems. It's also a good idea to know your test results and keep a list of the medicines you take. Where can you learn more? Go to http://shan-valentina.info/. Enter N508 in the search box to learn more about \"Learning About Cyclic Vomiting Syndrome. \"  Current as of: November 7, 2018  Content Version: 12.2  © 2235-5940 [employer]. Care instructions adapted under license by [employer] (which disclaims liability or warranty for this information). If you have questions about a medical condition or this instruction, always ask your healthcare professional. [address] any warranty or liability for your use of this information.

## 2020-03-12 NOTE — ED NOTES
..Discharge summary and discharge medications reviewed with patient and appropriate educational materials and side effects teaching were provided. patient  Given 0 paper prescriptions and 0 electronic prescriptions sent to pt's listed pharmacy. Patient (s) verbalized understanding of the importance of discussing medications with his or her physician or clinic they will be following up with. No si/s of acute distress prior to discharge. Patient offered wheelchair from treatment area to hospital entrance, patient declined wheelchair. Pt exited pt care area to meet  at ED bay.

## 2020-03-12 NOTE — ED NOTES
Pt sleeping in bed. Vitals are stable. Pt easily aroused. Reports decrease in nausea. No vomiting noted at this time.

## 2020-03-13 LAB
BACTERIA SPEC CULT: NORMAL
CC UR VC: NORMAL
SERVICE CMNT-IMP: NORMAL

## 2020-08-31 ENCOUNTER — APPOINTMENT (OUTPATIENT)
Dept: CT IMAGING | Age: 45
DRG: 392 | End: 2020-08-31
Attending: STUDENT IN AN ORGANIZED HEALTH CARE EDUCATION/TRAINING PROGRAM
Payer: COMMERCIAL

## 2020-08-31 ENCOUNTER — APPOINTMENT (OUTPATIENT)
Dept: CT IMAGING | Age: 45
DRG: 392 | End: 2020-08-31
Attending: EMERGENCY MEDICINE
Payer: COMMERCIAL

## 2020-08-31 ENCOUNTER — HOSPITAL ENCOUNTER (INPATIENT)
Age: 45
LOS: 3 days | Discharge: HOME | DRG: 392 | End: 2020-09-03
Attending: EMERGENCY MEDICINE | Admitting: STUDENT IN AN ORGANIZED HEALTH CARE EDUCATION/TRAINING PROGRAM
Payer: COMMERCIAL

## 2020-08-31 DIAGNOSIS — N18.9 ACUTE ON CHRONIC RENAL INSUFFICIENCY: ICD-10-CM

## 2020-08-31 DIAGNOSIS — F14.10 COCAINE ABUSE (HCC): ICD-10-CM

## 2020-08-31 DIAGNOSIS — K52.9 GASTROENTERITIS, ACUTE: Primary | ICD-10-CM

## 2020-08-31 DIAGNOSIS — N28.9 ACUTE ON CHRONIC RENAL INSUFFICIENCY: ICD-10-CM

## 2020-08-31 DIAGNOSIS — F12.10 MARIJUANA ABUSE: ICD-10-CM

## 2020-08-31 DIAGNOSIS — I16.0 HYPERTENSIVE URGENCY: ICD-10-CM

## 2020-08-31 PROBLEM — R11.2 INTRACTABLE NAUSEA AND VOMITING: Status: ACTIVE | Noted: 2020-08-31

## 2020-08-31 LAB
ALBUMIN SERPL-MCNC: 4 G/DL (ref 3.5–5)
ALBUMIN/GLOB SERPL: 0.8 {RATIO} (ref 1.1–2.2)
ALP SERPL-CCNC: 102 U/L (ref 45–117)
ALT SERPL-CCNC: 33 U/L (ref 12–78)
AMPHET UR QL SCN: NEGATIVE
ANION GAP SERPL CALC-SCNC: 14 MMOL/L (ref 5–15)
APPEARANCE UR: CLEAR
AST SERPL-CCNC: 28 U/L (ref 15–37)
BACTERIA URNS QL MICRO: NEGATIVE /HPF
BARBITURATES UR QL SCN: NEGATIVE
BASOPHILS # BLD: 0.1 K/UL (ref 0–0.1)
BASOPHILS NFR BLD: 1 % (ref 0–1)
BENZODIAZ UR QL: NEGATIVE
BILIRUB SERPL-MCNC: 0.3 MG/DL (ref 0.2–1)
BILIRUB UR QL: NEGATIVE
BUN SERPL-MCNC: 16 MG/DL (ref 6–20)
BUN/CREAT SERPL: 13 (ref 12–20)
CALCIUM SERPL-MCNC: 9.8 MG/DL (ref 8.5–10.1)
CANNABINOIDS UR QL SCN: POSITIVE
CHLORIDE SERPL-SCNC: 103 MMOL/L (ref 97–108)
CO2 SERPL-SCNC: 23 MMOL/L (ref 21–32)
COCAINE UR QL SCN: POSITIVE
COLOR UR: ABNORMAL
CREAT SERPL-MCNC: 1.22 MG/DL (ref 0.55–1.02)
DIFFERENTIAL METHOD BLD: NORMAL
DRUG SCRN COMMENT,DRGCM: ABNORMAL
EOSINOPHIL # BLD: 0.1 K/UL (ref 0–0.4)
EOSINOPHIL NFR BLD: 1 % (ref 0–7)
EPITH CASTS URNS QL MICRO: ABNORMAL /LPF
ERYTHROCYTE [DISTWIDTH] IN BLOOD BY AUTOMATED COUNT: 14.2 % (ref 11.5–14.5)
GLOBULIN SER CALC-MCNC: 5.1 G/DL (ref 2–4)
GLUCOSE SERPL-MCNC: 131 MG/DL (ref 65–100)
GLUCOSE UR STRIP.AUTO-MCNC: NEGATIVE MG/DL
HCG UR QL: NEGATIVE
HCT VFR BLD AUTO: 40.6 % (ref 35–47)
HGB BLD-MCNC: 14.2 G/DL (ref 11.5–16)
HGB UR QL STRIP: NEGATIVE
IMM GRANULOCYTES # BLD AUTO: 0 K/UL (ref 0–0.04)
IMM GRANULOCYTES NFR BLD AUTO: 0 % (ref 0–0.5)
KETONES UR QL STRIP.AUTO: NEGATIVE MG/DL
LACTATE SERPL-SCNC: 2.4 MMOL/L (ref 0.4–2)
LACTATE SERPL-SCNC: 2.5 MMOL/L (ref 0.4–2)
LACTATE SERPL-SCNC: 2.6 MMOL/L (ref 0.4–2)
LEUKOCYTE ESTERASE UR QL STRIP.AUTO: NEGATIVE
LIPASE SERPL-CCNC: 188 U/L (ref 73–393)
LYMPHOCYTES # BLD: 3.3 K/UL (ref 0.8–3.5)
LYMPHOCYTES NFR BLD: 36 % (ref 12–49)
MCH RBC QN AUTO: 31.8 PG (ref 26–34)
MCHC RBC AUTO-ENTMCNC: 35 G/DL (ref 30–36.5)
MCV RBC AUTO: 91 FL (ref 80–99)
METHADONE UR QL: NEGATIVE
MONOCYTES # BLD: 0.5 K/UL (ref 0–1)
MONOCYTES NFR BLD: 6 % (ref 5–13)
NEUTS SEG # BLD: 5.2 K/UL (ref 1.8–8)
NEUTS SEG NFR BLD: 56 % (ref 32–75)
NITRITE UR QL STRIP.AUTO: NEGATIVE
NRBC # BLD: 0 K/UL (ref 0–0.01)
NRBC BLD-RTO: 0 PER 100 WBC
OPIATES UR QL: POSITIVE
PCP UR QL: NEGATIVE
PH UR STRIP: 5 [PH] (ref 5–8)
PLATELET # BLD AUTO: 348 K/UL (ref 150–400)
PMV BLD AUTO: 9.2 FL (ref 8.9–12.9)
POTASSIUM SERPL-SCNC: 3.7 MMOL/L (ref 3.5–5.1)
PROT SERPL-MCNC: 9.1 G/DL (ref 6.4–8.2)
PROT UR STRIP-MCNC: 30 MG/DL
RBC # BLD AUTO: 4.46 M/UL (ref 3.8–5.2)
RBC #/AREA URNS HPF: ABNORMAL /HPF (ref 0–5)
SODIUM SERPL-SCNC: 140 MMOL/L (ref 136–145)
SP GR UR REFRACTOMETRY: 1.01 (ref 1–1.03)
TROPONIN I SERPL-MCNC: <0.05 NG/ML
UA: UC IF INDICATED,UAUC: ABNORMAL
UROBILINOGEN UR QL STRIP.AUTO: 0.2 EU/DL (ref 0.2–1)
WBC # BLD AUTO: 9.1 K/UL (ref 3.6–11)
WBC URNS QL MICRO: ABNORMAL /HPF (ref 0–4)

## 2020-08-31 PROCEDURE — 81025 URINE PREGNANCY TEST: CPT

## 2020-08-31 PROCEDURE — 80053 COMPREHEN METABOLIC PANEL: CPT

## 2020-08-31 PROCEDURE — 84484 ASSAY OF TROPONIN QUANT: CPT

## 2020-08-31 PROCEDURE — 83605 ASSAY OF LACTIC ACID: CPT

## 2020-08-31 PROCEDURE — 74011250636 HC RX REV CODE- 250/636: Performed by: EMERGENCY MEDICINE

## 2020-08-31 PROCEDURE — 74177 CT ABD & PELVIS W/CONTRAST: CPT

## 2020-08-31 PROCEDURE — 74011000636 HC RX REV CODE- 636: Performed by: EMERGENCY MEDICINE

## 2020-08-31 PROCEDURE — 80307 DRUG TEST PRSMV CHEM ANLYZR: CPT

## 2020-08-31 PROCEDURE — 65660000001 HC RM ICU INTERMED STEPDOWN

## 2020-08-31 PROCEDURE — 83690 ASSAY OF LIPASE: CPT

## 2020-08-31 PROCEDURE — 74011250636 HC RX REV CODE- 250/636: Performed by: STUDENT IN AN ORGANIZED HEALTH CARE EDUCATION/TRAINING PROGRAM

## 2020-08-31 PROCEDURE — 96361 HYDRATE IV INFUSION ADD-ON: CPT

## 2020-08-31 PROCEDURE — 96374 THER/PROPH/DIAG INJ IV PUSH: CPT

## 2020-08-31 PROCEDURE — 74011000250 HC RX REV CODE- 250: Performed by: EMERGENCY MEDICINE

## 2020-08-31 PROCEDURE — 81001 URINALYSIS AUTO W/SCOPE: CPT

## 2020-08-31 PROCEDURE — 74011000250 HC RX REV CODE- 250: Performed by: STUDENT IN AN ORGANIZED HEALTH CARE EDUCATION/TRAINING PROGRAM

## 2020-08-31 PROCEDURE — 93005 ELECTROCARDIOGRAM TRACING: CPT

## 2020-08-31 PROCEDURE — 74011000258 HC RX REV CODE- 258: Performed by: STUDENT IN AN ORGANIZED HEALTH CARE EDUCATION/TRAINING PROGRAM

## 2020-08-31 PROCEDURE — 74011250637 HC RX REV CODE- 250/637: Performed by: STUDENT IN AN ORGANIZED HEALTH CARE EDUCATION/TRAINING PROGRAM

## 2020-08-31 PROCEDURE — 99285 EMERGENCY DEPT VISIT HI MDM: CPT

## 2020-08-31 PROCEDURE — 85025 COMPLETE CBC W/AUTO DIFF WBC: CPT

## 2020-08-31 PROCEDURE — 96375 TX/PRO/DX INJ NEW DRUG ADDON: CPT

## 2020-08-31 PROCEDURE — 70450 CT HEAD/BRAIN W/O DYE: CPT

## 2020-08-31 PROCEDURE — 36415 COLL VENOUS BLD VENIPUNCTURE: CPT

## 2020-08-31 RX ORDER — SODIUM CHLORIDE 0.9 % (FLUSH) 0.9 %
5-40 SYRINGE (ML) INJECTION AS NEEDED
Status: DISCONTINUED | OUTPATIENT
Start: 2020-08-31 | End: 2020-09-03 | Stop reason: HOSPADM

## 2020-08-31 RX ORDER — METOCLOPRAMIDE HYDROCHLORIDE 5 MG/ML
10 INJECTION INTRAMUSCULAR; INTRAVENOUS
Status: COMPLETED | OUTPATIENT
Start: 2020-08-31 | End: 2020-08-31

## 2020-08-31 RX ORDER — DICYCLOMINE HYDROCHLORIDE 10 MG/ML
20 INJECTION INTRAMUSCULAR
Status: DISCONTINUED | OUTPATIENT
Start: 2020-08-31 | End: 2020-09-03 | Stop reason: HOSPADM

## 2020-08-31 RX ORDER — ACETAMINOPHEN 325 MG/1
650 TABLET ORAL
Status: DISCONTINUED | OUTPATIENT
Start: 2020-08-31 | End: 2020-09-03 | Stop reason: HOSPADM

## 2020-08-31 RX ORDER — SODIUM CHLORIDE 0.9 % (FLUSH) 0.9 %
5-40 SYRINGE (ML) INJECTION EVERY 8 HOURS
Status: DISCONTINUED | OUTPATIENT
Start: 2020-08-31 | End: 2020-09-03 | Stop reason: HOSPADM

## 2020-08-31 RX ORDER — MORPHINE SULFATE 20 MG/ML
5 SOLUTION ORAL
Status: DISCONTINUED | OUTPATIENT
Start: 2020-08-31 | End: 2020-09-03 | Stop reason: HOSPADM

## 2020-08-31 RX ORDER — ENOXAPARIN SODIUM 100 MG/ML
40 INJECTION SUBCUTANEOUS DAILY
Status: DISCONTINUED | OUTPATIENT
Start: 2020-09-01 | End: 2020-09-03 | Stop reason: HOSPADM

## 2020-08-31 RX ORDER — KETOROLAC TROMETHAMINE 30 MG/ML
30 INJECTION, SOLUTION INTRAMUSCULAR; INTRAVENOUS
Status: COMPLETED | OUTPATIENT
Start: 2020-08-31 | End: 2020-08-31

## 2020-08-31 RX ORDER — HYDRALAZINE HYDROCHLORIDE 20 MG/ML
20 INJECTION INTRAMUSCULAR; INTRAVENOUS
Status: COMPLETED | OUTPATIENT
Start: 2020-08-31 | End: 2020-08-31

## 2020-08-31 RX ORDER — MORPHINE SULFATE 20 MG/ML
10 SOLUTION ORAL
Status: DISCONTINUED | OUTPATIENT
Start: 2020-08-31 | End: 2020-09-03 | Stop reason: HOSPADM

## 2020-08-31 RX ORDER — ONDANSETRON 2 MG/ML
4 INJECTION INTRAMUSCULAR; INTRAVENOUS
Status: DISCONTINUED | OUTPATIENT
Start: 2020-08-31 | End: 2020-08-31

## 2020-08-31 RX ORDER — POLYETHYLENE GLYCOL 3350 17 G/17G
17 POWDER, FOR SOLUTION ORAL DAILY PRN
Status: DISCONTINUED | OUTPATIENT
Start: 2020-08-31 | End: 2020-09-03 | Stop reason: HOSPADM

## 2020-08-31 RX ORDER — ONDANSETRON 2 MG/ML
4 INJECTION INTRAMUSCULAR; INTRAVENOUS
Status: COMPLETED | OUTPATIENT
Start: 2020-08-31 | End: 2020-08-31

## 2020-08-31 RX ORDER — LABETALOL HCL 20 MG/4 ML
10 SYRINGE (ML) INTRAVENOUS ONCE
Status: DISCONTINUED | OUTPATIENT
Start: 2020-08-31 | End: 2020-08-31

## 2020-08-31 RX ORDER — SODIUM CHLORIDE 0.9 % (FLUSH) 0.9 %
5-10 SYRINGE (ML) INJECTION
Status: COMPLETED | OUTPATIENT
Start: 2020-08-31 | End: 2020-08-31

## 2020-08-31 RX ORDER — PROMETHAZINE HYDROCHLORIDE 25 MG/1
12.5 TABLET ORAL
Status: DISCONTINUED | OUTPATIENT
Start: 2020-08-31 | End: 2020-08-31

## 2020-08-31 RX ORDER — PROMETHAZINE HYDROCHLORIDE 25 MG/ML
25 INJECTION, SOLUTION INTRAMUSCULAR; INTRAVENOUS
Status: DISCONTINUED | OUTPATIENT
Start: 2020-08-31 | End: 2020-09-01

## 2020-08-31 RX ORDER — MORPHINE SULFATE 2 MG/ML
4 INJECTION, SOLUTION INTRAMUSCULAR; INTRAVENOUS
Status: COMPLETED | OUTPATIENT
Start: 2020-08-31 | End: 2020-08-31

## 2020-08-31 RX ORDER — LABETALOL HCL 20 MG/4 ML
20 SYRINGE (ML) INTRAVENOUS
Status: DISCONTINUED | OUTPATIENT
Start: 2020-08-31 | End: 2020-09-01

## 2020-08-31 RX ORDER — BLACK COHOSH ROOT 540 MG
CAPSULE ORAL
Status: ON HOLD | COMMUNITY
End: 2020-09-01

## 2020-08-31 RX ORDER — ACETAMINOPHEN 650 MG/1
650 SUPPOSITORY RECTAL
Status: DISCONTINUED | OUTPATIENT
Start: 2020-08-31 | End: 2020-09-03 | Stop reason: HOSPADM

## 2020-08-31 RX ORDER — SODIUM CHLORIDE 9 MG/ML
75 INJECTION, SOLUTION INTRAVENOUS CONTINUOUS
Status: DISCONTINUED | OUTPATIENT
Start: 2020-08-31 | End: 2020-09-01

## 2020-08-31 RX ORDER — SODIUM CHLORIDE 0.9 % (FLUSH) 0.9 %
5-40 SYRINGE (ML) INJECTION EVERY 8 HOURS
Status: DISCONTINUED | OUTPATIENT
Start: 2020-08-31 | End: 2020-09-01 | Stop reason: SDUPTHER

## 2020-08-31 RX ORDER — SODIUM CHLORIDE 0.9 % (FLUSH) 0.9 %
5-40 SYRINGE (ML) INJECTION AS NEEDED
Status: DISCONTINUED | OUTPATIENT
Start: 2020-08-31 | End: 2020-09-01 | Stop reason: SDUPTHER

## 2020-08-31 RX ORDER — CYCLOBENZAPRINE HCL 10 MG
5 TABLET ORAL
Status: DISCONTINUED | OUTPATIENT
Start: 2020-08-31 | End: 2020-09-03 | Stop reason: HOSPADM

## 2020-08-31 RX ORDER — LABETALOL 100 MG/1
100 TABLET, FILM COATED ORAL EVERY 12 HOURS
Status: DISCONTINUED | OUTPATIENT
Start: 2020-08-31 | End: 2020-09-03 | Stop reason: HOSPADM

## 2020-08-31 RX ORDER — MAG HYDROX/ALUMINUM HYD/SIMETH 200-200-20
30 SUSPENSION, ORAL (FINAL DOSE FORM) ORAL
Status: DISCONTINUED | OUTPATIENT
Start: 2020-08-31 | End: 2020-09-03 | Stop reason: HOSPADM

## 2020-08-31 RX ORDER — METOPROLOL TARTRATE 5 MG/5ML
5 INJECTION INTRAVENOUS
Status: COMPLETED | OUTPATIENT
Start: 2020-08-31 | End: 2020-08-31

## 2020-08-31 RX ADMIN — MORPHINE SULFATE 4 MG: 2 INJECTION, SOLUTION INTRAMUSCULAR; INTRAVENOUS at 13:04

## 2020-08-31 RX ADMIN — SODIUM CHLORIDE 5 MG/HR: 900 INJECTION, SOLUTION INTRAVENOUS at 16:05

## 2020-08-31 RX ADMIN — Medication 10 ML: at 18:04

## 2020-08-31 RX ADMIN — SODIUM CHLORIDE 12.5 MG/HR: 900 INJECTION, SOLUTION INTRAVENOUS at 18:12

## 2020-08-31 RX ADMIN — KETOROLAC TROMETHAMINE 30 MG: 30 INJECTION, SOLUTION INTRAMUSCULAR; INTRAVENOUS at 12:25

## 2020-08-31 RX ADMIN — HYDRALAZINE HYDROCHLORIDE 20 MG: 20 INJECTION, SOLUTION INTRAMUSCULAR; INTRAVENOUS at 14:42

## 2020-08-31 RX ADMIN — PROCHLORPERAZINE EDISYLATE 5 MG: 5 INJECTION INTRAMUSCULAR; INTRAVENOUS at 18:02

## 2020-08-31 RX ADMIN — SODIUM CHLORIDE 15 MG/HR: 900 INJECTION, SOLUTION INTRAVENOUS at 19:50

## 2020-08-31 RX ADMIN — METOCLOPRAMIDE 10 MG: 5 INJECTION, SOLUTION INTRAMUSCULAR; INTRAVENOUS at 14:03

## 2020-08-31 RX ADMIN — MORPHINE SULFATE 10 MG: 20 SOLUTION ORAL at 18:23

## 2020-08-31 RX ADMIN — SODIUM CHLORIDE 125 ML/HR: 900 INJECTION, SOLUTION INTRAVENOUS at 17:56

## 2020-08-31 RX ADMIN — SODIUM CHLORIDE 1000 ML: 900 INJECTION, SOLUTION INTRAVENOUS at 14:42

## 2020-08-31 RX ADMIN — SODIUM CHLORIDE 15 MG/HR: 900 INJECTION, SOLUTION INTRAVENOUS at 21:43

## 2020-08-31 RX ADMIN — Medication 10 ML: at 18:06

## 2020-08-31 RX ADMIN — SODIUM CHLORIDE 1000 ML: 900 INJECTION, SOLUTION INTRAVENOUS at 12:24

## 2020-08-31 RX ADMIN — PROMETHAZINE HYDROCHLORIDE 25 MG: 25 INJECTION INTRAMUSCULAR; INTRAVENOUS at 18:18

## 2020-08-31 RX ADMIN — ONDANSETRON 4 MG: 2 INJECTION INTRAMUSCULAR; INTRAVENOUS at 15:48

## 2020-08-31 RX ADMIN — IOPAMIDOL 100 ML: 755 INJECTION, SOLUTION INTRAVENOUS at 14:32

## 2020-08-31 RX ADMIN — METOPROLOL TARTRATE 5 MG: 5 INJECTION, SOLUTION INTRAVENOUS at 12:50

## 2020-08-31 RX ADMIN — LABETALOL HYDROCHLORIDE 100 MG: 100 TABLET, FILM COATED ORAL at 20:58

## 2020-08-31 RX ADMIN — Medication 10 ML: at 14:32

## 2020-08-31 RX ADMIN — ONDANSETRON 4 MG: 2 INJECTION INTRAMUSCULAR; INTRAVENOUS at 12:25

## 2020-08-31 RX ADMIN — ONDANSETRON 4 MG: 2 INJECTION INTRAMUSCULAR; INTRAVENOUS at 13:02

## 2020-08-31 NOTE — ED TRIAGE NOTES
Reports abdominal pain with lower back pain, nausea, vomiting, and diarrhea starting this morning. Pt diaphoretic in triage.

## 2020-08-31 NOTE — PROGRESS NOTES
1629) .. TRANSFER - IN REPORT:    Verbal report received from Mario Alberto RN(name) on Rik Bolivar  being received from ED (unit) for routine progression of care      Report consisted of patients Situation, Background, Assessment and   Recommendations(SBAR). Information from the following report(s) SBAR, Kardex, MAR, Accordion, Recent Results and Cardiac Rhythm NSR was reviewed with the receiving nurse. Opportunity for questions and clarification was provided. Assessment completed upon patients arrival to unit and care assumed.

## 2020-08-31 NOTE — PROGRESS NOTES
Reason for Admission:   abdominal pain with lower back pain, nausea, vomiting, and diarrhea                    RUR Score:     4 %                 Plan for utilizing home health:    TBD not at this time. PCP: First and Last name:  Dr. Matilde Chin    Name of Practice: [facility]    Are you a current patient: Yes/No:  Yes    Approximate date of last visit:    Can you participate in a virtual visit with your PCP: Yes                     Current Advanced Directive/Advance Care Plan: No ACP on file. Patient has listed                          Transition of Care Plan:   CM opened case for assessment of D/C planning needs, CM reviewed chart. Unable to speak with patient at this time due to the nausea and vomiting. Attempting to schedule follow-up with PCP.    5928 earliest PCP appointment is 9/22/2020 @ 0830 Virtual. CM to discuss dispatch  health with patient. POC  1. Discuss dispatch health.     [address] [place] [address]  [phone]

## 2020-08-31 NOTE — ED NOTES
Spoke to Dr. Michael Isbell regarding upated BP after starting drip, states to leave drip at initial rate for now.

## 2020-08-31 NOTE — ED NOTES
Patient given additional zofran and morphine for pain, given new vomit bag. Continues to feel cool and clammy. Dr. Gradie Boast updated on status just prior to additional zofran.

## 2020-08-31 NOTE — ED NOTES
Discussed BP with Dr. Margoth Hernandez since still elevated.   States ok to send to CT as is and will re-medicate upon return from CT

## 2020-08-31 NOTE — PROGRESS NOTES
1730) Discuss with Dr. Myranda Adamson, if able to get central line. 12 hours limit on peripheral IV; new one needed soon. Discuss heart rate 120's and /110; titrate drip  1810) Dr. Myranda Adamson at bedside. Discuss pain and pt request for icechips  1844) Discuss with Dr. Myranda Adamson, pt /102, , at 15 mg/hr cardene. Discuss unable to use IV past 4 am; pt difficult stick. Trial oral labetalol. 18) Discuss with Braydon Meraz, pharmacist, pt sleeping after morphine; unable to complete admission database at this time; suggest med reconciliation tomorrow. 1920) Bedside shift change report given to Angelo Cruz RN (oncoming nurse) by Jose Roberto Springer RN (offgoing nurse).  Report included the following information SBAR, Kardex, MAR, Accordion, Recent Results and Cardiac Rhythm [address]) Assistance from Ino, nursing supervisor for IV start  2010) R upper arm elevated with warm compress

## 2020-08-31 NOTE — ED NOTES
Patients nausea and vomiting have picked up again.   Discussed with Dr. Azam Uribe who has ordered another one time dose of zofran 4 mg IV

## 2020-08-31 NOTE — ED NOTES
Patient assisted to bedside commode where she had small amount of loose stool. Placed back into bed and placed on cardiac monitor, NSR on monitor. Appears to be getting a little relief from medications but states nausea is no better and back still hurts.

## 2020-08-31 NOTE — H&P
Hospitalist Admission Note    NAME: Lucina Saldaña   :  1975   MRN:  [id number]   Room Number: BZ61/69  @ [address] [facility]     Date/Time:  2020 3:48 PM    Patient PCP: Bhavani Tarango MD  ______________________________________________________________________  Given the patient's current clinical presentation, I have a high level of concern for decompensation if discharged from the emergency department. Complex decision making was performed, which includes reviewing the patient's available past medical records, laboratory results, and x-ray films. My assessment of this patient's clinical condition and my plan of care is as follows. Assessment / Plan: Active Problems:    Intractable nausea and vomiting (2020)      Hyperemesis POA  Suspect cannabis induced hyperemesis. CT abdomen/pelvis showed no acute pathology. Pt has a hx of cholecystectomy. UDS + for THC and cocaine. Denies current marijuana. CT Abd/pelvis  : Gastritis, Mild colitis of the descending colon, Appendicolith without evidence for appendicitis. Mild sigmoid diverticulosis without evidence for diverticulitis. QTc 482 ms.      - Scheduled/PRN antiemetics and dicyclomine for abd cramping  - Alum-mag hydroxide-simethicone (Mylanta) prn for heartburn  - PPI daily.   - Hydration   - counseled on abstaining from marijuana. - Needs outpatient GI follow up             Hypertensive emergency POA   D/t missed BP medications, intractable n/v. EKG reviewed -normal sinus rhythm,   SBP in 230s on initial presentation. Has received 5 mg IV metoprolol, 20 mg IV hydralazine in the ER. Beta blockade with unopposed alpha-1 action of cocaine probably worsened BP in the ER. Lactic acid 2.6.     - Will get CT Head   - check troponin  - Nicardipine infusion for slow titration of BP, goal SBP < 140 mm Hg.   - OK to give labetalol.       Elevated Lactic acid POA   Lactic acid 2.6. d/t hypovolemia, hypertensive emergency. - Received 2 L NS in the ER.   - repeat lactic acid      Tachycardia :   Sinus tachycardia d/t incessant retching, hypovolemia, cocaine.      - IV fluids  - Administer Labetalol x1    Musculoskeletal pain :    Reports nonspecific throbbing chest and back pain likely musculoskeletal from excessive retching. Do no suspect dissection or ACS. EKG reviewed. Troponin < 0.05    - SL Oxycodone. PRN  - PRN muscle relaxant      Polysubstance abuse   Cocaine abuse   Marijuana abuse   UDS + for cocaine and marijuana  Patient was counseled extensively on the need to abstain from drugs its addictive tendencies, its deleterious effects on the brain, cardiovascular system, lungs as well as its financial & social sequelae          Elevated creatinine POA  Polycystic kidney disease   Creatinine 1.2, BUN 16, CrCl 54.5 ml/min. Suspect this is her baseline due to hypertension and PCKD. Control blood pressure   Strict Is and Os, avoid nephrotoxic medications, renally dose medications.    Follow up with nephrology and PCP at discharge. Nephrologist is Dr. America Litten Abou-assi.             Body mass index is 32.5 kg/m². Morbid obesity   Counseled on Lifestyle modifications, AHA Diet ,weight loss strategies. Code Status: full   Surrogate Decision Maker:  Name:     Rel:  denia Spears     Mother Home Ph:  Work Ph:  Mobile Ph: [phone]       DVT Prophylaxis: Lovenox  GI Prophylaxis: PPI    Baseline: ambulatory independently        Subjective:   CHIEF COMPLAINT: nausea/vomitting    HISTORY OF PRESENT ILLNESS:     Binnie Kanner is a 54 y.o.  female with PMH of HTN, Polycystic kidney disease who presents to ED with c/o nausea, vomiting, abdominal cramping. Patient woke up this morning with severe nausea, nonbloody nonbilious emesis, generalized crampy abdominal pain with inability to to tolerate food or water prompting presentation to the ER.   Patient has had such episodes before, was admitted last year around the same time with cannabis hyperemesis syndrome which resolved with supportive management. She was supposed to follow-up with gastroenterology as outpatient but did not do so. She has had an EGD in the past that showed gastritis. She usually takes metoprolol at home and was unable to take it due to severe nausea and vomiting. Reports throbbing central 7/10 in intensity nonradiating chest pain that improves with pain medication worsens with retching. Also describes crampy upper back pain, 7/10 intensity, nonradiating, worsening with retching. Received 4 mg IV Ondansetron x 4 and Metoclopramide 10 mg IV x 1 in the ER with 30 mg IV Ketorolac and 4 mg IV morphine. Symptoms did not improve and patient continues to have intractable N/V and admitted to hospital for hyperemesis and hypertensive emergency. Reports ongoing marijuana use, she is surprised that her U tox was positive for cocaine, denies using cocaine. We were asked to admit for work up and evaluation of the above problems. Past Medical History:   Diagnosis Date    Hypertension     Kidney anomaly, congenital     Tubal pregnancy         Past Surgical History:   Procedure Laterality Date    HX CHOLECYSTECTOMY      HX GYN  10/07/2013    cyst removed    HX HYSTERECTOMY  10/7/2013    partial hysterectomy       Social History     Tobacco Use    Smoking status: Current Some Day Smoker     Packs/day: 0.25     Years: 25.00     Pack years: 6.25    Smokeless tobacco: Never Used    Tobacco comment: 1-3 day   Substance Use Topics    Alcohol use: Yes     Comment: occasionally/socially        Family History   Problem Relation Age of Onset    Hypertension Mother     Hypertension Father     Hypertension Sister      No Known Allergies     Prior to Admission medications    Medication Sig Start Date End Date Taking? Authorizing Provider   black cohosh 540 mg cap Take  by mouth.     Other, MD Jaylon   metoprolol tartrate (LOPRESSOR) 25 mg tablet TAKE 1 TABLET BY MOUTH EVERY 12 HOURS 7/13/20   Eric Barba MD   acetaminophen (TYLENOL) 325 mg tablet Take 2 Tabs by mouth every four (4) hours as needed for Pain or Fever. 9/26/19   Kaden Mary MD   dicyclomine (BENTYL) 10 mg capsule Take 1 Cap by mouth four (4) times daily. 9/26/19   Kaden Mary MD   promethazine (PHENERGAN) 25 mg suppository Insert 1 Suppository into rectum every six (6) hours as needed for Nausea. 9/26/19   Kaden Mary MD   esomeprazole (NEXIUM 24HR) 20 mg capsule Take 1 Cap by mouth daily. 11/15/18   Eric Barba MD   cetirizine (ZYRTEC) 10 mg tablet Take 10 mg by mouth daily. Provider, Historical       REVIEW OF SYSTEMS:     I am not able to complete the review of systems because:    The patient is intubated and sedated    The patient has altered mental status due to his acute medical problems    The patient has baseline aphasia from prior stroke(s)    The patient has baseline dementia and is not reliable historian    The patient is in acute medical distress and unable to provide information           Total of 12 systems reviewed as follows:       POSITIVE= underlined text  Negative = text not underlined  General:  fever, chills, sweats, generalized weakness, weight loss/gain,      loss of appetite   Eyes:    blurred vision, eye pain, loss of vision, double vision  ENT:    rhinorrhea, pharyngitis   Respiratory:   cough, sputum production, SOB, ABRAHAM, wheezing, pleuritic pain   Cardiology:   chest pain, palpitations, orthopnea, PND, edema, syncope   Gastrointestinal:  abdominal pain , N/V, diarrhea, dysphagia, constipation, bleeding   Genitourinary:  frequency, urgency, dysuria, hematuria, incontinence   Muskuloskeletal :  arthralgia, myalgia, back pain  Hematology:  easy bruising, nose or gum bleeding, lymphadenopathy   Dermatological: rash, ulceration, pruritis, color change / jaundice  Endocrine:   hot flashes or polydipsia   Neurological:  headache, dizziness, confusion, focal weakness, paresthesia,     Speech difficulties, memory loss, gait difficulty  Psychological: Feelings of anxiety, depression, agitation    Objective:   VITALS:    Visit Vitals  BP (!) 208/131   Pulse 88   Temp 97.5 °F (36.4 °C)   Resp 18   Ht 5' 1\" (1.549 m)   Wt 78 kg (172 lb)   SpO2 99%   BMI 32.50 kg/m²       PHYSICAL EXAM:    General:    Alert, cooperative, no distress, appears stated age. HEENT: Atraumatic, anicteric sclerae, pink conjunctivae     No oral ulcers, mucosa moist, throat clear, dentition fair  Neck:  Supple, symmetrical,  thyroid: non tender  Lungs:   Clear to auscultation bilaterally. No Wheezing or Rhonchi. No rales. Chest wall:  No tenderness  No Accessory muscle use. Heart:   Regular  rhythm,  No  murmur   No edema  Abdomen:   Soft, non-tender. Not distended. Bowel sounds normal  Extremities: No cyanosis. No clubbing,      Skin turgor normal, Capillary refill normal, Radial dial pulse 2+  Skin:     Not pale. Not Jaundiced  No rashes   Psych:  Good insight. Not depressed. Not anxious or agitated. Neurologic: EOMs intact. No facial asymmetry. No aphasia or slurred speech. Symmetrical strength, Sensation grossly intact.  Alert and oriented X 4.     ______________________________________________________________________    Care Plan discussed with:  Patient/Family, Nurse and     Expected  Disposition:  Home w/Family  ________________________________________________________________________  TOTAL TIME:  40 Minutes    Critical Care Provided     Minutes non procedure based      Comments     Reviewed previous records   >50% of visit spent in counseling and coordination of care  Discussion with patient and/or family and questions answered       ________________________________________________________________________  Signed: Waqas Cohn MD    Procedures: see electronic medical records for all procedures/Xrays and details which were not copied into this note but were reviewed prior to creation of Plan. LAB DATA REVIEWED:    Recent Results (from the past 24 hour(s))   CBC WITH AUTOMATED DIFF    Collection Time: 08/31/20 12:20 PM   Result Value Ref Range    WBC 9.1 3.6 - 11.0 K/uL    RBC 4.46 3.80 - 5.20 M/uL    HGB 14.2 11.5 - 16.0 g/dL    HCT 40.6 35.0 - 47.0 %    MCV 91.0 80.0 - 99.0 FL    MCH 31.8 26.0 - 34.0 PG    MCHC 35.0 30.0 - 36.5 g/dL    RDW 14.2 11.5 - 14.5 %    PLATELET 048 806 - 433 K/uL    MPV 9.2 8.9 - 12.9 FL    NRBC 0.0 0  WBC    ABSOLUTE NRBC 0.00 0.00 - 0.01 K/uL    NEUTROPHILS 56 32 - 75 %    LYMPHOCYTES 36 12 - 49 %    MONOCYTES 6 5 - 13 %    EOSINOPHILS 1 0 - 7 %    BASOPHILS 1 0 - 1 %    IMMATURE GRANULOCYTES 0 0.0 - 0.5 %    ABS. NEUTROPHILS 5.2 1.8 - 8.0 K/UL    ABS. LYMPHOCYTES 3.3 0.8 - 3.5 K/UL    ABS. MONOCYTES 0.5 0.0 - 1.0 K/UL    ABS. EOSINOPHILS 0.1 0.0 - 0.4 K/UL    ABS. BASOPHILS 0.1 0.0 - 0.1 K/UL    ABS. IMM. GRANS. 0.0 0.00 - 0.04 K/UL    DF AUTOMATED     METABOLIC PANEL, COMPREHENSIVE    Collection Time: 08/31/20 12:20 PM   Result Value Ref Range    Sodium 140 136 - 145 mmol/L    Potassium 3.7 3.5 - 5.1 mmol/L    Chloride 103 97 - 108 mmol/L    CO2 23 21 - 32 mmol/L    Anion gap 14 5 - 15 mmol/L    Glucose 131 (H) 65 - 100 mg/dL    BUN 16 6 - 20 MG/DL    Creatinine 1.22 (H) 0.55 - 1.02 MG/DL    BUN/Creatinine ratio 13 12 - 20      GFR est AA 58 (L) >60 ml/min/1.73m2    GFR est non-AA 47 (L) >60 ml/min/1.73m2    Calcium 9.8 8.5 - 10.1 MG/DL    Bilirubin, total 0.3 0.2 - 1.0 MG/DL    ALT (SGPT) 33 12 - 78 U/L    AST (SGOT) 28 15 - 37 U/L    Alk.  phosphatase 102 45 - 117 U/L    Protein, total 9.1 (H) 6.4 - 8.2 g/dL    Albumin 4.0 3.5 - 5.0 g/dL    Globulin 5.1 (H) 2.0 - 4.0 g/dL    A-G Ratio 0.8 (L) 1.1 - 2.2     LIPASE    Collection Time: 08/31/20 12:20 PM   Result Value Ref Range    Lipase 188 73 - 393 U/L   LACTIC ACID    Collection Time: 08/31/20 12:21 PM   Result Value Ref Range    Lactic acid 2.6 (HH) 0.4 - 2. 0 MMOL/L   URINALYSIS W/ REFLEX CULTURE    Collection Time: 08/31/20  1:52 PM    Specimen: Urine   Result Value Ref Range    Color YELLOW/STRAW      Appearance CLEAR CLEAR      Specific gravity 1.015 1.003 - 1.030      pH (UA) 5.0 5.0 - 8.0      Protein 30 (A) NEG mg/dL    Glucose Negative NEG mg/dL    Ketone Negative NEG mg/dL    Bilirubin Negative NEG      Blood Negative NEG      Urobilinogen 0.2 0.2 - 1.0 EU/dL    Nitrites Negative NEG      Leukocyte Esterase Negative NEG      WBC 0-4 0 - 4 /hpf    RBC 0-5 0 - 5 /hpf    Epithelial cells FEW FEW /lpf    Bacteria Negative NEG /hpf    UA:UC IF INDICATED CULTURE NOT INDICATED BY UA RESULT CNI     DRUG SCREEN, URINE    Collection Time: 08/31/20  2:20 PM   Result Value Ref Range    AMPHETAMINES Negative NEG      BARBITURATES Negative NEG      BENZODIAZEPINES Negative NEG      COCAINE Positive (A) NEG      METHADONE Negative NEG      OPIATES Positive (A) NEG      PCP(PHENCYCLIDINE) Negative NEG      THC (TH-CANNABINOL) Positive (A) NEG      Drug screen comment (NOTE)    EKG, 12 LEAD, INITIAL    Collection Time: 08/31/20  3:19 PM   Result Value Ref Range    Ventricular Rate 99 BPM    Atrial Rate 99 BPM    P-R Interval 146 ms    QRS Duration 80 ms    Q-T Interval 376 ms    QTC Calculation (Bezet) 482 ms    Calculated P Axis 54 degrees    Calculated R Axis 65 degrees    Calculated T Axis 47 degrees    Diagnosis       Normal sinus rhythm  Possible Left atrial enlargement  Prolonged QT  Abnormal ECG  When compared with ECG of 11-MAR-2020 18:52,  QT has lengthened             CT A/P 8/31 :   FINDINGS:   LOWER THORAX: No significant abnormality in the incidentally imaged lower chest.  LIVER: No mass. Diffuse density of the liver. Multiple stable hypodensities  likely represent cysts. BILIARY TREE: Prior cholecystectomy. CBD is not dilated. SPLEEN: within normal limits. PANCREAS: No mass or ductal dilatation. ADRENALS: Unremarkable.   KIDNEYS: No mass, calculus, or hydronephrosis. Multiple bilateral renal cysts. STOMACH: Diffuse thickening of the distal stomach (axial image 27 and coronal  image 61. SMALL BOWEL: No dilatation or wall thickening. COLON: Mild thickening of the descending colon wall (coronal image 61 and axial  image 3. Mild sigmoid diverticulosis. APPENDIX: No inflammatory change. 3 mm appendicolith on axial image 54. PERITONEUM: No ascites or pneumoperitoneum. RETROPERITONEUM: No lymphadenopathy or aortic aneurysm. REPRODUCTIVE ORGANS: Anteverted uterus. 2 right ovarian cysts present. URINARY BLADDER: No mass or calculus. BONES: No destructive bone lesion. ABDOMINAL WALL: No mass or hernia. ADDITIONAL COMMENTS: N/A     IMPRESSION  IMPRESSION:  Gastritis  Mild colitis of the descending colon. Appendicolith without evidence for appendicitis. Mild sigmoid diverticulosis without evidence for diverticulitis.

## 2020-08-31 NOTE — ED NOTES
TRANSFER - OUT REPORT:    Verbal report given to Nneka WHALEY(name) on Melody Ireland  being transferred to PCU(unit) for routine progression of care       Report consisted of patients Situation, Background, Assessment and   Recommendations(SBAR). Information from the following report(s) SBAR, ED Summary, MAR, Recent Results and Cardiac Rhythm NSR was reviewed with the receiving nurse. Lines:   Peripheral IV 08/31/20 Right Arm (Active)   Site Assessment Clean, dry, & intact 08/31/20 1227   Phlebitis Assessment 0 08/31/20 1227   Infiltration Assessment 0 08/31/20 1227   Dressing Status Clean, dry, & intact 08/31/20 1227   Hub Color/Line Status Pink 08/31/20 1227        Opportunity for questions and clarification was provided.       Patient transported with:   Monitor  Registered Nurse

## 2020-08-31 NOTE — ED NOTES
Reports nausea, vomting and diarrhea since this morning.   Has not been able to take HTN med and keep it down today

## 2020-08-31 NOTE — ED PROVIDER NOTES
EMERGENCY DEPARTMENT HISTORY AND PHYSICAL EXAM      Date: 8/31/2020  Patient Name: Karena Owen    History of Presenting Illness     Chief Complaint   Patient presents with    Abdominal Pain     History Provided By: Patient    HPI: Karena Owen, 54 y.o. female with past medical history significant for hypertension and congenital kidney anomaly who presents via private vehicle to the ED with cc of nausea, vomiting, diarrhea, and generalized abdominal pain that started this morning. Patient states she has vomited approximately 10 times and has not been able to keep any of her medications down. She denies any sick contacts or any known COVID-19 exposures. She denies any fevers or chills. She does have a history of GI problems in the past, but has not seen a gastroenterologist for this yet. Her abdominal pain is described as a dull, aching pain that is generalized and does not radiate. PMHx: Hypertension and congenital kidney anomaly  Social Hx: Smokes 2 to 3 cigarettes/day, occasional alcohol use, occasional marijuana use    PCP: Melquiades Pierson MD    There are no other complaints, changes, or physical findings at this time. No current facility-administered medications on file prior to encounter. Current Outpatient Medications on File Prior to Encounter   Medication Sig Dispense Refill    black cohosh 540 mg cap Take  by mouth.  metoprolol tartrate (LOPRESSOR) 25 mg tablet TAKE 1 TABLET BY MOUTH EVERY 12 HOURS 180 Tab 0    acetaminophen (TYLENOL) 325 mg tablet Take 2 Tabs by mouth every four (4) hours as needed for Pain or Fever. 30 Tab 0    dicyclomine (BENTYL) 10 mg capsule Take 1 Cap by mouth four (4) times daily. 20 Cap 0    promethazine (PHENERGAN) 25 mg suppository Insert 1 Suppository into rectum every six (6) hours as needed for Nausea. 20 Suppository 0    esomeprazole (NEXIUM 24HR) 20 mg capsule Take 1 Cap by mouth daily.  30 Cap 2    cetirizine (ZYRTEC) 10 mg tablet Take 10 mg by mouth daily. Past History     Past Medical History:  Past Medical History:   Diagnosis Date    Hypertension     Kidney anomaly, congenital     Tubal pregnancy      Past Surgical History:  Past Surgical History:   Procedure Laterality Date    HX CHOLECYSTECTOMY      HX GYN  10/07/2013    cyst removed    HX HYSTERECTOMY  10/7/2013    partial hysterectomy     Family History:  Family History   Problem Relation Age of Onset    Hypertension Mother     Hypertension Father     Hypertension Sister      Social History:  Social History     Tobacco Use    Smoking status: Current Some Day Smoker     Packs/day: 0.25     Years: 25.00     Pack years: 6.25    Smokeless tobacco: Never Used    Tobacco comment: 1-3 day   Substance Use Topics    Alcohol use: Yes     Comment: occasionally/socially    Drug use: Yes     Types: Marijuana     Comment: occ     Allergies:  No Known Allergies  Review of Systems   Review of Systems   Constitutional: Negative for chills and fever. HENT: Negative for congestion, rhinorrhea, sneezing and sore throat. Eyes: Negative for redness and visual disturbance. Respiratory: Negative for shortness of breath. Cardiovascular: Negative for leg swelling. Gastrointestinal: Positive for abdominal pain, diarrhea, nausea and vomiting. Genitourinary: Negative for difficulty urinating and frequency. Musculoskeletal: Negative for back pain, myalgias and neck stiffness. Skin: Negative for rash. Neurological: Negative for dizziness, syncope, weakness and headaches. Hematological: Negative for adenopathy. All other systems reviewed and are negative. Physical Exam   Physical Exam  Vitals signs and nursing note reviewed. Constitutional:       Comments: Mildly diaphoretic, ill-appearing   HENT:      Head: Normocephalic and atraumatic.    Eyes:      Conjunctiva/sclera: Conjunctivae normal.   Neck:      Musculoskeletal: Full passive range of motion without pain, normal range of motion and neck supple. Cardiovascular:      Rate and Rhythm: Normal rate and regular rhythm. Pulses: Normal pulses. Heart sounds: Normal heart sounds, S1 normal and S2 normal. No murmur. Pulmonary:      Effort: Pulmonary effort is normal. No respiratory distress. Breath sounds: Normal breath sounds. No wheezing. Abdominal:      General: Bowel sounds are normal. There is no distension. Palpations: Abdomen is soft. Tenderness: There is generalized abdominal tenderness. There is no guarding or rebound. Musculoskeletal: Normal range of motion. Skin:     General: Skin is warm and dry. Findings: No rash. Neurological:      Mental Status: She is alert and oriented to person, place, and time. Psychiatric:         Speech: Speech normal.         Behavior: Behavior normal.         Thought Content: Thought content normal.         Judgment: Judgment normal.       Diagnostic Study Results   Labs -     Recent Results (from the past 12 hour(s))   CBC WITH AUTOMATED DIFF    Collection Time: 08/31/20 12:20 PM   Result Value Ref Range    WBC 9.1 3.6 - 11.0 K/uL    RBC 4.46 3.80 - 5.20 M/uL    HGB 14.2 11.5 - 16.0 g/dL    HCT 40.6 35.0 - 47.0 %    MCV 91.0 80.0 - 99.0 FL    MCH 31.8 26.0 - 34.0 PG    MCHC 35.0 30.0 - 36.5 g/dL    RDW 14.2 11.5 - 14.5 %    PLATELET 637 706 - 275 K/uL    MPV 9.2 8.9 - 12.9 FL    NRBC 0.0 0  WBC    ABSOLUTE NRBC 0.00 0.00 - 0.01 K/uL    NEUTROPHILS 56 32 - 75 %    LYMPHOCYTES 36 12 - 49 %    MONOCYTES 6 5 - 13 %    EOSINOPHILS 1 0 - 7 %    BASOPHILS 1 0 - 1 %    IMMATURE GRANULOCYTES 0 0.0 - 0.5 %    ABS. NEUTROPHILS 5.2 1.8 - 8.0 K/UL    ABS. LYMPHOCYTES 3.3 0.8 - 3.5 K/UL    ABS. MONOCYTES 0.5 0.0 - 1.0 K/UL    ABS. EOSINOPHILS 0.1 0.0 - 0.4 K/UL    ABS. BASOPHILS 0.1 0.0 - 0.1 K/UL    ABS. IMM.  GRANS. 0.0 0.00 - 0.04 K/UL    DF AUTOMATED     METABOLIC PANEL, COMPREHENSIVE    Collection Time: 08/31/20 12:20 PM   Result Value Ref Range Sodium 140 136 - 145 mmol/L    Potassium 3.7 3.5 - 5.1 mmol/L    Chloride 103 97 - 108 mmol/L    CO2 23 21 - 32 mmol/L    Anion gap 14 5 - 15 mmol/L    Glucose 131 (H) 65 - 100 mg/dL    BUN 16 6 - 20 MG/DL    Creatinine 1.22 (H) 0.55 - 1.02 MG/DL    BUN/Creatinine ratio 13 12 - 20      GFR est AA 58 (L) >60 ml/min/1.73m2    GFR est non-AA 47 (L) >60 ml/min/1.73m2    Calcium 9.8 8.5 - 10.1 MG/DL    Bilirubin, total 0.3 0.2 - 1.0 MG/DL    ALT (SGPT) 33 12 - 78 U/L    AST (SGOT) 28 15 - 37 U/L    Alk.  phosphatase 102 45 - 117 U/L    Protein, total 9.1 (H) 6.4 - 8.2 g/dL    Albumin 4.0 3.5 - 5.0 g/dL    Globulin 5.1 (H) 2.0 - 4.0 g/dL    A-G Ratio 0.8 (L) 1.1 - 2.2     LIPASE    Collection Time: 08/31/20 12:20 PM   Result Value Ref Range    Lipase 188 73 - 393 U/L   LACTIC ACID    Collection Time: 08/31/20 12:21 PM   Result Value Ref Range    Lactic acid 2.6 (HH) 0.4 - 2.0 MMOL/L   URINALYSIS W/ REFLEX CULTURE    Collection Time: 08/31/20  1:52 PM    Specimen: Urine   Result Value Ref Range    Color YELLOW/STRAW      Appearance CLEAR CLEAR      Specific gravity 1.015 1.003 - 1.030      pH (UA) 5.0 5.0 - 8.0      Protein 30 (A) NEG mg/dL    Glucose Negative NEG mg/dL    Ketone Negative NEG mg/dL    Bilirubin Negative NEG      Blood Negative NEG      Urobilinogen 0.2 0.2 - 1.0 EU/dL    Nitrites Negative NEG      Leukocyte Esterase Negative NEG      WBC 0-4 0 - 4 /hpf    RBC 0-5 0 - 5 /hpf    Epithelial cells FEW FEW /lpf    Bacteria Negative NEG /hpf    UA:UC IF INDICATED CULTURE NOT INDICATED BY UA RESULT CNI     DRUG SCREEN, URINE    Collection Time: 08/31/20  2:20 PM   Result Value Ref Range    AMPHETAMINES Negative NEG      BARBITURATES Negative NEG      BENZODIAZEPINES Negative NEG      COCAINE Positive (A) NEG      METHADONE Negative NEG      OPIATES Positive (A) NEG      PCP(PHENCYCLIDINE) Negative NEG      THC (TH-CANNABINOL) Positive (A) NEG      Drug screen comment (NOTE)        Radiologic Studies -   CT ABD PELV W CONT   Final Result   IMPRESSION:   Gastritis   Mild colitis of the descending colon. Appendicolith without evidence for appendicitis. Mild sigmoid diverticulosis without evidence for diverticulitis. Ct Abd Pelv W Cont    Result Date: 8/31/2020  IMPRESSION: Gastritis Mild colitis of the descending colon. Appendicolith without evidence for appendicitis. Mild sigmoid diverticulosis without evidence for diverticulitis. Medical Decision Making   I am the first provider for this patient. I reviewed the vital signs, available nursing notes, past medical history, past surgical history, family history and social history. Vital Signs-Reviewed the patient's vital signs. Patient Vitals for the past 24 hrs:   Temp Pulse Resp BP SpO2   08/31/20 1438  88   99 %   08/31/20 1437    (!) 208/131    08/31/20 1330  78  (!) 232/125 100 %   08/31/20 1311  81  (!) 225/131 100 %   08/31/20 1301  78  (!) 217/172 100 %   08/31/20 1250  87  (!) 202/134    08/31/20 1143  89  (!) 235/139 100 %   08/31/20 1132 97.5 °F (36.4 °C) 93 18 (!) 218/139 100 %     Pulse Oximetry Analysis - 100% on RA    Cardiac Monitor:   Rate: 93 bpm  Rhythm: Normal Sinus Rhythm     ED EKG interpretation: 15:19  Rhythm: normal sinus rhythm; and regular . Rate (approx.): 99; Axis: normal; P wave: normal; QRS interval: prolonged; ST/T wave: normal; Other findings: abnormal ekg. This EKG was interpreted by Marianne Pritchett MD,ED Provider. Records Reviewed: Nursing Notes and Old Medical Records    Provider Notes (Medical Decision Making):   50-year-old female presents with generalized abdominal pain, nausea, vomiting, and diarrhea that started abruptly this morning. Differential includes gastroenteritis, diverticulitis, mesenteric ischemia, cannabinoid use hyperemesis, and electrolyte abnormality. Low suspicion for appendicitis, pancreatitis, abdominal aortic aneurysm, or ureteral colic.     ED Course:   Initial assessment performed. The patients presenting problems have been discussed, and they are in agreement with the care plan formulated and outlined with them. I have encouraged them to ask questions as they arise throughout their visit. Progress Note  12:04 PM  I have re-evaluated pt and nursing has been unable to get IV access. They have attempted twice. Patient states she normally gets an ultrasound-guided IV. Will attempt an ultrasound-guided IV for blood, fluids, and medications. Procedure Note- Peripheral IV Access  12:20 PM  Performed by: MD Debra Peng MD gained IV access using a 20 gauge needle because the patient had no vascular access. After cleaning the site with alcohol prep, the Right basilic vein was localized with ultrasound guidance in an anterior approach. Line confirmation was obtained by direct visualization and good blood return. No anaesthetic was used. The line was successfully flushed with normal saline and was secured with transparent tape. Estimated blood loss: 0mL  The procedure took 15 minutes, and pt tolerated well. Progress Note  12:57 PM  I have re-evaluated pt and she states that her nausea and pain are still uncontrolled. We will give another dose of IV Zofran and 4 mg of IV morphine and continue to monitor. Progress Note  2:52 PM  I have re-evaluated pt and her symptoms are still no better after Toradol, morphine, 2 doses of Zofran, and IV Reglan. She has also received IV metoprolol and IV hydralazine. Will discuss with the hospitalist for possible admission. 3:18 PM  Debra Kang MD spoke with Dr. Naseem Lobato, Consult for Hospitalist. Discussed HPI and PE, available diagnostic tests and clinical findings. She is in agreement with care plans as outlined. She agrees to admit the patient.     CRITICAL CARE NOTE :    3:22 PM    IMPENDING DETERIORATION -Metabolic and Renal    ASSOCIATED RISK FACTORS - Metabolic changes and Dehydration    MANAGEMENT- Bedside Assessment    INTERPRETATION -  CT Scan, ECG and Blood Pressure    INTERVENTIONS - Metobolic interventions    CASE REVIEW - Hospitalist and Nursing    TREATMENT RESPONSE -Unchanged     PERFORMED BY - Self    NOTES   :    I have spent 50 minutes of critical care time involved in lab review, consultations with specialist, family decision- making, bedside attention and documentation. During this entire length of time I was immediately available to the patient. Critical Care: The reason for providing this level of medical care for this critically ill patient was due to a critical illness that impaired one or more vital organ systems such that there was a high probability of imminent or life threatening deterioration in the patients condition. This care involved high complexity decision making to assess, manipulate, and support vital system functions. Burgess Silvestre MD    Progress Note:   Updated pt on all returned results and findings. Discussed the importance of proper follow up as referred below along with return precautions. Pt in agreement with the care plan and expresses agreement with and understanding of all items discussed. Disposition:  ADMIT NOTE:  3:18 PM  The patient is being admitted to the hospital by Dr. Delio Robin. The results of their tests and reasons for their admission have been discussed with the patient and/or available family. They convey agreement and understanding for the need to be admitted and for their admission diagnosis. PLAN:  1. Admit    Diagnosis     Clinical Impression:   1. Gastroenteritis, acute    2. Marijuana abuse    3. Cocaine abuse ([address]    4. Hypertensive urgency    5. Acute on chronic renal insufficiency            Please note that this dictation was completed with Dragon, computer voice recognition software.   Quite often unanticipated grammatical, syntax, homophones, and other interpretive errors are inadvertently transcribed by the computer software. Please disregard these errors. Additionally, please excuse any errors that have escaped final proofreading.

## 2020-08-31 NOTE — PROGRESS NOTES
RN to RN bedside shift change report provided by Yvette Singer RN. During the report it was discovered that the R AC/upper arm IV through which the Cardene drip was infusing had infiltrated. Pt known to be a difficult stick for venipuncture--Nursing Supervisor called for assistance in placing new 22 G IV in her LAC/upper arm (2 attempts required).

## 2020-09-01 PROBLEM — F14.90 COCAINE USE: Status: ACTIVE | Noted: 2020-09-01

## 2020-09-01 LAB
ANION GAP SERPL CALC-SCNC: 13 MMOL/L (ref 5–15)
ANION GAP SERPL CALC-SCNC: 14 MMOL/L (ref 5–15)
ATRIAL RATE: 99 BPM
BASOPHILS # BLD: 0 K/UL (ref 0–0.1)
BASOPHILS NFR BLD: 0 % (ref 0–1)
BUN SERPL-MCNC: 10 MG/DL (ref 6–20)
BUN SERPL-MCNC: 7 MG/DL (ref 6–20)
BUN/CREAT SERPL: 8 (ref 12–20)
BUN/CREAT SERPL: 9 (ref 12–20)
CALCIUM SERPL-MCNC: 9.1 MG/DL (ref 8.5–10.1)
CALCIUM SERPL-MCNC: 9.6 MG/DL (ref 8.5–10.1)
CALCULATED P AXIS, ECG09: 54 DEGREES
CALCULATED R AXIS, ECG10: 65 DEGREES
CALCULATED T AXIS, ECG11: 47 DEGREES
CHLORIDE SERPL-SCNC: 102 MMOL/L (ref 97–108)
CHLORIDE SERPL-SCNC: 99 MMOL/L (ref 97–108)
CO2 SERPL-SCNC: 22 MMOL/L (ref 21–32)
CO2 SERPL-SCNC: 24 MMOL/L (ref 21–32)
CREAT SERPL-MCNC: 0.91 MG/DL (ref 0.55–1.02)
CREAT SERPL-MCNC: 1.08 MG/DL (ref 0.55–1.02)
DIAGNOSIS, 93000: NORMAL
DIFFERENTIAL METHOD BLD: NORMAL
EOSINOPHIL # BLD: 0 K/UL (ref 0–0.4)
EOSINOPHIL NFR BLD: 0 % (ref 0–7)
ERYTHROCYTE [DISTWIDTH] IN BLOOD BY AUTOMATED COUNT: 14.3 % (ref 11.5–14.5)
GLUCOSE SERPL-MCNC: 115 MG/DL (ref 65–100)
GLUCOSE SERPL-MCNC: 130 MG/DL (ref 65–100)
HCT VFR BLD AUTO: 38.2 % (ref 35–47)
HGB BLD-MCNC: 13.3 G/DL (ref 11.5–16)
IMM GRANULOCYTES # BLD AUTO: 0 K/UL (ref 0–0.04)
IMM GRANULOCYTES NFR BLD AUTO: 0 % (ref 0–0.5)
LACTATE SERPL-SCNC: 2 MMOL/L (ref 0.4–2)
LYMPHOCYTES # BLD: 1.8 K/UL (ref 0.8–3.5)
LYMPHOCYTES NFR BLD: 19 % (ref 12–49)
MAGNESIUM SERPL-MCNC: 1.5 MG/DL (ref 1.6–2.4)
MAGNESIUM SERPL-MCNC: 2.4 MG/DL (ref 1.6–2.4)
MCH RBC QN AUTO: 31.3 PG (ref 26–34)
MCHC RBC AUTO-ENTMCNC: 34.8 G/DL (ref 30–36.5)
MCV RBC AUTO: 89.9 FL (ref 80–99)
MONOCYTES # BLD: 0.6 K/UL (ref 0–1)
MONOCYTES NFR BLD: 6 % (ref 5–13)
NEUTS SEG # BLD: 7 K/UL (ref 1.8–8)
NEUTS SEG NFR BLD: 75 % (ref 32–75)
NRBC # BLD: 0 K/UL (ref 0–0.01)
NRBC BLD-RTO: 0 PER 100 WBC
P-R INTERVAL, ECG05: 146 MS
PHOSPHATE SERPL-MCNC: 2.9 MG/DL (ref 2.6–4.7)
PHOSPHATE SERPL-MCNC: 3.3 MG/DL (ref 2.6–4.7)
PLATELET # BLD AUTO: 322 K/UL (ref 150–400)
PMV BLD AUTO: 9.6 FL (ref 8.9–12.9)
POTASSIUM SERPL-SCNC: 2.9 MMOL/L (ref 3.5–5.1)
POTASSIUM SERPL-SCNC: 3.9 MMOL/L (ref 3.5–5.1)
Q-T INTERVAL, ECG07: 376 MS
QRS DURATION, ECG06: 80 MS
QTC CALCULATION (BEZET), ECG08: 482 MS
RBC # BLD AUTO: 4.25 M/UL (ref 3.8–5.2)
SODIUM SERPL-SCNC: 136 MMOL/L (ref 136–145)
SODIUM SERPL-SCNC: 138 MMOL/L (ref 136–145)
VENTRICULAR RATE, ECG03: 99 BPM
WBC # BLD AUTO: 9.4 K/UL (ref 3.6–11)

## 2020-09-01 PROCEDURE — 36415 COLL VENOUS BLD VENIPUNCTURE: CPT

## 2020-09-01 PROCEDURE — 85025 COMPLETE CBC W/AUTO DIFF WBC: CPT

## 2020-09-01 PROCEDURE — 65270000029 HC RM PRIVATE

## 2020-09-01 PROCEDURE — 74011000250 HC RX REV CODE- 250: Performed by: STUDENT IN AN ORGANIZED HEALTH CARE EDUCATION/TRAINING PROGRAM

## 2020-09-01 PROCEDURE — 74011250636 HC RX REV CODE- 250/636: Performed by: STUDENT IN AN ORGANIZED HEALTH CARE EDUCATION/TRAINING PROGRAM

## 2020-09-01 PROCEDURE — 83735 ASSAY OF MAGNESIUM: CPT

## 2020-09-01 PROCEDURE — 80048 BASIC METABOLIC PNL TOTAL CA: CPT

## 2020-09-01 PROCEDURE — 74011250637 HC RX REV CODE- 250/637: Performed by: STUDENT IN AN ORGANIZED HEALTH CARE EDUCATION/TRAINING PROGRAM

## 2020-09-01 PROCEDURE — 84100 ASSAY OF PHOSPHORUS: CPT

## 2020-09-01 PROCEDURE — 83605 ASSAY OF LACTIC ACID: CPT

## 2020-09-01 PROCEDURE — 74011250636 HC RX REV CODE- 250/636: Performed by: INTERNAL MEDICINE

## 2020-09-01 PROCEDURE — C9113 INJ PANTOPRAZOLE SODIUM, VIA: HCPCS | Performed by: STUDENT IN AN ORGANIZED HEALTH CARE EDUCATION/TRAINING PROGRAM

## 2020-09-01 RX ORDER — METOPROLOL TARTRATE 25 MG/1
25 TABLET, FILM COATED ORAL 2 TIMES DAILY
COMMUNITY
End: 2020-10-06

## 2020-09-01 RX ORDER — LABETALOL HCL 20 MG/4 ML
20 SYRINGE (ML) INTRAVENOUS
Status: DISCONTINUED | OUTPATIENT
Start: 2020-09-01 | End: 2020-09-01

## 2020-09-01 RX ORDER — METOCLOPRAMIDE 10 MG/1
10 TABLET ORAL
Status: DISCONTINUED | OUTPATIENT
Start: 2020-09-01 | End: 2020-09-03 | Stop reason: HOSPADM

## 2020-09-01 RX ORDER — LABETALOL HCL 20 MG/4 ML
20 SYRINGE (ML) INTRAVENOUS
Status: DISCONTINUED | OUTPATIENT
Start: 2020-09-01 | End: 2020-09-03 | Stop reason: HOSPADM

## 2020-09-01 RX ORDER — BLACK COHOSH ROOT 40 MG
40 TABLET ORAL DAILY
COMMUNITY
End: 2020-09-03

## 2020-09-01 RX ORDER — MAGNESIUM SULFATE HEPTAHYDRATE 40 MG/ML
2 INJECTION, SOLUTION INTRAVENOUS ONCE
Status: COMPLETED | OUTPATIENT
Start: 2020-09-01 | End: 2020-09-01

## 2020-09-01 RX ORDER — AMLODIPINE BESYLATE 5 MG/1
10 TABLET ORAL DAILY
Status: DISCONTINUED | OUTPATIENT
Start: 2020-09-01 | End: 2020-09-03 | Stop reason: HOSPADM

## 2020-09-01 RX ORDER — POTASSIUM CHLORIDE 7.45 MG/ML
10 INJECTION INTRAVENOUS
Status: COMPLETED | OUTPATIENT
Start: 2020-09-01 | End: 2020-09-01

## 2020-09-01 RX ORDER — PROMETHAZINE HYDROCHLORIDE 25 MG/ML
25 INJECTION, SOLUTION INTRAMUSCULAR; INTRAVENOUS
Status: DISCONTINUED | OUTPATIENT
Start: 2020-09-01 | End: 2020-09-03 | Stop reason: HOSPADM

## 2020-09-01 RX ORDER — NALOXONE HYDROCHLORIDE 0.4 MG/ML
0.4 INJECTION, SOLUTION INTRAMUSCULAR; INTRAVENOUS; SUBCUTANEOUS
Status: DISCONTINUED | OUTPATIENT
Start: 2020-09-01 | End: 2020-09-03 | Stop reason: HOSPADM

## 2020-09-01 RX ADMIN — POTASSIUM BICARBONATE 40 MEQ: 782 TABLET, EFFERVESCENT ORAL at 17:02

## 2020-09-01 RX ADMIN — PANTOPRAZOLE SODIUM 40 MG: 40 INJECTION, POWDER, LYOPHILIZED, FOR SOLUTION INTRAVENOUS at 08:46

## 2020-09-01 RX ADMIN — LABETALOL 20 MG/4 ML (5 MG/ML) INTRAVENOUS SYRINGE 20 MG: at 16:24

## 2020-09-01 RX ADMIN — LABETALOL HYDROCHLORIDE 100 MG: 100 TABLET, FILM COATED ORAL at 08:45

## 2020-09-01 RX ADMIN — MORPHINE SULFATE 10 MG: 20 SOLUTION ORAL at 21:03

## 2020-09-01 RX ADMIN — POTASSIUM CHLORIDE 10 MEQ: 7.46 INJECTION, SOLUTION INTRAVENOUS at 10:07

## 2020-09-01 RX ADMIN — PROCHLORPERAZINE EDISYLATE 5 MG: 5 INJECTION INTRAMUSCULAR; INTRAVENOUS at 05:52

## 2020-09-01 RX ADMIN — LABETALOL 20 MG/4 ML (5 MG/ML) INTRAVENOUS SYRINGE 20 MG: at 05:52

## 2020-09-01 RX ADMIN — Medication 10 ML: at 05:52

## 2020-09-01 RX ADMIN — AMLODIPINE BESYLATE 10 MG: 5 TABLET ORAL at 10:39

## 2020-09-01 RX ADMIN — PROCHLORPERAZINE EDISYLATE 5 MG: 5 INJECTION INTRAMUSCULAR; INTRAVENOUS at 00:41

## 2020-09-01 RX ADMIN — LABETALOL HYDROCHLORIDE 100 MG: 100 TABLET, FILM COATED ORAL at 21:02

## 2020-09-01 RX ADMIN — ENOXAPARIN SODIUM 40 MG: 100 INJECTION SUBCUTANEOUS at 08:46

## 2020-09-01 RX ADMIN — POTASSIUM CHLORIDE 10 MEQ: 7.46 INJECTION, SOLUTION INTRAVENOUS at 12:07

## 2020-09-01 RX ADMIN — POTASSIUM CHLORIDE 10 MEQ: 7.46 INJECTION, SOLUTION INTRAVENOUS at 14:13

## 2020-09-01 RX ADMIN — Medication 10 ML: at 13:32

## 2020-09-01 RX ADMIN — PROCHLORPERAZINE EDISYLATE 5 MG: 5 INJECTION INTRAMUSCULAR; INTRAVENOUS at 17:40

## 2020-09-01 RX ADMIN — METOCLOPRAMIDE 10 MG: 10 TABLET ORAL at 12:07

## 2020-09-01 RX ADMIN — PROMETHAZINE HYDROCHLORIDE 25 MG: 25 INJECTION INTRAMUSCULAR; INTRAVENOUS at 03:13

## 2020-09-01 RX ADMIN — MAGNESIUM SULFATE 2 G: 2 INJECTION INTRAVENOUS at 08:54

## 2020-09-01 RX ADMIN — PROMETHAZINE HYDROCHLORIDE 25 MG: 25 INJECTION INTRAMUSCULAR; INTRAVENOUS at 20:05

## 2020-09-01 RX ADMIN — POTASSIUM CHLORIDE 10 MEQ: 7.46 INJECTION, SOLUTION INTRAVENOUS at 18:25

## 2020-09-01 RX ADMIN — METOCLOPRAMIDE 10 MG: 10 TABLET ORAL at 17:02

## 2020-09-01 RX ADMIN — Medication 10 ML: at 00:41

## 2020-09-01 RX ADMIN — MORPHINE SULFATE 10 MG: 20 SOLUTION ORAL at 13:32

## 2020-09-01 RX ADMIN — POTASSIUM CHLORIDE 10 MEQ: 7.46 INJECTION, SOLUTION INTRAVENOUS at 16:16

## 2020-09-01 RX ADMIN — Medication 10 ML: at 05:51

## 2020-09-01 RX ADMIN — METOCLOPRAMIDE 10 MG: 10 TABLET ORAL at 21:02

## 2020-09-01 NOTE — PROGRESS NOTES
During last phone call with Dr. Lechuga Signs we reviewed pt's b/p's. 7328 sbp 80. Discussed potentially holding Cardene drip. Re assessed b/p and it was 125/79. Did not hang new bag yet. Will keep assessing b/p's.

## 2020-09-01 NOTE — PROGRESS NOTES
Dr. Faustino Michaels on call. He has promptly responded to both the calls regarding IV infiltration and lactic acid results, but I was delayed in writing the note regarding both. For the R arm IVF/med (Cardene) infiltration we elevated pt's arm with pillows and applied heat. Arm is not red or irritated in appearance, and pt is without pain. No topical Nitro ordered or applied. MD will reorder follow up lactic acid with am labs. He reviewed pt's labs and noted value less than previous, despite concern lactic acid level >2.0.

## 2020-09-01 NOTE — PROGRESS NOTES
Pt tachycardic. Recently given overdue labetalol pt had initially refused 2/2 nausea and upset stomach. Current MEWS score 3.

## 2020-09-01 NOTE — PROGRESS NOTES
Hospitalist Progress Note    NAME: Reuben Phillips   :  1975   MRN:  [id number]   Room Number:  GYI9/47  @ [facility] Summary: 54 y.o. female whom presented on 2020 with      Assessment / Plan:  Hyperemesis POA  Suspect cannabis induced hyperemesis. CT abdomen/pelvis showed no acute pathology. Pt has a hx of cholecystectomy. UDS + for THC and cocaine. Denies current marijuana. CT Abd/pelvis  : Gastritis, Mild colitis of the descending colon, Appendicolith without evidence for appendicitis. Mild sigmoid diverticulosis without evidence for diverticulitis. QTc 482 ms.        - Scheduled/PRN antiemetics and dicyclomine for abd cramping  - Alum-mag hydroxide-simethicone (Mylanta) prn for heartburn  - PPI daily.   - Hydration   - counseled on abstaining from marijuana. - Needs outpatient GI follow up               Hypertensive emergency POA, resolved  Uncontrolled hypertension   D/t missed BP medications, intractable n/v. EKG reviewed -normal sinus rhythm,   SBP in 230s on initial presentation. Has received 5 mg IV metoprolol, 20 mg IV hydralazine in the ER. Beta blockade with unopposed alpha-1 action of cocaine probably worsened BP in the ER. Lactic acid 2.6. CT Head negative for bleed. Off nicardipine infusion. Troponin <0.05.     - Continue labetalol 100 mg BID. Add amlodipine 10 mg daily.           Elevated Lactic acid POA   Admission Lactic acid 2.6. d/t hypovolemia, hypertensive emergency. Resolved with hydration.          Sinus Tachycardia POA, resolved:   Sinus tachycardia d/t incessant retching, hypovolemia, cocaine. Improved with hydration and labetalol.        Musculoskeletal pain :    Reports nonspecific throbbing chest and back pain likely musculoskeletal from excessive retching. Do no suspect dissection or ACS. EKG reviewed. Troponin < 0.05     - SL Oxycodone.  PRN  - PRN muscle relaxant        Polysubstance abuse   Cocaine abuse   Marijuana abuse UDS + for cocaine and marijuana  Patient was counseled extensively on the need to abstain from drugs its addictive tendencies, its deleterious effects on the brain, cardiovascular system, lungs as well as its financial & social sequelae              Elevated creatinine POA  Polycystic kidney disease   Creatinine 1.2, BUN 16, CrCl 54.5 ml/min. Suspect this is her baseline due to hypertension and PCKD. Control blood pressure   Strict Is and Os, avoid nephrotoxic medications, renally dose medications.    Follow up with nephrology and PCP at discharge. Nephrologist is Dr. Melissa Piper.               Body mass index is 32.5 kg/m². Morbid obesity   Counseled on Lifestyle modifications, AHA Diet ,weight loss strategies.     Code Status: full   Surrogate Decision Maker:  Name:     Rel:  denia Spears     Mother Home Ph:  Work Ph:  Mobile Ph: [phone]         DVT Prophylaxis: Lovenox  GI Prophylaxis: PPI     Baseline: ambulatory independently       Subjective:     Chief Complaint / Reason for Physician Visit  \"feel much better, I can try to eat\". Discussed with RN events overnight. Review of Systems:  No fevers, chills, appetite change, cough, sputum production, shortness of breath, dyspnea on exertion, nausea, vomitting, diarrhea, constipation, chest pain, leg edema, abdominal pain, joint pain, rash, itching. Objective:     VITALS:   Last 24hrs VS reviewed since prior progress note.  Most recent are:  Patient Vitals for the past 24 hrs:   Temp Pulse Resp BP SpO2   09/01/20 1621  92 20 (!) 171/95 90 %   09/01/20 1600 98.2 °F (36.8 °C) 91 20 (!) 161/98 99 %   09/01/20 1206 97.7 °F (36.5 °C) 95 20 (!) 154/94 99 %   09/01/20 1200  (!) 109 (!) 33 (!) 141/112 100 %   09/01/20 1036  88 30 (!) 136/100 100 %   09/01/20 1000  87 26 162/87 100 %   09/01/20 0840  99 10 (!) 165/94 100 %   09/01/20 0800 98.2 °F (36.8 °C) 93 22 157/86 100 %   09/01/20 0756  (!) 113 22 (!) 160/107 100 %   09/01/20 0713  99 13  100 %   09/01/20 0712  89 12  100 %   09/01/20 0711  100 19 149/88 100 %   09/01/20 0710  95 18  100 %   09/01/20 0709  97 18 (!) 185/107 100 %   09/01/20 0708  96 19  100 %   09/01/20 0707  97 16  100 %   09/01/20 0706  90 19  100 %   09/01/20 0705  88 18  100 %   09/01/20 0704  88 19  100 %   09/01/20 0703  84 17  100 %   09/01/20 0702  (!) 107 24  100 %   09/01/20 0701  100 15  96 %   09/01/20 0700  90 19 111/73 97 %   09/01/20 0659  89 20  97 %   09/01/20 0658  91 20  96 %   09/01/20 0657  90 20  96 %   09/01/20 0656  91 19  97 %   09/01/20 0655  91 20  97 %   09/01/20 0654  91 20  96 %   09/01/20 0653  90 20  97 %   09/01/20 0652  89 21  97 %   09/01/20 0651  88 20  97 %   09/01/20 0650  88 20  97 %   09/01/20 0649  92 19  98 %   09/01/20 0648  90 19  97 %   09/01/20 0647  89 19  98 %   09/01/20 0646  89 16  99 %   09/01/20 0645  90 18 106/62 98 %   09/01/20 0644  92 19  98 %   09/01/20 0643  91 18  98 %   09/01/20 0642  91 20  97 %   09/01/20 0641  90 19  98 %   09/01/20 0640  96 20  98 %   09/01/20 0639  93 21  97 %   09/01/20 0638  91 21  97 %   09/01/20 0637  95 24  97 %   09/01/20 0636  (!) 102 20  99 %   09/01/20 0635  (!) 114 25  99 %   09/01/20 0634  (!) 104 22  100 %   09/01/20 0633  99 20  99 %   09/01/20 0632  (!) 101 20  99 %   09/01/20 0631  94 19  100 %   09/01/20 0630  94 20 (!) 161/97 100 %   09/01/20 0629  91 18  100 %   09/01/20 0628  95 18  100 %   09/01/20 0627  93 17  99 %   09/01/20 0626  96 18  99 %   09/01/20 0625  97 18  99 %   09/01/20 0624  96 19  99 %   09/01/20 0623  97 18  99 %   09/01/20 0622  97 18  99 %   09/01/20 0621  97 19  100 %   09/01/20 0620  97 19  99 %   09/01/20 0619  97 18  99 %   09/01/20 0618  96 18  100 %   09/01/20 0617  94 18  100 %   09/01/20 0616  93 19  100 %   09/01/20 0615  93 19 (!) 149/97 99 %   09/01/20 0614  94 19  99 %   09/01/20 0613  94 19  100 %   09/01/20 0612  93 19  99 %   09/01/20 0611  94 18  99 %   09/01/20 0610  93 18  99 %   09/01/20 0609  93 19  99 %   09/01/20 0608  92 18  99 %   09/01/20 0607  90 17  99 %   09/01/20 0606  90 20  99 %   09/01/20 0605  90 19  99 %   09/01/20 0604  89 19  99 %   09/01/20 0603  89 19  99 %   09/01/20 0602  90 14  99 %   09/01/20 0601  89 13  100 %   09/01/20 0600  93 13 (!) 165/99 100 %   09/01/20 0559  (!) 112 10  100 %   09/01/20 0558  (!) 110 13  100 %   09/01/20 0557  (!) 118 18  100 %   09/01/20 0556  (!) 104 22  98 %   09/01/20 0555  (!) 103 23  98 %   09/01/20 0554  (!) 105 20  99 %   09/01/20 0553  (!) 107 20  99 %   09/01/20 0552  (!) 115 19  99 %   09/01/20 0551  (!) 116 20  98 %   09/01/20 0550  (!) 115 16  99 %   09/01/20 0549  (!) 134 28  100 %   09/01/20 0548  (!) 113 19  97 %   09/01/20 0547  (!) 104 20  97 %   09/01/20 0546  (!) 113 20  97 %   09/01/20 0545  (!) 115 22 (!) 175/95 97 %   09/01/20 0530  94 14 117/73 97 %   09/01/20 0515  (!) 120 19 121/78 95 %   09/01/20 0500  (!) 115 21 (!) 144/101 97 %   09/01/20 0445  (!) 133 25 (!) 108/93 100 %   09/01/20 0430  (!) 112 21 (!) 141/124 99 %   09/01/20 0415 98.2 °F (36.8 °C) (!) 111 18 112/75 98 %   09/01/20 0400  (!) 113 18 120/73 98 %   09/01/20 0345  (!) 117 18 117/76 98 %   09/01/20 0330  (!) 108 20 (!) 161/91 100 %   09/01/20 0315  (!) 104 16 160/87 100 %   09/01/20 0300  (!) 105 20 (!) 154/93 99 %   09/01/20 0230  (!) 114 17 (!) 151/96 100 %   09/01/20 0215  (!) 109 21 150/86 100 %   09/01/20 0200  (!) 108 20 154/90 100 %   09/01/20 0145    164/88    09/01/20 0130  (!) 113 25 150/84 100 %   09/01/20 0115  (!) 116 22 165/89 100 %   09/01/20 0100  (!) 117 23 (!) 165/97 100 %   09/01/20 0045  (!) 101 20 125/69 96 %   09/01/20 0030  (!) 102 21 148/85 97 %   09/01/20 0015  (!) 110 24 142/84 99 %   09/01/20 0000 97.7 °F (36.5 °C) (!) 124 19 (!) 151/104 100 % 08/31/20 2345  (!) 118 23 139/87 100 %   08/31/20 2340  (!) 122 16  100 %   08/31/20 2339  (!) 124 20  100 %   08/31/20 2338  (!) 122 22  100 %   08/31/20 2337  (!) 124 (!) 32  100 %   08/31/20 2336  (!) 118 20  100 %   08/31/20 2335  (!) 120 20  100 %   08/31/20 2334  (!) 119 21  100 %   08/31/20 2333  (!) 124 24  100 %   08/31/20 2332  (!) 125 20 125/79 100 %   08/31/20 2331  (!) 121 20  100 %   08/31/20 2330  (!) 124 22 (!) 158/105 100 %   08/31/20 2300  (!) 103 19 (!) 89/67 100 %   08/31/20 2245  (!) 115 18 (!) 148/115 100 %   08/31/20 2244  (!) 116 21  100 %   08/31/20 2243  (!) 118 21  98 %   08/31/20 2242  (!) 114 19  99 %   08/31/20 2241  (!) 111 22  98 %   08/31/20 2240  (!) 113 20  98 %   08/31/20 2239  (!) 106 21  98 %   08/31/20 2238  (!) 104 18  99 %   08/31/20 2237  (!) 108 22  99 %   08/31/20 2236  (!) 110 19  99 %   08/31/20 2235  (!) 112 18  99 %   08/31/20 2234  (!) 113 24  100 %   08/31/20 2233  (!) 122 30  97 %   08/31/20 2232  (!) 118 26  98 %   08/31/20 2231  (!) 115 28  99 %   08/31/20 2230  (!) 109 20 (!) 137/101 98 %   08/31/20 2229  (!) 111 21  97 %   08/31/20 2228  (!) 110 26  98 %   08/31/20 2227  (!) 114 19  98 %   08/31/20 2226  (!) 115 25  97 %   08/31/20 2225  (!) 123 24  99 %   08/31/20 2224  (!) 119 29  99 %   08/31/20 2223  (!) 111 26  98 %   08/31/20 2222  (!) 111 25  98 %   08/31/20 2221  (!) 112 16  98 %   08/31/20 2220  (!) 117 21  100 %   08/31/20 2219  (!) 101 20  98 %   08/31/20 2218  (!) 111 20  98 %   08/31/20 2217  (!) 119 19  99 %   08/31/20 2216  (!) 124 19  99 %   08/31/20 2215  (!) 125 19 138/79 100 %   08/31/20 2214  (!) 115 25  98 %   08/31/20 2213  (!) 102 21  98 %   08/31/20 2212  (!) 104 21  98 %   08/31/20 2200  (!) 125  165/72 99 %   08/31/20 2145  (!) 118  (!) 168/97 98 %   08/31/20 2130  (!) 121  160/90 98 %   08/31/20 2115  (!) 124  155/86 98 %   08/31/20 2100 97.9 °F (36.6 °C) (!) 124 20 152/86 97 %   08/31/20 1938  (!) 116  (!) 172/99    08/31/20 1900  (!) 110 24 (!) 167/107 98 %   08/31/20 1841  (!) 114   99 %   08/31/20 1840  (!) 117 26  99 %   08/31/20 1839  100 22  98 %   08/31/20 1838  (!) 101 16 (!) 161/102 99 %   08/31/20 1837  (!) 102 22  98 %   08/31/20 1836  (!) 103 16  98 %   08/31/20 1835  100 23 (!) 164/91 98 %   08/31/20 1834  100 23  98 %   08/31/20 1833  98 22  98 %   08/31/20 1832  98 22  98 %   08/31/20 1831  99 14  98 %   08/31/20 1830 97.5 °F (36.4 °C) 99 22 160/90 98 %   08/31/20 1829  97 22  98 %   08/31/20 1828  97 19  99 %   08/31/20 1827  (!) 121 18 (!) 161/111 99 %   08/31/20 1826  99 20  98 %   08/31/20 1825  (!) 109 19  97 %   08/31/20 1824  (!) 102 21  98 %   08/31/20 1820  96 22  98 %   08/31/20 1815  91 24 (!) 176/104 98 %   08/31/20 1814  94 15 (!) 175/116 98 %   08/31/20 1810  (!) 118 15  100 %   08/31/20 1805  (!) 125 23 168/89    08/31/20 1800  (!) 117 28     08/31/20 1755  97 20     08/31/20 1750  97 17     08/31/20 1745  97 20 (!) 193/118    08/31/20 1740  98 20  99 %   08/31/20 1735  (!) 112 17  100 %   08/31/20 1730  (!) 114 17  100 %   08/31/20 1725  (!) 125 25  100 %   08/31/20 1721 97.6 °F (36.4 °C) (!) 126 (!) 34 (!) 176/110 100 %   08/31/20 1716    (!) 185/112        Intake/Output Summary (Last 24 hours) at 9/1/2020 1643  Last data filed at 9/1/2020 1629  Gross per 24 hour   Intake 905.83 ml   Output 2850 ml   Net -1944.17 ml        PHYSICAL EXAM:  General: WD, WN. Alert, cooperative, no acute distress    EENT:  EOMI. Anicteric sclerae. MMM  Resp:  CTA bilaterally, no wheezing or rales. No accessory muscle use  CV:  Regular  rhythm,  normal S1/S2, no murmurs rubs gallops, No edema  GI:  Soft, Non distended, Non tender. +Bowel sounds  Neurologic:  Alert and oriented X 3, normal speech,   Psych:   Good insight. Not anxious nor agitated  Skin:  No rashes.   No jaundice    Reviewed most current lab test results and cultures  YES  Reviewed most current radiology test results   YES  Review and summation of old records today    NO  Reviewed patient's current orders and MAR    YES  PMH/SH reviewed - no change compared to H&P  ________________________________________________________________________  Care Plan discussed with:    Comments   Patient x    Family      RN x    Care Manager x    Consultant                       x Multidiciplinary team rounds were held today with , nursing, pharmacist and clinical coordinator. Patient's plan of care was discussed; medications were reviewed and discharge planning was addressed. ________________________________________________________________________  Total NON critical care TIME: 35   Minutes    Total CRITICAL CARE TIME Spent:   Minutes non procedure based      Comments   >50% of visit spent in counseling and coordination of care     ________________________________________________________________________  Salazar Domínguez MD     Procedures: see electronic medical records for all procedures/Xrays and details which were not copied into this note but were reviewed prior to creation of Plan. LABS:  I reviewed today's most current labs and imaging studies.   Pertinent labs include:  Recent Labs     09/01/20 0534 08/31/20  1220   WBC 9.4 9.1   HGB 13.3 14.2   HCT 38.2 40.6    348     Recent Labs     09/01/20 0534 08/31/20  1220    140   K 2.9* 3.7    103   CO2 22 23   * 131*   BUN 10 16   CREA 1.08* 1.22*   CA 9.1 9.8   MG 1.5*  --    PHOS 3.3  --    ALB  --  4.0   TBILI  --  0.3   ALT  --  33       Signed: Salazar Domínguez MD

## 2020-09-01 NOTE — PROGRESS NOTES
Lactic acid done but delayed 2/2 difficulties with venipuncture at beginning of shift. Result reported as 2.4 by  Ms. Cheney.  Will notify MD.

## 2020-09-01 NOTE — PROGRESS NOTES
Pharmacist contacted regarding Cardene infiltration to see if there were any specific interventions needed. Recommendations provided included elevating the arm, applying warm compress, and notifying the doctor to consider placing topical Nitroglycerin to the effected area for the next 6 hours. On call MD paged/texted regarding concern and recommendations. Awaiting response.

## 2020-09-01 NOTE — PROGRESS NOTES
[facility]: MED RECONCILIATION    Information obtained from: Patient    Allergies: Patient has no known allergies. Prior to Admission Medications:   Prior to Admission Medications   Prescriptions Last Dose Informant Patient Reported? Taking?   black cohosh 40 mg tab  Self Yes Yes   Sig: Take 40 mg by mouth daily. cetirizine (ZYRTEC) 10 mg tablet  Self Yes Yes   Sig: Take 10 mg by mouth daily. esomeprazole (NEXIUM 24HR) 20 mg capsule  Self No Yes   Sig: Take 1 Cap by mouth daily. metoprolol tartrate (LOPRESSOR) 25 mg tablet  Self Yes Yes   Sig: Take 25 mg by mouth two (2) times a day.         Thank you,  Ramandeep Beavers, PharmD, BCPS  [phone]

## 2020-09-01 NOTE — PROGRESS NOTES
Dr. Angie Chadwick updated on pt's blood pressures and holding of Cardene. Med discontinued. IVF rate reduced. PRN labetalol parameters changed.

## 2020-09-01 NOTE — PROGRESS NOTES
YASMINE- IDT met to discuss plan of care. Patient may be ready for discharge tomorrow. CM met to talk with patient and provide information about upcoming appointments. 1.  PCP appointment scheduled with Dr. Stefano Fontana for 9/22 at 8:30am.  Virtual appointment  2. Nephrology-  Appointment with Dr. Roosevelt Bocanegra on 9/17 at 10:45. This will be an in person appointment. Also talked with patient about [facility] as PCP appointment is not until 9/22. Patient stated that she will be unavailable 9/20-26. 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 Currently working on her doctorate. CM explained the importance of keeping appointments. Patient stated understanding. [facility] to set up appointment. They will need to call patient to set up appointment and wanted CM their also. Will meet with patient in room to make call. Continue to follow for d/c planning needs.   Rashard Varner, ABELARDO/FRAN  [phone]

## 2020-09-01 NOTE — PROGRESS NOTES
Bedside shift report received from [facility]TA Rn.pt awake,resting on her bed.call meeks in reach. 200 Dr Delio Robin made aware K+ level 2. 9.new order received. 3159 pt receiving mag at this time. pt washing herself. 1015 IDR performed with Dr Delio Robin, CM,ID nurse and supervisor. 1215 Pt receiving 2nd dose of her iv potassium. 1415 pt medicated with pian medicine,pt sleeping at this time. 1630 pt medicated with labetalol 20 mg iv for /95.    1700 /81,pt resting comfortably. 1744 pt c/o nausea after taking po potassium. medicated with Compazine 5 mg iv. 1815 lab drawn and sent to lab. 1930 . Bedside and Verbal shift change report given to Renetta Sauceda (oncoming nurse) by Bethany Gonzalez (offgoing nurse). Report included the following information SBAR, Kardex, Intake/Output, MAR, Recent Results, Med Rec Status and Cardiac Rhythm STACH/NSR.

## 2020-09-01 NOTE — PROGRESS NOTES
Problem: Falls - Risk of  Goal: *Absence of Falls  Description: Document Jeff Box Fall Risk and appropriate interventions in the flowsheet.   Outcome: Progressing Towards Goal  Note: Fall Risk Interventions:            Medication Interventions: Evaluate medications/consider consulting pharmacy    Elimination Interventions: Call light in reach, Toilet paper/wipes in reach, Toileting schedule/hourly rounds              Problem: Hypertension  Goal: *Blood pressure within specified parameters  Outcome: Progressing Towards Goal  Goal: *Fluid volume balance  Outcome: Progressing Towards Goal  Goal: *Labs within defined limits  Outcome: Progressing Towards Goal     Problem: Nausea/Vomiting (Adult)  Goal: *Absence of nausea/vomiting  Outcome: Progressing Towards Goal  Goal: *Palliation of nausea/vomiting (Palliative Care)  Outcome: Progressing Towards Goal

## 2020-09-01 NOTE — PROGRESS NOTES
Pt's b/p fluctuating without medication intervention--no PRN labetalol given. Pt did c/o nausea approximately 3 hours after receiving scheduled compazine--IM phenergan administered. Pt noted initial intense burning that rapidly resolved.

## 2020-09-02 LAB
ANION GAP SERPL CALC-SCNC: 12 MMOL/L (ref 5–15)
BASOPHILS # BLD: 0 K/UL (ref 0–0.1)
BASOPHILS NFR BLD: 1 % (ref 0–1)
BUN SERPL-MCNC: 9 MG/DL (ref 6–20)
BUN/CREAT SERPL: 8 (ref 12–20)
CALCIUM SERPL-MCNC: 9.7 MG/DL (ref 8.5–10.1)
CHLORIDE SERPL-SCNC: 100 MMOL/L (ref 97–108)
CO2 SERPL-SCNC: 24 MMOL/L (ref 21–32)
CREAT SERPL-MCNC: 1.1 MG/DL (ref 0.55–1.02)
DIFFERENTIAL METHOD BLD: NORMAL
EOSINOPHIL # BLD: 0.1 K/UL (ref 0–0.4)
EOSINOPHIL NFR BLD: 1 % (ref 0–7)
ERYTHROCYTE [DISTWIDTH] IN BLOOD BY AUTOMATED COUNT: 14.4 % (ref 11.5–14.5)
GLUCOSE SERPL-MCNC: 114 MG/DL (ref 65–100)
HCT VFR BLD AUTO: 39.4 % (ref 35–47)
HGB BLD-MCNC: 13.7 G/DL (ref 11.5–16)
IMM GRANULOCYTES # BLD AUTO: 0 K/UL (ref 0–0.04)
IMM GRANULOCYTES NFR BLD AUTO: 0 % (ref 0–0.5)
LYMPHOCYTES # BLD: 2.7 K/UL (ref 0.8–3.5)
LYMPHOCYTES NFR BLD: 35 % (ref 12–49)
MAGNESIUM SERPL-MCNC: 2.5 MG/DL (ref 1.6–2.4)
MCH RBC QN AUTO: 31.4 PG (ref 26–34)
MCHC RBC AUTO-ENTMCNC: 34.8 G/DL (ref 30–36.5)
MCV RBC AUTO: 90.2 FL (ref 80–99)
MONOCYTES # BLD: 0.9 K/UL (ref 0–1)
MONOCYTES NFR BLD: 12 % (ref 5–13)
NEUTS SEG # BLD: 3.8 K/UL (ref 1.8–8)
NEUTS SEG NFR BLD: 51 % (ref 32–75)
NRBC # BLD: 0 K/UL (ref 0–0.01)
NRBC BLD-RTO: 0 PER 100 WBC
PHOSPHATE SERPL-MCNC: 3.3 MG/DL (ref 2.6–4.7)
PLATELET # BLD AUTO: 289 K/UL (ref 150–400)
PMV BLD AUTO: 9.6 FL (ref 8.9–12.9)
POTASSIUM SERPL-SCNC: 3.4 MMOL/L (ref 3.5–5.1)
RBC # BLD AUTO: 4.37 M/UL (ref 3.8–5.2)
SODIUM SERPL-SCNC: 136 MMOL/L (ref 136–145)
WBC # BLD AUTO: 7.6 K/UL (ref 3.6–11)

## 2020-09-02 PROCEDURE — 80048 BASIC METABOLIC PNL TOTAL CA: CPT

## 2020-09-02 PROCEDURE — 36415 COLL VENOUS BLD VENIPUNCTURE: CPT

## 2020-09-02 PROCEDURE — 74011000250 HC RX REV CODE- 250: Performed by: STUDENT IN AN ORGANIZED HEALTH CARE EDUCATION/TRAINING PROGRAM

## 2020-09-02 PROCEDURE — 83735 ASSAY OF MAGNESIUM: CPT

## 2020-09-02 PROCEDURE — 74011250637 HC RX REV CODE- 250/637: Performed by: STUDENT IN AN ORGANIZED HEALTH CARE EDUCATION/TRAINING PROGRAM

## 2020-09-02 PROCEDURE — C9113 INJ PANTOPRAZOLE SODIUM, VIA: HCPCS | Performed by: STUDENT IN AN ORGANIZED HEALTH CARE EDUCATION/TRAINING PROGRAM

## 2020-09-02 PROCEDURE — 85025 COMPLETE CBC W/AUTO DIFF WBC: CPT

## 2020-09-02 PROCEDURE — 65270000029 HC RM PRIVATE

## 2020-09-02 PROCEDURE — 74011250636 HC RX REV CODE- 250/636: Performed by: STUDENT IN AN ORGANIZED HEALTH CARE EDUCATION/TRAINING PROGRAM

## 2020-09-02 PROCEDURE — 84100 ASSAY OF PHOSPHORUS: CPT

## 2020-09-02 RX ORDER — PROCHLORPERAZINE MALEATE 10 MG
5 TABLET ORAL
Status: DISCONTINUED | OUTPATIENT
Start: 2020-09-02 | End: 2020-09-03 | Stop reason: HOSPADM

## 2020-09-02 RX ORDER — PANTOPRAZOLE SODIUM 40 MG/1
40 TABLET, DELAYED RELEASE ORAL
Status: DISCONTINUED | OUTPATIENT
Start: 2020-09-03 | End: 2020-09-03 | Stop reason: HOSPADM

## 2020-09-02 RX ADMIN — ENOXAPARIN SODIUM 40 MG: 100 INJECTION SUBCUTANEOUS at 08:41

## 2020-09-02 RX ADMIN — MORPHINE SULFATE 10 MG: 20 SOLUTION ORAL at 03:08

## 2020-09-02 RX ADMIN — POTASSIUM BICARBONATE 20 MEQ: 782 TABLET, EFFERVESCENT ORAL at 12:56

## 2020-09-02 RX ADMIN — PROCHLORPERAZINE EDISYLATE 5 MG: 5 INJECTION INTRAMUSCULAR; INTRAVENOUS at 03:08

## 2020-09-02 RX ADMIN — LABETALOL HYDROCHLORIDE 100 MG: 100 TABLET, FILM COATED ORAL at 08:41

## 2020-09-02 RX ADMIN — ACETAMINOPHEN 650 MG: 325 TABLET, FILM COATED ORAL at 20:18

## 2020-09-02 RX ADMIN — METOCLOPRAMIDE 10 MG: 10 TABLET ORAL at 08:41

## 2020-09-02 RX ADMIN — METOCLOPRAMIDE 10 MG: 10 TABLET ORAL at 16:58

## 2020-09-02 RX ADMIN — METOCLOPRAMIDE 10 MG: 10 TABLET ORAL at 12:56

## 2020-09-02 RX ADMIN — Medication 10 ML: at 16:58

## 2020-09-02 RX ADMIN — LABETALOL HYDROCHLORIDE 100 MG: 100 TABLET, FILM COATED ORAL at 21:44

## 2020-09-02 RX ADMIN — Medication 10 ML: at 21:46

## 2020-09-02 RX ADMIN — METOCLOPRAMIDE 10 MG: 10 TABLET ORAL at 21:44

## 2020-09-02 RX ADMIN — AMLODIPINE BESYLATE 10 MG: 5 TABLET ORAL at 08:41

## 2020-09-02 RX ADMIN — PANTOPRAZOLE SODIUM 40 MG: 40 INJECTION, POWDER, LYOPHILIZED, FOR SOLUTION INTRAVENOUS at 08:42

## 2020-09-02 RX ADMIN — Medication 10 ML: at 03:17

## 2020-09-02 NOTE — PROGRESS NOTES
Hospitalist Progress Note    NAME: Humberto Rea   :  1975   MRN:  [id number]   Room Number:  MFR7/17  @ [facility] Summary: 54 y.o. female whom presented on 2020 with      Assessment / Plan:    # Hyperemesis - resolved   - 2/2 suspected cannabis induced   - CT abdomen/pelvis: gastritis,  Mild colitis of the descending colon, appendicolith w/o appendicitis. Mild sigmoid diverticulosis w/o diverticulitis   - On metoclopramide  10 mg TID, compazine 5 mg IV Q6 PRN and Promethazine 25 mg Q6 PRN    - Required compazine last night   - on PPI IV daily     # Hypertensive Emergency POA- resolved   - /97 this am   - on amlodipine and labetalol   - UDS + cocaine in the ED     # Elevated lactic acid POA   - resolved     # Sinus tachycardia  - TSH 0.93  19   - 2/2 hypovolemia + stimulant use -  improved w/ hydration     # MSK pain   - on PRN oxycodon     # Polysubstance abuse   - Cocane . MJ   - will discuss social options     # Polycystic kidney disease  - CT A/p 20: Multiple bilateral cysts   - Follow with Nephrology outpatient     # Plan   - Change IV PPI to PO   - Change IV compazine to PO -  -ADO   - F/u nephrology outpatient           Code status: Full  Prophylaxis: Lovenox and Hep SQ  Recommended Disposition: Home w/Family     Subjective:     Chief Complaint / Reason for Physician Visit  Patient required IV compazine overnight for nausea . Discussed with RN events overnight. Review of Systems:  Symptom Y/N Comments  Symptom Y/N Comments   Fever/Chills N   Chest Pain N    Poor Appetite N   Edema N    Cough n   Abdominal Pain N    Sputum n   Joint Pain N    SOB/ABRAHAM n   Pruritis/Rash N    Nausea/vomit Y    Tolerating PT/OT N    Diarrhea N   Tolerating Diet N    Constipation N   Other N      Could NOT obtain due to:      Objective:     VITALS:   Last 24hrs VS reviewed since prior progress note.  Most recent are:  Patient Vitals for the past 24 hrs:   Temp Pulse Resp BP SpO2   09/02/20 0308 98.3 °F (36.8 °C) 85 20 (!) 136/97 100 %   09/01/20 2336 98 °F (36.7 °C) 90 20 118/88 100 %   09/01/20 2102  84  (!) 147/92    09/01/20 2027 97.7 °F (36.5 °C) 98 20 (!) 141/104 100 %   09/01/20 1700  80 12 141/81    09/01/20 1621  92 20 (!) 171/95 90 %   09/01/20 1600 98.2 °F (36.8 °C) 91 20 (!) 161/98 99 %   09/01/20 1206 97.7 °F (36.5 °C) 95 20 (!) 154/94 99 %   09/01/20 1200  (!) 109 (!) 33 (!) 141/112 100 %   09/01/20 1036  88 30 (!) 136/100 100 %   09/01/20 1000  87 26 162/87 100 %   09/01/20 0840  99 10 (!) 165/94 100 %       Intake/Output Summary (Last 24 hours) at 9/2/2020 0804  Last data filed at 9/2/2020 0334  Gross per 24 hour   Intake 2030 ml   Output 2700 ml   Net -670 ml        PHYSICAL EXAM:  General: WD, WN. Alert, cooperative, no acute distress    EENT:  EOMI. Anicteric sclerae. MMM  Resp:  CTA bilaterally, no wheezing or rales. No accessory muscle use  CV:  Regular  rhythm,  No edema  GI:  Soft, Non distended, Non tender.  +Bowel sounds  Neurologic:  Alert and oriented X 3, normal speech,   Psych:   Good insight. Not anxious nor agitated  Skin:  No rashes. No jaundice    Reviewed most current lab test results and cultures  YES  Reviewed most current radiology test results   YES  Review and summation of old records today    NO  Reviewed patient's current orders and MAR    YES  PMH/SH reviewed - no change compared to H&P  ________________________________________________________________________  Care Plan discussed with:    Comments   Patient     Family      RN     Care Manager     Consultant                        Multidiciplinary team rounds were held today with , nursing, pharmacist and clinical coordinator. Patient's plan of care was discussed; medications were reviewed and discharge planning was addressed.      ________________________________________________________________________  Total NON critical care TIME:  15 Minutes    Total CRITICAL CARE TIME Spent:   Minutes non procedure based      Comments   >50% of visit spent in counseling and coordination of care     ________________________________________________________________________  Pari Leal MD     Procedures: see electronic medical records for all procedures/Xrays and details which were not copied into this note but were reviewed prior to creation of Plan. LABS:  I reviewed today's most current labs and imaging studies.   Pertinent labs include:  Recent Labs     09/02/20 0319 09/01/20 0534 08/31/20  1220   WBC 7.6 9.4 9.1   HGB 13.7 13.3 14.2   HCT 39.4 38.2 40.6    322 348     Recent Labs     09/02/20 0319 09/01/20  1809 09/01/20 0534 08/31/20  1220    136 138 140   K 3.4* 3.9 2.9* 3.7    99 102 103   CO2 24 24 22 23   * 115* 130* 131*   BUN 9 7 10 16   CREA 1.10* 0.91 1.08* 1.22*   CA 9.7 9.6 9.1 9.8   MG 2.5* 2.4 1.5*  --    PHOS 3.3 2.9 3.3  --    ALB  --   --   --  4.0   TBILI  --   --   --  0.3   ALT  --   --   --  33       Signed: Pari Leal MD

## 2020-09-02 NOTE — PROGRESS NOTES
Bedside and Verbal shift change report given to Adrian Casiano (oncoming nurse) by Paul Anaya rn (offgoing nurse).  Report included the following information SBAR, Kardex, Intake/Output, MAR, Accordion, Recent Results, Med Rec Status and Cardiac Rhythm SR.

## 2020-09-02 NOTE — PROGRESS NOTES
Bedside shift report received from José Luis Bazzi.pt sleeping. call bell in reach,no acute distress noticed.

## 2020-09-02 NOTE — PROGRESS NOTES
Spiritual Care Assessment/Progress Note  [facility]      NAME: Narciso Elliott      MRN: [id number]  AGE: 54 y.o. SEX: female  Anabaptist Affiliation: Gnosticist   Language: English     9/2/2020     Total Time (in minutes): 17     Spiritual Assessment begun in [address] [place] [address] 2 PROGRESSIVE CARE through conversation with:         [x]Patient        [] Family    [] Friend(s)        Reason for Consult: Initial/Spiritual assessment, patient floor     Spiritual beliefs: (Please include comment if needed)     [x] Identifies with a brendan tradition:         [] Supported by a brendan community:            [] Claims no spiritual orientation:           [] Seeking spiritual identity:                [] Adheres to an individual form of spirituality:           [] Not able to assess:                           Identified resources for coping:      [] Prayer                               [] Music                  [] Guided Imagery     [] Family/friends                 [] Pet visits     [] Devotional reading                         [x] Unknown     [] Other:                                               Interventions offered during this visit: (See comments for more details)    Patient Interventions: Affirmation of emotions/emotional suffering, Initial/Spiritual assessment, Critical care           Plan of Care:     [] Support spiritual and/or cultural needs    [] Support AMD and/or advance care planning process      [] Support grieving process   [] Coordinate Rites and/or Rituals    [] Coordination with community clergy   [x] No spiritual needs identified at this time   [] Detailed Plan of Care below (See Comments)  [] Make referral to Music Therapy  [] Make referral to Pet Therapy     [] Make referral to Addiction services  [] Make referral to [facility]  [] Make referral to Spiritual Care Partner  [] No future visits requested        [] Follow up visits as needed     Comments:  The patient was visited on the [facility] ER unit to make a spiritual assessment. The patient appeared to have been in a virtual meeting when I knocked and entered the room. The patient muted the call and acknowledged my presence. The purpose of the visit was explained. The patient responded by indicating that she is looking forward to being discharged today. 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 It was obvious that patient intended to re-join her meeting. Provided active listening. Advised of  availability. Chaplains will follow as able and/or needed. Rev.  Sae Blankenship EdD MDiv     For  Assistance Page [phone])

## 2020-09-03 VITALS
HEIGHT: 61 IN | BODY MASS INDEX: 30.81 KG/M2 | OXYGEN SATURATION: 99 % | TEMPERATURE: 97.8 F | DIASTOLIC BLOOD PRESSURE: 93 MMHG | SYSTOLIC BLOOD PRESSURE: 136 MMHG | WEIGHT: 163.2 LBS | HEART RATE: 81 BPM | RESPIRATION RATE: 19 BRPM

## 2020-09-03 PROCEDURE — 74011250637 HC RX REV CODE- 250/637: Performed by: STUDENT IN AN ORGANIZED HEALTH CARE EDUCATION/TRAINING PROGRAM

## 2020-09-03 PROCEDURE — 74011250636 HC RX REV CODE- 250/636: Performed by: STUDENT IN AN ORGANIZED HEALTH CARE EDUCATION/TRAINING PROGRAM

## 2020-09-03 RX ORDER — AMLODIPINE BESYLATE 10 MG/1
10 TABLET ORAL DAILY
Qty: 30 TAB | Refills: 1 | Status: SHIPPED | OUTPATIENT
Start: 2020-09-04 | End: 2020-11-30

## 2020-09-03 RX ORDER — METOCLOPRAMIDE 10 MG/1
10 TABLET ORAL
Qty: 40 TAB | Refills: 0 | Status: SHIPPED | OUTPATIENT
Start: 2020-09-03 | End: 2020-09-13

## 2020-09-03 RX ADMIN — AMLODIPINE BESYLATE 10 MG: 5 TABLET ORAL at 08:35

## 2020-09-03 RX ADMIN — METOCLOPRAMIDE 10 MG: 10 TABLET ORAL at 12:18

## 2020-09-03 RX ADMIN — CYCLOBENZAPRINE HYDROCHLORIDE 5 MG: 10 TABLET, FILM COATED ORAL at 00:13

## 2020-09-03 RX ADMIN — ENOXAPARIN SODIUM 40 MG: 100 INJECTION SUBCUTANEOUS at 08:35

## 2020-09-03 RX ADMIN — LABETALOL HYDROCHLORIDE 100 MG: 100 TABLET, FILM COATED ORAL at 08:35

## 2020-09-03 RX ADMIN — PANTOPRAZOLE SODIUM 40 MG: 40 TABLET, DELAYED RELEASE ORAL at 08:35

## 2020-09-03 RX ADMIN — MORPHINE SULFATE 10 MG: 20 SOLUTION ORAL at 05:41

## 2020-09-03 RX ADMIN — METOCLOPRAMIDE 10 MG: 10 TABLET ORAL at 08:35

## 2020-09-03 NOTE — PROGRESS NOTES
Bedside shift report received from [facility] Rn.pt sleeping at this time,call bell in reach.

## 2020-09-03 NOTE — PROGRESS NOTES
Hospitalist Progress Note    NAME: Kimo Schaefer   :  1975   MRN:  [id number]   Room Number:  MZE6/57  @ [address] [place] [address] Summary: 54 y.o. female whom presented on 2020 with      Assessment / Plan:        # Hyperemesis - resolved   - 2/2 suspected cannabis induced   - CT abdomen/pelvis: gastritis,  Mild colitis of the descending colon, appendicolith w/o appendicitis. Mild sigmoid diverticulosis w/o diverticulitis   - On metoclopramide  10 mg TID, compazine 5 mg  PO Q6 PRN and Promethazine 25 mg Q6 PRN    - Required compazine last night   - on PPI  daily      # Hypertensive Emergency POA- resolved   - /97 this am   - on amlodipine and labetalol   - UDS + cocaine in the ED      # Elevated lactic acid POA   - resolved      # Sinus tachycardia  - TSH 0.93  19   - 2/2 hypovolemia + stimulant use -  improved w/ hydration      # MSK pain   - on PRN oxycodon      # Polysubstance abuse   - Cocane . MJ   - will discuss social options      # Polycystic kidney disease  - CT A/p 20: Multiple bilateral cysts   - Follow with Nephrology outpatient      # Plan   - discharge   - Follow up PCP              Code status: Full  Prophylaxis: Lovenox  Recommended Disposition: Home w/Family     Subjective:     Chief Complaint / Reason for Physician Visit  Yasir Diane     Review of Systems:  No fevers, chills, appetite change, cough, sputum production, shortness of breath, dyspnea on exertion, nausea, vomitting, diarrhea, constipation, chest pain, leg edema, abdominal pain, joint pain, rash, itching. Tolerating PT/OT. Tolerating diet. Objective:     VITALS:   Last 24hrs VS reviewed since prior progress note.  Most recent are:  Patient Vitals for the past 24 hrs:   Temp Pulse Resp BP SpO2   20 0400 97.6 °F (36.4 °C) 72 16 128/90    20 0009 97.5 °F (36.4 °C) 78 18 135/84 98 %   20 97.3 °F (36.3 °C) 79 16 (!) 136/94 98 %   20 1900  82 15 (!) 145/96    09/02/20 1800  80 13 (!) 133/94    09/02/20 1700  94 18 (!) 136/93    09/02/20 1600 98.1 °F (36.7 °C) 80 17 115/67 98 %   09/02/20 1224  74 13     09/02/20 1222  78 12     09/02/20 1221  83 18     09/02/20 1220  79 14     09/02/20 1219  82 17     09/02/20 1218  75 16     09/02/20 1217  78 16     09/02/20 1216  86 20     09/02/20 1215  80 17     09/02/20 1214  88 28     09/02/20 1213  77 19     09/02/20 1212  77 23     09/02/20 1211  80 18     09/02/20 1210  74 17     09/02/20 1209  75 17     09/02/20 1208  74 19     09/02/20 1207  74 18     09/02/20 1206  76 21     09/02/20 1205  75 20     09/02/20 1204  77 20     09/02/20 1203  74 17     09/02/20 1202  74 19     09/02/20 1201  74 17     09/02/20 1200 97.6 °F (36.4 °C) 74 19 (!) 143/104    09/02/20 1159  75 23     09/02/20 1158  76 21     09/02/20 1157  94 25     09/02/20 1156  78 23     09/02/20 1155  79 16     09/02/20 1154  83 17     09/02/20 1153  84 18     09/02/20 1152  94 20     09/02/20 1151  91 16     09/02/20 1150  (!) 106 28     09/02/20 1149  (!) 101 23     09/02/20 1148  81 17     09/02/20 1147  80 19     09/02/20 1146  85 23     09/02/20 1145  82 21     09/02/20 1144  79 17     09/02/20 1143  77 23     09/02/20 1142  79 19     09/02/20 1141  78 18     09/02/20 1140  79 18     09/02/20 1139  89 26     09/02/20 1138  88 14     09/02/20 1137  82 16     09/02/20 1136  80 13     09/02/20 1135  82 14     09/02/20 1134  85 15     09/02/20 1133  95 20     09/02/20 1132  85 24     09/02/20 1131  84 14     09/02/20 1130  84 13     09/02/20 1129  82 15     09/02/20 1128  79 15     09/02/20 1127  79 19     09/02/20 1126  77 20     09/02/20 1125  84 22     09/02/20 1124  85 16     09/02/20 1123  83 17     09/02/20 1122  82 15     09/02/20 1121  86 21     09/02/20 1120  85 19     09/02/20 1119  83 22     09/02/20 1118  77 17     09/02/20 1117  79 21     09/02/20 1116  83 25     09/02/20 1115  85 20     09/02/20 1114  86 20     09/02/20 1113  76 12     09/02/20 1112  88 17     09/02/20 1111  82 17     09/02/20 1110  82 18     09/02/20 1109  79 16     09/02/20 1108  78 18     09/02/20 1107  78 18     09/02/20 1106  87 17     09/02/20 1105  83 21     09/02/20 1104  82 16     09/02/20 1103  86 20     09/02/20 1102  83 16     09/02/20 1101  94 26     09/02/20 1100  (!) 103 26 129/90    09/02/20 1059  93 20     09/02/20 1058  92 25     09/02/20 1057  90 27     09/02/20 1056  91 22     09/02/20 1055  99 19     09/02/20 1054  89 26     09/02/20 1053  85 28     09/02/20 1052  81 15     09/02/20 1051  81 17     09/02/20 1050  75 21     09/02/20 1049  76 21     09/02/20 1048  81 20     09/02/20 1047  82 23     09/02/20 1046  91 18     09/02/20 1045  79 12     09/02/20 1044  90 19     09/02/20 1043  77 17     09/02/20 1042  85 26     09/02/20 1041  77 14     09/02/20 1040  79 20     09/02/20 1039  82 11     09/02/20 1038  79 17     09/02/20 1037  83 24     09/02/20 1036  81 21     09/02/20 1035  78 16     09/02/20 1034  84 16     09/02/20 1033  84 18     09/02/20 1032  78 16     09/02/20 1031  93 19     09/02/20 1030  79 15     09/02/20 1029  91 (!) 41     09/02/20 1024  85 23     09/02/20 1022  85 19     09/02/20 1021  89 23     09/02/20 1020  79 21     09/02/20 1019  75 19     09/02/20 1018  76 16     09/02/20 1017  75 16     09/02/20 1016  77 24     09/02/20 1015  78 16     09/02/20 1014  82 19     09/02/20 1013  81 20     09/02/20 1012  78 14     09/02/20 1011  82 23     09/02/20 1010  84 19     09/02/20 1009  84 16     09/02/20 1008  88 16     09/02/20 1007  96 15     09/02/20 [address]   09/02/20 1005  85 19     09/02/20 1004  83 23     09/02/20 1003  83 25     09/02/20 1002  83 22     09/02/20 1001  80 22     09/02/20 1000  78 15 119/87    09/02/20 0959  86 21     09/02/20 0958  81 18     09/02/20 0957  88 18     09/02/20 0956  80 12     09/02/20 0955  82 18     09/02/20 0954  82 17     09/02/20 0953  82 22     09/02/20 0952  85 21     09/02/20 0951  87 17     09/02/20 0950  87 22     09/02/20 0949  83 16     09/02/20 0948  82 16     09/02/20 0947  82 18     09/02/20 0946  82 16     09/02/20 0945  81 15     09/02/20 0944  83 20     09/02/20 0943  84 16     09/02/20 0942  88 19     09/02/20 0941  85 21     09/02/20 0940  85 18     09/02/20 0939  83 15     09/02/20 0938  82 15     09/02/20 0937  81 18     09/02/20 0936  82 15     09/02/20 0935  82 12     09/02/20 0934  80 18     09/02/20 0933  81 21     09/02/20 0932  84 18     09/02/20 0931  81 17     09/02/20 0930  84 17     09/02/20 0929  84 21     09/02/20 0928  93 22     09/02/20 0927  84 13     09/02/20 0926  81 18     09/02/20 0925  81 11     09/02/20 0924  82 18     09/02/20 0923  80 19     09/02/20 0922  92 28     09/02/20 0921  76 20     09/02/20 0920  76 15     09/02/20 0919  79 22     09/02/20 0918  77 15     09/02/20 0917  80 17     09/02/20 0916  79 19     09/02/20 0915  81 18     09/02/20 0914  81 17     09/02/20 0913  80 16     09/02/20 0912  87 18     09/02/20 0911  81 18     09/02/20 0910  79 18     09/02/20 0909  75 14     09/02/20 0908  75 17     09/02/20 0907  77 17     09/02/20 0906  80 19     09/02/20 0905  77 17     09/02/20 0904  76 19     09/02/20 0903  80 19     09/02/20 0902  77 18     09/02/20 0901  79 27     09/02/20 0900  79 19 (!) 115/100    09/02/20 0859  79 19     09/02/20 0858  83 21     09/02/20 0857  82 18     09/02/20 0856  82 15     09/02/20 0855  85 21     09/02/20 0854  89 19     09/02/20 0853  96 21     09/02/20 0852  89 20     09/02/20 0851  86 18     09/02/20 0850  76 17     09/02/20 0849  80 18     09/02/20 0848  83 17     09/02/20 0847  83 15     09/02/20 0846  94 19     09/02/20 0845  96 19     09/02/20 0844  96 25     09/02/20 0843  93 20     09/02/20 0842  (!) 106 24     09/02/20 0841  88 20     09/02/20 0840  84 16     09/02/20 0839  85 21     09/02/20 0838  81 13     09/02/20 0837  82 15     09/02/20 0836  95 19     09/02/20 0835  87 18 120/90        Intake/Output Summary (Last 24 hours) at 9/3/2020 0827  Last data filed at 9/2/2020 2216  Gross per 24 hour   Intake 540 ml   Output 2 ml   Net 538 ml        PHYSICAL EXAM:  General: WD, WN. Alert, cooperative, no acute distress    EENT:  EOMI. Anicteric sclerae. MMM  Resp:  CTA bilaterally, no wheezing or rales. No accessory muscle use  CV:  Regular  rhythm,  normal S1/S2, no murmurs rubs gallops, No edema  GI:  Soft, Non distended, Non tender. +Bowel sounds  Neurologic:  Alert and oriented X 3, normal speech,   Psych:   Good insight. Not anxious nor agitated  Skin:  No rashes. No jaundice    Reviewed most current lab test results and cultures  YES  Reviewed most current radiology test results   YES  Review and summation of old records today    NO  Reviewed patient's current orders and MAR    YES  PMH/SH reviewed - no change compared to H&P  ________________________________________________________________________  Care Plan discussed with:    Comments   Patient     Family      RN     Care Manager     Consultant                        Multidiciplinary team rounds were held today with , nursing, pharmacist and clinical coordinator. Patient's plan of care was discussed; medications were reviewed and discharge planning was addressed. ________________________________________________________________________  Total NON critical care TIME:  20   Minutes    Total CRITICAL CARE TIME Spent:   Minutes non procedure based      Comments   >50% of visit spent in counseling and coordination of care     ________________________________________________________________________  Gus Colby MD     Procedures: see electronic medical records for all procedures/Xrays and details which were not copied into this note but were reviewed prior to creation of Plan. LABS:  I reviewed today's most current labs and imaging studies.   Pertinent labs include:  Recent Labs     09/02/20 0319 09/01/20 0534 08/31/20  1220   WBC 7.6 9.4 9.1   HGB 13.7 13.3 14.2   HCT 39.4 38.2 40.6    322 348     Recent Labs     09/02/20 0319 09/01/20  1809 09/01/20 0534 08/31/20  1220    136 138 140   K 3.4* 3.9 2.9* 3.7    99 102 103   CO2 24 24 22 23   * 115* 130* 131*   BUN 9 7 10 16   CREA 1.10* 0.91 1.08* 1.22*   CA 9.7 9.6 9.1 9.8   MG 2.5* 2.4 1.5*  --    PHOS 3.3 2.9 3.3  --    ALB  --   --   --  4.0   TBILI  --   --   --  0.3   ALT  --   --   --  33       Signed: Gus Colby MD

## 2020-09-03 NOTE — DISCHARGE SUMMARY
Hospitalist Discharge Summary     Patient ID:  Jim Roy  [id number]  80 y.o.  1975    PCP on record: Archana Menjivar MD    Admit date: 8/31/2020  Discharge date and time: 9/3/2020      Admission Diagnoses: Intractable nausea and vomiting [R11.2]    Discharge Diagnoses:    Principal Problem:    Intractable nausea and vomiting (8/31/2020)           Hospital Course:     # Hyperemesis - resolved   - 2/2 suspected cannabis induced   - CT abdomen/pelvis: gastritis,  Mild colitis of the descending colon, appendicolith w/o appendicitis. Mild sigmoid diverticulosis w/o diverticulitis   - patient sent home on metoclopramide 10 mg Q6 for 10 days before she sees PCP along with PPI.      # Hypertensive Emergency POA- resolved   - /97 this am   - on amlodipine and labetalol in the hospital   - UDS + cocaine in the ED   - patient discharged on amlodipine and home dose of metoprolol      # Elevated lactic acid POA   - resolved      # Sinus tachycardia  - TSH 0.93 9/24 19   - 2/2 hypovolemia + stimulant use -  improved w/ hydration      # MSK pain   - patient stated she would use tylenol at home      # Polysubstance abuse   - Cocane . MJ   - counseled         # Polycystic kidney disease  - CT A/p 8/31/20: Multiple bilateral cysts   - Follow with Nephrology outpatient      CONSULTATIONS:  None    Excerpted HPI from H&P of Deandra Valdes MD:  Jim Roy is a 54 y.o.  female with PMH of HTN, Polycystic kidney disease who presents to ED with c/o nausea, vomiting, abdominal cramping.      Patient woke up this morning with severe nausea, nonbloody nonbilious emesis, generalized crampy abdominal pain with inability to to tolerate food or water prompting presentation to the ER. Patient has had such episodes before, was admitted last year around the same time with cannabis hyperemesis syndrome which resolved with supportive management.   She was supposed to follow-up with gastroenterology as outpatient but did not do so. She has had an EGD in the past that showed gastritis. She usually takes metoprolol at home and was unable to take it due to severe nausea and vomiting. Reports throbbing central 7/10 in intensity nonradiating chest pain that improves with pain medication worsens with retching. Also describes crampy upper back pain, 7/10 intensity, nonradiating, worsening with retching.     Received 4 mg IV Ondansetron x 4 and Metoclopramide 10 mg IV x 1 in the ER with 30 mg IV Ketorolac and 4 mg IV morphine. Symptoms did not improve and patient continues to have intractable N/V and admitted to hospital for hyperemesis and hypertensive emergency.      Reports ongoing marijuana use, she is surprised that her U tox was positive for cocaine, denies using cocaine.     We were asked to admit for work up and evaluation of the above problems.        ______________________________________________________________________  DISCHARGE SUMMARY/HOSPITAL COURSE:  for full details see H&P, daily progress notes, labs, consult notes. _______________________________________________________________________  Patient seen and examined by me on discharge day. Pertinent Findings:  Gen:    Not in distress  Chest: Clear lungs  CVS:   Regular rhythm. No edema  Abd:  Soft, not distended, not tender  Neuro:  Alert with good insight. Oriented to person, place, and time   _______________________________________________________________________  DISCHARGE MEDICATIONS:   Current Discharge Medication List          My Recommended Diet, Activity, Wound Care, and follow-up labs are listed in the patient's Discharge Insturctions which I have personally completed and reviewed.     _______________________________________________________________________  DISPOSITION:     Home with Family: x   Home with HH/PT/OT/RN:    SNF/LTC:    MEME:    OTHER:        Condition at Discharge: Stable  _______________________________________________________________________  Follow up with:   PCP : Radha Araiza MD  Follow-up Information     Follow up With Specialties Details Why Contact Info    Radha Araiza MD Internal Medicine On 9/22/2020 Your appointment time is 0830, OFFICE WILL CALL 15 MIN PRIOR TO ANYA., THIS IS A VIRTUAL APPOINTMENT. [address] [place] [address] Dr. Joselo Christopher MD Nephrology On 9/17/2020 Your appointment time is 10:45am.  This will be an in person appointment.  , Bring ins card, picture id, and discharge papers, Please keep this appointment, Please arrive 15 minutes early. Pam [address]  [phone]      [facility] On 9/4/2020 They will call you tomorrow morning in order to set up a time to visit.  [address] [place] [address] Av              Total time in minutes spent coordinating this discharge (includes going over instructions, follow-up, prescriptions, and preparing report for sign off to her PCP) :  30 minutes    Signed:  Eri Jama MD

## 2020-09-03 NOTE — PROGRESS NOTES
Pt discussed earlier having had a headache throughout the head, with some back pain, but trying to bare it without complaint to facilitate being closer to going home. Pt encouraged to report symptoms promptly to verify it they are detrimental to her health or simply treatable. Encouraged to take Tylenol, which was ultimately administered.

## 2020-09-03 NOTE — PROGRESS NOTES
Pt discharge home. AVS discharge instruction reviewed with pt.prescription medicine sent to pt preferred pharmacy. pt received written instruction regarding her medicine with verbal feedback. pt awake and alert with steady gait. pt has all her belonging with her. pt off the floor. pt going home using hospital provide courtesy sina.

## 2020-09-03 NOTE — DISCHARGE INSTRUCTIONS
- Your Blood pressure was elevated, we have starting you on new medication called amlodipine   - Please check your Blood pressure daily   - Please follow up PCP and your nephrologist   - we have given you anti emetic for 10 days before you can see your PCP yo see if there is further need

## 2020-09-03 NOTE — PROGRESS NOTES
YASMINE-  IDT met to discuss plans and progress. Patient is scheduled for d/c home today. She has a ride at 5pm but states that she would like to go home sooner if possible. CM called [facility] to arrange for follow-up visit. [facility]    Specialty: Urgent Care    [address]   [address] [place]   [phone]    They will call you tomorrow morning in order to set up a time to visit. Follow up with Wisam Bianchi MD on 9/17/2020    Specialty: Nephrology    [email or web address]   [address]   [phone]    Your appointment time is 10:45am.  This will be an in person appointment.  , Bring ins card, picture id, and discharge papers, Please keep this appointment, Please arrive 15 minutes early. Follow up with Kallie Richter MD on 9/22/2020    Specialty: Internal Medicine    [address] [place] [address]   [phone]    Your appointment time is 0830, OFFICE WILL CALL 15 MIN PRIOR TO ANYA., THIS IS A VIRTUAL APPOINTMENT. Care Management Interventions  PCP Verified by CM: Yes  Mode of Transport at Discharge: Self  Transition of Care Consult (CM Consult): Discharge Planning  Health Maintenance Reviewed: Yes  Current Support Network: Lives Alone  Confirm Follow Up Transport: Self  The Plan for Transition of Care is Related to the Following Treatment Goals :  Follow-Up appointments  The Patient and/or Patient Representative was Provided with a Choice of Provider and Agrees with the Discharge Plan?: Yes  Name of the Patient Representative Who was Provided with a Choice of Provider and Agrees with the Discharge Plan: Patient  Freedom of Choice List was Provided with Basic Dialogue that Supports the Patient's Individualized Plan of Care/Goals, Treatment Preferences and Shares the Quality Data Associated with the Providers?: Yes  Discharge Location  Discharge Placement: Home with outpatient services [phone] Patient ask for assistance with ride home. CM arranged transportation home through [address]. Notified floor of time for .       Rashard Varner, BSW/FRAN  [phone]

## 2020-09-03 NOTE — ROUTINE PROCESS
Tylenol was very effective in treating pt's headache and partially useful in treating back pain. Pt tolerating oral medications, despite continued sense of nausea; while still poor, she notes improved poor intake.

## 2020-09-03 NOTE — PROGRESS NOTES
Pt sleeping. Continues to be NSR per cardiac telemetry. Blood pressures have been under control overnight--no PRN meds needed.

## 2020-09-04 ENCOUNTER — PATIENT OUTREACH (OUTPATIENT)
Dept: CASE MANAGEMENT | Age: 45
End: 2020-09-04

## 2020-09-04 NOTE — PROGRESS NOTES
9/4/20 - Care transitions     John Rincon, RN, [facility], [facility]  Care transitions nurse [phone]  [facility] Coordination Team    Attempt 1 to reach pt - post discharge [facility] 8/31-9/3 for intractable vomiting - gastritis, colitis, hypertensive urgency, with hx of polycystic kidney disease. LM - request return call. Provider  Angelita De Los Santos    9/11/2020 2:00 PM  [facility] AT [facility]  BS AMB    9/22/2020 8:30 AM  Claud Goes, NP [facility] Boas  BS AMB      Dr. Sandra Robles appt 9/17 10:45 in person.   Dr. Liz Starkey appt 9/22 8:30 - virtual       8/31/2020  2:35 PM - Balwinder, [facility]     Component  Value  Flag  Ref Range  Units  Status    AMPHETAMINES  Negative   NEG     Final    BARBITURATES  Negative   NEG     Final    BENZODIAZEPINES  Negative   NEG     Final    COCAINE  Positive  Abnormal    NEG     Final    METHADONE  Negative   NEG     Final    OPIATES  Positive  Abnormal    NEG     Final    PCP(PHENCYCLIDINE)  Negative   NEG     Final    THC (TH-CANNABINOL)  Positive  Abnormal    NEG     Final    Drug screen comment  (NOTE)     Final      Component  Value  Flag  Ref Range  Units  Status    Pregnancy test,urine (POC)         Valaria Graven

## 2020-10-06 ENCOUNTER — VIRTUAL VISIT (OUTPATIENT)
Dept: INTERNAL MEDICINE CLINIC | Age: 45
End: 2020-10-06
Payer: COMMERCIAL

## 2020-10-06 DIAGNOSIS — R11.15 NON-INTRACTABLE CYCLICAL VOMITING WITH NAUSEA: Primary | ICD-10-CM

## 2020-10-06 DIAGNOSIS — I10 ESSENTIAL HYPERTENSION: ICD-10-CM

## 2020-10-06 DIAGNOSIS — F12.10 CANNABIS ABUSE: ICD-10-CM

## 2020-10-06 PROCEDURE — 99214 OFFICE O/P EST MOD 30 MIN: CPT | Performed by: NURSE PRACTITIONER

## 2020-10-06 RX ORDER — METOPROLOL TARTRATE 50 MG/1
50 TABLET ORAL 2 TIMES DAILY
Qty: 60 TAB | Refills: 2 | Status: SHIPPED | OUTPATIENT
Start: 2020-10-06 | End: 2021-05-14 | Stop reason: SDUPTHER

## 2020-10-06 RX ORDER — METOCLOPRAMIDE 10 MG/1
TABLET ORAL
COMMUNITY
End: 2020-10-06 | Stop reason: ALTCHOICE

## 2020-10-06 RX ORDER — CETIRIZINE HYDROCHLORIDE 10 MG/1
CAPSULE, LIQUID FILLED ORAL
COMMUNITY
Start: 2019-09-30 | End: 2020-11-11

## 2020-10-06 NOTE — PATIENT INSTRUCTIONS
Metoprolol (By mouth) Metoprolol (met-oh-PROE-lol) Treats high blood pressure, angina (chest pain), and heart failure. May lower the risk of death after a heart attack. This medicine is a beta-blocker. Brand Name(s): Lopressor, Toprol XL There may be other brand names for this medicine. When This Medicine Should Not Be Used: This medicine is not right for everyone. Do not use if you had an allergic reaction to metoprolol or similar medicines. Do not use this medicine if you have certain blood circulation or heart problems. Ask your doctor about these problems. How to Use This Medicine:  
Tablet, Long Acting Tablet · Take your medicine as directed. Your dose may need to be changed several times to find what works best for you. · Take this medicine with a meal or right after a meal. Take this medicine the same way every day, at the same time. · Swallow the tablet whole with a glass of water. You may break the extended-release tablet in half, but do not chew or crush it. · Missed dose: Take a dose as soon as you remember. If it is almost time for your next dose, wait until then and take a regular dose. Do not take extra medicine to make up for a missed dose. · Store the medicine in a closed container at room temperature, away from heat, moisture, and direct light. Drugs and Foods to Avoid: Ask your doctor or pharmacist before using any other medicine, including over-the-counter medicines, vitamins, and herbal products. · Some medicines can affect how metoprolol works. Tell your doctor if you are taking any of the following: ¨ Digoxin, dipyridamole, hydralazine, hydroxychloroquine, methyldopa, quinidine ¨ Medicine to treat depression (such as bupropion, clomipramine, desipramine, fluoxetine, fluvoxamine, paroxetine, sertraline), medicine to treat mental illness (such as chlorpromazine, fluphenazine, haloperidol, thioridazine), medicine for heart rhythm problems (such as propafenone), HIV/AIDS medicine (such as ritonavir), medicine to treat a fungus infection (such as terbinafine), a monoamine oxidase inhibitor (MAOI), an ergot medicine for headaches, a calcium channel blocker (such as amlodipine, diltiazem, verapamil), or an alpha blocker (such as clonidine, prazosin, reserpine, guanethidine) Warnings While Using This Medicine: · Tell your doctor if you are pregnant or breastfeeding, or if you have blood vessel, heart, or circulation problems (such as heart failure, rhythm problems, or slow heartbeat). Tell your doctor if you have kidney disease, liver disease, diabetes, lung disease (such as asthma), an overactive thyroid, or a history of allergies. · This medicine may cause worse symptoms of heart failure while the dose is being adjusted. · Do not stop using this medicine suddenly. Your doctor will need to slowly decrease your dose before you stop it completely. · Tell any doctor or dentist who treats you that you are using this medicine. You may need to stop using this medicine several days before you have surgery or medical tests. · This medicine could lower your blood pressure too much, especially when you first use it or if you are dehydrated. Stand or sit up slowly if you feel lightheaded or dizzy. · This medicine may make you dizzy or drowsy. Do not drive or do anything else that could be dangerous until you know how this medicine affects you. · Your doctor will check your progress and the effects of this medicine at regular visits. Keep all appointments. · Keep all medicine out of the reach of children. Never share your medicine with anyone. Possible Side Effects While Using This Medicine:  
Call your doctor right away if you notice any of these side effects: · Allergic reaction: Itching or hives, swelling in your face or hands, swelling or tingling in your mouth or throat, chest tightness, trouble breathing · Lightheadedness, dizziness, or fainting · Slow heartbeat · Swelling in your hands, ankles, or feet, trouble breathing, tiredness · Worsening chest pain If you notice these less serious side effects, talk with your doctor: · Diarrhea · Mild dizziness or tiredness If you notice other side effects that you think are caused by this medicine, tell your doctor. Call your doctor for medical advice about side effects. You may report side effects to FDA at 2-246-FDA-1811 © [facility] Information is for End User's use only and may not be sold, redistributed or otherwise used for commercial purposes. The above information is an  only. It is not intended as medical advice for individual conditions or treatments. Talk to your doctor, nurse or pharmacist before following any medical regimen to see if it is safe and effective for you.

## 2020-10-06 NOTE — PROGRESS NOTES
Shann Siemens is a 54 y.o. female who was seen by synchronous (real-time) audio-video technology on 10/6/2020. Consent: Shann Siemens, who was seen by synchronous (real-time) audio-video technology, and/or her healthcare decision maker, is aware that this patient-initiated, Telehealth encounter on 10/6/2020 is a billable service, with coverage as determined by her insurance carrier. She is aware that she may receive a bill and has provided verbal consent to proceed: Yes. Assessment & Plan:   Diagnoses and all orders for this visit:    1. Non-intractable cyclical vomiting with nausea    2. Cannabis abuse    3. Essential hypertension    Other orders  -     metoprolol tartrate (LOPRESSOR) 50 mg tablet; Take 1 Tab by mouth two (2) times a day. Check PULSE before taking, must be over 60. Follow-up and Dispositions    · Return in about 4 weeks (around 11/3/2020) for OV-with DR. LALA, HTN med change. (If no visits on 11/3, she can come in anytime 3p and after). Discussed with pt INCREASING Metoprolol to 50 mg BID (review of pulse check over 60 BEFORE taking), using amlodipine at night to avoid dizziness SE reported. Try to abstain from cannabis use and F/UP with GI. Subjective:   Shann Siemens is a 54 y.o. female who was seen for Transitions Of Care (8/31/2020 for nausea and vomiting. . DOXY. -[phone])      Pt is here for hospital follow-up transition of care from 8/31 to 9/3 for intractable N & V. Contacted or attempted contact within two days of d/c by NN. Diagnosed with cyclical cannabis induced vomiting. Was given reglan. Instructed to schedule appt with nephrology, PCP, & GI. Reports feeling BETTER THAN when in hospital. No further episodes of N/V or abd pain. Seen by nephrology since stay, but not GI.      Hypertension Review:  The patient has hypertension  Diet and Lifestyle: generally does try to follow a  low sodium diet, exercises sporadically   Home BP Monitoring: is measured at home, 170/110's \"good\"  Pertinent ROS: taking medications as instructed: Metoprolol 25 mg BID and amlodipine. Intermittent dizziness when amlodipine taken. No TIA's, chest pain on exertion, dyspnea on exertion, or swelling of ankles. BP Readings from Last 3 Encounters:   09/03/20 (!) 136/93   03/12/20 130/90   09/30/19 126/85         Prior to Admission medications    Medication Sig Start Date End Date Taking? Authorizing Provider   Cetirizine (ZyrTEC) 10 mg cap Zyrtec 10 mg capsule   take 1 capsule by oral route daily 9/30/19  Yes Provider, Historical   metoprolol tartrate (LOPRESSOR) 50 mg tablet Take 1 Tab by mouth two (2) times a day. Check PULSE before taking, must be over 60. 10/6/20  Yes Sky David, NP   amLODIPine (NORVASC) 10 mg tablet Take 1 Tab by mouth daily. 9/4/20  Yes Sandy Gee MD   metoclopramide HCl (REGLAN) 10 mg tablet metoclopramide 10 mg tablet   take 1 tablet QID  10/6/20  Provider, Historical   metoprolol tartrate (LOPRESSOR) 25 mg tablet Take 25 mg by mouth two (2) times a day. 10/6/20  Provider, Historical   esomeprazole (NEXIUM 24HR) 20 mg capsule Take 1 Cap by mouth daily.  11/15/18 10/6/20  Sariah Scott MD     No Known Allergies    Patient Active Problem List   Diagnosis Code    Essential hypertension, malignant I10    Cannabis abuse F12.10    Tobacco abuse Z72.0    Polycystic kidney disease, autosomal dominant Q61.2    Hypokalemia E87.6    Essential hypertension I10    Hidradenitis suppurativa L73.2    Hyperemesis R11.10    S/P laparoscopic cholecystectomy Z90.49    Exophthalmos H05.20    Acute pancreatitis K85.90    Hypokalemia due to loss of potassium E87.6    Intractable nausea and vomiting R11.2    Cocaine use F14.90     Patient Active Problem List    Diagnosis Date Noted    Tobacco abuse 07/18/2013     Priority: 2 - Two     Class: Chronic    Cocaine use 09/01/2020    Intractable nausea and vomiting 08/31/2020    Hypokalemia due to loss of potassium 09/24/2019    Acute pancreatitis 08/28/2019    Exophthalmos 01/02/2019    S/P laparoscopic cholecystectomy 09/21/2018    Hyperemesis 09/09/2018    Essential hypertension 05/08/2018    Hidradenitis suppurativa 05/08/2018    Hypokalemia 11/22/2013    Polycystic kidney disease, autosomal dominant 07/19/2013     Class: Present on Admission    Cannabis abuse 07/18/2013     Class: Chronic    Essential hypertension, malignant 04/13/2012     Current Outpatient Medications   Medication Sig Dispense Refill    Cetirizine (ZyrTEC) 10 mg cap Zyrtec 10 mg capsule   take 1 capsule by oral route daily      metoprolol tartrate (LOPRESSOR) 50 mg tablet Take 1 Tab by mouth two (2) times a day. Check PULSE before taking, must be over 60. 60 Tab 2    amLODIPine (NORVASC) 10 mg tablet Take 1 Tab by mouth daily. 30 Tab 1     No Known Allergies  Past Medical History:   Diagnosis Date    Hypertension     Kidney anomaly, congenital     Tubal pregnancy      Past Surgical History:   Procedure Laterality Date    HX CHOLECYSTECTOMY      HX GYN  10/07/2013    cyst removed    HX HYSTERECTOMY  10/7/2013    partial hysterectomy     Family History   Problem Relation Age of Onset    Hypertension Mother     Hypertension Father     Hypertension Sister      Social History     Tobacco Use    Smoking status: Current Some Day Smoker     Packs/day: 0.25     Years: 25.00     Pack years: 6.25    Smokeless tobacco: Never Used    Tobacco comment: 1-3 day   Substance Use Topics    Alcohol use: Yes     Comment: occasionally/socially       Review of Systems   Constitutional: Negative for fever and malaise/fatigue. Eyes: Negative for blurred vision. Respiratory: Negative for cough and shortness of breath. Cardiovascular: Negative for chest pain and leg swelling. Neurological: Negative for dizziness, weakness and headaches.      Objective:   Vital Signs: (As obtained by patient/caregiver at home)  There were no vitals taken for this visit. Physical Exam:  General appearance - alert, well appearing, and in no distress. Mental status - A/O x 4,normal mood and affect. Eyes- no periorbital edema, drainage, or irritation noted. Nose- no obvious drainage or swelling. Throat- no obvious swelling, goiter, or notable lymphadenopathy  Chest - Symmetric chest rise. No wheezing or coughing. No distress. Skin- normal skin tone noted. No hyperpigmentation or obvious deformities. No diaphoresis noted. No flushing. Neuro - Normal speech, no focal findings or movement disorder. Other pertinent observable physical exam findings:-        We discussed the expected course, resolution and complications of the diagnosis(es) in detail. Medication risks, benefits, costs, interactions, and alternatives were discussed as indicated. I advised her to contact the office if her condition worsens, changes or fails to improve as anticipated. She expressed understanding with the diagnosis(es) and plan. Fausto Hoyos is a 54 y.o. female who was evaluated by a video visit encounter for concerns as above. Patient identification was verified prior to start of the visit. A caregiver was present when appropriate. Due to this being a TeleHealth encounter (During ZUNRM-40 public health emergency), evaluation of the following organ systems was limited: Vitals/Constitutional/EENT/Resp/CV/GI//MS/Neuro/Skin/Heme-Lymph-Imm. Pursuant to the emergency declaration under the [facility] waiver authority and the [employer] Act, this Virtual  Visit was conducted, with patient's (and/or legal guardian's) consent, to reduce the patient's risk of exposure to COVID-19 and provide necessary medical care. Services were provided through a video synchronous discussion virtually to substitute for in-person clinic visit.    Patient and provider were located at their individual homes.       Dorina Alvarez NP

## 2020-10-06 NOTE — PROGRESS NOTES
Pt is here for   Chief Complaint   Patient presents with    Transitions Of Care     8/31/2020 for nausea and vomiting. . CYNTHIAY. -[phone]     Pt denies pain at this time    1. Have you been to the ER, urgent care clinic since your last visit? Hospitalized since your last visit? Yes When: 8/31/2020 for nausea and vomiting     2. Have you seen or consulted any other health care providers outside of the [address] [place] [address] since your last visit? Include any pap smears or colon screening.  No

## 2020-11-09 ENCOUNTER — HOSPITAL ENCOUNTER (EMERGENCY)
Age: 45
Discharge: HOME | End: 2020-11-09
Attending: EMERGENCY MEDICINE
Payer: COMMERCIAL

## 2020-11-09 ENCOUNTER — APPOINTMENT (OUTPATIENT)
Dept: GENERAL RADIOLOGY | Age: 45
End: 2020-11-09
Attending: EMERGENCY MEDICINE
Payer: COMMERCIAL

## 2020-11-09 VITALS
OXYGEN SATURATION: 98 % | RESPIRATION RATE: 16 BRPM | HEIGHT: 61 IN | WEIGHT: 155 LBS | SYSTOLIC BLOOD PRESSURE: 138 MMHG | TEMPERATURE: 98 F | BODY MASS INDEX: 29.27 KG/M2 | DIASTOLIC BLOOD PRESSURE: 99 MMHG | HEART RATE: 99 BPM

## 2020-11-09 DIAGNOSIS — F12.188 CANNABIS HYPEREMESIS SYNDROME CONCURRENT WITH AND DUE TO CANNABIS ABUSE (HCC): ICD-10-CM

## 2020-11-09 DIAGNOSIS — K29.20 ACUTE ALCOHOLIC GASTRITIS WITHOUT HEMORRHAGE: Primary | ICD-10-CM

## 2020-11-09 DIAGNOSIS — F19.10 SUBSTANCE ABUSE (HCC): ICD-10-CM

## 2020-11-09 LAB
ALBUMIN SERPL-MCNC: 4 G/DL (ref 3.5–5)
ALBUMIN/GLOB SERPL: 0.8 {RATIO} (ref 1.1–2.2)
ALP SERPL-CCNC: 107 U/L (ref 45–117)
ALT SERPL-CCNC: 30 U/L (ref 12–78)
ANION GAP SERPL CALC-SCNC: 10 MMOL/L (ref 5–15)
AST SERPL-CCNC: 27 U/L (ref 15–37)
BASOPHILS # BLD: 0.1 K/UL (ref 0–0.1)
BASOPHILS NFR BLD: 1 % (ref 0–1)
BILIRUB SERPL-MCNC: 0.4 MG/DL (ref 0.2–1)
BUN SERPL-MCNC: 16 MG/DL (ref 6–20)
BUN/CREAT SERPL: 18 (ref 12–20)
CALCIUM SERPL-MCNC: 9.5 MG/DL (ref 8.5–10.1)
CHLORIDE SERPL-SCNC: 102 MMOL/L (ref 97–108)
CO2 SERPL-SCNC: 21 MMOL/L (ref 21–32)
CREAT SERPL-MCNC: 0.91 MG/DL (ref 0.55–1.02)
DIFFERENTIAL METHOD BLD: ABNORMAL
EOSINOPHIL # BLD: 0 K/UL (ref 0–0.4)
EOSINOPHIL NFR BLD: 0 % (ref 0–7)
ERYTHROCYTE [DISTWIDTH] IN BLOOD BY AUTOMATED COUNT: 13.4 % (ref 11.5–14.5)
GLOBULIN SER CALC-MCNC: 4.9 G/DL (ref 2–4)
GLUCOSE SERPL-MCNC: 123 MG/DL (ref 65–100)
HCT VFR BLD AUTO: 39.3 % (ref 35–47)
HGB BLD-MCNC: 13.8 G/DL (ref 11.5–16)
IMM GRANULOCYTES # BLD AUTO: 0.1 K/UL (ref 0–0.04)
IMM GRANULOCYTES NFR BLD AUTO: 1 % (ref 0–0.5)
LIPASE SERPL-CCNC: 148 U/L (ref 73–393)
LYMPHOCYTES # BLD: 1.9 K/UL (ref 0.8–3.5)
LYMPHOCYTES NFR BLD: 15 % (ref 12–49)
MCH RBC QN AUTO: 31.4 PG (ref 26–34)
MCHC RBC AUTO-ENTMCNC: 35.1 G/DL (ref 30–36.5)
MCV RBC AUTO: 89.5 FL (ref 80–99)
MONOCYTES # BLD: 0.6 K/UL (ref 0–1)
MONOCYTES NFR BLD: 4 % (ref 5–13)
NEUTS SEG # BLD: 10 K/UL (ref 1.8–8)
NEUTS SEG NFR BLD: 79 % (ref 32–75)
NRBC # BLD: 0 K/UL (ref 0–0.01)
NRBC BLD-RTO: 0 PER 100 WBC
PLATELET # BLD AUTO: 372 K/UL (ref 150–400)
PMV BLD AUTO: 9.1 FL (ref 8.9–12.9)
POTASSIUM SERPL-SCNC: 3.6 MMOL/L (ref 3.5–5.1)
PROT SERPL-MCNC: 8.9 G/DL (ref 6.4–8.2)
RBC # BLD AUTO: 4.39 M/UL (ref 3.8–5.2)
SODIUM SERPL-SCNC: 133 MMOL/L (ref 136–145)
TROPONIN I SERPL-MCNC: <0.05 NG/ML
WBC # BLD AUTO: 12.6 K/UL (ref 3.6–11)

## 2020-11-09 PROCEDURE — 71045 X-RAY EXAM CHEST 1 VIEW: CPT

## 2020-11-09 PROCEDURE — 36415 COLL VENOUS BLD VENIPUNCTURE: CPT

## 2020-11-09 PROCEDURE — 83690 ASSAY OF LIPASE: CPT

## 2020-11-09 PROCEDURE — 93005 ELECTROCARDIOGRAM TRACING: CPT

## 2020-11-09 PROCEDURE — 74011250636 HC RX REV CODE- 250/636: Performed by: EMERGENCY MEDICINE

## 2020-11-09 PROCEDURE — 96361 HYDRATE IV INFUSION ADD-ON: CPT

## 2020-11-09 PROCEDURE — 99284 EMERGENCY DEPT VISIT MOD MDM: CPT

## 2020-11-09 PROCEDURE — 96375 TX/PRO/DX INJ NEW DRUG ADDON: CPT

## 2020-11-09 PROCEDURE — 84484 ASSAY OF TROPONIN QUANT: CPT

## 2020-11-09 PROCEDURE — 74011000250 HC RX REV CODE- 250: Performed by: EMERGENCY MEDICINE

## 2020-11-09 PROCEDURE — 80053 COMPREHEN METABOLIC PANEL: CPT

## 2020-11-09 PROCEDURE — 85025 COMPLETE CBC W/AUTO DIFF WBC: CPT

## 2020-11-09 PROCEDURE — 36600 WITHDRAWAL OF ARTERIAL BLOOD: CPT

## 2020-11-09 PROCEDURE — 96374 THER/PROPH/DIAG INJ IV PUSH: CPT

## 2020-11-09 RX ORDER — SODIUM CHLORIDE 0.9 % (FLUSH) 0.9 %
5-40 SYRINGE (ML) INJECTION EVERY 8 HOURS
Status: DISCONTINUED | OUTPATIENT
Start: 2020-11-09 | End: 2020-11-09 | Stop reason: HOSPADM

## 2020-11-09 RX ORDER — HYDROMORPHONE HYDROCHLORIDE 1 MG/ML
1 INJECTION, SOLUTION INTRAMUSCULAR; INTRAVENOUS; SUBCUTANEOUS
Status: COMPLETED | OUTPATIENT
Start: 2020-11-09 | End: 2020-11-09

## 2020-11-09 RX ORDER — METOPROLOL TARTRATE 5 MG/5ML
5 INJECTION INTRAVENOUS
Status: COMPLETED | OUTPATIENT
Start: 2020-11-09 | End: 2020-11-09

## 2020-11-09 RX ORDER — ONDANSETRON 4 MG/1
4 TABLET, ORALLY DISINTEGRATING ORAL
Qty: 10 TAB | Refills: 0 | Status: SHIPPED | OUTPATIENT
Start: 2020-11-09 | End: 2020-11-11

## 2020-11-09 RX ORDER — ONDANSETRON 2 MG/ML
4 INJECTION INTRAMUSCULAR; INTRAVENOUS
Status: COMPLETED | OUTPATIENT
Start: 2020-11-09 | End: 2020-11-09

## 2020-11-09 RX ORDER — HALOPERIDOL 5 MG/ML
5 INJECTION INTRAMUSCULAR
Status: COMPLETED | OUTPATIENT
Start: 2020-11-09 | End: 2020-11-09

## 2020-11-09 RX ADMIN — HYDROMORPHONE HYDROCHLORIDE 1 MG: 1 INJECTION, SOLUTION INTRAMUSCULAR; INTRAVENOUS; SUBCUTANEOUS at 16:51

## 2020-11-09 RX ADMIN — METOPROLOL TARTRATE 5 MG: 5 INJECTION, SOLUTION INTRAVENOUS at 16:51

## 2020-11-09 RX ADMIN — ONDANSETRON 4 MG: 2 INJECTION INTRAMUSCULAR; INTRAVENOUS at 16:51

## 2020-11-09 RX ADMIN — HALOPERIDOL LACTATE 5 MG: 5 INJECTION, SOLUTION INTRAMUSCULAR at 16:54

## 2020-11-09 RX ADMIN — SODIUM CHLORIDE 1000 ML: 900 INJECTION, SOLUTION INTRAVENOUS at 16:50

## 2020-11-09 NOTE — ED NOTES
Pt here for evaluation of N/V since Friday. Pt actively vomiting during assessment. Pt is A+Ox3 clear speaking. Emergency Department Nursing Plan of Care       The Nursing Plan of Care is developed from the Nursing assessment and Emergency Department Attending provider initial evaluation. The plan of care may be reviewed in the ED Provider note.     The Plan of Care was developed with the following considerations:   Patient / Family readiness to learn indicated by:verbalized understanding  Persons(s) to be included in education: patient  Barriers to Learning/Limitations:No    Signed     Cedric Mao RN    11/9/2020   3:27 PM

## 2020-11-09 NOTE — ED TRIAGE NOTES
Abdominal pain with n/v/d x 4 days. Pt became very dizzy in triage and had near syncopal episode. Pt assisted into wheelchair and back out to waiting room.

## 2020-11-09 NOTE — ED NOTES
Pt for DC home and accepted DC data and med. Pt sts she feel much better compared to arrival.        Patient (s)  given copy of dc instructions and 1 script(s). Patient (s)  verbalized understanding of instructions and script (s). Patient given a current medication reconciliation form and verbalized understanding of their medications. Patient (s) verbalized understanding of the importance of discussing medications with  his or her physician or clinic they will be following up with. Patient alert and oriented and in no acute distress. Patient discharged home ambulatory with self.

## 2020-11-09 NOTE — ED PROVIDER NOTES
EMERGENCY DEPARTMENT HISTORY AND PHYSICAL EXAM      Date: 11/9/2020  Patient Name: Garrison Infante    History of Presenting Illness     Chief Complaint   Patient presents with    Abdominal Pain    Dizziness       History Provided By: Patient    HPI: Garrison Infante, 54 y.o. female with PMHx of HTN presents to the ED with cc of mild to moderate vomiting for a few days. PT reports associated CP, Abd pain, and back pain. Reports MJ and alcohol use. Reports inability to take HTN medication. Denies any other associated symptoms. Denies alleviating and exacerbating factors. Denies fevers, chills, fever, diarrhea, SOB. There are no other complaints, changes, or physical findings at this time. PCP: Yandel Beck MD    No current facility-administered medications on file prior to encounter. Current Outpatient Medications on File Prior to Encounter   Medication Sig Dispense Refill    Cetirizine (ZyrTEC) 10 mg cap Zyrtec 10 mg capsule   take 1 capsule by oral route daily      metoprolol tartrate (LOPRESSOR) 50 mg tablet Take 1 Tab by mouth two (2) times a day. Check PULSE before taking, must be over 60. 60 Tab 2    amLODIPine (NORVASC) 10 mg tablet Take 1 Tab by mouth daily.  27 Tab 1       Past History     Past Medical History:  Past Medical History:   Diagnosis Date    Hypertension     Kidney anomaly, congenital     Tubal pregnancy        Past Surgical History:  Past Surgical History:   Procedure Laterality Date    HX CHOLECYSTECTOMY      HX GYN  10/07/2013    cyst removed    HX HYSTERECTOMY  10/7/2013    partial hysterectomy       Family History:  Family History   Problem Relation Age of Onset    Hypertension Mother     Hypertension Father     Hypertension Sister        Social History:  Social History     Tobacco Use    Smoking status: Current Some Day Smoker     Packs/day: 0.25     Years: 25.00     Pack years: 6.25    Smokeless tobacco: Never Used    Tobacco comment: 1-3 day Substance Use Topics    Alcohol use: Yes     Comment: occasionally/socially    Drug use: Yes     Types: Marijuana     Comment: occ       Allergies:  No Known Allergies      Review of Systems   Review of Systems   Constitutional: Negative. Negative for chills, diaphoresis and fever. HENT: Negative. Negative for congestion, sore throat and trouble swallowing. Eyes: Negative. Negative for photophobia, pain and redness. Respiratory: Negative. Negative for cough, chest tightness, shortness of breath and wheezing. Cardiovascular: Negative. Negative for chest pain and palpitations. Gastrointestinal: Positive for abdominal pain, nausea and vomiting. Negative for blood in stool and diarrhea. Genitourinary: Negative for difficulty urinating, dysuria and frequency. Musculoskeletal: Positive for back pain. Negative for arthralgias, myalgias, neck pain and neck stiffness. Skin: Negative. Neurological: Negative. Negative for dizziness, tremors, seizures, syncope, speech difficulty, light-headedness and headaches. Psychiatric/Behavioral: Negative. Negative for confusion. The patient is not nervous/anxious. All other systems reviewed and are negative. Physical Exam   Physical Exam  Vitals signs and nursing note reviewed. Constitutional:       General: She is not in acute distress. HENT:      Head: Normocephalic and atraumatic. Mouth/Throat:      Mouth: Mucous membranes are moist.   Eyes:      Conjunctiva/sclera: Conjunctivae normal.      Pupils: Pupils are equal, round, and reactive to light. Neck:      Musculoskeletal: Normal range of motion. Cardiovascular:      Rate and Rhythm: Normal rate and regular rhythm. Pulses: Normal pulses. Pulmonary:      Effort: Pulmonary effort is normal. No respiratory distress. Breath sounds: Normal breath sounds. Abdominal:      General: Bowel sounds are normal. There is no distension. Palpations: Abdomen is soft. Tenderness: There is no abdominal tenderness. Musculoskeletal: Normal range of motion. Skin:     Capillary Refill: Capillary refill takes less than 2 seconds. Findings: No rash. Neurological:      General: No focal deficit present. Mental Status: She is alert and oriented to person, place, and time. Cranial Nerves: No cranial nerve deficit. Psychiatric:         Behavior: Behavior normal.         Diagnostic Study Results     Labs -     Recent Results (from the past 12 hour(s))   EKG, 12 LEAD, INITIAL    Collection Time: 11/09/20  3:55 PM   Result Value Ref Range    Ventricular Rate 109 BPM    Atrial Rate 109 BPM    P-R Interval 138 ms    QRS Duration 82 ms    Q-T Interval 342 ms    QTC Calculation (Bezet) 460 ms    Calculated P Axis 63 degrees    Calculated R Axis 91 degrees    Calculated T Axis 32 degrees    Diagnosis       Sinus tachycardia  Biatrial enlargement  Rightward axis  Abnormal ECG  When compared with ECG of 31-AUG-2020 15:19,  No significant change was found     METABOLIC PANEL, COMPREHENSIVE    Collection Time: 11/09/20  5:33 PM   Result Value Ref Range    Sodium 133 (L) 136 - 145 mmol/L    Potassium 3.6 3.5 - 5.1 mmol/L    Chloride 102 97 - 108 mmol/L    CO2 21 21 - 32 mmol/L    Anion gap 10 5 - 15 mmol/L    Glucose 123 (H) 65 - 100 mg/dL    BUN 16 6 - 20 MG/DL    Creatinine 0.91 0.55 - 1.02 MG/DL    BUN/Creatinine ratio 18 12 - 20      GFR est AA >60 >60 ml/min/1.73m2    GFR est non-AA >60 >60 ml/min/1.73m2    Calcium 9.5 8.5 - 10.1 MG/DL    Bilirubin, total 0.4 0.2 - 1.0 MG/DL    ALT (SGPT) 30 12 - 78 U/L    AST (SGOT) 27 15 - 37 U/L    Alk.  phosphatase 107 45 - 117 U/L    Protein, total 8.9 (H) 6.4 - 8.2 g/dL    Albumin 4.0 3.5 - 5.0 g/dL    Globulin 4.9 (H) 2.0 - 4.0 g/dL    A-G Ratio 0.8 (L) 1.1 - 2.2     LIPASE    Collection Time: 11/09/20  5:33 PM   Result Value Ref Range    Lipase 148 73 - 393 U/L   TROPONIN I    Collection Time: 11/09/20  5:33 PM   Result Value Ref Range Troponin-I, Qt. <0.05 <0.05 ng/mL       Radiologic Studies -   XR CHEST PORT   Final Result   IMPRESSION: No evidence of acute cardiopulmonary process. CT Results  (Last 48 hours)    None        CXR Results  (Last 48 hours)               11/09/20 1723  XR CHEST PORT Final result    Impression:  IMPRESSION: No evidence of acute cardiopulmonary process. Narrative:  INDICATION: Chest pain       COMPARISON: September 9, 2018       FINDINGS: AP portable imaging of the chest performed at 4:54 PM demonstrates a   stable cardiomediastinal silhouette. The lungs are clear bilaterally. No   significant osseous abnormalities are seen. Medical Decision Making   I am the first provider for this patient. I reviewed the vital signs, available nursing notes, past medical history, past surgical history, family history and social history. Vital Signs-Reviewed the patient's vital signs. Patient Vitals for the past 12 hrs:   Temp Pulse Resp BP SpO2   11/09/20 1651  99  (!) 187/118    11/09/20 1458 98 °F (36.7 °C) (!) 116 20 (!) 157/119 99 %       EKG interpretation: (Preliminary)  Rhythm: sinus tachycardia; and regular . Rate (approx.): 1-9; Axis: normal; NJ interval: normal; QRS interval: normal ; ST/T wave: normal; Other findings: normal.  EKG read and interpreted by Janes Regan MD     Records Reviewed: Nursing Notes and Old Medical Records    Provider Notes (Medical Decision Making):   Ddx: gastritis, acute coronitis, pancreatitis, uncontrolled HTN, substance abuse, cannabis hyperemesis syndrome    ED Course:   Initial assessment performed. The patients presenting problems have been discussed, and they are in agreement with the care plan formulated and outlined with them. I have encouraged them to ask questions as they arise throughout their visit. 6:11 PM  PT reevaluated. Pt feeling better, tolerating PO fluid and ready to be discharged home.      Critical Care Time: none      Disposition:  DISCHARGE  6:11 PM  The patient has been re-evaluated and is ready for discharge. Reviewed available results with patient. Counseled pt on diagnosis and care plan. Pt has expressed understanding, and all questions have been answered. Pt agrees with plan and agrees to follow up as recommended, or return to the ED if their symptoms worsen. Discharge instructions have been provided and explained to the pt, along with reasons to return to the ED. DISCHARGE PLAN:  1. Current Discharge Medication List        2. Follow-up Information     Follow up With Specialties Details Why Contact Info    Anastasia Sam MD Internal Medicine In 1 week  [address]  [address]  [phone]          3. Return to ED if worse     Diagnosis     Clinical Impression:   1. Acute alcoholic gastritis without hemorrhage    2. Cannabis hyperemesis syndrome concurrent with and due to cannabis abuse ([facility] [address]    3. Substance abuse ([facility] [address]        Attestations: This note is prepared by Juany Morales, acting as Scribe for Aimee Saba MD.     Aimee Saab MD. The scribe's documentation has been prepared under my direction and personally reviewed by me in its entirety. I confirm that the note above accurately reflects all work, treatment, procedures and medical decision making performed by me.

## 2020-11-10 ENCOUNTER — HOSPITAL ENCOUNTER (OUTPATIENT)
Age: 45
Setting detail: OBSERVATION
Discharge: HOME | End: 2020-11-13
Attending: EMERGENCY MEDICINE | Admitting: STUDENT IN AN ORGANIZED HEALTH CARE EDUCATION/TRAINING PROGRAM
Payer: COMMERCIAL

## 2020-11-10 ENCOUNTER — APPOINTMENT (OUTPATIENT)
Dept: CT IMAGING | Age: 45
End: 2020-11-10
Attending: NURSE PRACTITIONER
Payer: COMMERCIAL

## 2020-11-10 DIAGNOSIS — R11.2 CANNABINOID HYPEREMESIS SYNDROME: Primary | ICD-10-CM

## 2020-11-10 DIAGNOSIS — F12.90 CANNABINOID HYPEREMESIS SYNDROME: Primary | ICD-10-CM

## 2020-11-10 DIAGNOSIS — R11.2 INTRACTABLE NAUSEA AND VOMITING: ICD-10-CM

## 2020-11-10 LAB
ALBUMIN SERPL-MCNC: 4.3 G/DL (ref 3.5–5)
ALBUMIN/GLOB SERPL: 0.8 {RATIO} (ref 1.1–2.2)
ALP SERPL-CCNC: 114 U/L (ref 45–117)
ALT SERPL-CCNC: 33 U/L (ref 12–78)
AMPHET UR QL SCN: NEGATIVE
ANION GAP SERPL CALC-SCNC: 15 MMOL/L (ref 5–15)
APPEARANCE UR: ABNORMAL
AST SERPL-CCNC: 30 U/L (ref 15–37)
ATRIAL RATE: 109 BPM
BACTERIA URNS QL MICRO: ABNORMAL /HPF
BARBITURATES UR QL SCN: NEGATIVE
BASOPHILS # BLD: 0.1 K/UL (ref 0–0.1)
BASOPHILS NFR BLD: 1 % (ref 0–1)
BENZODIAZ UR QL: NEGATIVE
BILIRUB SERPL-MCNC: 0.4 MG/DL (ref 0.2–1)
BILIRUB UR QL: NEGATIVE
BUN SERPL-MCNC: 13 MG/DL (ref 6–20)
BUN/CREAT SERPL: 12 (ref 12–20)
CALCIUM SERPL-MCNC: 9.8 MG/DL (ref 8.5–10.1)
CALCULATED P AXIS, ECG09: 63 DEGREES
CALCULATED R AXIS, ECG10: 91 DEGREES
CALCULATED T AXIS, ECG11: 32 DEGREES
CANNABINOIDS UR QL SCN: POSITIVE
CHLORIDE SERPL-SCNC: 99 MMOL/L (ref 97–108)
CO2 SERPL-SCNC: 24 MMOL/L (ref 21–32)
COCAINE UR QL SCN: NEGATIVE
COLOR UR: ABNORMAL
CREAT SERPL-MCNC: 1.05 MG/DL (ref 0.55–1.02)
DIAGNOSIS, 93000: NORMAL
DIFFERENTIAL METHOD BLD: ABNORMAL
DRUG SCRN COMMENT,DRGCM: ABNORMAL
EOSINOPHIL # BLD: 0.1 K/UL (ref 0–0.4)
EOSINOPHIL NFR BLD: 1 % (ref 0–7)
EPITH CASTS URNS QL MICRO: ABNORMAL /LPF
ERYTHROCYTE [DISTWIDTH] IN BLOOD BY AUTOMATED COUNT: 13.6 % (ref 11.5–14.5)
GLOBULIN SER CALC-MCNC: 5.3 G/DL (ref 2–4)
GLUCOSE SERPL-MCNC: 128 MG/DL (ref 65–100)
GLUCOSE UR STRIP.AUTO-MCNC: NEGATIVE MG/DL
HCT VFR BLD AUTO: 41.2 % (ref 35–47)
HGB BLD-MCNC: 14.3 G/DL (ref 11.5–16)
HGB UR QL STRIP: ABNORMAL
IMM GRANULOCYTES # BLD AUTO: 0 K/UL (ref 0–0.04)
IMM GRANULOCYTES NFR BLD AUTO: 1 % (ref 0–0.5)
KETONES UR QL STRIP.AUTO: NEGATIVE MG/DL
LACTATE SERPL-SCNC: 1.3 MMOL/L (ref 0.4–2)
LEUKOCYTE ESTERASE UR QL STRIP.AUTO: NEGATIVE
LYMPHOCYTES # BLD: 2.4 K/UL (ref 0.8–3.5)
LYMPHOCYTES NFR BLD: 27 % (ref 12–49)
MCH RBC QN AUTO: 31.5 PG (ref 26–34)
MCHC RBC AUTO-ENTMCNC: 34.7 G/DL (ref 30–36.5)
MCV RBC AUTO: 90.7 FL (ref 80–99)
METHADONE UR QL: NEGATIVE
MONOCYTES # BLD: 0.4 K/UL (ref 0–1)
MONOCYTES NFR BLD: 5 % (ref 5–13)
NEUTS SEG # BLD: 5.9 K/UL (ref 1.8–8)
NEUTS SEG NFR BLD: 65 % (ref 32–75)
NITRITE UR QL STRIP.AUTO: NEGATIVE
NRBC # BLD: 0 K/UL (ref 0–0.01)
NRBC BLD-RTO: 0 PER 100 WBC
OPIATES UR QL: NEGATIVE
P-R INTERVAL, ECG05: 138 MS
PCP UR QL: NEGATIVE
PH UR STRIP: 5.5 [PH] (ref 5–8)
PLATELET # BLD AUTO: 392 K/UL (ref 150–400)
PMV BLD AUTO: 9.3 FL (ref 8.9–12.9)
POTASSIUM SERPL-SCNC: 3.7 MMOL/L (ref 3.5–5.1)
PROT SERPL-MCNC: 9.6 G/DL (ref 6.4–8.2)
PROT UR STRIP-MCNC: >300 MG/DL
Q-T INTERVAL, ECG07: 342 MS
QRS DURATION, ECG06: 82 MS
QTC CALCULATION (BEZET), ECG08: 460 MS
RBC # BLD AUTO: 4.54 M/UL (ref 3.8–5.2)
RBC #/AREA URNS HPF: ABNORMAL /HPF (ref 0–5)
SODIUM SERPL-SCNC: 138 MMOL/L (ref 136–145)
SP GR UR REFRACTOMETRY: 1.02 (ref 1–1.03)
UA: UC IF INDICATED,UAUC: ABNORMAL
UROBILINOGEN UR QL STRIP.AUTO: 0.2 EU/DL (ref 0.2–1)
VENTRICULAR RATE, ECG03: 109 BPM
WBC # BLD AUTO: 8.8 K/UL (ref 3.6–11)
WBC URNS QL MICRO: ABNORMAL /HPF (ref 0–4)

## 2020-11-10 PROCEDURE — 99218 HC RM OBSERVATION: CPT

## 2020-11-10 PROCEDURE — 74011000250 HC RX REV CODE- 250: Performed by: INTERNAL MEDICINE

## 2020-11-10 PROCEDURE — 74011250636 HC RX REV CODE- 250/636: Performed by: NURSE PRACTITIONER

## 2020-11-10 PROCEDURE — 74177 CT ABD & PELVIS W/CONTRAST: CPT

## 2020-11-10 PROCEDURE — 74011000250 HC RX REV CODE- 250: Performed by: NURSE PRACTITIONER

## 2020-11-10 PROCEDURE — 36415 COLL VENOUS BLD VENIPUNCTURE: CPT

## 2020-11-10 PROCEDURE — 96376 TX/PRO/DX INJ SAME DRUG ADON: CPT

## 2020-11-10 PROCEDURE — 96361 HYDRATE IV INFUSION ADD-ON: CPT

## 2020-11-10 PROCEDURE — 85025 COMPLETE CBC W/AUTO DIFF WBC: CPT

## 2020-11-10 PROCEDURE — 74011000636 HC RX REV CODE- 636: Performed by: EMERGENCY MEDICINE

## 2020-11-10 PROCEDURE — 80307 DRUG TEST PRSMV CHEM ANLYZR: CPT

## 2020-11-10 PROCEDURE — 74011250636 HC RX REV CODE- 250/636: Performed by: PHYSICIAN ASSISTANT

## 2020-11-10 PROCEDURE — 80053 COMPREHEN METABOLIC PANEL: CPT

## 2020-11-10 PROCEDURE — 96374 THER/PROPH/DIAG INJ IV PUSH: CPT

## 2020-11-10 PROCEDURE — 96375 TX/PRO/DX INJ NEW DRUG ADDON: CPT

## 2020-11-10 PROCEDURE — 83605 ASSAY OF LACTIC ACID: CPT

## 2020-11-10 PROCEDURE — 81001 URINALYSIS AUTO W/SCOPE: CPT

## 2020-11-10 PROCEDURE — 99285 EMERGENCY DEPT VISIT HI MDM: CPT

## 2020-11-10 PROCEDURE — 87040 BLOOD CULTURE FOR BACTERIA: CPT

## 2020-11-10 PROCEDURE — 74011250636 HC RX REV CODE- 250/636: Performed by: INTERNAL MEDICINE

## 2020-11-10 RX ORDER — METOPROLOL TARTRATE 5 MG/5ML
5 INJECTION INTRAVENOUS
Status: COMPLETED | OUTPATIENT
Start: 2020-11-10 | End: 2020-11-10

## 2020-11-10 RX ORDER — SODIUM CHLORIDE 0.9 % (FLUSH) 0.9 %
5-10 SYRINGE (ML) INJECTION
Status: COMPLETED | OUTPATIENT
Start: 2020-11-10 | End: 2020-11-10

## 2020-11-10 RX ORDER — MORPHINE SULFATE 2 MG/ML
2 INJECTION, SOLUTION INTRAMUSCULAR; INTRAVENOUS
Status: COMPLETED | OUTPATIENT
Start: 2020-11-10 | End: 2020-11-10

## 2020-11-10 RX ORDER — MORPHINE SULFATE 2 MG/ML
1 INJECTION, SOLUTION INTRAMUSCULAR; INTRAVENOUS
Status: DISCONTINUED | OUTPATIENT
Start: 2020-11-10 | End: 2020-11-11

## 2020-11-10 RX ORDER — ENOXAPARIN SODIUM 100 MG/ML
40 INJECTION SUBCUTANEOUS DAILY
Status: DISCONTINUED | OUTPATIENT
Start: 2020-11-11 | End: 2020-11-13 | Stop reason: HOSPADM

## 2020-11-10 RX ORDER — ACETAMINOPHEN 325 MG/1
650 TABLET ORAL
Status: DISCONTINUED | OUTPATIENT
Start: 2020-11-10 | End: 2020-11-13 | Stop reason: HOSPADM

## 2020-11-10 RX ORDER — HYDRALAZINE HYDROCHLORIDE 20 MG/ML
20 INJECTION INTRAMUSCULAR; INTRAVENOUS ONCE
Status: COMPLETED | OUTPATIENT
Start: 2020-11-10 | End: 2020-11-10

## 2020-11-10 RX ORDER — SODIUM CHLORIDE 0.9 % (FLUSH) 0.9 %
5-40 SYRINGE (ML) INJECTION AS NEEDED
Status: DISCONTINUED | OUTPATIENT
Start: 2020-11-10 | End: 2020-11-13 | Stop reason: HOSPADM

## 2020-11-10 RX ORDER — ONDANSETRON 2 MG/ML
4 INJECTION INTRAMUSCULAR; INTRAVENOUS
Status: COMPLETED | OUTPATIENT
Start: 2020-11-10 | End: 2020-11-10

## 2020-11-10 RX ORDER — ACETAMINOPHEN 650 MG/1
650 SUPPOSITORY RECTAL
Status: DISCONTINUED | OUTPATIENT
Start: 2020-11-10 | End: 2020-11-13 | Stop reason: HOSPADM

## 2020-11-10 RX ORDER — ONDANSETRON 2 MG/ML
4 INJECTION INTRAMUSCULAR; INTRAVENOUS
Status: DISCONTINUED | OUTPATIENT
Start: 2020-11-10 | End: 2020-11-12

## 2020-11-10 RX ORDER — METOCLOPRAMIDE HYDROCHLORIDE 5 MG/ML
10 INJECTION INTRAMUSCULAR; INTRAVENOUS
Status: COMPLETED | OUTPATIENT
Start: 2020-11-10 | End: 2020-11-10

## 2020-11-10 RX ORDER — SODIUM CHLORIDE 0.9 % (FLUSH) 0.9 %
5-40 SYRINGE (ML) INJECTION EVERY 8 HOURS
Status: DISCONTINUED | OUTPATIENT
Start: 2020-11-10 | End: 2020-11-13 | Stop reason: HOSPADM

## 2020-11-10 RX ORDER — PROMETHAZINE HYDROCHLORIDE 25 MG/1
12.5 TABLET ORAL
Status: DISCONTINUED | OUTPATIENT
Start: 2020-11-10 | End: 2020-11-11

## 2020-11-10 RX ADMIN — FAMOTIDINE 20 MG: 10 INJECTION INTRAVENOUS at 18:37

## 2020-11-10 RX ADMIN — IOPAMIDOL 100 ML: 755 INJECTION, SOLUTION INTRAVENOUS at 19:26

## 2020-11-10 RX ADMIN — HYDRALAZINE HYDROCHLORIDE 20 MG: 20 INJECTION INTRAMUSCULAR; INTRAVENOUS at 23:42

## 2020-11-10 RX ADMIN — Medication 10 ML: at 19:27

## 2020-11-10 RX ADMIN — MORPHINE SULFATE 2 MG: 2 INJECTION, SOLUTION INTRAMUSCULAR; INTRAVENOUS at 19:52

## 2020-11-10 RX ADMIN — CEFTRIAXONE SODIUM 1 G: 1 INJECTION, POWDER, FOR SOLUTION INTRAMUSCULAR; INTRAVENOUS at 23:42

## 2020-11-10 RX ADMIN — METOPROLOL TARTRATE 5 MG: 5 INJECTION, SOLUTION INTRAVENOUS at 19:52

## 2020-11-10 RX ADMIN — METOCLOPRAMIDE 10 MG: 5 INJECTION, SOLUTION INTRAMUSCULAR; INTRAVENOUS at 18:37

## 2020-11-10 RX ADMIN — ONDANSETRON 4 MG: 2 INJECTION INTRAMUSCULAR; INTRAVENOUS at 18:37

## 2020-11-10 RX ADMIN — ONDANSETRON 4 MG: 2 INJECTION INTRAMUSCULAR; INTRAVENOUS at 21:24

## 2020-11-10 RX ADMIN — SODIUM CHLORIDE 1000 ML: 900 INJECTION, SOLUTION INTRAVENOUS at 18:37

## 2020-11-10 NOTE — ED TRIAGE NOTES
Pt presents to ED with c/o vomiting x2 days. Pt states she was seen at [facility] ED yesterday. Pt states continuous vomiting since discharge.

## 2020-11-10 NOTE — ED NOTES
Patient presents to the ED with c/o vomiting since being discharged yesterday. Pt denies any urinary symptoms. Pt reports diarrhea. Pt reports taking zofran that we prescribed with no relief. Pt is alert and oriented. Pt skin is warm and dry. Pt is ambulatory independently. Emergency Department Nursing Plan of Care       The Nursing Plan of Care is developed from the Nursing assessment and Emergency Department Attending provider initial evaluation. The plan of care may be reviewed in the ED Provider note.     The Plan of Care was developed with the following considerations:   Patient / Family readiness to learn indicated by:verbalized understanding  Persons(s) to be included in education: patient  Barriers to Learning/Limitations:No    Signed     Brooklyn Pizano    11/10/2020   5:51 PM

## 2020-11-10 NOTE — ED PROVIDER NOTES
EMERGENCY DEPARTMENT HISTORY AND PHYSICAL EXAM      Date: 11/10/2020  Patient Name: Gurmeet Gonzalez    History of Presenting Illness     Chief Complaint   Patient presents with    Vomiting     x2 days       History Provided By: Patient      Additional History (Context): Gurmeet Gonzalez is a 54 y.o. female with hypertension who presents with vomiting x 2 days. Pt reports being seen yesterday for same sx. She was given dilaudid, zofran and haldol with relief. States she was given a rx for zofran but unable to take it and her sx returned. Pt reports pain in her abd radiating to her back. Denies fever, chills, urinary changes or diarrhea. Chart review shows pt has a hx of cannabis hyperemeisis and gastritis. Pt states she has follow up with GI but has not seen them yet.     PCP: Jayleen Cesar MD    Current Facility-Administered Medications   Medication Dose Route Frequency Provider Last Rate Last Dose    labetaloL (NORMODYNE;TRANDATE) 20 mg/4 mL (5 mg/mL) injection 10 mg  10 mg IntraVENous Q4H PRN Caridad David MD   10 mg at 11/11/20 0715    pantoprazole (PROTONIX) 40 mg in 0.9% sodium chloride 10 mL injection  40 mg IntraVENous Q12H Caridad David MD   40 mg at 11/11/20 0842    prochlorperazine (COMPAZINE) with saline injection 5 mg  5 mg IntraVENous Q6H PRN Chantale Mendieta MD        morphine injection 2 mg  2 mg IntraVENous Q4H PRN Chantale Mendieta MD   2 mg at 11/11/20 1004    lidocaine 4 % patch 1 Patch  1 Patch TransDERmal Q24H Chantale Mendieta MD   1 Patch at 11/11/20 0941    lactated Ringers infusion  100 mL/hr IntraVENous CONTINUOUS Chantale Mendieta MD        potassium chloride 10 mEq in 100 ml IVPB  10 mEq IntraVENous Q1H Chantale Mendieta MD        sodium chloride (NS) flush 5-40 mL  5-40 mL IntraVENous Q8H Caridad David MD   10 mL at 11/11/20 0718    sodium chloride (NS) flush 5-40 mL  5-40 mL IntraVENous PRN Caridad David MD        acetaminophen (TYLENOL) tablet 650 mg  650 mg Oral Q6H PRN Caridad David MD        Or    acetaminophen (TYLENOL) suppository 650 mg  650 mg Rectal Q6H PRN Caridda David MD        ondansetron (ZOFRAN) injection 4 mg  4 mg IntraVENous Q6H PRN Caridad David MD   4 mg at 11/11/20 0643    enoxaparin (LOVENOX) injection 40 mg  40 mg SubCUTAneous DAILY Caridad David MD   40 mg at 11/11/20 7268     Current Outpatient Medications   Medication Sig Dispense Refill    ondansetron (Zofran ODT) 4 mg disintegrating tablet Take 1 Tab by mouth every eight (8) hours as needed for Nausea. 10 Tab 0    Cetirizine (ZyrTEC) 10 mg cap Zyrtec 10 mg capsule   take 1 capsule by oral route daily      metoprolol tartrate (LOPRESSOR) 50 mg tablet Take 1 Tab by mouth two (2) times a day. Check PULSE before taking, must be over 60. 60 Tab 2    amLODIPine (NORVASC) 10 mg tablet Take 1 Tab by mouth daily. 27 Tab 1       Past History     Past Medical History:  Past Medical History:   Diagnosis Date    Hypertension     Kidney anomaly, congenital     Tubal pregnancy        Past Surgical History:  Past Surgical History:   Procedure Laterality Date    HX CHOLECYSTECTOMY      HX GYN  10/07/2013    cyst removed    HX HYSTERECTOMY  10/7/2013    partial hysterectomy       Family History:  Family History   Problem Relation Age of Onset    Hypertension Mother     Hypertension Father     Hypertension Sister        Social History:  Social History     Tobacco Use    Smoking status: Current Some Day Smoker     Packs/day: 0.25     Years: 25.00     Pack years: 6.25    Smokeless tobacco: Never Used    Tobacco comment: 1-3 day   Substance Use Topics    Alcohol use: Yes     Comment: occasionally/socially    Drug use: Yes     Types: Marijuana     Comment: occ       Allergies:  No Known Allergies      Review of Systems   Review of Systems   Constitutional: Negative for appetite change, chills, fatigue and fever. HENT: Negative for congestion, ear pain and rhinorrhea.     Eyes: Negative for pain and itching. Respiratory: Negative for cough, chest tightness, shortness of breath and wheezing. Cardiovascular: Negative for chest pain, palpitations and leg swelling. Gastrointestinal: Positive for abdominal pain, nausea and vomiting. Negative for diarrhea. Genitourinary: Negative for dysuria, frequency and urgency. Musculoskeletal: Negative for arthralgias, back pain and joint swelling. Skin: Negative for color change and rash. Neurological: Negative for dizziness, numbness and headaches. All other systems reviewed and are negative. Physical Exam     Vitals:    11/11/20 0705 11/11/20 0730 11/11/20 0830 11/11/20 0930   BP: (!) 156/102 (!) 153/98 (!) 173/103 (!) 150/103   Pulse: (!) 137 (!) 102 (!) 102 (!) 108   Resp: 17 21 14 20   Temp:       SpO2: 99% 100% 97% 98%   Weight:    73.6 kg (162 lb 3.2 oz)   Height:         Physical Exam  Vitals signs and nursing note reviewed. Constitutional:       General: She is not in acute distress. Appearance: She is well-developed. She is not ill-appearing. HENT:      Head: Normocephalic and atraumatic. Right Ear: Tympanic membrane and ear canal normal.      Left Ear: Tympanic membrane and ear canal normal.      Nose: Nose normal.      Mouth/Throat:      Mouth: Mucous membranes are dry. Pharynx: Oropharynx is clear. Eyes:      Extraocular Movements: Extraocular movements intact. Conjunctiva/sclera: Conjunctivae normal.      Pupils: Pupils are equal, round, and reactive to light. Neck:      Musculoskeletal: Normal range of motion and neck supple. Cardiovascular:      Rate and Rhythm: Normal rate and regular rhythm. Heart sounds: Normal heart sounds. Pulmonary:      Effort: Pulmonary effort is normal.      Breath sounds: Normal breath sounds. Abdominal:      General: Bowel sounds are normal.      Palpations: Abdomen is soft. Tenderness: There is generalized abdominal tenderness.  There is no guarding or rebound. Musculoskeletal: Normal range of motion. Skin:     General: Skin is warm and dry. Neurological:      Mental Status: She is alert and oriented to person, place, and time. Deep Tendon Reflexes: Reflexes are normal and symmetric.            Diagnostic Study Results     Labs -     Recent Results (from the past 12 hour(s))   URINALYSIS W/ REFLEX CULTURE    Collection Time: 11/11/20 12:08 AM    Specimen: Urine   Result Value Ref Range    Color YELLOW/STRAW      Appearance CLEAR CLEAR      Specific gravity 1.025 1.003 - 1.030      pH (UA) 5.5 5.0 - 8.0      Protein >300 (A) NEG mg/dL    Glucose Negative NEG mg/dL    Ketone TRACE (A) NEG mg/dL    Bilirubin Negative NEG      Blood Negative NEG      Urobilinogen 0.2 0.2 - 1.0 EU/dL    Nitrites Negative NEG      Leukocyte Esterase Negative NEG      WBC 0-4 0 - 4 /hpf    RBC 0-5 0 - 5 /hpf    Epithelial cells MODERATE (A) FEW /lpf    Bacteria Negative NEG /hpf    UA:UC IF INDICATED CULTURE NOT INDICATED BY UA RESULT CNI     EKG, 12 LEAD, INITIAL    Collection Time: 11/11/20  2:31 AM   Result Value Ref Range    Ventricular Rate 82 BPM    Atrial Rate 82 BPM    P-R Interval 128 ms    QRS Duration 84 ms    Q-T Interval 392 ms    QTC Calculation (Bezet) 457 ms    Calculated P Axis 56 degrees    Calculated R Axis 69 degrees    Calculated T Axis 51 degrees    Diagnosis       Normal sinus rhythm  Possible Left atrial enlargement  Borderline ECG  When compared with ECG of 09-NOV-2020 15:55,  No significant change was found  Confirmed by Jose Aparicio M.D., Ximena Partida ([id number]) on 11/11/2020 5:28:26 AM     METABOLIC PANEL, COMPREHENSIVE    Collection Time: 11/11/20  3:52 AM   Result Value Ref Range    Sodium 138 136 - 145 mmol/L    Potassium 3.0 (L) 3.5 - 5.1 mmol/L    Chloride 100 97 - 108 mmol/L    CO2 22 21 - 32 mmol/L    Anion gap 16 (H) 5 - 15 mmol/L    Glucose 137 (H) 65 - 100 mg/dL    BUN 10 6 - 20 MG/DL    Creatinine 1.00 0.55 - 1.02 MG/DL    BUN/Creatinine ratio 10 (L) 12 - 20      GFR est AA >60 >60 ml/min/1.73m2    GFR est non-AA 60 (L) >60 ml/min/1.73m2    Calcium 9.2 8.5 - 10.1 MG/DL    Bilirubin, total 0.3 0.2 - 1.0 MG/DL    ALT (SGPT) 28 12 - 78 U/L    AST (SGOT) 24 15 - 37 U/L    Alk. phosphatase 103 45 - 117 U/L    Protein, total 8.8 (H) 6.4 - 8.2 g/dL    Albumin 3.9 3.5 - 5.0 g/dL    Globulin 4.9 (H) 2.0 - 4.0 g/dL    A-G Ratio 0.8 (L) 1.1 - 2.2     MAGNESIUM    Collection Time: 11/11/20  3:52 AM   Result Value Ref Range    Magnesium 1.8 1.6 - 2.4 mg/dL   CBC WITH AUTOMATED DIFF    Collection Time: 11/11/20  3:52 AM   Result Value Ref Range    WBC 11.6 (H) 3.6 - 11.0 K/uL    RBC 4.49 3.80 - 5.20 M/uL    HGB 13.8 11.5 - 16.0 g/dL    HCT 40.2 35.0 - 47.0 %    MCV 89.5 80.0 - 99.0 FL    MCH 30.7 26.0 - 34.0 PG    MCHC 34.3 30.0 - 36.5 g/dL    RDW 13.4 11.5 - 14.5 %    PLATELET 384 289 - 851 K/uL    MPV 9.3 8.9 - 12.9 FL    NRBC 0.0 0  WBC    ABSOLUTE NRBC 0.00 0.00 - 0.01 K/uL    NEUTROPHILS 77 (H) 32 - 75 %    LYMPHOCYTES 16 12 - 49 %    MONOCYTES 6 5 - 13 %    EOSINOPHILS 0 0 - 7 %    BASOPHILS 1 0 - 1 %    IMMATURE GRANULOCYTES 0 0.0 - 0.5 %    ABS. NEUTROPHILS 9.0 (H) 1.8 - 8.0 K/UL    ABS. LYMPHOCYTES 1.8 0.8 - 3.5 K/UL    ABS. MONOCYTES 0.6 0.0 - 1.0 K/UL    ABS. EOSINOPHILS 0.0 0.0 - 0.4 K/UL    ABS. BASOPHILS 0.1 0.0 - 0.1 K/UL    ABS. IMM. GRANS. 0.1 (H) 0.00 - 0.04 K/UL    DF AUTOMATED     LIPASE    Collection Time: 11/11/20  9:08 AM   Result Value Ref Range    Lipase 549 (H) 73 - 393 U/L       Radiologic Studies -   CT ABD PELV W CONT   Final Result   IMPRESSION:    No acute process. CT Results  (Last 48 hours)               11/10/20 1927  CT ABD PELV W CONT Final result    Impression:  IMPRESSION:    No acute process. Narrative:  CT ABDOMEN AND PELVIS WITH CONTRAST. 11/10/2020 7:27 PM        INDICATION: Abdominal and back pain for 2 days with vomiting. History of   colitis. COMPARISON: 8/31/2020, 3/11/2020, 9/18/2018. TECHNIQUE: CT of the abdomen and pelvis was performed after the administration   of 100 cc IV Isovue-370. CT dose reduction was achieved through use of a   standardized protocol tailored for this examination and automatic exposure   control for dose modulation. FINDINGS:   Abdomen: There are innumerable cysts in the bilateral kidneys. A 14 mm,   exophytic, interpolar, right renal mass (3-33, 601-64) is denser than simple   fluid. This has decreased in size from 9/18/2018, and likely represents a   complicated cyst.       Subcentimeter hypodense hepatic lesions are too small to characterize, but   likely represent cysts. Post cholecystectomy. The lung bases are clear. The   heart size is normal. There is trace pericardial fluid. The distal esophagus,   stomach, duodenum, pancreas, spleen, and adrenals are normal.       Pelvis: Atherosclerotic calcification of the bilateral common iliac arteries is   advanced for age. The small bowel, ileocecal junction, appendix, colon, and   bladder are normal. No free air or fluid, and no abdominopelvic lymphadenopathy. CXR Results  (Last 48 hours)               11/09/20 1723  XR CHEST PORT Final result    Impression:  IMPRESSION: No evidence of acute cardiopulmonary process. Narrative:  INDICATION: Chest pain       COMPARISON: September 9, 2018       FINDINGS: AP portable imaging of the chest performed at 4:54 PM demonstrates a   stable cardiomediastinal silhouette. The lungs are clear bilaterally. No   significant osseous abnormalities are seen. Medical Decision Making   I am the first provider for this patient. I reviewed the vital signs, available nursing notes, past medical history, past surgical history, family history and social history. Vital Signs-Reviewed the patient's vital signs.     Records Reviewed: Nursing Notes, Old Medical Records, Previous Radiology Studies and Previous Laboratory Studies    53 yo F with c/o abd pain exhibiting generalized tenderness but no guarding or rigidity on exam. Pt BP elevated which is unchanged from yesterday. Unable to take BP meds today due to vomiting. Otherwise HR and temp within normal limits. Plan to repeat labs to evaluate for any changes in electrolytes or WBC. Advised pt to void since we were unable to obtain sample yesterday. ED Course:   ED Course as of Nov 11 1004   Tue Nov 10, 2020   1947 Case sign out given to Parvin KENNEDY for further disposition and evaluation. Pt has not received pain medications at this time. WBC has improved compared to yesterday. UDS + for [facility] which is likely cause of sx. Awaiting CT results at this time     [NA]   2050 Care assumed from colleague, CASH Fairchild NP. Went in to discuss CT results with pt and reevaluate. She states she is feeling no better and just vomited 15min prior. Pt states she is still feeling nauseated and can't keep anything down. Since this is her second visit to ED with discuss with hospitalist about admission. Pt made aware of plan and is in agreement.    [AH]   2120   9:20 PM    I spoke with Dr. Ayla Rodriguez, Consult for Hospitalist. Discussed available diagnostic tests and clinical findings. She is in agreement with care plans as outlined. She will call and do a face to face consult in about 30min to admit pt  Katina Roman      []      ED Course User Index  [AH] Eitan Mir PA-C  [NA] Tamika Fairchild, RODOLFO         Disposition:  Admit      Follow-up Information    None         Current Discharge Medication List          Provider Notes (Medical Decision Making):   DDX: Diverticulitis, diverticulosis, UTI, gastroparesis, dehydration, electrolyte imbalance, gastritis, appendicitis, GERD, cannabis hyperemesis    Procedures:  Procedures      Diagnosis     Clinical Impression:   1. Cannabinoid hyperemesis syndrome    2.  Intractable nausea and vomiting

## 2020-11-11 ENCOUNTER — APPOINTMENT (OUTPATIENT)
Dept: ULTRASOUND IMAGING | Age: 45
End: 2020-11-11
Attending: STUDENT IN AN ORGANIZED HEALTH CARE EDUCATION/TRAINING PROGRAM
Payer: COMMERCIAL

## 2020-11-11 PROBLEM — K85.90 PANCREATITIS: Status: ACTIVE | Noted: 2020-11-11

## 2020-11-11 LAB
ALBUMIN SERPL-MCNC: 3.9 G/DL (ref 3.5–5)
ALBUMIN/GLOB SERPL: 0.8 {RATIO} (ref 1.1–2.2)
ALP SERPL-CCNC: 103 U/L (ref 45–117)
ALT SERPL-CCNC: 28 U/L (ref 12–78)
ANION GAP SERPL CALC-SCNC: 16 MMOL/L (ref 5–15)
APPEARANCE UR: CLEAR
AST SERPL-CCNC: 24 U/L (ref 15–37)
ATRIAL RATE: 82 BPM
BACTERIA URNS QL MICRO: NEGATIVE /HPF
BASOPHILS # BLD: 0.1 K/UL (ref 0–0.1)
BASOPHILS NFR BLD: 1 % (ref 0–1)
BILIRUB SERPL-MCNC: 0.3 MG/DL (ref 0.2–1)
BILIRUB UR QL: NEGATIVE
BUN SERPL-MCNC: 10 MG/DL (ref 6–20)
BUN/CREAT SERPL: 10 (ref 12–20)
CALCIUM SERPL-MCNC: 9.2 MG/DL (ref 8.5–10.1)
CALCULATED P AXIS, ECG09: 56 DEGREES
CALCULATED R AXIS, ECG10: 69 DEGREES
CALCULATED T AXIS, ECG11: 51 DEGREES
CHLORIDE SERPL-SCNC: 100 MMOL/L (ref 97–108)
CHOLEST SERPL-MCNC: 184 MG/DL
CO2 SERPL-SCNC: 22 MMOL/L (ref 21–32)
COLOR UR: ABNORMAL
CREAT SERPL-MCNC: 1 MG/DL (ref 0.55–1.02)
DIAGNOSIS, 93000: NORMAL
DIFFERENTIAL METHOD BLD: ABNORMAL
EOSINOPHIL # BLD: 0 K/UL (ref 0–0.4)
EOSINOPHIL NFR BLD: 0 % (ref 0–7)
EPITH CASTS URNS QL MICRO: ABNORMAL /LPF
ERYTHROCYTE [DISTWIDTH] IN BLOOD BY AUTOMATED COUNT: 13.4 % (ref 11.5–14.5)
GLOBULIN SER CALC-MCNC: 4.9 G/DL (ref 2–4)
GLUCOSE SERPL-MCNC: 137 MG/DL (ref 65–100)
GLUCOSE UR STRIP.AUTO-MCNC: NEGATIVE MG/DL
HCG UR QL: NEGATIVE
HCG UR QL: NEGATIVE
HCT VFR BLD AUTO: 40.2 % (ref 35–47)
HDLC SERPL-MCNC: 43 MG/DL
HDLC SERPL: 4.3 {RATIO} (ref 0–5)
HGB BLD-MCNC: 13.8 G/DL (ref 11.5–16)
HGB UR QL STRIP: NEGATIVE
IMM GRANULOCYTES # BLD AUTO: 0.1 K/UL (ref 0–0.04)
IMM GRANULOCYTES NFR BLD AUTO: 0 % (ref 0–0.5)
KETONES UR QL STRIP.AUTO: ABNORMAL MG/DL
LDLC SERPL CALC-MCNC: 84.4 MG/DL (ref 0–100)
LEUKOCYTE ESTERASE UR QL STRIP.AUTO: NEGATIVE
LIPASE SERPL-CCNC: 549 U/L (ref 73–393)
LIPID PROFILE,FLP: ABNORMAL
LYMPHOCYTES # BLD: 1.8 K/UL (ref 0.8–3.5)
LYMPHOCYTES NFR BLD: 16 % (ref 12–49)
MAGNESIUM SERPL-MCNC: 1.8 MG/DL (ref 1.6–2.4)
MCH RBC QN AUTO: 30.7 PG (ref 26–34)
MCHC RBC AUTO-ENTMCNC: 34.3 G/DL (ref 30–36.5)
MCV RBC AUTO: 89.5 FL (ref 80–99)
MONOCYTES # BLD: 0.6 K/UL (ref 0–1)
MONOCYTES NFR BLD: 6 % (ref 5–13)
NEUTS SEG # BLD: 9 K/UL (ref 1.8–8)
NEUTS SEG NFR BLD: 77 % (ref 32–75)
NITRITE UR QL STRIP.AUTO: NEGATIVE
NRBC # BLD: 0 K/UL (ref 0–0.01)
NRBC BLD-RTO: 0 PER 100 WBC
P-R INTERVAL, ECG05: 128 MS
PH UR STRIP: 5.5 [PH] (ref 5–8)
PLATELET # BLD AUTO: 369 K/UL (ref 150–400)
PMV BLD AUTO: 9.3 FL (ref 8.9–12.9)
POTASSIUM SERPL-SCNC: 3 MMOL/L (ref 3.5–5.1)
POTASSIUM SERPL-SCNC: 3.2 MMOL/L (ref 3.5–5.1)
PROT SERPL-MCNC: 8.8 G/DL (ref 6.4–8.2)
PROT UR STRIP-MCNC: >300 MG/DL
Q-T INTERVAL, ECG07: 392 MS
QRS DURATION, ECG06: 84 MS
QTC CALCULATION (BEZET), ECG08: 457 MS
RBC # BLD AUTO: 4.49 M/UL (ref 3.8–5.2)
RBC #/AREA URNS HPF: ABNORMAL /HPF (ref 0–5)
SODIUM SERPL-SCNC: 138 MMOL/L (ref 136–145)
SP GR UR REFRACTOMETRY: 1.02 (ref 1–1.03)
TRIGL SERPL-MCNC: 283 MG/DL (ref ?–150)
UA: UC IF INDICATED,UAUC: ABNORMAL
UROBILINOGEN UR QL STRIP.AUTO: 0.2 EU/DL (ref 0.2–1)
VENTRICULAR RATE, ECG03: 82 BPM
VLDLC SERPL CALC-MCNC: 56.6 MG/DL
WBC # BLD AUTO: 11.6 K/UL (ref 3.6–11)
WBC URNS QL MICRO: ABNORMAL /HPF (ref 0–4)

## 2020-11-11 PROCEDURE — 80061 LIPID PANEL: CPT

## 2020-11-11 PROCEDURE — 36415 COLL VENOUS BLD VENIPUNCTURE: CPT

## 2020-11-11 PROCEDURE — 96375 TX/PRO/DX INJ NEW DRUG ADDON: CPT

## 2020-11-11 PROCEDURE — 96376 TX/PRO/DX INJ SAME DRUG ADON: CPT

## 2020-11-11 PROCEDURE — 74011250636 HC RX REV CODE- 250/636: Performed by: INTERNAL MEDICINE

## 2020-11-11 PROCEDURE — 99218 HC RM OBSERVATION: CPT

## 2020-11-11 PROCEDURE — 83735 ASSAY OF MAGNESIUM: CPT

## 2020-11-11 PROCEDURE — 83690 ASSAY OF LIPASE: CPT

## 2020-11-11 PROCEDURE — 74011250636 HC RX REV CODE- 250/636: Performed by: STUDENT IN AN ORGANIZED HEALTH CARE EDUCATION/TRAINING PROGRAM

## 2020-11-11 PROCEDURE — 81001 URINALYSIS AUTO W/SCOPE: CPT

## 2020-11-11 PROCEDURE — 80053 COMPREHEN METABOLIC PANEL: CPT

## 2020-11-11 PROCEDURE — 85025 COMPLETE CBC W/AUTO DIFF WBC: CPT

## 2020-11-11 PROCEDURE — 65270000029 HC RM PRIVATE

## 2020-11-11 PROCEDURE — 81025 URINE PREGNANCY TEST: CPT

## 2020-11-11 PROCEDURE — 74011250636 HC RX REV CODE- 250/636: Performed by: HOSPITALIST

## 2020-11-11 PROCEDURE — C9113 INJ PANTOPRAZOLE SODIUM, VIA: HCPCS | Performed by: INTERNAL MEDICINE

## 2020-11-11 PROCEDURE — 76705 ECHO EXAM OF ABDOMEN: CPT

## 2020-11-11 PROCEDURE — 96372 THER/PROPH/DIAG INJ SC/IM: CPT

## 2020-11-11 PROCEDURE — 84132 ASSAY OF SERUM POTASSIUM: CPT

## 2020-11-11 PROCEDURE — 74011000250 HC RX REV CODE- 250: Performed by: INTERNAL MEDICINE

## 2020-11-11 PROCEDURE — 74011000250 HC RX REV CODE- 250: Performed by: HOSPITALIST

## 2020-11-11 PROCEDURE — 93005 ELECTROCARDIOGRAM TRACING: CPT

## 2020-11-11 PROCEDURE — 96365 THER/PROPH/DIAG IV INF INIT: CPT

## 2020-11-11 PROCEDURE — 74011000250 HC RX REV CODE- 250: Performed by: STUDENT IN AN ORGANIZED HEALTH CARE EDUCATION/TRAINING PROGRAM

## 2020-11-11 RX ORDER — MORPHINE SULFATE 2 MG/ML
2 INJECTION, SOLUTION INTRAMUSCULAR; INTRAVENOUS
Status: DISCONTINUED | OUTPATIENT
Start: 2020-11-11 | End: 2020-11-13

## 2020-11-11 RX ORDER — HYDROGEN PEROXIDE 3 %
20 SOLUTION, NON-ORAL MISCELLANEOUS
COMMUNITY
End: 2020-11-13

## 2020-11-11 RX ORDER — SODIUM CHLORIDE, SODIUM LACTATE, POTASSIUM CHLORIDE, CALCIUM CHLORIDE 600; 310; 30; 20 MG/100ML; MG/100ML; MG/100ML; MG/100ML
100 INJECTION, SOLUTION INTRAVENOUS CONTINUOUS
Status: DISCONTINUED | OUTPATIENT
Start: 2020-11-11 | End: 2020-11-12

## 2020-11-11 RX ORDER — LABETALOL HCL 20 MG/4 ML
10 SYRINGE (ML) INTRAVENOUS
Status: DISCONTINUED | OUTPATIENT
Start: 2020-11-11 | End: 2020-11-12

## 2020-11-11 RX ORDER — KETOROLAC TROMETHAMINE 30 MG/ML
15 INJECTION, SOLUTION INTRAMUSCULAR; INTRAVENOUS ONCE
Status: COMPLETED | OUTPATIENT
Start: 2020-11-11 | End: 2020-11-11

## 2020-11-11 RX ORDER — LIDOCAINE 4 G/100G
1 PATCH TOPICAL EVERY 24 HOURS
Status: DISCONTINUED | OUTPATIENT
Start: 2020-11-11 | End: 2020-11-13 | Stop reason: HOSPADM

## 2020-11-11 RX ORDER — POTASSIUM CHLORIDE 7.45 MG/ML
10 INJECTION INTRAVENOUS
Status: COMPLETED | OUTPATIENT
Start: 2020-11-11 | End: 2020-11-11

## 2020-11-11 RX ORDER — CETIRIZINE HCL 10 MG
10 TABLET ORAL DAILY
COMMUNITY

## 2020-11-11 RX ORDER — POTASSIUM CHLORIDE 7.45 MG/ML
10 INJECTION INTRAVENOUS
Status: DISPENSED | OUTPATIENT
Start: 2020-11-11 | End: 2020-11-11

## 2020-11-11 RX ORDER — SODIUM CHLORIDE 9 MG/ML
125 INJECTION, SOLUTION INTRAVENOUS CONTINUOUS
Status: DISCONTINUED | OUTPATIENT
Start: 2020-11-11 | End: 2020-11-11

## 2020-11-11 RX ADMIN — LABETALOL HYDROCHLORIDE 10 MG: 5 INJECTION, SOLUTION INTRAVENOUS at 07:15

## 2020-11-11 RX ADMIN — PROCHLORPERAZINE EDISYLATE 5 MG: 5 INJECTION, SOLUTION INTRAMUSCULAR; INTRAVENOUS at 10:05

## 2020-11-11 RX ADMIN — POTASSIUM CHLORIDE 10 MEQ: 7.46 INJECTION, SOLUTION INTRAVENOUS at 11:00

## 2020-11-11 RX ADMIN — Medication 10 ML: at 07:18

## 2020-11-11 RX ADMIN — POTASSIUM CHLORIDE 10 MEQ: 7.46 INJECTION, SOLUTION INTRAVENOUS at 22:13

## 2020-11-11 RX ADMIN — SODIUM CHLORIDE, SODIUM LACTATE, POTASSIUM CHLORIDE, AND CALCIUM CHLORIDE 100 ML/HR: 600; 310; 30; 20 INJECTION, SOLUTION INTRAVENOUS at 22:12

## 2020-11-11 RX ADMIN — SODIUM CHLORIDE 40 MG: 9 INJECTION INTRAMUSCULAR; INTRAVENOUS; SUBCUTANEOUS at 02:23

## 2020-11-11 RX ADMIN — POTASSIUM CHLORIDE 10 MEQ: 7.46 INJECTION, SOLUTION INTRAVENOUS at 13:01

## 2020-11-11 RX ADMIN — POTASSIUM CHLORIDE 10 MEQ: 7.46 INJECTION, SOLUTION INTRAVENOUS at 20:16

## 2020-11-11 RX ADMIN — SODIUM CHLORIDE 40 MG: 9 INJECTION INTRAMUSCULAR; INTRAVENOUS; SUBCUTANEOUS at 21:37

## 2020-11-11 RX ADMIN — KETOROLAC TROMETHAMINE 15 MG: 30 INJECTION, SOLUTION INTRAMUSCULAR; INTRAVENOUS at 01:43

## 2020-11-11 RX ADMIN — PROCHLORPERAZINE EDISYLATE 5 MG: 5 INJECTION, SOLUTION INTRAMUSCULAR; INTRAVENOUS at 15:12

## 2020-11-11 RX ADMIN — SODIUM CHLORIDE 125 ML/HR: 900 INJECTION, SOLUTION INTRAVENOUS at 02:33

## 2020-11-11 RX ADMIN — MORPHINE SULFATE 2 MG: 2 INJECTION, SOLUTION INTRAMUSCULAR; INTRAVENOUS at 10:04

## 2020-11-11 RX ADMIN — ENOXAPARIN SODIUM 40 MG: 100 INJECTION SUBCUTANEOUS at 08:42

## 2020-11-11 RX ADMIN — SODIUM CHLORIDE 40 MG: 9 INJECTION INTRAMUSCULAR; INTRAVENOUS; SUBCUTANEOUS at 08:42

## 2020-11-11 RX ADMIN — MORPHINE SULFATE 2 MG: 2 INJECTION, SOLUTION INTRAMUSCULAR; INTRAVENOUS at 19:39

## 2020-11-11 RX ADMIN — PROCHLORPERAZINE EDISYLATE 5 MG: 5 INJECTION, SOLUTION INTRAMUSCULAR; INTRAVENOUS at 02:23

## 2020-11-11 RX ADMIN — LABETALOL HYDROCHLORIDE 10 MG: 5 INJECTION, SOLUTION INTRAVENOUS at 14:16

## 2020-11-11 RX ADMIN — MORPHINE SULFATE 1 MG: 2 INJECTION, SOLUTION INTRAMUSCULAR; INTRAVENOUS at 07:24

## 2020-11-11 RX ADMIN — POTASSIUM CHLORIDE 10 MEQ: 7.46 INJECTION, SOLUTION INTRAVENOUS at 23:42

## 2020-11-11 RX ADMIN — PROCHLORPERAZINE EDISYLATE 5 MG: 5 INJECTION, SOLUTION INTRAMUSCULAR; INTRAVENOUS at 21:37

## 2020-11-11 RX ADMIN — POTASSIUM CHLORIDE 10 MEQ: 7.46 INJECTION, SOLUTION INTRAVENOUS at 12:01

## 2020-11-11 RX ADMIN — ONDANSETRON 4 MG: 2 SOLUTION INTRAMUSCULAR; INTRAVENOUS at 06:43

## 2020-11-11 RX ADMIN — SODIUM CHLORIDE, SODIUM LACTATE, POTASSIUM CHLORIDE, AND CALCIUM CHLORIDE 100 ML/HR: 600; 310; 30; 20 INJECTION, SOLUTION INTRAVENOUS at 10:16

## 2020-11-11 RX ADMIN — LABETALOL HYDROCHLORIDE 10 MG: 5 INJECTION, SOLUTION INTRAVENOUS at 01:43

## 2020-11-11 RX ADMIN — Medication 10 ML: at 22:00

## 2020-11-11 RX ADMIN — POTASSIUM CHLORIDE 10 MEQ: 7.46 INJECTION, SOLUTION INTRAVENOUS at 10:09

## 2020-11-11 RX ADMIN — MORPHINE SULFATE 1 MG: 2 INJECTION, SOLUTION INTRAMUSCULAR; INTRAVENOUS at 01:07

## 2020-11-11 NOTE — H&P
Hospitalist Admission Note    NAME: CarlosA lberto Pinedo   :  1975   MRN:  [id number]     Date/Time:  2020 1:46 AM    Patient PCP: Poonam Garcia MD  _____________________________________________________________________  Given the patient's current clinical presentation, I have a high level of concern for decompensation if discharged from the emergency department. Complex decision making was performed, which includes reviewing the patient's available past medical records, laboratory results, and x-ray films. My assessment of this patient's clinical condition and my plan of care is as follows. Assessment / Plan:  1. Intractable nausea and vomiting: Patient has accompanying diarrhea. CT abdomen/pelvis no acute process. Suspect gastritis vs hyperemesis cannabis both of which she has known hx of. She denies sick contacts and no travel hx. Will culture stool and send for lactoferrin. Abdominal pain and will administer morphine, for N/V will start zofran and phenergan as needed. Protonix IV as hx of gastritis    2. Abnormal UA: Will repeat clean catch. In the interim, acute UTI is considered and will order cultures and ceftriaxone    3. Accelerated HTN: Has not been able to take her home anti-hypertensives, amlodipine and lopressor, since her symptoms started and will order IV labetalol in the interim    4. Marijuana use: She endorses this. Counselling    5. DVT prophylaxis: Lovenox SQ     Code status: FULL    Disposition: Home with family once medically stable        Subjective:   CHIEF COMPLAINT:  N/V/D    HISTORY OF PRESENT ILLNESS:     Carlos Alberto Pinedo is a 54 y.o.  female with hx of HTN, gastritis, hyperemesis cnnabis and partial hysterectomy admitted with N/V/D for 5 days. Was in ER a day or so ago and was hydrated, given anti-emetics and sent home, she did not get better and returned to ER where she had CT scan that showed no acute process.  Her bp is uncontrolled but she endorses not taking meds as vomiting,  we were asked to admit for work up and evaluation of the above problems. Past Medical History:   Diagnosis Date    Hypertension     Kidney anomaly, congenital     Tubal pregnancy         Past Surgical History:   Procedure Laterality Date    HX CHOLECYSTECTOMY      HX GYN  10/07/2013    cyst removed    HX HYSTERECTOMY  10/7/2013    partial hysterectomy       Social History     Tobacco Use    Smoking status: Current Some Day Smoker     Packs/day: 0.25     Years: 25.00     Pack years: 6.25    Smokeless tobacco: Never Used    Tobacco comment: 1-3 day   Substance Use Topics    Alcohol use: Yes     Comment: occasionally/socially        Family History   Problem Relation Age of Onset    Hypertension Mother     Hypertension Father     Hypertension Sister      No Known Allergies     Prior to Admission medications    Medication Sig Start Date End Date Taking? Authorizing Provider   ondansetron (Zofran ODT) 4 mg disintegrating tablet Take 1 Tab by mouth every eight (8) hours as needed for Nausea. 11/9/20  Yes Michaelene Schlatter, MD   Cetirizine (ZyrTEC) 10 mg cap Zyrtec 10 mg capsule   take 1 capsule by oral route daily 9/30/19  Yes Provider, Historical   metoprolol tartrate (LOPRESSOR) 50 mg tablet Take 1 Tab by mouth two (2) times a day. Check PULSE before taking, must be over 60. 10/6/20  Yes Rosi David, NP   amLODIPine (NORVASC) 10 mg tablet Take 1 Tab by mouth daily. 9/4/20   Daron House MD       REVIEW OF SYSTEMS:     I am not able to complete the review of systems because:    The patient is intubated and sedated    The patient has altered mental status due to his acute medical problems    The patient has baseline aphasia from prior stroke(s)    The patient has baseline dementia and is not reliable historian    The patient is in acute medical distress and unable to provide information           Total of 12 systems reviewed as follows: POSITIVE= underlined text  Negative = text not underlined  General:  fever, chills, sweats, generalized weakness, weight loss/gain,      loss of appetite   Eyes:    blurred vision, eye pain, loss of vision, double vision  ENT:    rhinorrhea, pharyngitis   Respiratory:   cough, sputum production, SOB, ABRAHAM, wheezing, pleuritic pain   Cardiology:   chest pain, palpitations, orthopnea, PND, edema, syncope   Gastrointestinal:  abdominal pain , N/V, diarrhea, dysphagia, constipation, bleeding   Genitourinary:  frequency, urgency, dysuria, hematuria, incontinence   Muskuloskeletal :  arthralgia, myalgia, back pain  Hematology:  easy bruising, nose or gum bleeding, lymphadenopathy   Dermatological: rash, ulceration, pruritis, color change / jaundice  Endocrine:   hot flashes or polydipsia   Neurological:  headache, dizziness, confusion, focal weakness, paresthesia,     Speech difficulties, memory loss, gait difficulty  Psychological: Feelings of anxiety, depression, agitation    Objective:   VITALS:    Visit Vitals  BP (!) 162/99   Pulse (!) 122   Temp 97.7 °F (36.5 °C)   Resp 19   Ht 5' 1\" (1.549 m)   Wt 73.5 kg (162 lb)   SpO2 100%   BMI 30.61 kg/m²       PHYSICAL EXAM: Performed via telecart with nurse assistance  General:    Alert, cooperative, no distress, obese  HEENT: Atraumatic, anicteric sclerae, pink conjunctivae     No oral ulcers, mucosa moist, throat clear, dentition fair  Neck:  Supple, symmetrical,  thyroid: non tender  Lungs:   Clear to auscultation bilaterally. No Wheezing or Rhonchi. No rales. Chest wall:  No tenderness  No Accessory muscle use. Heart:   Regular  rhythm,  No  murmur   No edema  Abdomen:   Soft, obese and diffusely-tender. No rebound and no rigidity, some guarding with nurse palpitation. Bowel sounds normal  Extremities: No cyanosis. No clubbing,      Skin turgor normal, Capillary refill normal, Radial dial pulse 2+  Skin:     Not pale.   Not Jaundiced  No rashes   Psych:  Good insight. Not depressed. Not anxious or agitated. Neurologic: EOMs intact. No facial asymmetry. No aphasia or slurred speech. Symmetrical strength, Sensation grossly intact. Alert and oriented X 4.     _______________________________________________________________________  Care Plan discussed with:    Comments   Patient x    Family      RN     Care Manager                    Consultant:      _______________________________________________________________________  Expected  Disposition:   Home with Family x   HH/PT/OT/RN    SNF/LTC    MEME    ________________________________________________________________________  TOTAL TIME: 39   Minutes    Critical Care Provided     Minutes non procedure based      Comments    x Reviewed previous records   >50% of visit spent in counseling and coordination of care x Discussion with patient and/or family and questions answered       Given the patient's current clinical presentation, I have a high level of concern for decompensation if discharged from the ED. Complex decision making was performed which includes reviewing the patient's available past medical records, laboratory results, and Xray films. I have also directly communicated my plan and discussed this case with the involved ED physician.     ____________________________________________________________________  Thang Alfaro MD  Telehospitalist    Procedures: see electronic medical records for all procedures/Xrays and details which were not copied into this note but were reviewed prior to creation of Plan.     LAB DATA REVIEWED:    Recent Results (from the past 24 hour(s))   URINALYSIS W/ REFLEX CULTURE    Collection Time: 11/10/20  6:20 PM    Specimen: Urine   Result Value Ref Range    Color YELLOW/STRAW      Appearance CLOUDY (A) CLEAR      Specific gravity 1.020 1.003 - 1.030      pH (UA) 5.5 5.0 - 8.0      Protein >300 (A) NEG mg/dL    Glucose Negative NEG mg/dL    Ketone Negative NEG mg/dL    Bilirubin Negative NEG Blood SMALL (A) NEG      Urobilinogen 0.2 0.2 - 1.0 EU/dL    Nitrites Negative NEG      Leukocyte Esterase Negative NEG      WBC 0-4 0 - 4 /hpf    RBC 0-5 0 - 5 /hpf    Epithelial cells FEW FEW /lpf    Bacteria 1+ (A) NEG /hpf    UA:UC IF INDICATED CULTURE NOT INDICATED BY UA RESULT CNI     METABOLIC PANEL, COMPREHENSIVE    Collection Time: 11/10/20  6:34 PM   Result Value Ref Range    Sodium 138 136 - 145 mmol/L    Potassium 3.7 3.5 - 5.1 mmol/L    Chloride 99 97 - 108 mmol/L    CO2 24 21 - 32 mmol/L    Anion gap 15 5 - 15 mmol/L    Glucose 128 (H) 65 - 100 mg/dL    BUN 13 6 - 20 MG/DL    Creatinine 1.05 (H) 0.55 - 1.02 MG/DL    BUN/Creatinine ratio 12 12 - 20      GFR est AA >60 >60 ml/min/1.73m2    GFR est non-AA 56 (L) >60 ml/min/1.73m2    Calcium 9.8 8.5 - 10.1 MG/DL    Bilirubin, total 0.4 0.2 - 1.0 MG/DL    ALT (SGPT) 33 12 - 78 U/L    AST (SGOT) 30 15 - 37 U/L    Alk. phosphatase 114 45 - 117 U/L    Protein, total 9.6 (H) 6.4 - 8.2 g/dL    Albumin 4.3 3.5 - 5.0 g/dL    Globulin 5.3 (H) 2.0 - 4.0 g/dL    A-G Ratio 0.8 (L) 1.1 - 2.2     CBC WITH AUTOMATED DIFF    Collection Time: 11/10/20  6:34 PM   Result Value Ref Range    WBC 8.8 3.6 - 11.0 K/uL    RBC 4.54 3.80 - 5.20 M/uL    HGB 14.3 11.5 - 16.0 g/dL    HCT 41.2 35.0 - 47.0 %    MCV 90.7 80.0 - 99.0 FL    MCH 31.5 26.0 - 34.0 PG    MCHC 34.7 30.0 - 36.5 g/dL    RDW 13.6 11.5 - 14.5 %    PLATELET 654 265 - 733 K/uL    MPV 9.3 8.9 - 12.9 FL    NRBC 0.0 0  WBC    ABSOLUTE NRBC 0.00 0.00 - 0.01 K/uL    NEUTROPHILS 65 32 - 75 %    LYMPHOCYTES 27 12 - 49 %    MONOCYTES 5 5 - 13 %    EOSINOPHILS 1 0 - 7 %    BASOPHILS 1 0 - 1 %    IMMATURE GRANULOCYTES 1 (H) 0.0 - 0.5 %    ABS. NEUTROPHILS 5.9 1.8 - 8.0 K/UL    ABS. LYMPHOCYTES 2.4 0.8 - 3.5 K/UL    ABS. MONOCYTES 0.4 0.0 - 1.0 K/UL    ABS. EOSINOPHILS 0.1 0.0 - 0.4 K/UL    ABS. BASOPHILS 0.1 0.0 - 0.1 K/UL    ABS. IMM.  GRANS. 0.0 0.00 - 0.04 K/UL    DF AUTOMATED     LACTIC ACID    Collection Time: 11/10/20  6:34 PM   Result Value Ref Range    Lactic acid 1.3 0.4 - 2.0 MMOL/L   DRUG SCREEN, URINE    Collection Time: 11/10/20  7:08 PM   Result Value Ref Range    AMPHETAMINES Negative NEG      BARBITURATES Negative NEG      BENZODIAZEPINES Negative NEG      COCAINE Negative NEG      METHADONE Negative NEG      OPIATES Negative NEG      PCP(PHENCYCLIDINE) Negative NEG      THC (TH-CANNABINOL) Positive (A) NEG      Drug screen comment (NOTE)    URINALYSIS W/ REFLEX CULTURE    Collection Time: 11/11/20 12:08 AM    Specimen: Urine   Result Value Ref Range    Color YELLOW/STRAW      Appearance CLEAR CLEAR      Specific gravity 1.025 1.003 - 1.030      pH (UA) 5.5 5.0 - 8.0      Protein >300 (A) NEG mg/dL    Glucose Negative NEG mg/dL    Ketone TRACE (A) NEG mg/dL    Bilirubin Negative NEG      Blood Negative NEG      Urobilinogen 0.2 0.2 - 1.0 EU/dL    Nitrites Negative NEG      Leukocyte Esterase Negative NEG      WBC 0-4 0 - 4 /hpf    RBC 0-5 0 - 5 /hpf    Epithelial cells MODERATE (A) FEW /lpf    Bacteria Negative NEG /hpf    UA:UC IF INDICATED CULTURE NOT INDICATED BY UA RESULT CNI

## 2020-11-11 NOTE — PROGRESS NOTES
Plan-  Arrange PCP follow-up appointment  Have patient sign State Observation Letter      Reason for Admission:  \" Shayna Carson is a 54 y.o.  female with PMH of above mentioned problem who presents to ED with c/o vomiting and nausea since Friday associated with abdominal pain and diarrhea. \"    Readmission score- 14%    Patient is here under OBSERVATION. Discussed status and patient stated understanding. Has [employer]. RUR Score:      14%               Plan for utilizing home health:      No plan at this time. PCP: First and Last name: Palmira Powell    Name of Practice: [facility]   Are you a current patient: Yes/No: Y Approximate date of last visit: Was supposed to have appointment last week and canceled. Can you participate in a virtual visit with your PCP: Y                  Current Advanced Directive/Advance Care Plan: Patient stated that she would want CPR and ventilation. Would want daughter, Nereyda Minor [phone] or her mother, Eric Freire [phone] to make medical decisions for her. Transition of Care Plan:                    Met with patient for assessment. Lives with children in house. Works as teacher at [employer]. No DME or home health. Uses [facility]. Has a gastroenterologist and a Nephrologist but could not remember their names. Care Management Interventions  PCP Verified by CM: Yes  Mode of Transport at Discharge: Self  Transition of Care Consult (CM Consult): Discharge Planning  Current Support Network:  Other(Lives with her two children- ages 21 and 15)  Confirm Follow Up Transport: Family  The Plan for Transition of Care is Related to the Following Treatment Goals : PCP follow-up  The Patient and/or Patient Representative was Provided with a Choice of Provider and Agrees with the Discharge Plan?: Yes  Name of the Patient Representative Who was Provided with a Choice of Provider and Agrees with the Discharge Plan: Patient  Freedom of Choice List was Provided with Basic Dialogue that Supports the Patient's Individualized Plan of Care/Goals, Treatment Preferences and Shares the Quality Data Associated with the Providers?: Yes  Discharge Location  Discharge Placement: [place]     ABELARDO Mccarty/FRAN  [phone]

## 2020-11-11 NOTE — ED NOTES
Patient asleep In bed at this time. Holding blood pressure medication due to systolic being less than 80.

## 2020-11-11 NOTE — ED NOTES
Received report from [place], [facility]. Pt sitting in bed after receiving zofran for nausea, actively vomiting. HR up to 130s as patient continues to vomit. BP elevated, rechecked after pts vomiting subsided. Pt reports lower abdominal cramping, back pain from vomiting. She denies a bowel movement today. Bedside commode with approximately 400ml output emptied. Pt given Labetalol for BP, Morphine for pain. Emergency Department Nursing Plan of Care       The Nursing Plan of Care is developed from the Nursing assessment and Emergency Department Attending provider initial evaluation. The plan of care may be reviewed in the ED Provider note.     The Plan of Care was developed with the following considerations:   Patient / Family readiness to learn indicated by:verbalized understanding  Persons(s) to be included in education: patient  Barriers to Learning/Limitations:No    [address] Lee Roberts RN    11/11/2020   8:05 AM

## 2020-11-11 NOTE — ED NOTES
Pt in bed resting quietly. Lights dim. IV infusing. Pt stating \"I feel better than this time last night\". Pt remains NPO. Pt updated on plan of care.

## 2020-11-11 NOTE — ED NOTES
Unable to collect feces lab work due to the patient not having a BM. Pt asleep in room at this time. No complaints.

## 2020-11-11 NOTE — H&P
Hospitalist Admission Note    NAME: Mariella Ybarra   :  1975   MRN:  [id number]   Room Number: ER09/09  @ [facility]     Date/Time:  2020 8:25 AM    Patient PCP: Lennox Null, MD  ______________________________________________________________________  Given the patient's current clinical presentation, I have a high level of concern for decompensation if discharged from the emergency department. Complex decision making was performed, which includes reviewing the patient's available past medical records, laboratory results, and x-ray films. My assessment of this patient's clinical condition and my plan of care is as follows. Assessment / Plan: Active Problems:    Nausea & vomiting (11/10/2020)      #Intractable nausea vomiting 2/2 pancreatitis 2/2 Cannabis use   -Hyperemesis secondary to cannabis use  -On IV Protonix  -Zofran IV every 6  -We will add Phenergan as second line antiemetic  -CT abdomen pelvis unremarkable  -UDS positive for marijuana  - Hx of cholecystectomy    - US RUQ for CBD     #Abdominal pain 2/2 pancreatitis   - Lipase 549 - 2020   - LA 1.3   - CT abdomen /pelvis unremarkable   - tender on exam   - IVF   - IV Morphine   - US for CBD stones   - check Blood alcohol   - ? cannabis induced acute pancreatis     # Sinus Tachycardia   - 2/2 dehydration   - EKG reviewed no acute change     #Accelerated HTN   -Not able to keep down p.o. blood pressure medication  -On IV labetalol as needed     # Polycystic kidney disease  - CT A/p 20: Multiple bilateral cysts   - Follow with Nephrology outpatient      Body mass index is 30.61 kg/m². Code Status: Full   Surrogate Decision Maker:    DVT Prophylaxis: Lovenox  GI Prophylaxis: not indicated          Subjective:   CHIEF COMPLAINT: Nausea vomiting w/ abdominal pain     HISTORY OF PRESENT ILLNESS:     Mariella Ybarra is a 54 y.o.    female with PMH of above mentioned problem who presents to ED with c/o vomiting and nausea since Friday associated with abdominal pain and diarrhea. Patient states that she has been having vomiting 4 times a day along with abdominal pain radiating to back. Patient states that she has not used cannabis in 2 weeks but her UDS was positive for marijuana. Patient denied any fever chills chest pain or any difficulty with urination. Patient endorsed she is smokes cigarettes occasionally drinks alcohol occasionally    We were asked to admit for work up and evaluation of the above problems. Past Medical History:   Diagnosis Date    Hypertension     Kidney anomaly, congenital     Tubal pregnancy         Past Surgical History:   Procedure Laterality Date    HX CHOLECYSTECTOMY      HX GYN  10/07/2013    cyst removed    HX HYSTERECTOMY  10/7/2013    partial hysterectomy       Social History     Tobacco Use    Smoking status: Current Some Day Smoker     Packs/day: 0.25     Years: 25.00     Pack years: 6.25    Smokeless tobacco: Never Used    Tobacco comment: 1-3 day   Substance Use Topics    Alcohol use: Yes     Comment: occasionally/socially        Family History   Problem Relation Age of Onset    Hypertension Mother     Hypertension Father     Hypertension Sister      No Known Allergies     Prior to Admission medications    Medication Sig Start Date End Date Taking? Authorizing Provider   ondansetron (Zofran ODT) 4 mg disintegrating tablet Take 1 Tab by mouth every eight (8) hours as needed for Nausea. 11/9/20  Yes Domingo Argueta MD   Cetirizine (ZyrTEC) 10 mg cap Zyrtec 10 mg capsule   take 1 capsule by oral route daily 9/30/19  Yes Provider, Historical   metoprolol tartrate (LOPRESSOR) 50 mg tablet Take 1 Tab by mouth two (2) times a day. Check PULSE before taking, must be over 60. 10/6/20  Yes Lenore David De Matos 131, NP   amLODIPine (NORVASC) 10 mg tablet Take 1 Tab by mouth daily.  9/4/20   Padmini Gregorio MD       REVIEW OF SYSTEMS:     I am not able to complete the review of systems because: The patient is intubated and sedated    The patient has altered mental status due to his acute medical problems    The patient has baseline aphasia from prior stroke(s)    The patient has baseline dementia and is not reliable historian    The patient is in acute medical distress and unable to provide information           Total of 12 systems reviewed as follows:       POSITIVE= underlined text  Negative = text not underlined  General:  fever, chills, sweats, generalized weakness, weight loss/gain,      loss of appetite   Eyes:    blurred vision, eye pain, loss of vision, double vision  ENT:    rhinorrhea, pharyngitis   Respiratory:   cough, sputum production, SOB, ABRAHAM, wheezing, pleuritic pain   Cardiology:   chest pain, palpitations, orthopnea, PND, edema, syncope   Gastrointestinal:  abdominal pain , N/V, diarrhea, dysphagia, constipation, bleeding   Genitourinary:  frequency, urgency, dysuria, hematuria, incontinence   Muskuloskeletal :  arthralgia, myalgia, back pain  Hematology:  easy bruising, nose or gum bleeding, lymphadenopathy   Dermatological: rash, ulceration, pruritis, color change / jaundice  Endocrine:   hot flashes or polydipsia   Neurological:  headache, dizziness, confusion, focal weakness, paresthesia,     Speech difficulties, memory loss, gait difficulty  Psychological: Feelings of anxiety, depression, agitation    Objective:   VITALS:    Visit Vitals  BP (!) 153/98 (BP 1 Location: Left arm, BP Patient Position: At rest)   Pulse (!) 102   Temp 98.1 °F (36.7 °C)   Resp 21   Ht 5' 1\" (1.549 m)   Wt 73.5 kg (162 lb)   SpO2 100%   Breastfeeding No   BMI 30.61 kg/m²       PHYSICAL EXAM:    General:    Alert, cooperative, no distress, appears stated age.      HEENT: Atraumatic, anicteric sclerae, pink conjunctivae     No oral ulcers, mucosa moist, throat clear, dentition fair  Neck:  Supple, symmetrical,  thyroid: non tender  Lungs:   Clear to auscultation bilaterally. No Wheezing or Rhonchi. No rales. Chest wall:  No tenderness  No Accessory muscle use. Heart:   Regular  rhythm,  No  murmur   No edema  Abdomen:   Tender to palpation epigastric area  Extremities: No cyanosis. No clubbing,      Skin turgor normal, Capillary refill normal, Radial dial pulse 2+  Skin:     Not pale. Not Jaundiced  No rashes   Psych:  Good insight. Not depressed. Not anxious or agitated. Neurologic: EOMs intact. No facial asymmetry. No aphasia or slurred speech. Symmetrical strength, Sensation grossly intact. Alert and oriented X 4.     ______________________________________________________________________    Care Plan discussed with:  Patient/Family    Expected  Disposition:  Home w/Family  ________________________________________________________________________  TOTAL TIME:  28 Minutes    Critical Care Provided     Minutes non procedure based      Comments     Reviewed previous records   >50% of visit spent in counseling and coordination of care  Discussion with patient and/or family and questions answered       ________________________________________________________________________  Signed: Larissa Enriquez MD    Procedures: see electronic medical records for all procedures/Xrays and details which were not copied into this note but were reviewed prior to creation of Plan.     LAB DATA REVIEWED:    Recent Results (from the past 24 hour(s))   URINALYSIS W/ REFLEX CULTURE    Collection Time: 11/10/20  6:20 PM    Specimen: Urine   Result Value Ref Range    Color YELLOW/STRAW      Appearance CLOUDY (A) CLEAR      Specific gravity 1.020 1.003 - 1.030      pH (UA) 5.5 5.0 - 8.0      Protein >300 (A) NEG mg/dL    Glucose Negative NEG mg/dL    Ketone Negative NEG mg/dL    Bilirubin Negative NEG      Blood SMALL (A) NEG      Urobilinogen 0.2 0.2 - 1.0 EU/dL    Nitrites Negative NEG      Leukocyte Esterase Negative NEG      WBC 0-4 0 - 4 /hpf    RBC 0-5 0 - 5 /hpf    Epithelial cells FEW FEW /lpf    Bacteria 1+ (A) NEG /hpf    UA:UC IF INDICATED CULTURE NOT INDICATED BY UA RESULT CNI     METABOLIC PANEL, COMPREHENSIVE    Collection Time: 11/10/20  6:34 PM   Result Value Ref Range    Sodium 138 136 - 145 mmol/L    Potassium 3.7 3.5 - 5.1 mmol/L    Chloride 99 97 - 108 mmol/L    CO2 24 21 - 32 mmol/L    Anion gap 15 5 - 15 mmol/L    Glucose 128 (H) 65 - 100 mg/dL    BUN 13 6 - 20 MG/DL    Creatinine 1.05 (H) 0.55 - 1.02 MG/DL    BUN/Creatinine ratio 12 12 - 20      GFR est AA >60 >60 ml/min/1.73m2    GFR est non-AA 56 (L) >60 ml/min/1.73m2    Calcium 9.8 8.5 - 10.1 MG/DL    Bilirubin, total 0.4 0.2 - 1.0 MG/DL    ALT (SGPT) 33 12 - 78 U/L    AST (SGOT) 30 15 - 37 U/L    Alk. phosphatase 114 45 - 117 U/L    Protein, total 9.6 (H) 6.4 - 8.2 g/dL    Albumin 4.3 3.5 - 5.0 g/dL    Globulin 5.3 (H) 2.0 - 4.0 g/dL    A-G Ratio 0.8 (L) 1.1 - 2.2     CBC WITH AUTOMATED DIFF    Collection Time: 11/10/20  6:34 PM   Result Value Ref Range    WBC 8.8 3.6 - 11.0 K/uL    RBC 4.54 3.80 - 5.20 M/uL    HGB 14.3 11.5 - 16.0 g/dL    HCT 41.2 35.0 - 47.0 %    MCV 90.7 80.0 - 99.0 FL    MCH 31.5 26.0 - 34.0 PG    MCHC 34.7 30.0 - 36.5 g/dL    RDW 13.6 11.5 - 14.5 %    PLATELET 717 419 - 393 K/uL    MPV 9.3 8.9 - 12.9 FL    NRBC 0.0 0  WBC    ABSOLUTE NRBC 0.00 0.00 - 0.01 K/uL    NEUTROPHILS 65 32 - 75 %    LYMPHOCYTES 27 12 - 49 %    MONOCYTES 5 5 - 13 %    EOSINOPHILS 1 0 - 7 %    BASOPHILS 1 0 - 1 %    IMMATURE GRANULOCYTES 1 (H) 0.0 - 0.5 %    ABS. NEUTROPHILS 5.9 1.8 - 8.0 K/UL    ABS. LYMPHOCYTES 2.4 0.8 - 3.5 K/UL    ABS. MONOCYTES 0.4 0.0 - 1.0 K/UL    ABS. EOSINOPHILS 0.1 0.0 - 0.4 K/UL    ABS. BASOPHILS 0.1 0.0 - 0.1 K/UL    ABS. IMM.  GRANS. 0.0 0.00 - 0.04 K/UL    DF AUTOMATED     LACTIC ACID    Collection Time: 11/10/20  6:34 PM   Result Value Ref Range    Lactic acid 1.3 0.4 - 2.0 MMOL/L   DRUG SCREEN, URINE    Collection Time: 11/10/20  7:08 PM   Result Value Ref Range    AMPHETAMINES Negative NEG      BARBITURATES Negative NEG      BENZODIAZEPINES Negative NEG      COCAINE Negative NEG      METHADONE Negative NEG      OPIATES Negative NEG      PCP(PHENCYCLIDINE) Negative NEG      THC (TH-CANNABINOL) Positive (A) NEG      Drug screen comment (NOTE)    URINALYSIS W/ REFLEX CULTURE    Collection Time: 11/11/20 12:08 AM    Specimen: Urine   Result Value Ref Range    Color YELLOW/STRAW      Appearance CLEAR CLEAR      Specific gravity 1.025 1.003 - 1.030      pH (UA) 5.5 5.0 - 8.0      Protein >300 (A) NEG mg/dL    Glucose Negative NEG mg/dL    Ketone TRACE (A) NEG mg/dL    Bilirubin Negative NEG      Blood Negative NEG      Urobilinogen 0.2 0.2 - 1.0 EU/dL    Nitrites Negative NEG      Leukocyte Esterase Negative NEG      WBC 0-4 0 - 4 /hpf    RBC 0-5 0 - 5 /hpf    Epithelial cells MODERATE (A) FEW /lpf    Bacteria Negative NEG /hpf    UA:UC IF INDICATED CULTURE NOT INDICATED BY UA RESULT CNI     EKG, 12 LEAD, INITIAL    Collection Time: 11/11/20  2:31 AM   Result Value Ref Range    Ventricular Rate 82 BPM    Atrial Rate 82 BPM    P-R Interval 128 ms    QRS Duration 84 ms    Q-T Interval 392 ms    QTC Calculation (Bezet) 457 ms    Calculated P Axis 56 degrees    Calculated R Axis 69 degrees    Calculated T Axis 51 degrees    Diagnosis       Normal sinus rhythm  Possible Left atrial enlargement  Borderline ECG  When compared with ECG of 09-NOV-2020 15:55,  No significant change was found  Confirmed by Sheila Alex M.D., [facility] ([id number]) on 11/11/2020 4:33:44 AM     METABOLIC PANEL, COMPREHENSIVE    Collection Time: 11/11/20  3:52 AM   Result Value Ref Range    Sodium 138 136 - 145 mmol/L    Potassium 3.0 (L) 3.5 - 5.1 mmol/L    Chloride 100 97 - 108 mmol/L    CO2 22 21 - 32 mmol/L    Anion gap 16 (H) 5 - 15 mmol/L    Glucose 137 (H) 65 - 100 mg/dL    BUN 10 6 - 20 MG/DL    Creatinine 1.00 0.55 - 1.02 MG/DL    BUN/Creatinine ratio 10 (L) 12 - 20      GFR est AA >60 >60 ml/min/1.73m2    GFR est non-AA 60 (L) >60 ml/min/1.73m2    Calcium 9.2 8.5 - 10.1 MG/DL    Bilirubin, total 0.3 0.2 - 1.0 MG/DL    ALT (SGPT) 28 12 - 78 U/L    AST (SGOT) 24 15 - 37 U/L    Alk. phosphatase 103 45 - 117 U/L    Protein, total 8.8 (H) 6.4 - 8.2 g/dL    Albumin 3.9 3.5 - 5.0 g/dL    Globulin 4.9 (H) 2.0 - 4.0 g/dL    A-G Ratio 0.8 (L) 1.1 - 2.2     MAGNESIUM    Collection Time: 11/11/20  3:52 AM   Result Value Ref Range    Magnesium 1.8 1.6 - 2.4 mg/dL   CBC WITH AUTOMATED DIFF    Collection Time: 11/11/20  3:52 AM   Result Value Ref Range    WBC 11.6 (H) 3.6 - 11.0 K/uL    RBC 4.49 3.80 - 5.20 M/uL    HGB 13.8 11.5 - 16.0 g/dL    HCT 40.2 35.0 - 47.0 %    MCV 89.5 80.0 - 99.0 FL    MCH 30.7 26.0 - 34.0 PG    MCHC 34.3 30.0 - 36.5 g/dL    RDW 13.4 11.5 - 14.5 %    PLATELET 979 645 - 678 K/uL    MPV 9.3 8.9 - 12.9 FL    NRBC 0.0 0  WBC    ABSOLUTE NRBC 0.00 0.00 - 0.01 K/uL    NEUTROPHILS 77 (H) 32 - 75 %    LYMPHOCYTES 16 12 - 49 %    MONOCYTES 6 5 - 13 %    EOSINOPHILS 0 0 - 7 %    BASOPHILS 1 0 - 1 %    IMMATURE GRANULOCYTES 0 0.0 - 0.5 %    ABS. NEUTROPHILS 9.0 (H) 1.8 - 8.0 K/UL    ABS. LYMPHOCYTES 1.8 0.8 - 3.5 K/UL    ABS. MONOCYTES 0.6 0.0 - 1.0 K/UL    ABS. EOSINOPHILS 0.0 0.0 - 0.4 K/UL    ABS. BASOPHILS 0.1 0.0 - 0.1 K/UL    ABS. IMM.  GRANS. 0.1 (H) 0.00 - 0.04 K/UL    DF AUTOMATED

## 2020-11-11 NOTE — ED NOTES
Patient still complaining of 10/10 pain and dry heaving. Given morphine and zofran without relief. Paged hospitalist for a new order to help get patient comfortable.

## 2020-11-11 NOTE — ED NOTES
Pt restless in bed, abdominal cramping upper and lower, nauseated with vomiting. Pt given morphine and compazine. IVF changed to LR and potassium replacement doses started.

## 2020-11-11 NOTE — PROGRESS NOTES
Telemed: patient is still complaining of 10/10 pain and dry heaving. pt was given morphine as prescribed at 0050. pts last dose of Zofran was 2124 and is not due again until 0324. can we try a dose of Haldol? thank you! Plan: Spoke with nurse who states patient is not psychotic or agitated. Would not order Haldol for this patient, unable to take oral phenergan ordered.  Will order one time IV phenergan

## 2020-11-11 NOTE — ED NOTES
Bedside and Verbal shift change report given to Matteo Larios RN  (oncoming nurse) by Alyssia Villareral RN  (offgoing nurse). Report included the following information SBAR, ED Summary, MAR and Recent Results.

## 2020-11-11 NOTE — ED NOTES
Holding IV phenergan due to patient not having an IV in the [facility] or higher and pharmacy stating not to give it due to damaging the vein.

## 2020-11-11 NOTE — PROGRESS NOTES
**Consult Information**  Member Facility: [address] [place] [address] Rd., [address] MRN: [id number]  Consult ID: [id number]  Facility Time Zone: ET  Date and Time of Request: 11/11/2020 02:01:02 AM  Requesting Clinician: Maye Currie  Patient Name: Carol Jurado  YOB: 1975  Gender: Female    **Clinical Note**  Clinical Note: One time order placed for Compazine for n/v. **Attestation**  Interaction Mode: Phone Only  Phone Duration (mins): 5  Time of Call : 11/11/2020 02:11 AM  Interaction Attestation: Interprofessional internet consultation was delivered through telephonic and/or electronic communication upon the request of the patients treating provider, while the requesting and the rendering provider were not in the same physical location. A written summary report was provided to the requesting provider at the originating site.   Evaluation Duration (mins): 7    **Physician Signature**  This document was electronically signed by: Jarrett Hernandez MD  11/11/2020 02:13 AM

## 2020-11-11 NOTE — ED NOTES
Verbal shift change report given to Donato Phillip RN  (oncoming nurse) by Javier Browning RN (offgoing nurse). Report included the following information SBAR.

## 2020-11-11 NOTE — ED NOTES
Discussed with Dr Chelita Siu pt's potassium level of 3.0 and requesting replacement. Dr Chelita Siu notified of new lipase level and will put in orders to replace potassium.

## 2020-11-12 LAB
ANION GAP SERPL CALC-SCNC: 12 MMOL/L (ref 5–15)
BASOPHILS # BLD: 0.1 K/UL (ref 0–0.1)
BASOPHILS NFR BLD: 1 % (ref 0–1)
BUN SERPL-MCNC: 10 MG/DL (ref 6–20)
BUN/CREAT SERPL: 10 (ref 12–20)
CALCIUM SERPL-MCNC: 8.6 MG/DL (ref 8.5–10.1)
CHLORIDE SERPL-SCNC: 103 MMOL/L (ref 97–108)
CO2 SERPL-SCNC: 23 MMOL/L (ref 21–32)
CREAT SERPL-MCNC: 0.99 MG/DL (ref 0.55–1.02)
DIFFERENTIAL METHOD BLD: ABNORMAL
EOSINOPHIL # BLD: 0.2 K/UL (ref 0–0.4)
EOSINOPHIL NFR BLD: 2 % (ref 0–7)
ERYTHROCYTE [DISTWIDTH] IN BLOOD BY AUTOMATED COUNT: 13.7 % (ref 11.5–14.5)
GLUCOSE SERPL-MCNC: 90 MG/DL (ref 65–100)
HCT VFR BLD AUTO: 33.4 % (ref 35–47)
HGB BLD-MCNC: 11.6 G/DL (ref 11.5–16)
IMM GRANULOCYTES # BLD AUTO: 0 K/UL (ref 0–0.04)
IMM GRANULOCYTES NFR BLD AUTO: 0 % (ref 0–0.5)
LYMPHOCYTES # BLD: 3.5 K/UL (ref 0.8–3.5)
LYMPHOCYTES NFR BLD: 51 % (ref 12–49)
MCH RBC QN AUTO: 31.5 PG (ref 26–34)
MCHC RBC AUTO-ENTMCNC: 34.7 G/DL (ref 30–36.5)
MCV RBC AUTO: 90.8 FL (ref 80–99)
MONOCYTES # BLD: 0.7 K/UL (ref 0–1)
MONOCYTES NFR BLD: 11 % (ref 5–13)
NEUTS SEG # BLD: 2.4 K/UL (ref 1.8–8)
NEUTS SEG NFR BLD: 35 % (ref 32–75)
NRBC # BLD: 0 K/UL (ref 0–0.01)
NRBC BLD-RTO: 0 PER 100 WBC
PLATELET # BLD AUTO: 286 K/UL (ref 150–400)
PMV BLD AUTO: 9.1 FL (ref 8.9–12.9)
POTASSIUM SERPL-SCNC: 3.2 MMOL/L (ref 3.5–5.1)
RBC # BLD AUTO: 3.68 M/UL (ref 3.8–5.2)
SODIUM SERPL-SCNC: 138 MMOL/L (ref 136–145)
WBC # BLD AUTO: 6.8 K/UL (ref 3.6–11)

## 2020-11-12 PROCEDURE — 74011000250 HC RX REV CODE- 250: Performed by: INTERNAL MEDICINE

## 2020-11-12 PROCEDURE — 80048 BASIC METABOLIC PNL TOTAL CA: CPT

## 2020-11-12 PROCEDURE — 99218 HC RM OBSERVATION: CPT

## 2020-11-12 PROCEDURE — 96372 THER/PROPH/DIAG INJ SC/IM: CPT

## 2020-11-12 PROCEDURE — 85025 COMPLETE CBC W/AUTO DIFF WBC: CPT

## 2020-11-12 PROCEDURE — 36415 COLL VENOUS BLD VENIPUNCTURE: CPT

## 2020-11-12 PROCEDURE — 74011000250 HC RX REV CODE- 250: Performed by: STUDENT IN AN ORGANIZED HEALTH CARE EDUCATION/TRAINING PROGRAM

## 2020-11-12 PROCEDURE — 74011250637 HC RX REV CODE- 250/637: Performed by: STUDENT IN AN ORGANIZED HEALTH CARE EDUCATION/TRAINING PROGRAM

## 2020-11-12 PROCEDURE — C9113 INJ PANTOPRAZOLE SODIUM, VIA: HCPCS | Performed by: INTERNAL MEDICINE

## 2020-11-12 PROCEDURE — 65270000032 HC RM SEMIPRIVATE

## 2020-11-12 PROCEDURE — 74011250636 HC RX REV CODE- 250/636: Performed by: INTERNAL MEDICINE

## 2020-11-12 PROCEDURE — 74011250636 HC RX REV CODE- 250/636: Performed by: STUDENT IN AN ORGANIZED HEALTH CARE EDUCATION/TRAINING PROGRAM

## 2020-11-12 RX ORDER — LABETALOL HCL 20 MG/4 ML
10 SYRINGE (ML) INTRAVENOUS
Status: DISCONTINUED | OUTPATIENT
Start: 2020-11-12 | End: 2020-11-13 | Stop reason: HOSPADM

## 2020-11-12 RX ORDER — NALOXONE HYDROCHLORIDE 0.4 MG/ML
0.4 INJECTION, SOLUTION INTRAMUSCULAR; INTRAVENOUS; SUBCUTANEOUS
Status: DISCONTINUED | OUTPATIENT
Start: 2020-11-12 | End: 2020-11-13 | Stop reason: HOSPADM

## 2020-11-12 RX ORDER — AMLODIPINE BESYLATE 5 MG/1
10 TABLET ORAL DAILY
Status: DISCONTINUED | OUTPATIENT
Start: 2020-11-13 | End: 2020-11-13 | Stop reason: HOSPADM

## 2020-11-12 RX ORDER — POTASSIUM CHLORIDE 7.45 MG/ML
10 INJECTION INTRAVENOUS
Status: COMPLETED | OUTPATIENT
Start: 2020-11-12 | End: 2020-11-12

## 2020-11-12 RX ORDER — ENALAPRILAT 1.25 MG/ML
0.62 INJECTION INTRAVENOUS
Status: COMPLETED | OUTPATIENT
Start: 2020-11-12 | End: 2020-11-12

## 2020-11-12 RX ORDER — METOPROLOL TARTRATE 50 MG/1
50 TABLET ORAL 2 TIMES DAILY
Status: DISCONTINUED | OUTPATIENT
Start: 2020-11-12 | End: 2020-11-13 | Stop reason: HOSPADM

## 2020-11-12 RX ORDER — ONDANSETRON 4 MG/1
4 TABLET, ORALLY DISINTEGRATING ORAL
Status: DISCONTINUED | OUTPATIENT
Start: 2020-11-12 | End: 2020-11-13 | Stop reason: HOSPADM

## 2020-11-12 RX ADMIN — SODIUM CHLORIDE 40 MG: 9 INJECTION INTRAMUSCULAR; INTRAVENOUS; SUBCUTANEOUS at 21:06

## 2020-11-12 RX ADMIN — ENALAPRILAT 0.62 MG: 1.25 INJECTION INTRAVENOUS at 09:36

## 2020-11-12 RX ADMIN — Medication 10 ML: at 08:20

## 2020-11-12 RX ADMIN — ENOXAPARIN SODIUM 40 MG: 100 INJECTION SUBCUTANEOUS at 08:20

## 2020-11-12 RX ADMIN — MORPHINE SULFATE 2 MG: 2 INJECTION, SOLUTION INTRAMUSCULAR; INTRAVENOUS at 08:20

## 2020-11-12 RX ADMIN — MORPHINE SULFATE 2 MG: 2 INJECTION, SOLUTION INTRAMUSCULAR; INTRAVENOUS at 01:42

## 2020-11-12 RX ADMIN — Medication 20 ML: at 08:21

## 2020-11-12 RX ADMIN — POTASSIUM CHLORIDE 10 MEQ: 7.46 INJECTION, SOLUTION INTRAVENOUS at 13:56

## 2020-11-12 RX ADMIN — POTASSIUM CHLORIDE 10 MEQ: 7.46 INJECTION, SOLUTION INTRAVENOUS at 11:46

## 2020-11-12 RX ADMIN — POTASSIUM CHLORIDE 10 MEQ: 7.46 INJECTION, SOLUTION INTRAVENOUS at 16:14

## 2020-11-12 RX ADMIN — Medication 10 ML: at 16:15

## 2020-11-12 RX ADMIN — Medication 10 ML: at 21:22

## 2020-11-12 RX ADMIN — SODIUM CHLORIDE 40 MG: 9 INJECTION INTRAMUSCULAR; INTRAVENOUS; SUBCUTANEOUS at 08:19

## 2020-11-12 RX ADMIN — SODIUM CHLORIDE, SODIUM LACTATE, POTASSIUM CHLORIDE, AND CALCIUM CHLORIDE 100 ML/HR: 600; 310; 30; 20 INJECTION, SOLUTION INTRAVENOUS at 08:30

## 2020-11-12 RX ADMIN — ONDANSETRON 4 MG: 4 TABLET, ORALLY DISINTEGRATING ORAL at 17:52

## 2020-11-12 RX ADMIN — PROCHLORPERAZINE EDISYLATE 5 MG: 5 INJECTION, SOLUTION INTRAMUSCULAR; INTRAVENOUS at 04:45

## 2020-11-12 RX ADMIN — Medication 10 ML: at 18:51

## 2020-11-12 RX ADMIN — POTASSIUM CHLORIDE 10 MEQ: 7.46 INJECTION, SOLUTION INTRAVENOUS at 09:41

## 2020-11-12 RX ADMIN — MORPHINE SULFATE 2 MG: 2 INJECTION, SOLUTION INTRAMUSCULAR; INTRAVENOUS at 16:18

## 2020-11-12 RX ADMIN — MORPHINE SULFATE 2 MG: 2 INJECTION, SOLUTION INTRAMUSCULAR; INTRAVENOUS at 21:06

## 2020-11-12 NOTE — PROGRESS NOTES
Devorah.Dell) Verbal shift change report given to Kandi Singh RN (oncoming nurse) by Shola Baca (nursing supervisor). Report included the following information SBAR, Kardex, MAR and Recent Results. 0281) Consult Dr. Virl Ormond, able to give Vasotec with   1319) pt tolerating gingerale without nausea. Pain currently 4/10  1338) Dr. Virl Ormond, order to advance diet to GI lite  1620) Pt pain increase to 8/10 after eating crackers. A slight amount of nausea  1843) Discuss with Dr. Virl Ormond, pt tolerate dinner; some morphine given after crackers. 1935) Bedside shift change report given to JOVANA Ham (oncoming nurse) by Kandi Singh RN (offgoing nurse).  Report included the following information SBAR, Kardex and MAR and Cardiac Rhythm NSR

## 2020-11-12 NOTE — ED NOTES
Bedside shift change report given to [email or web address] [facility] (oncoming nurse) by Niki Neville (offgoing nurse). Report included the following information SBAR, Kardex, Intake/Output, MAR and Recent Results.

## 2020-11-12 NOTE — PROGRESS NOTES
Physician Progress Note      Melissa Ware  [facility] #:                  [id number]  :                       1975  ADMIT DATE:       11/10/2020 5:17 PM  [address] [place] DATE:  RESPONDING  PROVIDER #:        Fanny Gallo MD          QUERY TEXT:    Dr. Ramsey Truong:    Patient admitted with Vomiting x 2 days and noted to have a  and RR: 20. If possible, please document in progress notes and discharge summary if you are evaluating and/or treating any of the following: The medical record reflects the following:  Risk Factors: Hx: HTN / Ex-smoker. ..... C/o Vomiting  Clinical Indicators: 97.7 122 19 162/99 100. ........ ED VS: HR: ; RR: 20-25. ... Andrea Arcelia Andrea Arcelia Lipase: 549. Andrea Arcelia Andrea Arcelia Andrea Arcelia Lac acid: 1.3....... CT ABD/PELVIS: Neg. ..... US ABD: Normal Biliary appearance following Cholecystectomy. ... Treatment: IVF NS Bolus 1,000 ml in ED; IVF NS gtt @100ml/hr in ED; IV Zofran in ED; IV Toradol in ED; IV Compazine in ED; IVF LR gtt in ED; IVPB KCL in ED x 7 doses; IP Admit; CT ABD/PELVIS; US ABD; Thank you,    Rosa Isela Dawn  [facility]  [phone]  Options provided:  -- SIRS of non-infectious origin due to Acute pancreatitis without acute organ dysfunction  -- Tachycardia and tachypnea  due to Acute pancreatitis without acute organ dysfunction  -- SIRS of non-infectious origin due to Acute pancreatitis without acute organ dysfunction and Dehydration  -- Tachycardia and tachypnea due to Acute pancreatitis without acute organ dysfunction and Dehydration  -- Other - I will add my own diagnosis  -- Disagree - Not applicable / Not valid  -- Disagree - Clinically unable to determine / Unknown  -- Refer to Clinical Documentation Reviewer    PROVIDER RESPONSE TEXT:    This patient has Tachycardia nd tachypnea due to Acute pancreatitis without acute organ dysfunction.     Query created by: Uma Faith on 2020 2:02 PM      Electronically signed by:  Fanny Gallo MD 2020 4:15 PM

## 2020-11-12 NOTE — INTERDISCIPLINARY ROUNDS
IDR with Dr. Ermias Law (MD), Glenys Waggoner (NP), Tee Daniel (pharmacist), Evens Vasquez (), Adriana Milligan (nurse director), Benny (nurse supervisor),  and Petty Owusu (RN) to discuss plan of care including potassium repletion, trial gingerale and ice chips

## 2020-11-12 NOTE — ED NOTES
TRANSFER - OUT REPORT:    Verbal report given to Manish lCark RN Supervisor(name) on Gurmeet Gonzalez  being transferred to [facility](unit) for routine progression of care       Report consisted of patients Situation, Background, Assessment and   Recommendations(SBAR). Information from the following report(s) SBAR, Kardex, ED Summary, [facility] and Recent Results was reviewed with the receiving nurse. Lines:   Peripheral IV 11/11/20 Left Wrist (Active)   Site Assessment Clean, dry, & intact 11/11/20 2052   Phlebitis Assessment 0 11/11/20 2052   Infiltration Assessment 0 11/11/20 2052   Dressing Status Clean, dry, & intact 11/11/20 2052   Hub Color/Line Status Blue 11/11/20 2052   Action Taken Catheter retaped 11/11/20 2052   Alcohol Cap Used Yes 11/11/20 2000        Opportunity for questions and clarification was provided.       Patient transported with:   Monitor   RN

## 2020-11-12 NOTE — PROGRESS NOTES
Hospitalist Progress Note    NAME: Nicci Sauceda   :  1975   MRN:  [id number]   Room Number:  884/36  @ [facility]       Interim Hospital Summary: 54 y.o. female whom presented on 11/10/2020 with      Assessment / Plan:        # Intractable nausea vomiting 2/2 pancreatitis 2/2 Cannabis use   -Hyperemesis secondary to cannabis use  -On IV Protonix  -Zofran IV every 6  - Compazine IV   -CT abdomen pelvis unremarkable  -UDS positive for marijuana  - Hx of cholecystectomy    - US RUQ CBD 5.2 mm   - advanced diet      #Abdominal pain 2/2 pancreatitis   - Lipase 549 - 2020   - LA 1.3   - CT abdomen /pelvis unremarkable   - tender on exam   - IVF   - IV Morphine   - US for CBD stones   - check Blood alcohol   - ? cannabis induced acute pancreatis      # Sinus Tachycardia   - 2/2 dehydration   - EKG reviewed no acute change      #Accelerated HTN   -Not able to keep down p.o. blood pressure medication  -On IV labetalol as needed  - will add vasotec IV 0.625 mg Now then every 6 hours if tolerate well        # Polycystic kidney disease  - CT A/p 20: Multiple bilateral cysts   - Follow with Nephrology outpatient        Body mass index is 30.61 kg/m². Code Status: Full   Surrogate Decision Maker:     DVT Prophylaxis: Lovenox  GI Prophylaxis: not indicated         Subjective:     Chief Complaint / Reason for Physician Visit  \" Patient feels much better will advance diet and see how she does discussed with RN events overnight. Review of Systems:  No fevers, chills, appetite change, cough, sputum production, shortness of breath, dyspnea on exertion, nausea, vomitting, diarrhea, constipation, chest pain, leg edema, abdominal pain, joint pain, rash, itching. Tolerating PT/OT. Tolerating diet. Objective:     VITALS:   Last 24hrs VS reviewed since prior progress note.  Most recent are:  Patient Vitals for the past 24 hrs:   Temp Pulse Resp BP SpO2   20 0818 98 °F (36.7 °C) 79 16 (!) 188/122 97 %   11/12/20 0700  75 17 (!) 144/75    11/12/20 0600  87 15 129/74    11/12/20 0500  68 11 121/65 (!) 84 %   11/12/20 0430  96 17 137/85    11/12/20 0400 97.9 °F (36.6 °C) (!) 107 19 (!) 150/101    11/12/20 0300  74 15 (!) 109/59    11/12/20 0230  80 18 (!) 132/92    11/12/20 0200  76 17 (!) 137/93 99 %   11/12/20 0130  80 17 (!) 138/91 98 %   11/12/20 0100  80 16 (!) 130/90 95 %   11/12/20 0030    119/69    11/12/20 0000  83 16  100 %   11/11/20 2330  79 18 110/67 95 %   11/11/20 2300  94 15 119/67 97 %   11/11/20 2230  86 17 134/71 98 %   11/11/20 2200    (!) 132/95 100 %   11/11/20 2130     100 %   11/11/20 2000     99 %   11/11/20 1830  88 15 115/62 99 %   11/11/20 1800  92 16 103/60 99 %   11/11/20 1630  86 17 (!) 131/93 99 %   11/11/20 1618  80 19 (!) 134/104 97 %   11/11/20 1500  97 19 (!) 157/108 98 %   11/11/20 1400  92 19 (!) 174/101 99 %   11/11/20 1339  90 19  98 %   11/11/20 1330  (!) 123 15 (!) 179/103 98 %   11/11/20 1300  (!) 103 21 (!) 141/86 100 %   11/11/20 1200  (!) 114 19 (!) 159/91 96 %   11/11/20 1100 97.8 °F (36.6 °C) 97 19 (!) 173/100 99 %   11/11/20 1000  91 20 (!) 155/84 97 %   11/11/20 0930  (!) 108 20 (!) 150/103 98 %       Intake/Output Summary (Last 24 hours) at 11/12/2020 0902  Last data filed at 11/12/2020 0239  Gross per 24 hour   Intake 1200 ml   Output 925 ml   Net 275 ml        PHYSICAL EXAM:  General: WD, WN. Alert, cooperative, no acute distress    EENT:  EOMI. Anicteric sclerae. MMM  Resp:  CTA bilaterally, no wheezing or rales. No accessory muscle use  CV:  Regular  rhythm,  normal S1/S2, no murmurs rubs gallops, No edema  GI:  Mild TTP epigastric area   Neurologic:  Alert and oriented X 3, normal speech,   Psych:   Good insight. Not anxious nor agitated  Skin:  No rashes.   No jaundice    Reviewed most current lab test results and cultures  YES  Reviewed most current radiology test results   YES  Review and summation of old records today    NO  Reviewed patient's current orders and MAR    YES  PMH/SH reviewed - no change compared to H&P  ________________________________________________________________________  Care Plan discussed with:    Comments   Patient x    Family      RN x    Care Manager x    Consultant                       x Multidiciplinary team rounds were held today with , nursing, pharmacist and clinical coordinator. Patient's plan of care was discussed; medications were reviewed and discharge planning was addressed. ________________________________________________________________________  Total NON critical care TIME:  15   Minutes    Total CRITICAL CARE TIME Spent:   Minutes non procedure based      Comments   >50% of visit spent in counseling and coordination of care     ________________________________________________________________________  Duarte Roman MD     Procedures: see electronic medical records for all procedures/Xrays and details which were not copied into this note but were reviewed prior to creation of Plan. LABS:  I reviewed today's most current labs and imaging studies.   Pertinent labs include:  Recent Labs     11/12/20  0442 11/11/20  0352 11/10/20  1834   WBC 6.8 11.6* 8.8   HGB 11.6 13.8 14.3   HCT 33.4* 40.2 41.2    369 392     Recent Labs     11/12/20  0442 11/11/20  1522 11/11/20  0352 11/10/20  1834 11/09/20  1733     --  138 138 133*   K 3.2* 3.2* 3.0* 3.7 3.6     --  100 99 102   CO2 23  --  22 24 21   GLU 90  --  137* 128* 123*   BUN 10  --  10 13 16   CREA 0.99  --  1.00 1.05* 0.91   CA 8.6  --  9.2 9.8 9.5   MG  --   --  1.8  --   --    ALB  --   --  3.9 4.3 4.0   TBILI  --   --  0.3 0.4 0.4   ALT  --   --  28 33 30       Signed: Duarte Roman MD

## 2020-11-12 NOTE — ED NOTES
1930: Pt reports 10/10 back pain; pt to be given morphine    2000: Pt reports alleviation of back pain: 2/10, but has mild nausea. Pt aware she cannot have compazine until 2112. Call bell at bedside will continue to monitor. 2140: Pt given compazine for nausea. 2300: Pt awake in bed watching TV; will continue to monitor    0000: Pt asleep in bed; will continue to monitor    0131: Pt called out reporting intense lower back pain 10/10; will give pt morphine    0200: Pt reports reduction in back pain 6/10; pt also requesting cold clothes and ice packs. 0300: PT asleep; will continue to monitor    0400: Labs obtained; pt given new ice pack for comfort.     0500: Pt asleep; will continue to monitor; call bell at bedside    0600: Pt asleep; call bell at bedside will continue to monitor

## 2020-11-12 NOTE — PROGRESS NOTES
[facility]: MED RECONCILIATION    Information obtained from: Patient    Allergies: Patient has no known allergies. Prior to Admission Medications:   Prior to Admission Medications   Prescriptions Last Dose Informant Patient Reported? Taking? amLODIPine (NORVASC) 10 mg tablet 11/5/2020 Self No Yes   Sig: Take 1 Tab by mouth daily. cetirizine (ZYRTEC) 10 mg tablet 11/5/2020 Self Yes Yes   Sig: Take 10 mg by mouth daily. esomeprazole (NexIUM) 20 mg capsule 11/5/2020 Self Yes Yes   Sig: Take 20 mg by mouth Daily (before breakfast). metoprolol tartrate (LOPRESSOR) 50 mg tablet 11/5/2020 Self No Yes   Sig: Take 1 Tab by mouth two (2) times a day. Check PULSE before taking, must be over 60.         Thank you,  Adri Quintanilla, PharmD, BCPS  [phone]

## 2020-11-13 VITALS
WEIGHT: 162.2 LBS | RESPIRATION RATE: 16 BRPM | SYSTOLIC BLOOD PRESSURE: 134 MMHG | BODY MASS INDEX: 30.62 KG/M2 | TEMPERATURE: 97.5 F | HEIGHT: 61 IN | DIASTOLIC BLOOD PRESSURE: 98 MMHG | OXYGEN SATURATION: 98 % | HEART RATE: 72 BPM

## 2020-11-13 LAB
ANION GAP SERPL CALC-SCNC: 10 MMOL/L (ref 5–15)
BASOPHILS # BLD: 0.1 K/UL (ref 0–0.1)
BASOPHILS NFR BLD: 1 % (ref 0–1)
BUN SERPL-MCNC: 9 MG/DL (ref 6–20)
BUN/CREAT SERPL: 8 (ref 12–20)
CALCIUM SERPL-MCNC: 8.7 MG/DL (ref 8.5–10.1)
CHLORIDE SERPL-SCNC: 103 MMOL/L (ref 97–108)
CO2 SERPL-SCNC: 27 MMOL/L (ref 21–32)
CREAT SERPL-MCNC: 1.07 MG/DL (ref 0.55–1.02)
DIFFERENTIAL METHOD BLD: ABNORMAL
EOSINOPHIL # BLD: 0.3 K/UL (ref 0–0.4)
EOSINOPHIL NFR BLD: 5 % (ref 0–7)
ERYTHROCYTE [DISTWIDTH] IN BLOOD BY AUTOMATED COUNT: 13.6 % (ref 11.5–14.5)
GLUCOSE SERPL-MCNC: 95 MG/DL (ref 65–100)
HCT VFR BLD AUTO: 35.5 % (ref 35–47)
HGB BLD-MCNC: 12.2 G/DL (ref 11.5–16)
IMM GRANULOCYTES # BLD AUTO: 0 K/UL (ref 0–0.04)
IMM GRANULOCYTES NFR BLD AUTO: 1 % (ref 0–0.5)
LYMPHOCYTES # BLD: 3.2 K/UL (ref 0.8–3.5)
LYMPHOCYTES NFR BLD: 49 % (ref 12–49)
MCH RBC QN AUTO: 31.5 PG (ref 26–34)
MCHC RBC AUTO-ENTMCNC: 34.4 G/DL (ref 30–36.5)
MCV RBC AUTO: 91.7 FL (ref 80–99)
MONOCYTES # BLD: 0.5 K/UL (ref 0–1)
MONOCYTES NFR BLD: 8 % (ref 5–13)
NEUTS SEG # BLD: 2.3 K/UL (ref 1.8–8)
NEUTS SEG NFR BLD: 36 % (ref 32–75)
NRBC # BLD: 0 K/UL (ref 0–0.01)
NRBC BLD-RTO: 0 PER 100 WBC
PLATELET # BLD AUTO: 298 K/UL (ref 150–400)
PMV BLD AUTO: 9.2 FL (ref 8.9–12.9)
POTASSIUM SERPL-SCNC: 3.4 MMOL/L (ref 3.5–5.1)
RBC # BLD AUTO: 3.87 M/UL (ref 3.8–5.2)
SODIUM SERPL-SCNC: 140 MMOL/L (ref 136–145)
WBC # BLD AUTO: 6.4 K/UL (ref 3.6–11)

## 2020-11-13 PROCEDURE — 74011250637 HC RX REV CODE- 250/637: Performed by: STUDENT IN AN ORGANIZED HEALTH CARE EDUCATION/TRAINING PROGRAM

## 2020-11-13 PROCEDURE — 80048 BASIC METABOLIC PNL TOTAL CA: CPT

## 2020-11-13 PROCEDURE — 74011250636 HC RX REV CODE- 250/636: Performed by: STUDENT IN AN ORGANIZED HEALTH CARE EDUCATION/TRAINING PROGRAM

## 2020-11-13 PROCEDURE — 74011000250 HC RX REV CODE- 250: Performed by: INTERNAL MEDICINE

## 2020-11-13 PROCEDURE — 36415 COLL VENOUS BLD VENIPUNCTURE: CPT

## 2020-11-13 PROCEDURE — 96372 THER/PROPH/DIAG INJ SC/IM: CPT

## 2020-11-13 PROCEDURE — 99218 HC RM OBSERVATION: CPT

## 2020-11-13 PROCEDURE — C9113 INJ PANTOPRAZOLE SODIUM, VIA: HCPCS | Performed by: INTERNAL MEDICINE

## 2020-11-13 PROCEDURE — 74011250636 HC RX REV CODE- 250/636: Performed by: INTERNAL MEDICINE

## 2020-11-13 PROCEDURE — 85025 COMPLETE CBC W/AUTO DIFF WBC: CPT

## 2020-11-13 PROCEDURE — 74011000250 HC RX REV CODE- 250: Performed by: STUDENT IN AN ORGANIZED HEALTH CARE EDUCATION/TRAINING PROGRAM

## 2020-11-13 RX ORDER — OXYCODONE AND ACETAMINOPHEN 5; 325 MG/1; MG/1
1 TABLET ORAL
Status: DISCONTINUED | OUTPATIENT
Start: 2020-11-13 | End: 2020-11-13 | Stop reason: HOSPADM

## 2020-11-13 RX ORDER — PANTOPRAZOLE SODIUM 40 MG/1
40 TABLET, DELAYED RELEASE ORAL
Qty: 30 TAB | Refills: 0 | Status: ON HOLD | OUTPATIENT
Start: 2020-11-14 | End: 2021-08-27

## 2020-11-13 RX ORDER — ONDANSETRON 4 MG/1
4 TABLET, ORALLY DISINTEGRATING ORAL
Qty: 30 TAB | Refills: 0 | Status: ON HOLD | OUTPATIENT
Start: 2020-11-13 | End: 2021-08-27

## 2020-11-13 RX ORDER — PANTOPRAZOLE SODIUM 40 MG/1
40 TABLET, DELAYED RELEASE ORAL
Status: DISCONTINUED | OUTPATIENT
Start: 2020-11-13 | End: 2020-11-13 | Stop reason: HOSPADM

## 2020-11-13 RX ADMIN — POTASSIUM BICARBONATE 40 MEQ: 391 TABLET, EFFERVESCENT ORAL at 10:00

## 2020-11-13 RX ADMIN — ENOXAPARIN SODIUM 40 MG: 100 INJECTION SUBCUTANEOUS at 07:22

## 2020-11-13 RX ADMIN — OXYCODONE HYDROCHLORIDE AND ACETAMINOPHEN 1 TABLET: 5; 325 TABLET ORAL at 10:00

## 2020-11-13 RX ADMIN — MORPHINE SULFATE 2 MG: 2 INJECTION, SOLUTION INTRAMUSCULAR; INTRAVENOUS at 02:49

## 2020-11-13 RX ADMIN — METOPROLOL TARTRATE 50 MG: 50 TABLET, FILM COATED ORAL at 07:21

## 2020-11-13 RX ADMIN — Medication 30 ML: at 07:26

## 2020-11-13 RX ADMIN — Medication 10 ML: at 06:00

## 2020-11-13 RX ADMIN — SODIUM CHLORIDE 40 MG: 9 INJECTION INTRAMUSCULAR; INTRAVENOUS; SUBCUTANEOUS at 07:22

## 2020-11-13 RX ADMIN — MORPHINE SULFATE 2 MG: 2 INJECTION, SOLUTION INTRAMUSCULAR; INTRAVENOUS at 07:22

## 2020-11-13 RX ADMIN — AMLODIPINE BESYLATE 10 MG: 5 TABLET ORAL at 07:22

## 2020-11-13 NOTE — DISCHARGE SUMMARY
Hospitalist Discharge Summary     Patient ID:  Michelle Zavala  [id number]  20 y.o.  1975    PCP on record: Anastasia Sam MD    Admit date: 11/10/2020  Discharge date and time: 11/13/2020      Admission Diagnoses: Nausea & vomiting [R11.2]  Pancreatitis [K85.90]    Discharge Diagnoses: Active Problems:    Nausea & vomiting (11/10/2020)      Pancreatitis (11/11/2020)           Hospital Course:     Intractable nausea vomiting 2/2 pancreatitis 2/2 Cannabis use Resolved   - secondary to Hyperemesis secondary to cannabis use   -CT abdomen pelvis unremarkable  -UDS positive for marijuana  - Hx of cholecystectomy 2018   -US RUQ CBD 5.2 mm   -Patient tolerated breakfast and dinner last night. She will be sending home on Zofran and PPI     #Abdominal pain 2/2 pancreatitis resolved  - [facility] 707 - 11/9/2020   - LA 1.3   - CT abdomen /pelvis unremarkable   - US Liver w/o CBD dilatation   - ? cannabis induced acute pancreatis   -Patient was able to keep food down and was discharged today with a follow-up with gastroenterology     # Sinus Tachycardia resolved   - 2/2 dehydration   - EKG reviewed no acute change      #Accelerated HTN   -BP at goal   - home blood pressure medication resumed         # Polycystic kidney disease  - CT A/p 8/31/20: Multiple bilateral cysts   - Follow with Nephrology outpatient      CONSULTATIONS:  None    Excerpted HPI from H&P of Lucho Weiss MD:  Michelle Zavala is a 54 y.o.  female with PMH of above mentioned problem who presents to ED with c/o vomiting and nausea since Friday associated with abdominal pain and diarrhea. Patient states that she has been having vomiting 4 times a day along with abdominal pain radiating to back. Patient states that she has not used cannabis in 2 weeks but her UDS was positive for marijuana. Patient denied any fever chills chest pain or any difficulty with urination.   Patient endorsed she is smokes cigarettes occasionally drinks alcohol occasionally    ______________________________________________________________________  DISCHARGE SUMMARY/HOSPITAL COURSE:  for full details see H&P, daily progress notes, labs, consult notes. _______________________________________________________________________  Patient seen and examined by me on discharge day. Pertinent Findings:  Gen:    Not in distress  Chest: Clear lungs  CVS:   Regular rhythm. No edema  Abd:  Soft, not distended, not tender  Neuro:  Alert with good insight. Oriented to person, place, and time   _______________________________________________________________________  DISCHARGE MEDICATIONS:   Current Discharge Medication List      START taking these medications    Details   pantoprazole (PROTONIX) 40 mg tablet Take 1 Tab by mouth Daily (before breakfast). Qty: 30 Tab, Refills: 0      ondansetron (ZOFRAN ODT) 4 mg disintegrating tablet Take 1 Tab by mouth every six (6) hours as needed for Nausea or Vomiting. Qty: 30 Tab, Refills: 0         CONTINUE these medications which have NOT CHANGED    Details   cetirizine (ZYRTEC) 10 mg tablet Take 10 mg by mouth daily. metoprolol tartrate (LOPRESSOR) 50 mg tablet Take 1 Tab by mouth two (2) times a day. Check PULSE before taking, must be over 60. Qty: 60 Tab, Refills: 2      amLODIPine (NORVASC) 10 mg tablet Take 1 Tab by mouth daily. Qty: 30 Tab, Refills: 1         STOP taking these medications       esomeprazole (NexIUM) 20 mg capsule Comments:   Reason for Stopping: different PPI ordered               My Recommended Diet, Activity, Wound Care, and follow-up labs are listed in the patient's Discharge Insturctions which I have personally completed and reviewed.     _______________________________________________________________________  DISPOSITION:     Home with Family: x   Home with HH/PT/OT/RN:    SNF/LTC:    MEME:    OTHER:        Condition at Discharge: Stable  _______________________________________________________________________  Follow up with:   PCP : Jos Cruz MD  Follow-up Information     Follow up With Specialties Details Why [address] [address] [facility]  On 11/30/2020 Your appointment time is 1:40pm.  Please arrive 10 minutes prior to appointment. , Please keep this appointment, , Bring ins card, picture id, and discharge papers   [facility] at [address]  On 11/18/2020 Your appointment time is 9:30am.  , Bring ins card, picture id, and discharge papers, Please keep this appointment, Please arrive 15 minutes early.  [address] [id number] [phone]              Total time in minutes spent coordinating this discharge (includes going over instructions, follow-up, prescriptions, and preparing report for sign off to her PCP) :  35 minutes    Signed:  Kurtis Navarrete MD

## 2020-11-13 NOTE — DISCHARGE SUMMARY
.    [address]       11/13/2020      RE: Michel Delgado      To Whom it May Concern: This is to certify that Michel Delgado was admitted to hospital on 11/10/2020   to  She may return to work on 11/18/20       Thank you for your assistance in this matter.     Sincerely,      Kurtis Navarrete MD

## 2020-11-13 NOTE — DISCHARGE INSTRUCTIONS
Patient Education        Marijuana Use: Care Instructions  Overview  During your exam, traces of marijuana were found in your body. The two most active chemicals in marijuana are THC and CBD. THC affects how you think, act, and feel. It can make you feel very happy or \"high. \" CBD can help you feel relaxed without the \"high. \" Marijuana products usually contain both THC and CBD. THC usually can be found in urine for a few days after marijuana is used. If you regularly use a lot of marijuana, THC may be found for weeks after use has stopped. There are many types, or strains, of marijuana. Each strain has specific THC-to-CBD ratios. Because of this, some strains have different kinds of effects than others. For example, if a strain of marijuana has a higher ratio of THC to CBD, it's more likely to affect your judgment, coordination, and decision making. In the [place], it's against federal law to possess, sell, give away, or grow marijuana for any purpose. But many states allow people with certain health problems to buy or grow it for their own use. And some states allow people to use it for recreational reasons. These laws vary from state to state. You can call your state department of health or health services to learn more about the laws in your state. If you live in a state where marijuana is legal, know your employer's policies about use. A positive drug test might cause you to lose your job. Or it might keep you from getting hired. If you use marijuana, take steps to lower your risk. Follow-up care is a key part of your treatment and safety. Be sure to make and go to all appointments, and call your doctor if you are having problems. It's also a good idea to know your test results and keep a list of the medicines you take. How can you care for yourself at home? · To have the lowest risk, don't use marijuana. But if you do use it, limit your use. · Know what you're using.  Choose products that have low levels of THC. The type (or strain), strength, and effects of marijuana can vary greatly. And understand how soon you may feel the effects of the product you use and how long those effects may last. The product label may have this information. · Don't drive or operate machinery after using marijuana. Using marijuana may affect your judgment, coordination, and decision making. · Don't smoke marijuana. The smoke can damage your lungs. If you do smoke it, don't breathe in deeply and don't hold your breath. · Don't use marijuana with alcohol, tobacco, or illegal drugs. · Reduce the risk of medicine interactions. Marijuana can be dangerous if you take it with blood thinners or with medicines that make you sleepy, control your mood, or lower your blood pressure. Talk to your doctor about other medicines you use before you try marijuana. · Keep others safe. Store marijuana in a safe and secure place. This is even more important with edible marijuana, which can be easily mistaken for treats or snacks. Make sure that children, friends, family, and pets can't get to it. And protect others from secondhand smoke. When should you call for help? Call your doctor now or seek immediate medical care if:    · You have new or worse symptoms of cannabis hyperemesis syndrome (CHS), such as:  ? Vomiting that doesn't stop. ? Not being able to keep down fluids. ? Belly pain. ? Symptoms that go away briefly when you take a hot bath or shower. This is one of the signs of CHS.     · You have symptoms of dehydration, such as:  ? Dry eyes and a dry mouth. ? Passing only a little dark urine. ? Feeling thirstier than usual.   Watch closely for changes in your health, and contact your doctor if:    · You think you have a problem with marijuana use. Where can you learn more? Go to http://www.gray.com/  Enter P683 in the search box to learn more about \"Marijuana Use: Care Instructions. \"  Current as of: June 29, 2020               Content Version: 12.6  © 5218-5291 [employer]. Care instructions adapted under license by [employer] (which disclaims liability or warranty for this information). If you have questions about a medical condition or this instruction, always ask your healthcare professional. [address] any warranty or liability for your use of this information.        - please follow up with GI doctor

## 2020-11-13 NOTE — PROGRESS NOTES
YASMINE-  Reassessment score=16%  IDT met to discuss plan of care. Patient is ready for d/c today. Will need PCP and Gastro appointments. Discharge Arrangements      these medications from [facility], [address] [place] [address]     ondansetron    pantoprazole      Follow up with Chey Velasco on 11/18/2020    [facility], [place], [id number] [place], [place] [address]y [phone]   [phone]    Your appointment time is 9:30am.  , Bring ins card, picture id, and discharge papers, Please keep this appointment, Please arrive 15 minutes early. [facility]    Next steps: Follow up on 11/30/2020      [facility] at [address] [place] [address] [address]it[address], [address]       Your appointment time is 1:40pm. Emilee Hargrove arrive     Care Management Interventions  PCP Verified by CM: Yes  Mode of Transport at Discharge: Self  Transition of Care Consult (CM Consult): Discharge Planning  Current Support Network:  Other(Lives with her two children ages 21 and 15)  Confirm Follow Up Transport: Self  The Plan for Transition of Care is Related to the Following Treatment Goals : PCP appointment,  Gastroenterology appointment  The Patient and/or Patient Representative was Provided with a Choice of Provider and Agrees with the Discharge Plan?: Yes  Name of the Patient Representative Who was Provided with a Choice of Provider and Agrees with the Discharge Plan: Patient  Freedom of Choice List was Provided with Basic Dialogue that Supports the Patient's Individualized Plan of Care/Goals, Treatment Preferences and Shares the Quality Data Associated with the Providers?: Yes  Discharge Location  Discharge Placement: ABELARDO Nguyen/FRAN  [phone]

## 2020-11-13 NOTE — PROGRESS NOTES
1920- Received report from Brittnee Penaloza, [facility].    2010- Patient watching TV. IV site on R hand saline locked. 2106- Patient requested pain med for pain 7/10 on upper right abd. No nausea. 2200- Pain level down to 3/10.    2345- Patient seems to be resting in no acute distress. 0200- Patient seems to be resting, no complaints. 5860- Patient requested pain med for RUQ 8/10.    0340- Pain level down to 3/10.    0500- Labs drawn. 0730- Report given to Brittnee Penaloza RN.

## 2020-11-13 NOTE — INTERDISCIPLINARY ROUNDS
IDR with Dr. Benja KIM), Pauly Myles (NP), Ronnie Allen (pharmacist), Kerri (), Dianna Staples (nurse director), and Vilma Orosco (RN) to discuss plan of care including discharge today, work note, pain control at discharge

## 2020-11-13 NOTE — PROGRESS NOTES
Problem: Falls - Risk of  Goal: *Absence of Falls  Description: Document Velia Green Fall Risk and appropriate interventions in the flowsheet.   11/12/2020 2349 by Abraham Clemons  Outcome: Progressing Towards Goal  Note: Fall Risk Interventions:            Medication Interventions: Teach patient to arise slowly                11/12/2020 2347 by Abraham Clemons  Note: Fall Risk Interventions:            Medication Interventions: Teach patient to arise slowly                   Problem: General Medical Care Plan  Goal: *Absence of infection signs and symptoms  Outcome: Progressing Towards Goal  Goal: *Optimal pain control at patient's stated goal  Outcome: Progressing Towards Goal  Goal: *Fluid volume balance  Outcome: Progressing Towards Goal  Goal: *Optimize nutritional status  Outcome: Progressing Towards Goal

## 2020-11-13 NOTE — ACP (ADVANCE CARE PLANNING)
Current Advanced Directive/Advance Care Plan: Patient stated that she would want CPR and ventilation. Would want daughter, Mikayla Robbins [phone] or her mother, Michael Pena [phone] to make medical decisions for her.   Johanna Reyna, MARIELAW/FRAN  [phone]

## 2020-11-13 NOTE — PROGRESS NOTES
0710) Bedside shift change report given to Will Grady RN (oncoming nurse) by Topher Yeager RN (offgoing nurse). Report included the following information SBAR, Kardex, MAR and Recent Results. 0730) pt stated pain 7.5 in lower abdomen radiating to back, but no nausea. Pt ask if she is able to go home, and if she is able to shower. Pt report several small loose bowel movements overnight. 1230) . Ronold Kanner Reviewed discharge instructions with pt including follow-up appointments, new medications and side effects, medications to continue, medications to discontinue, pancreatitis education, signs/symptoms of stroke and heart attack, and MyChart information. Pt expressed understanding. IV was removed. Belongings sent home with pt.   [phone]) pt wheeled downstairs for discharge.

## 2020-11-13 NOTE — PROGRESS NOTES
Hospitalist Progress Note    NAME: Cira Long   :  1975   MRN:  [id number]   Room Number:  844/58  @ [facility]       Interim Hospital Summary: 54 y.o. female whom presented on 11/10/2020 with      Assessment / Plan:      # Intractable nausea vomiting 2/2 pancreatitis 2/2 Cannabis use Resolved   -Hyperemesis secondary to cannabis use   -CT abdomen pelvis unremarkable  -UDS positive for marijuana  - Hx of cholecystectomy 2018   - US RUQ CBD 5.2 mm   - advanced diet   - PO PPI and pain mgmt      #Abdominal pain 2/2 pancreatitis resolving   - Lipase 549 - 2020   - LA 1.3   - CT abdomen /pelvis unremarkable   - IV Morphine  Changed to PO percocet as needed   - US Liver w/o CBD dilatation   - ? cannabis induced acute pancreatis      # Sinus Tachycardia resolved   - 2/2 dehydration   - EKG reviewed no acute change      #Accelerated HTN   -BP at goal   - home blood pressure medication resumed         # Polycystic kidney disease  - CT A/p 20: Multiple bilateral cysts   - Follow with Nephrology outpatient        Body mass index is 30.61 kg/m². Code Status: Full   Surrogate Decision Maker:     DVT Prophylaxis: Lovenox  GI Prophylaxis: not indicated         Subjective:     Chief Complaint / Reason for Physician Visit  Patient was able to keep breakfast down without any nausea or vomiting. discussed with RN events overnight. Review of Systems:  No fevers, chills, appetite change, cough, sputum production, shortness of breath, dyspnea on exertion, nausea, vomitting, diarrhea, constipation, chest pain, leg edema, abdominal pain, joint pain, rash, itching. Tolerating PT/OT. Tolerating diet. Objective:     VITALS:   Last 24hrs VS reviewed since prior progress note.  Most recent are:  Patient Vitals for the past 24 hrs:   Temp Pulse Resp BP SpO2   20 0719 97.5 °F (36.4 °C) 70 16 (!) 131/92 100 %   20 0430 97.2 °F (36.2 °C) 84 16 137/77 100 %   20 2354 97.4 °F (36.3 °C) 70 16 138/88 100 %   11/12/20 2029 97.3 °F (36.3 °C) 68 14 (!) 144/94 100 %   11/12/20 1857  76  130/85 100 %   11/12/20 1517 (!) 96.1 °F (35.6 °C) 66 12 (!) 145/105 97 %   11/12/20 1143 97.8 °F (36.6 °C) 69 12 134/89 100 %   11/12/20 0931  75  (!) 154/97      No intake or output data in the 24 hours ending 11/13/20 0845     PHYSICAL EXAM:  General: WD, WN. Alert, cooperative, no acute distress    EENT:  EOMI. Anicteric sclerae. MMM  Resp:  CTA bilaterally, no wheezing or rales. No accessory muscle use  CV:  Regular  rhythm,  normal S1/S2, no murmurs rubs gallops, No edema  GI:  Soft, Non distended, Non tender. +Bowel sounds  Neurologic:  Alert and oriented X 3, normal speech,   Psych:   Good insight. Not anxious nor agitated  Skin:  No rashes. No jaundice    Reviewed most current lab test results and cultures  YES  Reviewed most current radiology test results   YES  Review and summation of old records today    NO  Reviewed patient's current orders and MAR    YES  PMH/SH reviewed - no change compared to H&P  ________________________________________________________________________  Care Plan discussed with:    Comments   Patient x    Family      RN x    Care Manager x    Consultant                       x Multidiciplinary team rounds were held today with , nursing, pharmacist and clinical coordinator. Patient's plan of care was discussed; medications were reviewed and discharge planning was addressed.      ________________________________________________________________________  Total NON critical care TIME:  15  Minutes    Total CRITICAL CARE TIME Spent:   Minutes non procedure based      Comments   >50% of visit spent in counseling and coordination of care     ________________________________________________________________________  Lyudmila Arriaga MD     Procedures: see electronic medical records for all procedures/Xrays and details which were not copied into this note but were reviewed prior to creation of Plan. LABS:  I reviewed today's most current labs and imaging studies.   Pertinent labs include:  Recent Labs     11/13/20 0519 11/12/20 0442 11/11/20 0352   WBC 6.4 6.8 11.6*   HGB 12.2 11.6 13.8   HCT 35.5 33.4* 40.2    286 369     Recent Labs     11/13/20 0519 11/12/20 0442 11/11/20  1522 11/11/20  0352 11/10/20  1834    138  --  138 138   K 3.4* 3.2* 3.2* 3.0* 3.7    103  --  100 99   CO2 27 23  --  22 24   GLU 95 90  --  137* 128*   BUN 9 10  --  10 13   CREA 1.07* 0.99  --  1.00 1.05*   CA 8.7 8.6  --  9.2 9.8   MG  --   --   --  1.8  --    ALB  --   --   --  3.9 4.3   TBILI  --   --   --  0.3 0.4   ALT  --   --   --  28 33       Signed: Tesha Lewis MD

## 2020-11-13 NOTE — PROGRESS NOTES
Pharmacist Discharge Medication Reconciliation    Discharge Provider:  Rica Rehman        Discharge Medications:      My Medications        START taking these medications        Instructions Each Dose to Equal Morning Noon Evening Bedtime   ondansetron 4 mg disintegrating tablet  Commonly known as:  ZOFRAN ODT    Your last dose was: Your next dose is: Take 1 Tab by mouth every six (6) hours as needed for Nausea or Vomiting. 4 mg                 pantoprazole 40 mg tablet  Commonly known as:  PROTONIX  Start taking on:  November 14, 2020    Your last dose was: Your next dose is: Take 1 Tab by mouth Daily (before breakfast). 40 mg                        CONTINUE taking these medications        Instructions Each Dose to Equal Morning Noon Evening Bedtime   amLODIPine 10 mg tablet  Commonly known as:  NORVASC    Your last dose was: Your next dose is: Take 1 Tab by mouth daily. 10 mg                 cetirizine 10 mg tablet  Commonly known as:  ZYRTEC    Your last dose was: Your next dose is: Take 10 mg by mouth daily. 10 mg                 metoprolol tartrate 50 mg tablet  Commonly known as:  LOPRESSOR    Your last dose was: Your next dose is: Take 1 Tab by mouth two (2) times a day.  Check PULSE before taking, must be over 60.   50 mg                        STOP taking these medications      NexIUM 20 mg capsule  Generic drug:  esomeprazole                  Where to Get Your Medications        These medications were sent to [address]  [place]  [address] [facility] [address]      Phone:  [phone]   ondansetron 4 mg disintegrating tablet  pantoprazole 40 mg tablet          The patient's chart, MAR, and AVS were reviewed by   AZAR Hall,   Contact: [phone]

## 2020-11-16 ENCOUNTER — PATIENT OUTREACH (OUTPATIENT)
Dept: CASE MANAGEMENT | Age: 45
End: 2020-11-16

## 2020-11-16 LAB
BACTERIA SPEC CULT: NORMAL
BACTERIA SPEC CULT: NORMAL
SERVICE CMNT-IMP: NORMAL
SERVICE CMNT-IMP: NORMAL

## 2020-11-16 NOTE — PROGRESS NOTES
Called and LM for patient on 11/16-also sent My Chart message requesting call back. Called again 11/17 and LM. Unable to reach this patient. Episode of care closed at this time.

## 2020-11-19 ENCOUNTER — TELEPHONE (OUTPATIENT)
Dept: CASE MANAGEMENT | Age: 45
End: 2020-11-19

## 2020-11-19 NOTE — TELEPHONE ENCOUNTER
Unable to speak with patient. 2nd attempt for follow-up call. 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     [address]  [phone]

## 2021-05-14 RX ORDER — METOPROLOL TARTRATE 50 MG/1
50 TABLET ORAL 2 TIMES DAILY
Qty: 60 TAB | Refills: 2 | Status: SHIPPED | OUTPATIENT
Start: 2021-05-14 | End: 2021-11-22

## 2021-08-24 NOTE — PROGRESS NOTES
Problem: Falls - Risk of  Goal: *Absence of Falls  Description  Document Maritza Rahman Fall Risk and appropriate interventions in the flowsheet.   Outcome: Progressing Towards Goal  Note:   Fall Risk Interventions:            Medication Interventions: Teach patient to arise slowly                   Problem: Patient Education: Go to Patient Education Activity  Goal: Patient/Family Education  Outcome: Progressing Towards Goal     Problem: General Medical Care Plan  Goal: *Vital signs within specified parameters  Outcome: Progressing Towards Goal  Goal: *Labs within defined limits  Outcome: Progressing Towards Goal  Goal: *Optimal pain control at patient's stated goal  Outcome: Progressing Towards Goal 26-Aug-2021

## 2021-08-26 ENCOUNTER — APPOINTMENT (OUTPATIENT)
Dept: CT IMAGING | Age: 46
End: 2021-08-26
Attending: NURSE PRACTITIONER
Payer: COMMERCIAL

## 2021-08-26 ENCOUNTER — HOSPITAL ENCOUNTER (OUTPATIENT)
Age: 46
Setting detail: OBSERVATION
Discharge: HOME | End: 2021-08-27
Attending: EMERGENCY MEDICINE | Admitting: INTERNAL MEDICINE
Payer: COMMERCIAL

## 2021-08-26 DIAGNOSIS — K52.9 ACUTE COLITIS: ICD-10-CM

## 2021-08-26 DIAGNOSIS — F12.90 CANNABINOID HYPEREMESIS SYNDROME: ICD-10-CM

## 2021-08-26 DIAGNOSIS — R11.2 INTRACTABLE VOMITING WITH NAUSEA, UNSPECIFIED VOMITING TYPE: ICD-10-CM

## 2021-08-26 DIAGNOSIS — I10 MALIGNANT HYPERTENSION: Primary | ICD-10-CM

## 2021-08-26 DIAGNOSIS — R11.2 CANNABINOID HYPEREMESIS SYNDROME: ICD-10-CM

## 2021-08-26 DIAGNOSIS — R10.9 ACUTE ABDOMINAL PAIN: ICD-10-CM

## 2021-08-26 LAB
ALBUMIN SERPL-MCNC: 3.9 G/DL (ref 3.5–5)
ALBUMIN/GLOB SERPL: 0.7 {RATIO} (ref 1.1–2.2)
ALP SERPL-CCNC: 115 U/L (ref 45–117)
ALT SERPL-CCNC: 33 U/L (ref 12–78)
AMPHET UR QL SCN: NEGATIVE
ANION GAP SERPL CALC-SCNC: 18 MMOL/L (ref 5–15)
APPEARANCE UR: CLEAR
AST SERPL-CCNC: 40 U/L (ref 15–37)
BACTERIA URNS QL MICRO: NEGATIVE /HPF
BARBITURATES UR QL SCN: NEGATIVE
BASOPHILS # BLD: 0.1 K/UL (ref 0–0.1)
BASOPHILS NFR BLD: 1 % (ref 0–1)
BENZODIAZ UR QL: NEGATIVE
BILIRUB SERPL-MCNC: 0.3 MG/DL (ref 0.2–1)
BILIRUB UR QL: NEGATIVE
BUN SERPL-MCNC: 17 MG/DL (ref 6–20)
BUN/CREAT SERPL: 16 (ref 12–20)
CALCIUM SERPL-MCNC: 9.3 MG/DL (ref 8.5–10.1)
CANNABINOIDS UR QL SCN: POSITIVE
CHLORIDE SERPL-SCNC: 105 MMOL/L (ref 97–108)
CO2 SERPL-SCNC: 17 MMOL/L (ref 21–32)
COCAINE UR QL SCN: NEGATIVE
COLOR UR: ABNORMAL
CREAT SERPL-MCNC: 1.07 MG/DL (ref 0.55–1.02)
DIFFERENTIAL METHOD BLD: ABNORMAL
DRUG SCRN COMMENT,DRGCM: ABNORMAL
EOSINOPHIL # BLD: 0.1 K/UL (ref 0–0.4)
EOSINOPHIL NFR BLD: 1 % (ref 0–7)
EPITH CASTS URNS QL MICRO: ABNORMAL /LPF
ERYTHROCYTE [DISTWIDTH] IN BLOOD BY AUTOMATED COUNT: 14 % (ref 11.5–14.5)
GLOBULIN SER CALC-MCNC: 5.3 G/DL (ref 2–4)
GLUCOSE SERPL-MCNC: 147 MG/DL (ref 65–100)
GLUCOSE UR STRIP.AUTO-MCNC: NEGATIVE MG/DL
HCT VFR BLD AUTO: 41.1 % (ref 35–47)
HGB BLD-MCNC: 14.4 G/DL (ref 11.5–16)
HGB UR QL STRIP: ABNORMAL
IMM GRANULOCYTES # BLD AUTO: 0.1 K/UL (ref 0–0.04)
IMM GRANULOCYTES NFR BLD AUTO: 1 % (ref 0–0.5)
KETONES UR QL STRIP.AUTO: ABNORMAL MG/DL
LACTATE SERPL-SCNC: 1.9 MMOL/L (ref 0.4–2)
LEUKOCYTE ESTERASE UR QL STRIP.AUTO: NEGATIVE
LIPASE SERPL-CCNC: 198 U/L (ref 73–393)
LYMPHOCYTES # BLD: 3.7 K/UL (ref 0.8–3.5)
LYMPHOCYTES NFR BLD: 33 % (ref 12–49)
MCH RBC QN AUTO: 30.9 PG (ref 26–34)
MCHC RBC AUTO-ENTMCNC: 35 G/DL (ref 30–36.5)
MCV RBC AUTO: 88.2 FL (ref 80–99)
METHADONE UR QL: NEGATIVE
MONOCYTES # BLD: 0.5 K/UL (ref 0–1)
MONOCYTES NFR BLD: 5 % (ref 5–13)
NEUTS SEG # BLD: 6.8 K/UL (ref 1.8–8)
NEUTS SEG NFR BLD: 59 % (ref 32–75)
NITRITE UR QL STRIP.AUTO: NEGATIVE
NRBC # BLD: 0 K/UL (ref 0–0.01)
NRBC BLD-RTO: 0 PER 100 WBC
OPIATES UR QL: POSITIVE
PCP UR QL: NEGATIVE
PH UR STRIP: 6.5 [PH] (ref 5–8)
PLATELET # BLD AUTO: 388 K/UL (ref 150–400)
PMV BLD AUTO: 9.4 FL (ref 8.9–12.9)
POTASSIUM SERPL-SCNC: 3.9 MMOL/L (ref 3.5–5.1)
PROT SERPL-MCNC: 9.2 G/DL (ref 6.4–8.2)
PROT UR STRIP-MCNC: >300 MG/DL
RBC # BLD AUTO: 4.66 M/UL (ref 3.8–5.2)
RBC #/AREA URNS HPF: ABNORMAL /HPF (ref 0–5)
SODIUM SERPL-SCNC: 140 MMOL/L (ref 136–145)
SP GR UR REFRACTOMETRY: 1.01 (ref 1–1.03)
UA: UC IF INDICATED,UAUC: ABNORMAL
UROBILINOGEN UR QL STRIP.AUTO: 0.2 EU/DL (ref 0.2–1)
WBC # BLD AUTO: 11.2 K/UL (ref 3.6–11)
WBC URNS QL MICRO: ABNORMAL /HPF (ref 0–4)

## 2021-08-26 PROCEDURE — 80307 DRUG TEST PRSMV CHEM ANLYZR: CPT

## 2021-08-26 PROCEDURE — 83690 ASSAY OF LIPASE: CPT

## 2021-08-26 PROCEDURE — 83605 ASSAY OF LACTIC ACID: CPT

## 2021-08-26 PROCEDURE — 96361 HYDRATE IV INFUSION ADD-ON: CPT

## 2021-08-26 PROCEDURE — 81001 URINALYSIS AUTO W/SCOPE: CPT

## 2021-08-26 PROCEDURE — 96376 TX/PRO/DX INJ SAME DRUG ADON: CPT

## 2021-08-26 PROCEDURE — 74011000636 HC RX REV CODE- 636: Performed by: EMERGENCY MEDICINE

## 2021-08-26 PROCEDURE — 80053 COMPREHEN METABOLIC PANEL: CPT

## 2021-08-26 PROCEDURE — 85025 COMPLETE CBC W/AUTO DIFF WBC: CPT

## 2021-08-26 PROCEDURE — 74011250636 HC RX REV CODE- 250/636: Performed by: NURSE PRACTITIONER

## 2021-08-26 PROCEDURE — 99284 EMERGENCY DEPT VISIT MOD MDM: CPT

## 2021-08-26 PROCEDURE — 74011000258 HC RX REV CODE- 258: Performed by: EMERGENCY MEDICINE

## 2021-08-26 PROCEDURE — 96374 THER/PROPH/DIAG INJ IV PUSH: CPT

## 2021-08-26 PROCEDURE — 74011250636 HC RX REV CODE- 250/636: Performed by: EMERGENCY MEDICINE

## 2021-08-26 PROCEDURE — 36415 COLL VENOUS BLD VENIPUNCTURE: CPT

## 2021-08-26 PROCEDURE — 74177 CT ABD & PELVIS W/CONTRAST: CPT

## 2021-08-26 PROCEDURE — 96375 TX/PRO/DX INJ NEW DRUG ADDON: CPT

## 2021-08-26 RX ORDER — ONDANSETRON 2 MG/ML
4 INJECTION INTRAMUSCULAR; INTRAVENOUS
Status: COMPLETED | OUTPATIENT
Start: 2021-08-26 | End: 2021-08-26

## 2021-08-26 RX ORDER — SODIUM CHLORIDE 0.9 % (FLUSH) 0.9 %
5-40 SYRINGE (ML) INJECTION AS NEEDED
Status: DISCONTINUED | OUTPATIENT
Start: 2021-08-26 | End: 2021-08-27 | Stop reason: HOSPADM

## 2021-08-26 RX ORDER — SODIUM CHLORIDE 0.9 % (FLUSH) 0.9 %
5-10 SYRINGE (ML) INJECTION
Status: COMPLETED | OUTPATIENT
Start: 2021-08-26 | End: 2021-08-26

## 2021-08-26 RX ORDER — METOCLOPRAMIDE HYDROCHLORIDE 5 MG/ML
10 INJECTION INTRAMUSCULAR; INTRAVENOUS
Status: COMPLETED | OUTPATIENT
Start: 2021-08-26 | End: 2021-08-26

## 2021-08-26 RX ORDER — METRONIDAZOLE 500 MG/1
500 TABLET ORAL
Status: DISCONTINUED | OUTPATIENT
Start: 2021-08-26 | End: 2021-08-27 | Stop reason: SDUPTHER

## 2021-08-26 RX ORDER — CIPROFLOXACIN 500 MG/1
500 TABLET ORAL
Status: DISCONTINUED | OUTPATIENT
Start: 2021-08-26 | End: 2021-08-27

## 2021-08-26 RX ORDER — LABETALOL HCL 20 MG/4 ML
10 SYRINGE (ML) INTRAVENOUS
Status: COMPLETED | OUTPATIENT
Start: 2021-08-26 | End: 2021-08-26

## 2021-08-26 RX ORDER — MORPHINE SULFATE 2 MG/ML
2 INJECTION, SOLUTION INTRAMUSCULAR; INTRAVENOUS
Status: COMPLETED | OUTPATIENT
Start: 2021-08-26 | End: 2021-08-26

## 2021-08-26 RX ORDER — MORPHINE SULFATE 4 MG/ML
4 INJECTION INTRAVENOUS
Status: COMPLETED | OUTPATIENT
Start: 2021-08-26 | End: 2021-08-26

## 2021-08-26 RX ORDER — FENTANYL CITRATE 50 UG/ML
50 INJECTION, SOLUTION INTRAMUSCULAR; INTRAVENOUS
Status: COMPLETED | OUTPATIENT
Start: 2021-08-26 | End: 2021-08-26

## 2021-08-26 RX ORDER — HYDRALAZINE HYDROCHLORIDE 20 MG/ML
20 INJECTION INTRAMUSCULAR; INTRAVENOUS
Status: COMPLETED | OUTPATIENT
Start: 2021-08-26 | End: 2021-08-26

## 2021-08-26 RX ORDER — PROCHLORPERAZINE EDISYLATE 5 MG/ML
5 INJECTION INTRAMUSCULAR; INTRAVENOUS
Status: COMPLETED | OUTPATIENT
Start: 2021-08-26 | End: 2021-08-26

## 2021-08-26 RX ORDER — HALOPERIDOL 5 MG/ML
2.5 INJECTION INTRAMUSCULAR
Status: DISCONTINUED | OUTPATIENT
Start: 2021-08-26 | End: 2021-08-27 | Stop reason: HOSPADM

## 2021-08-26 RX ORDER — SODIUM CHLORIDE 0.9 % (FLUSH) 0.9 %
5-40 SYRINGE (ML) INJECTION EVERY 8 HOURS
Status: DISCONTINUED | OUTPATIENT
Start: 2021-08-26 | End: 2021-08-27 | Stop reason: HOSPADM

## 2021-08-26 RX ADMIN — IOPAMIDOL 100 ML: 755 INJECTION, SOLUTION INTRAVENOUS at 22:43

## 2021-08-26 RX ADMIN — Medication 10 ML: at 22:44

## 2021-08-26 RX ADMIN — ONDANSETRON 4 MG: 2 INJECTION INTRAMUSCULAR; INTRAVENOUS at 20:14

## 2021-08-26 RX ADMIN — ONDANSETRON 4 MG: 2 INJECTION INTRAMUSCULAR; INTRAVENOUS at 20:49

## 2021-08-26 RX ADMIN — FENTANYL CITRATE 50 MCG: 50 INJECTION, SOLUTION INTRAMUSCULAR; INTRAVENOUS at 22:14

## 2021-08-26 RX ADMIN — PROCHLORPERAZINE EDISYLATE 5 MG: 5 INJECTION INTRAMUSCULAR; INTRAVENOUS at 21:21

## 2021-08-26 RX ADMIN — SODIUM CHLORIDE 1000 ML: 9 INJECTION, SOLUTION INTRAVENOUS at 20:14

## 2021-08-26 RX ADMIN — METOCLOPRAMIDE 10 MG: 5 INJECTION, SOLUTION INTRAMUSCULAR; INTRAVENOUS at 22:11

## 2021-08-26 RX ADMIN — MORPHINE SULFATE 2 MG: 2 INJECTION, SOLUTION INTRAMUSCULAR; INTRAVENOUS at 21:24

## 2021-08-26 RX ADMIN — MORPHINE SULFATE 4 MG: 4 INJECTION INTRAVENOUS at 20:34

## 2021-08-26 RX ADMIN — LABETALOL HYDROCHLORIDE 10 MG: 5 INJECTION, SOLUTION INTRAVENOUS at 22:08

## 2021-08-26 RX ADMIN — HYDRALAZINE HYDROCHLORIDE 20 MG: 20 INJECTION INTRAMUSCULAR; INTRAVENOUS at 23:43

## 2021-08-26 RX ADMIN — Medication 10 ML: at 23:47

## 2021-08-26 NOTE — ED TRIAGE NOTES
C/o abdominal pain with N/V/D x 3 days. Pt actively vomiting in triage. pt's pressure high. Pt's pressure 244/148 and then 216/148 on repeat. Pt reports she has not been able to take her antihypertensives in a few days due to illness. Pt also reporting dizziness.

## 2021-08-27 VITALS
RESPIRATION RATE: 19 BRPM | TEMPERATURE: 98.3 F | HEART RATE: 76 BPM | BODY MASS INDEX: 30.58 KG/M2 | OXYGEN SATURATION: 99 % | SYSTOLIC BLOOD PRESSURE: 155 MMHG | WEIGHT: 162 LBS | HEIGHT: 61 IN | DIASTOLIC BLOOD PRESSURE: 96 MMHG

## 2021-08-27 PROBLEM — R11.10 VOMITING: Status: ACTIVE | Noted: 2021-08-27

## 2021-08-27 LAB
ANION GAP SERPL CALC-SCNC: 17 MMOL/L (ref 5–15)
BUN SERPL-MCNC: 14 MG/DL (ref 6–20)
BUN/CREAT SERPL: 13 (ref 12–20)
CALCIUM SERPL-MCNC: 9 MG/DL (ref 8.5–10.1)
CHLORIDE SERPL-SCNC: 99 MMOL/L (ref 97–108)
CO2 SERPL-SCNC: 21 MMOL/L (ref 21–32)
CREAT SERPL-MCNC: 1.08 MG/DL (ref 0.55–1.02)
GLUCOSE SERPL-MCNC: 176 MG/DL (ref 65–100)
POTASSIUM SERPL-SCNC: 2.8 MMOL/L (ref 3.5–5.1)
POTASSIUM SERPL-SCNC: 4.8 MMOL/L (ref 3.5–5.1)
SODIUM SERPL-SCNC: 137 MMOL/L (ref 136–145)

## 2021-08-27 PROCEDURE — 96375 TX/PRO/DX INJ NEW DRUG ADDON: CPT

## 2021-08-27 PROCEDURE — 74011250636 HC RX REV CODE- 250/636: Performed by: EMERGENCY MEDICINE

## 2021-08-27 PROCEDURE — 99218 HC RM OBSERVATION: CPT

## 2021-08-27 PROCEDURE — 74011000250 HC RX REV CODE- 250: Performed by: INTERNAL MEDICINE

## 2021-08-27 PROCEDURE — 80048 BASIC METABOLIC PNL TOTAL CA: CPT

## 2021-08-27 PROCEDURE — 84132 ASSAY OF SERUM POTASSIUM: CPT

## 2021-08-27 PROCEDURE — 96376 TX/PRO/DX INJ SAME DRUG ADON: CPT

## 2021-08-27 PROCEDURE — 74011250636 HC RX REV CODE- 250/636: Performed by: INTERNAL MEDICINE

## 2021-08-27 PROCEDURE — 36415 COLL VENOUS BLD VENIPUNCTURE: CPT

## 2021-08-27 PROCEDURE — C9113 INJ PANTOPRAZOLE SODIUM, VIA: HCPCS | Performed by: INTERNAL MEDICINE

## 2021-08-27 PROCEDURE — 74011250637 HC RX REV CODE- 250/637: Performed by: INTERNAL MEDICINE

## 2021-08-27 PROCEDURE — 96366 THER/PROPH/DIAG IV INF ADDON: CPT

## 2021-08-27 PROCEDURE — 74011250636 HC RX REV CODE- 250/636: Performed by: HOSPITALIST

## 2021-08-27 PROCEDURE — 74011250637 HC RX REV CODE- 250/637: Performed by: STUDENT IN AN ORGANIZED HEALTH CARE EDUCATION/TRAINING PROGRAM

## 2021-08-27 PROCEDURE — 96365 THER/PROPH/DIAG IV INF INIT: CPT

## 2021-08-27 RX ORDER — ACETAMINOPHEN 325 MG/1
650 TABLET ORAL
Status: CANCELLED | OUTPATIENT
Start: 2021-08-27

## 2021-08-27 RX ORDER — CIPROFLOXACIN 500 MG/1
500 TABLET ORAL EVERY 12 HOURS
Qty: 14 TABLET | Refills: 0 | Status: SHIPPED | OUTPATIENT
Start: 2021-08-27 | End: 2021-08-27

## 2021-08-27 RX ORDER — METRONIDAZOLE 500 MG/1
500 TABLET ORAL EVERY 12 HOURS
Qty: 14 TABLET | Refills: 0 | Status: SHIPPED | OUTPATIENT
Start: 2021-08-27 | End: 2022-02-16

## 2021-08-27 RX ORDER — METRONIDAZOLE 500 MG/100ML
500 INJECTION, SOLUTION INTRAVENOUS EVERY 8 HOURS
Status: DISCONTINUED | OUTPATIENT
Start: 2021-08-27 | End: 2021-08-27

## 2021-08-27 RX ORDER — ENOXAPARIN SODIUM 100 MG/ML
40 INJECTION SUBCUTANEOUS DAILY
Status: CANCELLED | OUTPATIENT
Start: 2021-08-27

## 2021-08-27 RX ORDER — SODIUM CHLORIDE 0.9 % (FLUSH) 0.9 %
5-40 SYRINGE (ML) INJECTION AS NEEDED
Status: CANCELLED | OUTPATIENT
Start: 2021-08-27

## 2021-08-27 RX ORDER — CIPROFLOXACIN 500 MG/1
500 TABLET ORAL EVERY 12 HOURS
Status: DISCONTINUED | OUTPATIENT
Start: 2021-08-27 | End: 2021-08-27 | Stop reason: HOSPADM

## 2021-08-27 RX ORDER — SODIUM CHLORIDE 0.9 % (FLUSH) 0.9 %
5-40 SYRINGE (ML) INJECTION EVERY 8 HOURS
Status: CANCELLED | OUTPATIENT
Start: 2021-08-27

## 2021-08-27 RX ORDER — CEFDINIR 300 MG/1
300 CAPSULE ORAL 2 TIMES DAILY
Qty: 14 CAPSULE | Refills: 0 | Status: SHIPPED | OUTPATIENT
Start: 2021-08-27 | End: 2022-02-15 | Stop reason: ALTCHOICE

## 2021-08-27 RX ORDER — ONDANSETRON 2 MG/ML
4 INJECTION INTRAMUSCULAR; INTRAVENOUS
Status: DISCONTINUED | OUTPATIENT
Start: 2021-08-27 | End: 2021-08-27 | Stop reason: HOSPADM

## 2021-08-27 RX ORDER — MAG HYDROX/ALUMINUM HYD/SIMETH 200-200-20
30 SUSPENSION, ORAL (FINAL DOSE FORM) ORAL
Status: DISCONTINUED | OUTPATIENT
Start: 2021-08-27 | End: 2021-08-27 | Stop reason: HOSPADM

## 2021-08-27 RX ORDER — ACETAMINOPHEN 325 MG/1
650 TABLET ORAL
Status: DISCONTINUED | OUTPATIENT
Start: 2021-08-27 | End: 2021-08-27 | Stop reason: HOSPADM

## 2021-08-27 RX ORDER — PROMETHAZINE HYDROCHLORIDE 25 MG/ML
25 INJECTION, SOLUTION INTRAMUSCULAR; INTRAVENOUS
Status: DISCONTINUED | OUTPATIENT
Start: 2021-08-27 | End: 2021-08-27 | Stop reason: HOSPADM

## 2021-08-27 RX ORDER — POTASSIUM CHLORIDE 7.45 MG/ML
10 INJECTION INTRAVENOUS
Status: ACTIVE | OUTPATIENT
Start: 2021-08-27 | End: 2021-08-27

## 2021-08-27 RX ORDER — FLUOXETINE HYDROCHLORIDE 20 MG/1
20 CAPSULE ORAL DAILY
COMMUNITY
End: 2022-02-16

## 2021-08-27 RX ORDER — ONDANSETRON 4 MG/1
4 TABLET, ORALLY DISINTEGRATING ORAL
Qty: 20 TABLET | Refills: 0 | Status: SHIPPED | OUTPATIENT
Start: 2021-08-27 | End: 2022-02-15 | Stop reason: ALTCHOICE

## 2021-08-27 RX ORDER — ESTRADIOL 0.05 MG/D
1 FILM, EXTENDED RELEASE TRANSDERMAL
COMMUNITY

## 2021-08-27 RX ORDER — ONDANSETRON 4 MG/1
4 TABLET, ORALLY DISINTEGRATING ORAL
Status: CANCELLED | OUTPATIENT
Start: 2021-08-27

## 2021-08-27 RX ORDER — CIPROFLOXACIN 2 MG/ML
400 INJECTION, SOLUTION INTRAVENOUS EVERY 12 HOURS
Status: DISCONTINUED | OUTPATIENT
Start: 2021-08-27 | End: 2021-08-27

## 2021-08-27 RX ORDER — POLYETHYLENE GLYCOL 3350 17 G/17G
17 POWDER, FOR SOLUTION ORAL DAILY PRN
Status: CANCELLED | OUTPATIENT
Start: 2021-08-27

## 2021-08-27 RX ORDER — POTASSIUM CHLORIDE 7.45 MG/ML
10 INJECTION INTRAVENOUS
Status: DISPENSED | OUTPATIENT
Start: 2021-08-27 | End: 2021-08-27

## 2021-08-27 RX ORDER — HYDROCORTISONE 1 %
CREAM (GRAM) TOPICAL
COMMUNITY

## 2021-08-27 RX ORDER — IBUPROFEN 200 MG
1 TABLET ORAL
Status: DISCONTINUED | OUTPATIENT
Start: 2021-08-27 | End: 2021-08-27 | Stop reason: HOSPADM

## 2021-08-27 RX ORDER — ONDANSETRON 2 MG/ML
4 INJECTION INTRAMUSCULAR; INTRAVENOUS
Status: CANCELLED | OUTPATIENT
Start: 2021-08-27

## 2021-08-27 RX ORDER — ACETAMINOPHEN 650 MG/1
650 SUPPOSITORY RECTAL
Status: CANCELLED | OUTPATIENT
Start: 2021-08-27

## 2021-08-27 RX ORDER — LEVOFLOXACIN 5 MG/ML
750 INJECTION, SOLUTION INTRAVENOUS EVERY 24 HOURS
Status: DISCONTINUED | OUTPATIENT
Start: 2021-08-27 | End: 2021-08-27

## 2021-08-27 RX ORDER — AMLODIPINE BESYLATE 5 MG/1
10 TABLET ORAL DAILY
Status: DISCONTINUED | OUTPATIENT
Start: 2021-08-27 | End: 2021-08-27 | Stop reason: HOSPADM

## 2021-08-27 RX ORDER — METRONIDAZOLE 500 MG/1
500 TABLET ORAL EVERY 12 HOURS
Status: DISCONTINUED | OUTPATIENT
Start: 2021-08-27 | End: 2021-08-27 | Stop reason: HOSPADM

## 2021-08-27 RX ORDER — METOPROLOL TARTRATE 50 MG/1
50 TABLET ORAL 2 TIMES DAILY
Status: DISCONTINUED | OUTPATIENT
Start: 2021-08-27 | End: 2021-08-27 | Stop reason: HOSPADM

## 2021-08-27 RX ORDER — ESOMEPRAZOLE MAGNESIUM 20 MG/1
20 TABLET ORAL DAILY
COMMUNITY

## 2021-08-27 RX ORDER — SODIUM CHLORIDE 9 MG/ML
75 INJECTION, SOLUTION INTRAVENOUS CONTINUOUS
Status: DISCONTINUED | OUTPATIENT
Start: 2021-08-27 | End: 2021-08-27 | Stop reason: HOSPADM

## 2021-08-27 RX ORDER — HYDRALAZINE HYDROCHLORIDE 20 MG/ML
10 INJECTION INTRAMUSCULAR; INTRAVENOUS
Status: DISCONTINUED | OUTPATIENT
Start: 2021-08-27 | End: 2021-08-27 | Stop reason: HOSPADM

## 2021-08-27 RX ORDER — MORPHINE SULFATE 4 MG/ML
4 INJECTION INTRAVENOUS
Status: DISCONTINUED | OUTPATIENT
Start: 2021-08-27 | End: 2021-08-27 | Stop reason: HOSPADM

## 2021-08-27 RX ADMIN — Medication 10 ML: at 04:29

## 2021-08-27 RX ADMIN — AMLODIPINE BESYLATE 10 MG: 5 TABLET ORAL at 09:06

## 2021-08-27 RX ADMIN — POTASSIUM BICARBONATE 40 MEQ: 391 TABLET, EFFERVESCENT ORAL at 12:11

## 2021-08-27 RX ADMIN — ONDANSETRON 4 MG: 2 INJECTION INTRAMUSCULAR; INTRAVENOUS at 06:21

## 2021-08-27 RX ADMIN — LEVOFLOXACIN 750 MG: 5 INJECTION, SOLUTION INTRAVENOUS at 02:10

## 2021-08-27 RX ADMIN — METRONIDAZOLE 500 MG: 500 INJECTION, SOLUTION INTRAVENOUS at 04:29

## 2021-08-27 RX ADMIN — PROCHLORPERAZINE EDISYLATE 10 MG: 5 INJECTION INTRAMUSCULAR; INTRAVENOUS at 00:21

## 2021-08-27 RX ADMIN — METOPROLOL TARTRATE 50 MG: 50 TABLET, FILM COATED ORAL at 09:06

## 2021-08-27 RX ADMIN — PANTOPRAZOLE SODIUM 40 MG: 40 INJECTION, POWDER, FOR SOLUTION INTRAVENOUS at 01:11

## 2021-08-27 RX ADMIN — SODIUM CHLORIDE 500 ML: 9 INJECTION, SOLUTION INTRAVENOUS at 06:05

## 2021-08-27 RX ADMIN — METRONIDAZOLE 500 MG: 500 TABLET ORAL at 11:35

## 2021-08-27 RX ADMIN — MORPHINE SULFATE 4 MG: 4 INJECTION INTRAVENOUS at 02:08

## 2021-08-27 RX ADMIN — POTASSIUM CHLORIDE 10 MEQ: 7.46 INJECTION, SOLUTION INTRAVENOUS at 09:04

## 2021-08-27 RX ADMIN — SODIUM CHLORIDE 75 ML/HR: 9 INJECTION, SOLUTION INTRAVENOUS at 01:07

## 2021-08-27 RX ADMIN — PANTOPRAZOLE SODIUM 40 MG: 40 INJECTION, POWDER, FOR SOLUTION INTRAVENOUS at 09:06

## 2021-08-27 RX ADMIN — POTASSIUM CHLORIDE 10 MEQ: 7.46 INJECTION, SOLUTION INTRAVENOUS at 06:20

## 2021-08-27 RX ADMIN — POTASSIUM BICARBONATE 40 MEQ: 391 TABLET, EFFERVESCENT ORAL at 14:06

## 2021-08-27 NOTE — PROGRESS NOTES
Bedside shift change report given to Lolita Alejandre RN  (oncoming nurse) by Rell Maria RN (offgoing nurse). Report included the following information SBAR, Kardex, Intake/Output, MAR, Accordion, Recent Results and Med Rec Status. PO K+ given. Lab collected. Awaiting results for possible discharge. Discharge paperwork reviewed with pt- time provided for questions. IV and tele removed.

## 2021-08-27 NOTE — PROGRESS NOTES
IDR with MD and team.   YASMINE  RUR: NA    LOS: 0  ELOS:   ACP: Patient states her mother, Espiridion Angelucci as next of kin. BARRIERS: none  PLAN: Patient discharging to home today. Driving herself home. 18  Spoke to MD, as per MD patient is ok to drive her own car home from the hospital.         Called Dr Lorena Easton office for follow up. Patient has follow up appt scheduled for 9/2 at 10:30AM, updated on AVS.     Added Substance abuse resources to patients AVS.       Reason for Admission:  62-year-old female with history of hypertension presented to hospital with 3-day history of intractable nausea and vomiting, diarrhea. Reports subjective fever. Reports persistent symptoms of 10 times a day. No sick contacts, recent travel or recent antibiotics. She does admit to medication compliance including Protonix. Denies melena or rectal bleeding. No NSAIDs. She reports diffuse mild crampy abdominal pain. Denies chest pain. In the ER blood pressure was as high as 244/148. Currently afebrile, 86, 17, 182/124, 98% room air. Lab work-up WBC 11.2 hemoglobin 14.4. Urinalysis negative for infection. Chemistry showed BUN of 17 creatinine 1, potassium 3.9, bilirubin normal.  Urine drug screen positive for marijuana and opiates. Patient had CT scan abdomen pelvis showed findings of colitis. In the ER patient was given multiple antiemetics, in addition to Haldol and hydralazine. Patient was admitted to the hospital.  Seen and examined in the ER and telemedicine video evaluation she does not appear to be in any visual distress. She states she has little appetite. No other complaints.                      RUR Score:    N/A                 Plan for utilizing home health:     no     PCP: First and Last name:  Yuliana Oneal MD     Name of Practice: [facility]    Are you a current patient: Yes/No: yes   Approximate date of last visit: does not remember    Can you participate in a virtual visit with your PCP:                     Current Advanced Directive/Advance Care Plan: Full Code Patient understands advance directive discussion and wants CPR and ventilation if needed. She states her mother, Emily Kayser, [phone] as next of kin and decision maker if needed. Healthcare Decision Maker:   Click here to complete [address] including selection of the Healthcare Decision Maker Relationship (ie \"Primary\")                             Transition of Care Plan:   CM assessed patient over call. Verified demographics. Patient lives in a home with her children at [facility], [address] She does drive. The best number to reach her at is [phone]. She is employed. She is completley independent   with her ADLs. .     She has [employer] insurance through her work place. She is under OBSERVATION. Discussed with patient over call. Patient understands and gave verbal ok for CM and witness CM Delmis Gonzales to sign on her behalf. Copy placed on chart and copy delivered to patient in an envelope to her room. Patient pcp is Gita Jarvis. She has not seen her in a while. She uses the [facility] on [facility]. She takes medications at home and has a co-pay on them. She drove to the hospital herself. Car is in visitor lot. Will confirm with MD if she is ok to drive home herself. CM; pcp follow up    Care Management Interventions  PCP Verified by CM:  Yes  Mode of Transport at Discharge: Self  Transition of Care Consult (CM Consult): Discharge Planning  Health Maintenance Reviewed: Yes  Current Support Network: Own Home  Confirm Follow Up Transport: Self  The Plan for Transition of Care is Related to the Following Treatment Goals : pcp follow up, sub abuse resources   The Patient and/or Patient Representative was Provided with a Choice of Provider and Agrees with the Discharge Plan?: Yes  Freedom of Choice List was Provided with Basic Dialogue that Supports the Patient's Individualized Plan of Care/Goals, Treatment Preferences and Shares the Quality Data Associated with the Providers?: Yes  Discharge Location  Discharge Placement: Home    YANN Kunz, [place], [place]  [phone]

## 2021-08-27 NOTE — PROGRESS NOTES
Pharmacy Antimicrobial Kinetic Dosing    Indication for Antimicrobials:  intra abdominal / infectious diarrhea    Current Regimen of Each Antimicrobial:  Levaquin 750 mg iv every 24 hr (Start Date ; Day # 1)  Flagyl 500 mg iv every 8 hr (Start Date ; Day # 1)    Previous Antimicrobial Therapy:   (Start Date ; Day    Goal Level:     Date Dose & Interval Measured (mcg/mL) Extrapolated (mcg/mL)                       Date & time of next level:     Significant Positive Cultures:       Conditions for Dosing Consideration:     Labs:  Recent Labs     21   CREA 1.07*   BUN 17     Recent Labs     21   WBC 11.2*     Temp (24hrs), Av.4 °F (36.3 °C), Min:97.3 °F (36.3 °C), Max:97.5 °F (36.4 °C)        Creatinine Clearance (mL/min):   CrCl (Ideal Body Weight): 49.0   If actual weight < IBW: CrCl (Actual Body Weight) 75.4    Impression/Plan:   Cipro adjusted to levaquin 750 mg iv every 24 hr per dosing protocol  Flagyl may be adjusted to 500 mg iv every 12 hr if c diff ruled out  Antimicrobial stop date pending     Pharmacy will follow daily and adjust medications as appropriate for renal function and/or serum levels.     Thank you,  Kaleb Loyd PHARMD    Vancomycin Dosing Document    Documents located on pharmacy Teams site: Clinical Practice -> Antimicrobial Stewardship -> Antibiotics_Vancomycin     Aminoglycoside Dosing Document    Documents located on pharmacy Teams site: Clinical Practice -> Antimicrobial Stewardship -> Antibiotics_Aminoglycosides

## 2021-08-27 NOTE — ED NOTES
TRANSFER - OUT REPORT:    Verbal report given to Emmanuel Ventura RN(name) on Crystal Calderon  being transferred to PCU-3(unit) for routine progression of care       Report consisted of patients Situation, Background, Assessment and   Recommendations(SBAR). Information from the following report(s) SBAR, Kardex and Recent Results was reviewed with the receiving nurse. Lines:   Peripheral IV 08/26/21 Right Hand (Active)       Peripheral IV 08/27/21 Left Antecubital (Active)   Site Assessment Clean, dry, & intact 08/27/21 0619   Phlebitis Assessment 0 08/27/21 0619   Infiltration Assessment 0 08/27/21 0619   Dressing Status Clean, dry, & intact 08/27/21 0619   Hub Color/Line Status Pink;Patent 08/27/21 5061   Action Taken Blood drawn 08/27/21 8409        Opportunity for questions and clarification was provided.       Patient transported with:   Registered Nurse

## 2021-08-27 NOTE — DISCHARGE INSTRUCTIONS
Patient Education     Colitis: Care Instructions  Your Care Instructions  Colitis is the medical term for swelling (inflammation) of the intestine. It can be caused by different things, such as an infection or loss of blood flow in the intestine. Other causes are problems like Crohn's disease or ulcerative colitis. Symptoms may include fever, diarrhea that may be bloody, or belly pain. Sometimes symptoms go away without treatment. But you may need treatment or more tests, such as blood tests or a stool test. Or you may need imaging tests like a CT scan or a colonoscopy. In some cases, the doctor may want to test a sample of tissue from the intestine. This test is called a biopsy. The doctor has checked you carefully, but problems can develop later. If you notice any problems or new symptoms, get medical treatment right away. Follow-up care is a key part of your treatment and safety. Be sure to make and go to all appointments, and call your doctor if you are having problems. It's also a good idea to know your test results and keep a list of the medicines you take. How can you care for yourself at home? · Rest until you feel better. · Your doctor may recommend that you eat bland foods. These include rice, dry toast or crackers, bananas, and applesauce. · To prevent dehydration, drink plenty of fluids. Choose water and other caffeine-free clear liquids until you feel better. If you have kidney, heart, or liver disease and have to limit fluids, talk with your doctor before you increase the amount of fluids you drink. · Be safe with medicines. Take your medicines exactly as prescribed. Call your doctor if you think you are having a problem with your medicine. You will get more details on the specific medicines your doctor prescribes. When should you call for help? Call 911 anytime you think you may need emergency care. For example, call if:  · You passed out (lost consciousness).   · You vomit blood or what looks like coffee grounds. · Your stools are maroon or very bloody. Call your doctor now or seek immediate medical care if:  · You have new or worse pain. · You have a new or higher fever. · You have new or worse symptoms. · You cannot keep fluids or medicines down. Watch closely for changes in your health, and be sure to contact your doctor if:  · You do not get better as expected. Where can you learn more? Go to [employer]ote[employer]be  Enter [id number] in the search box to learn more about \"Colitis: Care Instructions. \"   © 8698-9012 Healthwise, Incorporated. Care instructions adapted under license by [facility] [employer] (which disclaims liability or warranty for this information). This care instruction is for use with your licensed healthcare professional. If you have questions about a medical condition or this instruction, always ask your healthcare professional. [address] any warranty or liability for your use of this information.   Content Version: 11.7.231061; Current as of: November 20, 2015

## 2021-08-27 NOTE — PROGRESS NOTES
[facility] Admission Pharmacy Medication Reconciliation     Information obtained from: Patient  RxQuery data available1: Yes    Comments/recommendations:    Telephone interviewed patient regarding her PTA medication list and drug allergies. Patient was questioned regarding use of any inhalers, topical products, OTC/herbal/vitamin products or ophthalmic/nasal/otic medication use. Informed patient that [facility] pharmacy does not stock estradiol patches. Informed patient to have someone bring in her home supply for Saturday if needed. Patient stated that she will need to have someone  her prescription from [facility]. Medication changes (since last review): Added:  Esomeprazole  Estradiol  Fluoxetine  Hydrocortisone  Removed:  Amlodipine  Ondansetron  Pantoprazole  Adjusted: None   1RxQuery pharmacy benefit data reflects medications filled and processed through the patient's insurance, however, this data does NOT capture whether the medication was picked up or is currently being taken by the patient. Total Time Spent:  10 minutes    Past Medical History/Disease States:  Past Medical History:   Diagnosis Date    Hypertension     Kidney anomaly, congenital     Tubal pregnancy          Patient allergies: Allergies as of 2021    (No Known Allergies)         Prior to Admission Medications   Prescriptions Last Dose Informant Patient Reported? Taking? FLUoxetine (PROzac) 20 mg capsule 2021 Self Yes Yes   Sig: Take 20 mg by mouth daily. cetirizine (ZYRTEC) 10 mg tablet 2021 Self Yes Yes   Sig: Take 10 mg by mouth daily. esomeprazole magnesium (NexIUM 24HR) 20 mg TbEC 2021 Self Yes Yes   Sig: Take 20 mg by mouth daily. estradioL (VIVELLE) 0.05 mg/24 hr 2021 Self Yes Yes   Si Patch by TransDERmal route two (2) times a week on Wednesday and Saturday. hydrocortisone (CORTAID) 1 % topical cream  Self Yes Yes   Sig: Apply  to affected area two (2) times daily as needed (Eczema). metoprolol tartrate (LOPRESSOR) 50 mg tablet 8/26/2021 Self No Yes   Sig: Take 1 Tab by mouth two (2) times a day. Check PULSE before taking, must be over 60. Facility-Administered Medications: None        Thank you,  Janet Cunningham, Pharm. D.  [phone]

## 2021-08-27 NOTE — ACP (ADVANCE CARE PLANNING)
Patient understands advance directive discussion and wants CPR and ventilation if needed. She states her mother, Linnea Peck, [phone] as next of kin and decision maker if needed.      Chadwick Henry, BSN, [facility], [address] [facility]  [phone]

## 2021-08-27 NOTE — ED NOTES
Bedside and Verbal shift change report given to Sae Marie RN (oncoming nurse) by Jorge Alberto Avila RN (offgoing nurse). Report included the following information SBAR.

## 2021-08-27 NOTE — ED NOTES
See nursing triage note. Warm blanket offered, call bell within reach, safety precautions in place, bed locked and in the lowest position. Hx cholecystectomy and appendectomy. Emergency Department Nursing Plan of Care       The Nursing Plan of Care is developed from the Nursing assessment and Emergency Department Attending provider initial evaluation. The plan of care may be reviewed in the ED Provider note.     The Plan of Care was developed with the following considerations:   Patient / Family readiness to learn indicated by:verbalized understanding  Persons(s) to be included in education: patient  Barriers to Learning/Limitations:No    Signed     Mariza North RN    8/26/2021   8:26 PM

## 2021-08-27 NOTE — ED NOTES
Bedside shift change report given to JOVANA Dennis (oncoming nurse) by Rj Reed RN (offgoing nurse). Report included the following information SBAR, Kardex and Recent Results. 08/27/21    0041- Patient evaluated by hospitalist at this time. 1- Reviewing patients labs K noted to be 2.8, covering hospitalist notified new orders received, will continue to monitor. 9226- Patient placed on a hospital bed for comfort. 0600- Noted patients BP has dropped significantly, spoke to charge nurse, who paged hospitalist concerning, awaiting a call back at this time. 0602-Hospitalist returned call new orders received will continue to monitor at this time.

## 2021-08-27 NOTE — PROGRESS NOTES
**Physician Signature**  This document was electronically signed by: Dayami Ureña MD  08/27/2021 06:02 AM    **Consult Information**  Member Facility: [address] [place] [address] Rd[address] MRN: [id number]  Consult ID: [id number]  Facility Time Zone: ET  Date and Time of Request: 08/27/2021 05:53:32 AM  ET  Requesting Clinician: Agapito Thompson RN  Patient Name: Keeyl Cortez  YOB: 1975  Gender: Female    **Reason for Consult**  Reason for Consult: Change in Clinical Condition    **Clinical Note**  Clinical Note: BP is 86/57 with HR of 97. Order placed for 500 ml NS IVF bolus. **Attestation**  Interaction Mode: Phone Only  Phone Duration (mins): 1  Time of Call : 08/27/2021 06:01 AM  ET  Interaction Attestation: Interprofessional internet consultation was delivered through telephonic and/or electronic communication upon the request of the patients treating provider, while the requesting and the rendering provider were not in the same physical location. A written summary report was provided to the requesting provider at the originating site.   Evaluation Duration (mins): 1    **Physician Signature**  This document was electronically signed by: Dayami Ureña MD  08/27/2021 06:02 AM

## 2021-08-27 NOTE — PROGRESS NOTES
Pt came to floor by bed. pt awake and alert,denies any discomfort. pt assessed and call bell in reach.

## 2021-08-27 NOTE — DISCHARGE SUMMARY
Hospitalist Discharge Summary     Patient ID:  Miss[place] Alcides  [id number]  89 y.o.  1975    PCP on record: Dot Hdz MD    Admit date: 8/26/2021  Discharge date and time: 8/27/2021      Admission Diagnoses: Vomiting [R11.10]    Discharge Diagnoses: Active Problems:    Vomiting (8/27/2021)           Hospital Course:   Intractable nausea vomiting POA due to   Cannabis hyperemesis syndrome POA   Colitis POA  secondary to Hyperemesis secondary to cannabis use   CT abdomen pelvis showed evidence of colitis. -UDS positive for marijuana  - Hx of cholecystectomy 2018   -Patient tolerated breakfast and lunch well. She will be sending home on Zofran, abx and PPI      Hypokalemia POA   Due to GI losses. Repleted     Hypertensive urgency POA  Home BP meds resumed.      Polycystic kidney disease  - CT A/p 8/31/20: Multiple bilateral cysts   - Follow with Nephrology outpatient        CONSULTATIONS:  None    Excerpted HPI from H&P of Lo Caldwell MD:  53-year-old female with history of hypertension presented to hospital with 3-day history of intractable nausea and vomiting, diarrhea. Reports subjective fever. Reports persistent symptoms of 10 times a day. No sick contacts, recent travel or recent antibiotics. She does admit to medication compliance including Protonix. Denies melena or rectal bleeding. No NSAIDs. She reports diffuse mild crampy abdominal pain. Denies chest pain. In the ER blood pressure was as high as 244/148. Currently afebrile, 86, 17, 182/124, 98% room air. Lab work-up WBC 11.2 hemoglobin 14.4. Urinalysis negative for infection. Chemistry showed BUN of 17 creatinine 1, potassium 3.9, bilirubin normal.  Urine drug screen positive for marijuana and opiates. Patient had CT scan abdomen pelvis showed findings of colitis. In the ER patient was given multiple antiemetics, in addition to Haldol and hydralazine.   Patient was admitted to the hospital.  Seen and examined in the ER and telemedicine video evaluation she does not appear to be in any visual distress. She states she has little appetite. No other complaints.    ______________________________________________________________________  DISCHARGE SUMMARY/HOSPITAL COURSE:  for full details see H&P, daily progress notes, labs, consult notes. _______________________________________________________________________  Patient seen and examined by me on discharge day. Pertinent Findings:  Gen:    Not in distress  Chest: Clear lungs  CVS:   Regular rhythm. No edema  Abd:  Soft, not distended, not tender  Neuro:  Alert with good insight. Oriented to person, place, and time   _______________________________________________________________________  DISCHARGE MEDICATIONS:   Current Discharge Medication List      START taking these medications    Details   metroNIDAZOLE (FLAGYL) 500 mg tablet Take 1 Tablet by mouth every twelve (12) hours. Qty: 14 Tablet, Refills: 0  Start date: 8/27/2021      cefdinir (OMNICEF) 300 mg capsule Take 1 Capsule by mouth two (2) times a day. Qty: 14 Capsule, Refills: 0  Start date: 8/27/2021         CONTINUE these medications which have NOT CHANGED    Details   FLUoxetine (PROzac) 20 mg capsule Take 20 mg by mouth daily. esomeprazole magnesium (NexIUM 24HR) 20 mg TbEC Take 20 mg by mouth daily. hydrocortisone (CORTAID) 1 % topical cream Apply  to affected area two (2) times daily as needed (Eczema). estradioL (VIVELLE) 0.05 mg/24 hr 1 Patch by TransDERmal route two (2) times a week on Wednesday and Saturday. metoprolol tartrate (LOPRESSOR) 50 mg tablet Take 1 Tab by mouth two (2) times a day. Check PULSE before taking, must be over 60. Qty: 60 Tab, Refills: 2      cetirizine (ZYRTEC) 10 mg tablet Take 10 mg by mouth daily.          STOP taking these medications       amLODIPine (NORVASC) 10 mg tablet Comments:   Reason for Stopping:               My Recommended Diet, Activity, Wound Care, and follow-up labs are listed in the patient's Discharge Insturctions which I have personally completed and reviewed.     _______________________________________________________________________  DISPOSITION:     Home with Family: x   Home with HH/PT/OT/RN:    SNF/LTC:    MEME:    OTHER:        Condition at Discharge:  Stable  _______________________________________________________________________  Follow up with:   PCP : Marielle Martin MD  Follow-up Information     Follow up With Specialties Details Why Contact Info    Addiction Recovery Support Warm Line  Call Call for assistance  [phone]  7 days/week  8AM-12AM  [facility] Warm Line    Marielle Martin MD Internal Medicine Go on 9/2/2021 1030AM, , Please arrive 15 mins prior, Please take ID and insurance card, Please wear a mask [address] [place]  [phone]                Total time in minutes spent coordinating this discharge (includes going over instructions, follow-up, prescriptions, and preparing report for sign off to her PCP) : 35 minutes    Signed:  Jamaica Baron MD

## 2021-08-27 NOTE — PROGRESS NOTES
Problem: Falls - Risk of  Goal: *Absence of Falls  Description: Document Miriam Sajisimba Fall Risk and appropriate interventions in the flowsheet.   Outcome: Progressing Towards Goal  Note: Fall Risk Interventions:            Medication Interventions: Evaluate medications/consider consulting pharmacy, Patient to call before getting OOB, Teach patient to arise slowly

## 2021-08-27 NOTE — ED PROVIDER NOTES
EMERGENCY DEPARTMENT HISTORY AND PHYSICAL EXAM    Date: 8/26/2021  Patient Name: Bryanna Tobar    History of Presenting Illness     Chief Complaint   Patient presents with    Abdominal Pain    Vomiting       History Provided By: Patient    Chief Complaint: abdominal pain  Duration: 3 Days  Timing:  Acute  Location: generalized   Quality: Sharp  Severity: 10 out of 10  Modifying Factors: none  Associated Symptoms: vomiting      HPI: Bryanna Tobar is a 51 y.o. female with a PMH of HTN who presents with toe pain acute onset 3 days ago. Patient states she has vomited multiple times. Patient states she was holding off coming to the ER but today the pain worsened. Patient reports vomiting. She also reports diarrhea. She denies chest pain shortness of breath wheezing. She states she has not been able to take her blood pressure medications due to her vomiting.     PCP: Mle Bolanos MD    Current Facility-Administered Medications   Medication Dose Route Frequency Provider Last Rate Last Admin    ondansetron (ZOFRAN) injection 4 mg  4 mg IntraVENous Q4H PRN Sindy Matthews MD   4 mg at 08/27/21 0621    amLODIPine (NORVASC) tablet 10 mg  10 mg Oral DAILY Sindy Matthews MD   10 mg at 08/27/21 0906    metoprolol tartrate (LOPRESSOR) tablet 50 mg  50 mg Oral BID Radha Weaver MD   50 mg at 08/27/21 0906    0.9% sodium chloride infusion  75 mL/hr IntraVENous CONTINUOUS Sindy Matthews MD 75 mL/hr at 08/27/21 0107 75 mL/hr at 08/27/21 0107    hydrALAZINE (APRESOLINE) 20 mg/mL injection 10 mg  10 mg IntraVENous Q6H PRN Sindy Matthews MD        promethazine (PHENERGAN) injection 25 mg  25 mg IntraMUSCular Q6H PRN Sindy Matthews MD        pantoprazole (PROTONIX) 40 mg in 0.9% sodium chloride 10 mL injection  40 mg IntraVENous Q12H Sindy Matthews MD   40 mg at 08/27/21 0906    alum-mag hydroxide-simeth (MYLANTA) oral suspension 30 mL  30 mL Oral Q4H PRN Sindy Matthews MD        metroNIDAZOLE (FLAGYL) IVPB premix 500 mg  500 mg IntraVENous Q8H Rob Matthews  mL/hr at 08/27/21 0429 500 mg at 08/27/21 0429    morphine injection 4 mg  4 mg IntraVENous Q4H PRN Dahiana Matthews MD   4 mg at 08/27/21 0208    acetaminophen (TYLENOL) tablet 650 mg  650 mg Oral Q4H PRN Dahiana Matthews MD        nicotine (NICODERM CQ) 14 mg/24 hr patch 1 Patch  1 Patch TransDERmal DAILY PRN Dahiana Matthews MD        levoFLOXacin (LEVAQUIN) 750 mg in D5W IVPB  750 mg IntraVENous Q24H Rob Matthews  mL/hr at 08/27/21 0210 750 mg at 08/27/21 0210    sodium chloride (NS) flush 5-40 mL  5-40 mL IntraVENous Q8H Rob Matthews MD   10 mL at 08/26/21 2347    sodium chloride (NS) flush 5-40 mL  5-40 mL IntraVENous PRN Dahiana Matthews MD   10 mL at 08/27/21 0429    haloperidol lactate (HALDOL) injection 2.5 mg  2.5 mg IntraVENous ONCE PRN Los Lemus MD           Past History     Past Medical History:  Past Medical History:   Diagnosis Date    Hypertension     Kidney anomaly, congenital     Tubal pregnancy        Past Surgical History:  Past Surgical History:   Procedure Laterality Date    HX CHOLECYSTECTOMY      HX GYN  10/07/2013    cyst removed    HX HYSTERECTOMY  10/7/2013    partial hysterectomy       Family History:  Family History   Problem Relation Age of Onset    Hypertension Mother     Hypertension Father     Hypertension Sister        Social History:  Social History     Tobacco Use    Smoking status: Current Some Day Smoker     Packs/day: 0.25     Years: 25.00     Pack years: 6.25    Smokeless tobacco: Never Used    Tobacco comment: 1-3 day   Substance Use Topics    Alcohol use: Yes     Comment: occasionally/socially    Drug use: Yes     Types: Marijuana     Comment: occ       Allergies:  No Known Allergies      Review of Systems   Review of Systems   Constitutional: Negative for fatigue and fever. Respiratory: Negative for shortness of breath and wheezing. Cardiovascular: Negative for chest pain. Gastrointestinal: Positive for abdominal pain, diarrhea, nausea and vomiting. Musculoskeletal: Negative for arthralgias, myalgias, neck pain and neck stiffness. Skin: Negative for pallor and rash. Neurological: Negative for dizziness, tremors and headaches. All other systems reviewed and are negative. Physical Exam     Vitals:    08/27/21 0715 08/27/21 0730 08/27/21 0901 08/27/21 0903   BP: (!) 138/98  (!) 143/118 (!) 141/89   Pulse: 100 85 86 92   Resp: 20 16 19 21   Temp:    98.2 °F (36.8 °C)   SpO2:   99% 98%   Weight:       Height:         Physical Exam  Vitals and nursing note reviewed. Constitutional:       General: She is in acute distress. Appearance: She is well-developed. She is ill-appearing, toxic-appearing and diaphoretic. HENT:      Head: Normocephalic and atraumatic. Right Ear: External ear normal.      Left Ear: External ear normal.      Nose: Nose normal.   Eyes:      Conjunctiva/sclera: Conjunctivae normal.   Cardiovascular:      Rate and Rhythm: Tachycardia present. Heart sounds: Normal heart sounds. Pulmonary:      Effort: Pulmonary effort is normal. No respiratory distress. Breath sounds: Normal breath sounds. No wheezing. Abdominal:      General: Bowel sounds are normal.      Palpations: Abdomen is soft. Tenderness: There is generalized abdominal tenderness. Musculoskeletal:         General: Normal range of motion. Cervical back: Normal range of motion and neck supple. Lymphadenopathy:      Cervical: No cervical adenopathy. Skin:     General: Skin is warm. Findings: No rash. Neurological:      Mental Status: She is alert and oriented to person, place, and time. Cranial Nerves: No cranial nerve deficit. Coordination: Coordination normal.   Psychiatric:         Behavior: Behavior normal.         Thought Content:  Thought content normal.         Judgment: Judgment normal.           Diagnostic Study Results     Labs - Recent Results (from the past 12 hour(s))   DRUG SCREEN, URINE    Collection Time: 08/26/21 10:15 PM   Result Value Ref Range    AMPHETAMINES Negative NEG      BARBITURATES Negative NEG      BENZODIAZEPINES Negative NEG      COCAINE Negative NEG      METHADONE Negative NEG      OPIATES Positive (A) NEG      PCP(PHENCYCLIDINE) Negative NEG      THC (TH-CANNABINOL) Positive (A) NEG      Drug screen comment (NOTE)    URINALYSIS W/ REFLEX CULTURE    Collection Time: 08/26/21 10:22 PM    Specimen: Urine   Result Value Ref Range    Color YELLOW/STRAW      Appearance CLEAR CLEAR      Specific gravity 1.010 1.003 - 1.030      pH (UA) 6.5 5.0 - 8.0      Protein >300 (A) NEG mg/dL    Glucose Negative NEG mg/dL    Ketone TRACE (A) NEG mg/dL    Bilirubin Negative NEG      Blood TRACE (A) NEG      Urobilinogen 0.2 0.2 - 1.0 EU/dL    Nitrites Negative NEG      Leukocyte Esterase Negative NEG      WBC 0-4 0 - 4 /hpf    RBC 0-5 0 - 5 /hpf    Epithelial cells FEW FEW /lpf    Bacteria Negative NEG /hpf    UA:UC IF INDICATED CULTURE NOT INDICATED BY UA RESULT CNI     METABOLIC PANEL, BASIC    Collection Time: 08/27/21  4:06 AM   Result Value Ref Range    Sodium 137 136 - 145 mmol/L    Potassium 2.8 (L) 3.5 - 5.1 mmol/L    Chloride 99 97 - 108 mmol/L    CO2 21 21 - 32 mmol/L    Anion gap 17 (H) 5 - 15 mmol/L    Glucose 176 (H) 65 - 100 mg/dL    BUN 14 6 - 20 MG/DL    Creatinine 1.08 (H) 0.55 - 1.02 MG/DL    BUN/Creatinine ratio 13 12 - 20      GFR est AA >60 >60 ml/min/1.73m2    GFR est non-AA 54 (L) >60 ml/min/1.73m2    Calcium 9.0 8.5 - 10.1 MG/DL       Radiologic Studies -   CT ABD PELV W CONT   Final Result   Imaging findings consistent with a moderate diffuse colitis. Consider infectious   and inflammatory etiologies. Innumerable bilateral renal cysts as demonstrated on prior ultrasounds and CTs. Please see above for additional nonemergent incidental findings.             CT Results  (Last 48 hours) 08/26/21 2243  CT ABD PELV W CONT Final result    Impression:  Imaging findings consistent with a moderate diffuse colitis. Consider infectious   and inflammatory etiologies. Innumerable bilateral renal cysts as demonstrated on prior ultrasounds and CTs. Please see above for additional nonemergent incidental findings. Narrative:      CLINICAL HISTORY: abd pain 3 days vomitng   INDICATION: abd pain 3 days vomiting   COMPARISON: 11/22/2013. CONTRAST: 100  ml Isovue 370   TECHNIQUE:    Multislice helical CT was performed of the abdomen and pelvis following   uneventful rapid bolus intravenous contrast administration. Oral contrast was   not administered. Contiguous 5 mm axial images were reconstructed and lung and   soft tissue windows were generated. Coronal and sagittal reformations were   generated. CT dose reduction was achieved through use of a standardized   protocol tailored for this examination and automatic exposure control for dose   modulation. FINDINGS:   LUNG BASES:Trace pericardial fluid. LIVER: No mass or biliary dilatation. There is no intrahepatic duct dilatation. There is no hepatic parenchymal mass. Hepatic enhancement pattern is within   normal limits. Portal vein is patent. GALLBLADDER:  Postcholecystectomy    SPLEEN/PANCREAS: No mass. There is no pancreatic duct dilatation. There is no   evidence of splenomegaly. ADRENALS/KIDNEYS: Numerous bilateral renal cysts. Minimal renal cortical   thinning. There is no hydronephrosis. There is no renal mass. There is no   perinephric mass. STOMACH: Hyperemic mucosa. Nondistended. COLON AND SMALL BOWEL: Diffuse colonic wall thickening. Hyperemic mucosa. There   is no free intraperitoneal air. There is no evidence of incarceration or   obstruction. No mesenteric adenopathy. PERITONEUM: Unremarkable   APPENDIX: Unremarkable. BLADDER/REPRODUCTIVE ORGANS: No mass or calculus.    RETROPERITONEUM: Atherosclerotic change is moderate. The abdominal aorta is   normal in caliber. No aneurysm. No retroperitoneal adenopathy. OSSEOUS STRUCTURES: No destructive bone lesion. CXR Results  (Last 48 hours)    None            Medical Decision Making   I am the first provider for this patient. I reviewed the vital signs, available nursing notes, past medical history, past surgical history, family history and social history. Vital Signs-Reviewed the patient's vital signs. Records Reviewed: Nursing Notes    Provider Notes (Medical Decision Making):   41-year-old female with nausea vomiting diarrhea for 3 days reports acute abdominal pain generalized. Will order CT labs IV fluid. DDX electrolyte imbalance cannabinoid hyperemesis syndrome small bowel obstruction gastroenteritis viral illness colitis  ED Course as of Aug 27 0916   Thu Aug 26, 2021   2222 CT pending. Patient is not vomiting at this time. Care of patient has been transferred to attending.    Ramos Older [id number] CT with findings of acute moderate diffuse colitis. Also with innumerable bilateral renal cysts as demonstrated on prior ultrasounds and CTs. Pt also unable to tolerate PO, BP remains elevated. Will admit.     [HW]      ED Course User Index  [AN] Maxine Singh NP  [HW] Ysabel Camacho MD     Orders Placed This Encounter    CT ABD PELV W CONT    METABOLIC PANEL, COMPREHENSIVE    CBC WITH AUTOMATED DIFF    LACTIC ACID    LIPASE    URINALYSIS W/ REFLEX CULTURE    DRUG SCREEN, URINE    POTASSIUM    TRANSFER PATIENT    DISCHARGE PATIENT    sodium chloride 0.9 % bolus infusion 1,000 mL    DISCONTD: sodium chloride (NS) flush 5-40 mL    DISCONTD: sodium chloride (NS) flush 5-40 mL    ondansetron (ZOFRAN) injection 4 mg    sodium chloride (NS) flush 5-10 mL    iopamidoL (ISOVUE-370) 76 % injection 100 mL    morphine injection 4 mg    ondansetron (ZOFRAN) injection 4 mg    prochlorperazine (COMPAZINE) injection 5 mg    morphine injection 2 mg    labetaloL (NORMODYNE;TRANDATE) 20 mg/4 mL (5 mg/mL) injection 10 mg    fentaNYL citrate (PF) injection 50 mcg    metoclopramide HCl (REGLAN) injection 10 mg    DISCONTD: haloperidol lactate (HALDOL) injection 2.5 mg    DISCONTD: ciprofloxacin HCl (CIPRO) tablet 500 mg    DISCONTD: metroNIDAZOLE (FLAGYL) tablet 500 mg    hydrALAZINE (APRESOLINE) 20 mg/mL injection 20 mg    DISCONTD: promethazine (PHENERGAN) 25 mg in 0.9% sodium chloride 50 mL IVPB    prochlorperazine (COMPAZINE) with saline injection 10 mg    DISCONTD: ondansetron (ZOFRAN) injection 4 mg    DISCONTD: amLODIPine (NORVASC) tablet 10 mg    DISCONTD: metoprolol tartrate (LOPRESSOR) tablet 50 mg    DISCONTD: 0.9% sodium chloride infusion    DISCONTD: hydrALAZINE (APRESOLINE) 20 mg/mL injection 10 mg    DISCONTD: promethazine (PHENERGAN) injection 25 mg    DISCONTD: pantoprazole (PROTONIX) 40 mg in 0.9% sodium chloride 10 mL injection    DISCONTD: alum-mag hydroxide-simeth (MYLANTA) oral suspension 30 mL    DISCONTD: ciprofloxacin (CIPRO) 400 mg in D5W IVPB (premix)    DISCONTD: metroNIDAZOLE (FLAGYL) IVPB premix 500 mg    DISCONTD: morphine injection 4 mg    DISCONTD: acetaminophen (TYLENOL) tablet 650 mg    DISCONTD: nicotine (NICODERM CQ) 14 mg/24 hr patch 1 Patch    DISCONTD: levoFLOXacin (LEVAQUIN) 750 mg in D5W IVPB    potassium chloride 10 mEq in 100 ml IVPB    sodium chloride 0.9 % bolus infusion 500 mL    FLUoxetine (PROzac) 20 mg capsule    esomeprazole magnesium (NexIUM 24HR) 20 mg TbEC    hydrocortisone (CORTAID) 1 % topical cream    estradioL (VIVELLE) 0.05 mg/24 hr    potassium chloride 10 mEq in 100 ml IVPB    DISCONTD: potassium bicarb-citric acid (EFFER-K) tablet 40 mEq    DISCONTD: ciprofloxacin HCl (CIPRO) tablet 500 mg    DISCONTD: metroNIDAZOLE (FLAGYL) tablet 500 mg    metroNIDAZOLE (FLAGYL) 500 mg tablet    DISCONTD: ciprofloxacin HCl (CIPRO) 500 mg tablet    cefdinir (OMNICEF) 300 mg capsule    ondansetron (Zofran ODT) 4 mg disintegrating tablet    INITIAL PHYSICIAN ORDER: OBSERVATION/OUTPATIENT IN A BED OBSERVATION; Stepdown; Yes; histor     Medications   sodium chloride 0.9 % bolus infusion 1,000 mL (0 mL IntraVENous IV Completed 8/26/21 2240)   potassium chloride 10 mEq in 100 ml IVPB (0 mEq IntraVENous Transfusion Completed 8/27/21 1000)   potassium chloride 10 mEq in 100 ml IVPB (0 mEq IntraVENous Held 8/27/21 1100)   ondansetron (ZOFRAN) injection 4 mg (4 mg IntraVENous Given 8/26/21 2014)   sodium chloride (NS) flush 5-10 mL (10 mL IntraVENous Given 8/26/21 2244)   iopamidoL (ISOVUE-370) 76 % injection 100 mL (100 mL IntraVENous Given 8/26/21 2243)   morphine injection 4 mg (4 mg IntraVENous Given 8/26/21 2034)   ondansetron (ZOFRAN) injection 4 mg (4 mg IntraVENous Given 8/26/21 2049)   prochlorperazine (COMPAZINE) injection 5 mg (5 mg IntraVENous Given 8/26/21 2121)   morphine injection 2 mg (2 mg IntraVENous Given 8/26/21 2124)   labetaloL (NORMODYNE;TRANDATE) 20 mg/4 mL (5 mg/mL) injection 10 mg (10 mg IntraVENous Given 8/26/21 2208)   fentaNYL citrate (PF) injection 50 mcg (50 mcg IntraVENous Given 8/26/21 2214)   metoclopramide HCl (REGLAN) injection 10 mg (10 mg IntraVENous Given 8/26/21 2211)   hydrALAZINE (APRESOLINE) 20 mg/mL injection 20 mg (20 mg IntraVENous Given 8/26/21 2343)   prochlorperazine (COMPAZINE) with saline injection 10 mg (10 mg IntraVENous Given 8/27/21 0021)   sodium chloride 0.9 % bolus infusion 500 mL (500 mL IntraVENous New Bag 8/27/21 0605)       Progress Note:  Despite IV labetalol and IV hydralazine, BP's remain profoundly elevated, pt continues to c/o nausea/vomiting, will admit to Hospitalist at this time. Progress Note:  Initial /148 in triage, currently 182/124 after above ED treatment. WBC 11.2, CT with evidence of acute colitis. Progress Note:  Pt given IV haldol, suspect cannaboid hyperemesis syndrome, UDS pending.     Consult Note:  Jana Cuevas MD spoke with Dr. Suri Garcia. Specialty: Hospitalist  Discussed pt's hx, disposition, and available diagnostic and imaging results. Reviewed care plans. Agree with management and plan thus far. Consultant will evaluate pt. CRITICAL CARE NOTE :  IMPENDING DETERIORATION -Cardiovascular, CNS, Metabolic and Renal  ASSOCIATED RISK FACTORS - Hypotension, Dysrhythmia, Metabolic changes, Dehydration and Vascular Compromise  MANAGEMENT- Bedside Assessment and Supervision of Care  INTERPRETATION -  CT Scan, ECG, Blood Pressure and Cardiac Output Measures   INTERVENTIONS - hemodynamic mngmt, vascular control and Metobolic interventions  CASE REVIEW - Hospitalist/Intensivist, Nursing and Family  TREATMENT RESPONSE -Improved  PERFORMED BY - Self    NOTES   :  I personally spent 55 minutes of critical care time with this patient. This is time spent at this critically ill patient's bedside actively involved in patient care as well as the coordination of care and discussions with the patient's family. This includes time involved in lab review, consultations with specialist, family decision-making, bedside attention and documentation. During this entire length of time I was immediately available to the patient. This does not include time spent on separately reported billable procedures. Critical Care: The reason for providing this level of medical care for this critically-ill patient was due to a critical illness that impaired one or more vital organ systems, such that there was a high probability of imminent or life-threatening deterioration in the patient's condition. This care involved the highest level of preparedness to intervene urgently.  This care involved high complexity decision making to assess, manipulate, and support vital system functions, to treat this degree of vital organ system failure, and to prevent further life threatening deterioration of the patients condition requiring frequent assessments and interventions. Virginia Austin MD    ADMIT  Given the patient's current clinical presentation, I have a high level of concern for decompensation if discharged from the emergency department. Patient is being admitted to the hospital.  The results of their tests and reasons for their admission have been discussed with them and/or available family. They convey agreement and understanding for the need to be admitted and for their admission diagnosis. Consultation has been made with the inpatient physician specialist for hospitalization. Disposition:  ADMIT  Given the patient's current clinical presentation, I have a high level of concern for decompensation if discharged from the emergency department. Patient is being admitted to the hospital.  The results of their tests and reasons for their admission have been discussed with them and/or available family. They convey agreement and understanding for the need to be admitted and for their admission diagnosis. Consultation has been made with the inpatient physician specialist for hospitalization. Diagnosis     Clinical Impression:   1. Malignant hypertension    2. Acute colitis    3. Acute abdominal pain    4. Intractable vomiting with nausea, unspecified vomiting type    5. Cannabinoid hyperemesis syndrome      I personally performed the services described in this documentation on this date 8/26/2021 for [facility]. I have reviewed and verified that all the information is accurate and complete.  Virginia Austin MD

## 2021-08-27 NOTE — ED NOTES
Emergency Department Nursing Plan of Care       The Nursing Plan of Care is developed from the Nursing assessment and Emergency Department Attending provider initial evaluation. The plan of care may be reviewed in the ED Provider note.     The Plan of Care was developed with the following considerations:   Patient / Family readiness to learn indicated by:verbalized understanding  Persons(s) to be included in education: patient  Barriers to Learning/Limitations:No    Signed     Eugene Bentley RN    8/26/2021   11:23 PM

## 2021-08-27 NOTE — PROGRESS NOTES
**Physician Signature**  This document was electronically signed by: Dragan Holliday MD  08/27/2021 04:52 AM    **Consult Information**  Member Facility: [address] [place] [address] Rd., [address] MRN: [id number]  Consult ID: [id number]  Facility Time Zone: ET  Date and Time of Request: 08/27/2021 04:46:22 AM  ET  Requesting Clinician: Panfilo La RN  Patient Name: Nitin Barth  YOB: 1975  Gender: Female    **Reason for Consult**  Reason for Consult: Other non-Emergent    **Clinical Note**  Clinical Note: Order placed for IV Potassium for K of 2.8. **Attestation**  Interaction Mode: Phone Only  Phone Duration (mins): 1  Time of Call : 08/27/2021 04:51 AM  ET  Interaction Attestation: Interprofessional internet consultation was delivered through telephonic and/or electronic communication upon the request of the patients treating provider, while the requesting and the rendering provider were not in the same physical location. A written summary report was provided to the requesting provider at the originating site.   Evaluation Duration (mins): 1    **Physician Signature**  This document was electronically signed by: Dragan Holliday MD  08/27/2021 04:52 AM

## 2021-08-30 ENCOUNTER — PATIENT OUTREACH (OUTPATIENT)
Dept: CASE MANAGEMENT | Age: 46
End: 2021-08-30

## 2021-08-30 ENCOUNTER — TELEPHONE (OUTPATIENT)
Dept: CASE MANAGEMENT | Age: 46
End: 2021-08-30

## 2021-08-30 NOTE — TELEPHONE ENCOUNTER
CM called patient (telephone #[phone])  for the purpose of discharge follow up. Call was answered by VM. Mailbox was full and could not record messages.

## 2021-08-31 NOTE — PROGRESS NOTES
Care Transitions Outreach Attempt    Call within 2 business days of discharge: Yes   Attempted to reach patient for transitions of care follow up. Unable to reach patient. Patient: Melissa Clements Patient : 1975 MRN: [id number]    Last Discharge [address]       Complaint Diagnosis Description Type Department Provider    21 Abdominal Pain; Vomiting Acute colitis . .. ED to Hosp-Admission (Discharged) (ADMIT) Evon Can MD; Guido De Leon,... Was this an external facility discharge? No  Noted following upcoming appointments from discharge chart review:   [facility] follow up appointment(s):   Future Appointments   Date Time Provider Angelita De Los Santos   2021 10:30 AM [facility]     Non-BS follow up appointment(s): n/a    21  Unable to reach patient for [facility] call. My chart message sent. Ctn to attempt to reach in 5 business days. AR            START taking these medications     Details   metroNIDAZOLE (FLAGYL) 500 mg tablet Take 1 Tablet by mouth every twelve (12) hours. Qty: 14 Tablet, Refills: 0  Start date: 2021       cefdinir (OMNICEF) 300 mg capsule Take 1 Capsule by mouth two (2) times a day.   Qty: 14 Capsule, Refills: 0  Start date: 2021     STOP taking these medications         amLODIPine (NORVASC) 10 mg tablet Comments:   Reason for Stopping:

## 2021-09-14 ENCOUNTER — PATIENT OUTREACH (OUTPATIENT)
Dept: CASE MANAGEMENT | Age: 46
End: 2021-09-14

## 2021-09-14 NOTE — PROGRESS NOTES
09/14/21  Unable to reach patient for [facility] call. Episode resolved at this time.  AR

## 2022-02-15 ENCOUNTER — OFFICE VISIT (OUTPATIENT)
Dept: INTERNAL MEDICINE CLINIC | Age: 47
End: 2022-02-15
Payer: COMMERCIAL

## 2022-02-15 VITALS
OXYGEN SATURATION: 96 % | BODY MASS INDEX: 30.58 KG/M2 | HEART RATE: 109 BPM | SYSTOLIC BLOOD PRESSURE: 174 MMHG | WEIGHT: 162 LBS | TEMPERATURE: 97.8 F | DIASTOLIC BLOOD PRESSURE: 115 MMHG | HEIGHT: 61 IN | RESPIRATION RATE: 19 BRPM

## 2022-02-15 DIAGNOSIS — Q61.2 POLYCYSTIC KIDNEY DISEASE, AUTOSOMAL DOMINANT: ICD-10-CM

## 2022-02-15 DIAGNOSIS — F12.188 CANNABIS HYPEREMESIS SYNDROME CONCURRENT WITH AND DUE TO CANNABIS ABUSE (HCC): ICD-10-CM

## 2022-02-15 DIAGNOSIS — I10 ESSENTIAL HYPERTENSION: Primary | ICD-10-CM

## 2022-02-15 PROBLEM — K85.90 PANCREATITIS: Status: RESOLVED | Noted: 2020-11-11 | Resolved: 2022-02-15

## 2022-02-15 PROBLEM — K85.90 ACUTE PANCREATITIS: Status: RESOLVED | Noted: 2019-08-28 | Resolved: 2022-02-15

## 2022-02-15 PROBLEM — R11.10 VOMITING: Status: RESOLVED | Noted: 2021-08-27 | Resolved: 2022-02-15

## 2022-02-15 PROBLEM — R11.2 INTRACTABLE NAUSEA AND VOMITING: Status: RESOLVED | Noted: 2020-08-31 | Resolved: 2022-02-15

## 2022-02-15 PROBLEM — R11.2 NAUSEA & VOMITING: Status: RESOLVED | Noted: 2020-11-10 | Resolved: 2022-02-15

## 2022-02-15 PROCEDURE — 99214 OFFICE O/P EST MOD 30 MIN: CPT | Performed by: INTERNAL MEDICINE

## 2022-02-15 RX ORDER — CHLORTHALIDONE 25 MG/1
25 TABLET ORAL DAILY
Qty: 90 TABLET | Refills: 1 | Status: SHIPPED | OUTPATIENT
Start: 2022-02-15

## 2022-02-15 RX ORDER — METOPROLOL TARTRATE 50 MG/1
50 TABLET ORAL 2 TIMES DAILY
Qty: 180 TABLET | Refills: 1 | Status: SHIPPED | OUTPATIENT
Start: 2022-02-15 | End: 2022-08-29

## 2022-02-15 NOTE — PROGRESS NOTES
Chief Complaint   Patient presents with    Hypertension     1. Have you been to the ER, urgent care clinic since your last visit? Hospitalized since your last visit? No    2. Have you seen or consulted any other health care providers outside of the [address] [place] [address] since your last visit? Include any pap smears or colon screening.  No

## 2022-02-15 NOTE — PROGRESS NOTES
Susie Christian is a 51 y.o. female and presents with Hypertension  . Subjective:    Pts last appt w me 9/2019    Recurrent vomiting-pt has been evaluated in  ED multiple times due to n/v/hypokalemia. The etiology of her hyperemesis is most likely due to cannabis use. She is s/p EGD which demonstrated gastritis 11/2018    Pulse Readings from Last 3 Encounters:   02/15/22 (!) 109   08/27/21 76   11/13/20 72       Pt denies illicit drug use    Hypertension Review:  The patient has essential hypertension  Diet and Lifestyle: generally follows a  low sodium diet, exercises sporadically  Home BP Monitoring: is not measured at home. Pertinent ROS: taking medications as instructed, no medication side effects noted, no TIA's, no chest pain on exertion, no dyspnea on exertion, no swelling of ankles. BP Readings from Last 3 Encounters:   02/15/22 (!) 174/115   08/27/21 (!) 155/96   11/13/20 (!) 134/98     PCKD-pt has not established w renal as yet      Review of Systems  Constitutional: negative for fevers, chills, anorexia and weight loss  Eyes:   negative for visual disturbance and irritation  ENT:   negative for tinnitus,sore throat,nasal congestion,ear pains. hoarseness  Respiratory:  negative for cough, hemoptysis, dyspnea,wheezing  CV:   negative for chest pain, palpitations, lower extremity edema  Musculoskel: negative for myalgias, arthralgias, back pain, muscle weakness, joint pain  Neurological:  negative for headaches, dizziness, vertigo, memory problems and gait   Behavl/Psych: negative for feelings of anxiety, depression, mood changes    Past Medical History:   Diagnosis Date    Hypertension     Kidney anomaly, congenital     Tubal pregnancy      Past Surgical History:   Procedure Laterality Date    HX CHOLECYSTECTOMY      HX GYN  10/07/2013    cyst removed    HX HYSTERECTOMY  10/7/2013    partial hysterectomy     Social History     Socioeconomic History    Marital status: SINGLE   Tobacco Use    Smoking status: Current Some Day Smoker     Packs/day: 0.25     Years: 25.00     Pack years: 6.25    Smokeless tobacco: Never Used    Tobacco comment: 1-3 day   Substance and Sexual Activity    Alcohol use: Yes     Comment: occasionally/socially    Drug use: Yes     Types: Marijuana     Comment: occ    Sexual activity: Yes     Partners: Male     Comment: 8/29/20, last used     Family History   Problem Relation Age of Onset    Hypertension Mother     Hypertension Father     Hypertension Sister      Current Outpatient Medications   Medication Sig Dispense Refill    metoprolol tartrate (LOPRESSOR) 50 mg tablet Take 1 Tablet by mouth two (2) times a day. 180 Tablet 1    chlorthalidone (HYGROTON) 25 mg tablet Take 1 Tablet by mouth daily. 90 Tablet 1    esomeprazole magnesium (NexIUM 24HR) 20 mg TbEC Take 20 mg by mouth daily.  hydrocortisone (CORTAID) 1 % topical cream Apply  to affected area two (2) times daily as needed (Eczema).  estradioL (VIVELLE) 0.05 mg/24 hr 1 Patch by TransDERmal route two (2) times a week on Wednesday and Saturday.  cetirizine (ZYRTEC) 10 mg tablet Take 10 mg by mouth daily. No Known Allergies    Objective:  Visit Vitals  BP (!) 174/115 (BP 1 Location: Left upper arm, BP Patient Position: Sitting, BP Cuff Size: Adult)   Pulse (!) 109   Temp 97.8 °F (36.6 °C) (Temporal)   Resp 19   Ht 5' 1\" (1.549 m)   Wt 162 lb (73.5 kg)   SpO2 96%   BMI 30.61 kg/m²     Physical Exam:   General appearance - alert, well appearing, and in no distress. Pleasant   Mental status - alert, oriented to person, place, and time  EYE-EOMI  Neck - supple,   Chest - symmetric air entry    Ext-no pedal edema, no clubbing or cyanosis  Skin-Warm and dry.  no hyperpigmentation, vitiligo, or suspicious lesions  Neuro -alert, oriented, normal speech, no focal findings or movement disorder noted        Results for orders placed or performed during the hospital encounter of 15/16/00   METABOLIC PANEL, COMPREHENSIVE   Result Value Ref Range    Sodium 140 136 - 145 mmol/L    Potassium 3.9 3.5 - 5.1 mmol/L    Chloride 105 97 - 108 mmol/L    CO2 17 (L) 21 - 32 mmol/L    Anion gap 18 (H) 5 - 15 mmol/L    Glucose 147 (H) 65 - 100 mg/dL    BUN 17 6 - 20 MG/DL    Creatinine 1.07 (H) 0.55 - 1.02 MG/DL    BUN/Creatinine ratio 16 12 - 20      GFR est AA >60 >60 ml/min/1.73m2    GFR est non-AA 55 (L) >60 ml/min/1.73m2    Calcium 9.3 8.5 - 10.1 MG/DL    Bilirubin, total 0.3 0.2 - 1.0 MG/DL    ALT (SGPT) 33 12 - 78 U/L    AST (SGOT) 40 (H) 15 - 37 U/L    Alk. phosphatase 115 45 - 117 U/L    Protein, total 9.2 (H) 6.4 - 8.2 g/dL    Albumin 3.9 3.5 - 5.0 g/dL    Globulin 5.3 (H) 2.0 - 4.0 g/dL    A-G Ratio 0.7 (L) 1.1 - 2.2     CBC WITH AUTOMATED DIFF   Result Value Ref Range    WBC 11.2 (H) 3.6 - 11.0 K/uL    RBC 4.66 3.80 - 5.20 M/uL    HGB 14.4 11.5 - 16.0 g/dL    HCT 41.1 35.0 - 47.0 %    MCV 88.2 80.0 - 99.0 FL    MCH 30.9 26.0 - 34.0 PG    MCHC 35.0 30.0 - 36.5 g/dL    RDW 14.0 11.5 - 14.5 %    PLATELET 175 186 - 000 K/uL    MPV 9.4 8.9 - 12.9 FL    NRBC 0.0 0  WBC    ABSOLUTE NRBC 0.00 0.00 - 0.01 K/uL    NEUTROPHILS 59 32 - 75 %    LYMPHOCYTES 33 12 - 49 %    MONOCYTES 5 5 - 13 %    EOSINOPHILS 1 0 - 7 %    BASOPHILS 1 0 - 1 %    IMMATURE GRANULOCYTES 1 (H) 0.0 - 0.5 %    ABS. NEUTROPHILS 6.8 1.8 - 8.0 K/UL    ABS. LYMPHOCYTES 3.7 (H) 0.8 - 3.5 K/UL    ABS. MONOCYTES 0.5 0.0 - 1.0 K/UL    ABS. EOSINOPHILS 0.1 0.0 - 0.4 K/UL    ABS. BASOPHILS 0.1 0.0 - 0.1 K/UL    ABS. IMM.  GRANS. 0.1 (H) 0.00 - 0.04 K/UL    DF AUTOMATED     LACTIC ACID   Result Value Ref Range    Lactic acid 1.9 0.4 - 2.0 MMOL/L   LIPASE   Result Value Ref Range    Lipase 198 73 - 393 U/L   URINALYSIS W/ REFLEX CULTURE    Specimen: Urine   Result Value Ref Range    Color YELLOW/STRAW      Appearance CLEAR CLEAR      Specific gravity 1.010 1.003 - 1.030      pH (UA) 6.5 5.0 - 8.0      Protein >300 (A) NEG mg/dL    Glucose Negative NEG mg/dL Ketone TRACE (A) NEG mg/dL    Bilirubin Negative NEG      Blood TRACE (A) NEG      Urobilinogen 0.2 0.2 - 1.0 EU/dL    Nitrites Negative NEG      Leukocyte Esterase Negative NEG      WBC 0-4 0 - 4 /hpf    RBC 0-5 0 - 5 /hpf    Epithelial cells FEW FEW /lpf    Bacteria Negative NEG /hpf    UA:UC IF INDICATED CULTURE NOT INDICATED BY UA RESULT CNI     DRUG SCREEN, URINE   Result Value Ref Range    AMPHETAMINES Negative NEG      BARBITURATES Negative NEG      BENZODIAZEPINES Negative NEG      COCAINE Negative NEG      METHADONE Negative NEG      OPIATES Positive (A) NEG      PCP(PHENCYCLIDINE) Negative NEG      THC (TH-CANNABINOL) Positive (A) NEG      Drug screen comment (NOTE)    METABOLIC PANEL, BASIC   Result Value Ref Range    Sodium 137 136 - 145 mmol/L    Potassium 2.8 (L) 3.5 - 5.1 mmol/L    Chloride 99 97 - 108 mmol/L    CO2 21 21 - 32 mmol/L    Anion gap 17 (H) 5 - 15 mmol/L    Glucose 176 (H) 65 - 100 mg/dL    BUN 14 6 - 20 MG/DL    Creatinine 1.08 (H) 0.55 - 1.02 MG/DL    BUN/Creatinine ratio 13 12 - 20      GFR est AA >60 >60 ml/min/1.73m2    GFR est non-AA 54 (L) >60 ml/min/1.73m2    Calcium 9.0 8.5 - 10.1 MG/DL   POTASSIUM   Result Value Ref Range    Potassium 4.8 3.5 - 5.1 mmol/L       Assessment/Plan:    ICD-10-CM ICD-9-CM    1. Essential hypertension  I10 401.9 metoprolol tartrate (LOPRESSOR) 50 mg tablet      chlorthalidone (HYGROTON) 25 mg tablet   2. Polycystic kidney disease, autosomal dominant  Q61.2 753.13    3. Cannabis hyperemesis syndrome concurrent with and due to cannabis abuse (HCC)  F12.188 536.2      305.20      Orders Placed This Encounter    metoprolol tartrate (LOPRESSOR) 50 mg tablet     Sig: Take 1 Tablet by mouth two (2) times a day. Dispense:  180 Tablet     Refill:  1    chlorthalidone (HYGROTON) 25 mg tablet     Sig: Take 1 Tablet by mouth daily. Dispense:  90 Tablet     Refill:  1       1.  Essential hypertension  Reill metoprolol, add chlorthalidone  - metoprolol tartrate (LOPRESSOR) 50 mg tablet; Take 1 Tablet by mouth two (2) times a day. Dispense: 180 Tablet; Refill: 1  - chlorthalidone (HYGROTON) 25 mg tablet; Take 1 Tablet by mouth daily. Dispense: 90 Tablet; Refill: 1    2. Polycystic kidney disease, autosomal dominant      3. Cannabis hyperemesis syndrome concurrent with and due to cannabis abuse ([facility] [address]  Pt relays she quit smoking (?cannabis)      Pt declines colonoscopy referral @ this visit. There are no Patient Instructions on file for this visit. Follow-up and Dispositions    · Return in about 6 weeks (around 3/29/2022) for bp. I have reviewed with the patient details of the assessment and plan and all questions were answered. Relevent patient education was performed. The most recent lab findings were reviewed with the patient. An After Visit Summary was printed and given to the patient.

## 2022-03-18 PROBLEM — F14.90 COCAINE USE: Status: ACTIVE | Noted: 2020-09-01

## 2022-03-19 PROBLEM — H05.20 EXOPHTHALMOS: Status: ACTIVE | Noted: 2019-01-02

## 2022-03-19 PROBLEM — L73.2 HIDRADENITIS SUPPURATIVA: Status: ACTIVE | Noted: 2018-05-08

## 2022-03-19 PROBLEM — Z90.49 S/P LAPAROSCOPIC CHOLECYSTECTOMY: Status: ACTIVE | Noted: 2018-09-21

## 2022-03-20 PROBLEM — I10 ESSENTIAL HYPERTENSION: Status: ACTIVE | Noted: 2018-05-08

## 2023-02-03 NOTE — TELEPHONE ENCOUNTER
CM called pt for post discharge hospital f/u ([phone]). Voice message left on pt contact number for pt to call CM back.
No

## 2023-03-23 ENCOUNTER — HOSPITAL ENCOUNTER (INPATIENT)
Age: 48
LOS: 1 days | Discharge: HOME | DRG: 392 | End: 2023-03-24
Attending: STUDENT IN AN ORGANIZED HEALTH CARE EDUCATION/TRAINING PROGRAM | Admitting: INTERNAL MEDICINE
Payer: COMMERCIAL

## 2023-03-23 ENCOUNTER — APPOINTMENT (OUTPATIENT)
Dept: CT IMAGING | Age: 48
DRG: 392 | End: 2023-03-23
Attending: STUDENT IN AN ORGANIZED HEALTH CARE EDUCATION/TRAINING PROGRAM
Payer: COMMERCIAL

## 2023-03-23 DIAGNOSIS — R11.2 NAUSEA AND VOMITING, UNSPECIFIED VOMITING TYPE: ICD-10-CM

## 2023-03-23 DIAGNOSIS — I16.0 HYPERTENSIVE URGENCY: Primary | ICD-10-CM

## 2023-03-23 PROBLEM — I16.1 HYPERTENSIVE EMERGENCY: Status: ACTIVE | Noted: 2023-03-23

## 2023-03-23 LAB
ALBUMIN SERPL-MCNC: 4.3 G/DL (ref 3.5–5)
ALBUMIN/GLOB SERPL: 0.7 (ref 1.1–2.2)
ALP SERPL-CCNC: 114 U/L (ref 45–117)
ALT SERPL-CCNC: 19 U/L (ref 12–78)
ANION GAP SERPL CALC-SCNC: 16 MMOL/L (ref 5–15)
APPEARANCE UR: CLEAR
AST SERPL-CCNC: 38 U/L (ref 15–37)
BACTERIA URNS QL MICRO: NEGATIVE /HPF
BASOPHILS # BLD: 0.1 K/UL (ref 0–0.1)
BASOPHILS NFR BLD: 1 % (ref 0–1)
BILIRUB SERPL-MCNC: 0.6 MG/DL (ref 0.2–1)
BILIRUB UR QL: NEGATIVE
BUN SERPL-MCNC: 11 MG/DL (ref 6–20)
BUN/CREAT SERPL: 11 (ref 12–20)
CALCIUM SERPL-MCNC: 9.8 MG/DL (ref 8.5–10.1)
CHLORIDE SERPL-SCNC: 98 MMOL/L (ref 97–108)
CO2 SERPL-SCNC: 22 MMOL/L (ref 21–32)
COLOR UR: ABNORMAL
CREAT SERPL-MCNC: 0.96 MG/DL (ref 0.55–1.02)
DIFFERENTIAL METHOD BLD: ABNORMAL
EOSINOPHIL # BLD: 0 K/UL (ref 0–0.4)
EOSINOPHIL NFR BLD: 0 % (ref 0–7)
EPITH CASTS URNS QL MICRO: ABNORMAL /LPF
ERYTHROCYTE [DISTWIDTH] IN BLOOD BY AUTOMATED COUNT: 14.4 % (ref 11.5–14.5)
EST. AVERAGE GLUCOSE BLD GHB EST-MCNC: 117 MG/DL
GLOBULIN SER CALC-MCNC: 5.9 G/DL (ref 2–4)
GLUCOSE BLD STRIP.AUTO-MCNC: 145 MG/DL (ref 65–117)
GLUCOSE SERPL-MCNC: 129 MG/DL (ref 65–100)
GLUCOSE UR STRIP.AUTO-MCNC: NEGATIVE MG/DL
HBA1C MFR BLD: 5.7 % (ref 4–5.6)
HCG UR QL: NEGATIVE
HCT VFR BLD AUTO: 44.7 % (ref 35–47)
HGB BLD-MCNC: 15.4 G/DL (ref 11.5–16)
HGB UR QL STRIP: ABNORMAL
IMM GRANULOCYTES # BLD AUTO: 0.1 K/UL (ref 0–0.04)
IMM GRANULOCYTES NFR BLD AUTO: 1 % (ref 0–0.5)
KETONES UR QL STRIP.AUTO: ABNORMAL MG/DL
LEUKOCYTE ESTERASE UR QL STRIP.AUTO: NEGATIVE
LIPASE SERPL-CCNC: 68 U/L (ref 73–393)
LYMPHOCYTES # BLD: 2.7 K/UL (ref 0.8–3.5)
LYMPHOCYTES NFR BLD: 26 % (ref 12–49)
MCH RBC QN AUTO: 31.4 PG (ref 26–34)
MCHC RBC AUTO-ENTMCNC: 34.5 G/DL (ref 30–36.5)
MCV RBC AUTO: 91.2 FL (ref 80–99)
MONOCYTES # BLD: 0.5 K/UL (ref 0–1)
MONOCYTES NFR BLD: 5 % (ref 5–13)
NEUTS SEG # BLD: 7.1 K/UL (ref 1.8–8)
NEUTS SEG NFR BLD: 67 % (ref 32–75)
NITRITE UR QL STRIP.AUTO: NEGATIVE
NRBC # BLD: 0 K/UL (ref 0–0.01)
NRBC BLD-RTO: 0 PER 100 WBC
PH UR STRIP: 6 (ref 5–8)
PLATELET # BLD AUTO: 347 K/UL (ref 150–400)
PMV BLD AUTO: 9.3 FL (ref 8.9–12.9)
POTASSIUM SERPL-SCNC: 4.2 MMOL/L (ref 3.5–5.1)
PROT SERPL-MCNC: 10.2 G/DL (ref 6.4–8.2)
PROT UR STRIP-MCNC: 300 MG/DL
RBC # BLD AUTO: 4.9 M/UL (ref 3.8–5.2)
RBC #/AREA URNS HPF: ABNORMAL /HPF (ref 0–5)
SERVICE CMNT-IMP: ABNORMAL
SODIUM SERPL-SCNC: 136 MMOL/L (ref 136–145)
SP GR UR REFRACTOMETRY: 1.01
TROPONIN I SERPL HS-MCNC: 13 NG/L (ref 0–51)
UA: UC IF INDICATED,UAUC: ABNORMAL
UROBILINOGEN UR QL STRIP.AUTO: 0.2 EU/DL (ref 0.2–1)
WBC # BLD AUTO: 10.4 K/UL (ref 3.6–11)
WBC URNS QL MICRO: ABNORMAL /HPF (ref 0–4)

## 2023-03-23 PROCEDURE — 81025 URINE PREGNANCY TEST: CPT

## 2023-03-23 PROCEDURE — 74177 CT ABD & PELVIS W/CONTRAST: CPT

## 2023-03-23 PROCEDURE — 36415 COLL VENOUS BLD VENIPUNCTURE: CPT

## 2023-03-23 PROCEDURE — 96376 TX/PRO/DX INJ SAME DRUG ADON: CPT

## 2023-03-23 PROCEDURE — 96374 THER/PROPH/DIAG INJ IV PUSH: CPT

## 2023-03-23 PROCEDURE — 74011250637 HC RX REV CODE- 250/637: Performed by: INTERNAL MEDICINE

## 2023-03-23 PROCEDURE — 94761 N-INVAS EAR/PLS OXIMETRY MLT: CPT

## 2023-03-23 PROCEDURE — 82962 GLUCOSE BLOOD TEST: CPT

## 2023-03-23 PROCEDURE — 85025 COMPLETE CBC W/AUTO DIFF WBC: CPT

## 2023-03-23 PROCEDURE — 99285 EMERGENCY DEPT VISIT HI MDM: CPT

## 2023-03-23 PROCEDURE — 96375 TX/PRO/DX INJ NEW DRUG ADDON: CPT

## 2023-03-23 PROCEDURE — 81001 URINALYSIS AUTO W/SCOPE: CPT

## 2023-03-23 PROCEDURE — 83690 ASSAY OF LIPASE: CPT

## 2023-03-23 PROCEDURE — 74011250636 HC RX REV CODE- 250/636: Performed by: STUDENT IN AN ORGANIZED HEALTH CARE EDUCATION/TRAINING PROGRAM

## 2023-03-23 PROCEDURE — 74011000250 HC RX REV CODE- 250: Performed by: STUDENT IN AN ORGANIZED HEALTH CARE EDUCATION/TRAINING PROGRAM

## 2023-03-23 PROCEDURE — 93005 ELECTROCARDIOGRAM TRACING: CPT

## 2023-03-23 PROCEDURE — 80053 COMPREHEN METABOLIC PANEL: CPT

## 2023-03-23 PROCEDURE — C9113 INJ PANTOPRAZOLE SODIUM, VIA: HCPCS | Performed by: INTERNAL MEDICINE

## 2023-03-23 PROCEDURE — 74011000636 HC RX REV CODE- 636: Performed by: STUDENT IN AN ORGANIZED HEALTH CARE EDUCATION/TRAINING PROGRAM

## 2023-03-23 PROCEDURE — 83036 HEMOGLOBIN GLYCOSYLATED A1C: CPT

## 2023-03-23 PROCEDURE — 84484 ASSAY OF TROPONIN QUANT: CPT

## 2023-03-23 PROCEDURE — 74011250636 HC RX REV CODE- 250/636: Performed by: INTERNAL MEDICINE

## 2023-03-23 PROCEDURE — 74011000250 HC RX REV CODE- 250: Performed by: INTERNAL MEDICINE

## 2023-03-23 PROCEDURE — 65270000032 HC RM SEMIPRIVATE

## 2023-03-23 RX ORDER — SODIUM CHLORIDE 0.9 % (FLUSH) 0.9 %
5-40 SYRINGE (ML) INJECTION AS NEEDED
Status: DISCONTINUED | OUTPATIENT
Start: 2023-03-23 | End: 2023-03-24 | Stop reason: HOSPADM

## 2023-03-23 RX ORDER — ONDANSETRON 2 MG/ML
4 INJECTION INTRAMUSCULAR; INTRAVENOUS
Status: COMPLETED | OUTPATIENT
Start: 2023-03-23 | End: 2023-03-23

## 2023-03-23 RX ORDER — HALOPERIDOL 5 MG/ML
2.5 INJECTION INTRAMUSCULAR ONCE
Status: COMPLETED | OUTPATIENT
Start: 2023-03-23 | End: 2023-03-23

## 2023-03-23 RX ORDER — ACETAMINOPHEN 650 MG/1
650 SUPPOSITORY RECTAL
Status: DISCONTINUED | OUTPATIENT
Start: 2023-03-23 | End: 2023-03-24 | Stop reason: HOSPADM

## 2023-03-23 RX ORDER — HYDROMORPHONE HYDROCHLORIDE 1 MG/ML
0.5 INJECTION, SOLUTION INTRAMUSCULAR; INTRAVENOUS; SUBCUTANEOUS ONCE
Status: COMPLETED | OUTPATIENT
Start: 2023-03-23 | End: 2023-03-23

## 2023-03-23 RX ORDER — ONDANSETRON 4 MG/1
4 TABLET, ORALLY DISINTEGRATING ORAL
Status: DISCONTINUED | OUTPATIENT
Start: 2023-03-23 | End: 2023-03-24 | Stop reason: HOSPADM

## 2023-03-23 RX ORDER — ONDANSETRON 2 MG/ML
4 INJECTION INTRAMUSCULAR; INTRAVENOUS
Status: DISCONTINUED | OUTPATIENT
Start: 2023-03-23 | End: 2023-03-24 | Stop reason: HOSPADM

## 2023-03-23 RX ORDER — INSULIN LISPRO 100 [IU]/ML
INJECTION, SOLUTION INTRAVENOUS; SUBCUTANEOUS
Status: DISCONTINUED | OUTPATIENT
Start: 2023-03-23 | End: 2023-03-24 | Stop reason: HOSPADM

## 2023-03-23 RX ORDER — IBUPROFEN 200 MG
4 TABLET ORAL AS NEEDED
Status: DISCONTINUED | OUTPATIENT
Start: 2023-03-23 | End: 2023-03-24 | Stop reason: HOSPADM

## 2023-03-23 RX ORDER — LABETALOL HCL 20 MG/4 ML
10 SYRINGE (ML) INTRAVENOUS ONCE
Status: COMPLETED | OUTPATIENT
Start: 2023-03-23 | End: 2023-03-23

## 2023-03-23 RX ORDER — DEXTROSE MONOHYDRATE 100 MG/ML
0-250 INJECTION, SOLUTION INTRAVENOUS AS NEEDED
Status: DISCONTINUED | OUTPATIENT
Start: 2023-03-23 | End: 2023-03-24 | Stop reason: HOSPADM

## 2023-03-23 RX ORDER — SODIUM CHLORIDE 0.9 % (FLUSH) 0.9 %
5-40 SYRINGE (ML) INJECTION EVERY 8 HOURS
Status: DISCONTINUED | OUTPATIENT
Start: 2023-03-23 | End: 2023-03-24 | Stop reason: HOSPADM

## 2023-03-23 RX ORDER — METOPROLOL TARTRATE 50 MG/1
50 TABLET ORAL 2 TIMES DAILY
Status: DISCONTINUED | OUTPATIENT
Start: 2023-03-23 | End: 2023-03-24

## 2023-03-23 RX ORDER — CETIRIZINE HYDROCHLORIDE 10 MG/1
10 TABLET ORAL DAILY
Status: DISCONTINUED | OUTPATIENT
Start: 2023-03-24 | End: 2023-03-24 | Stop reason: HOSPADM

## 2023-03-23 RX ORDER — MORPHINE SULFATE 4 MG/ML
4 INJECTION INTRAVENOUS ONCE
Status: COMPLETED | OUTPATIENT
Start: 2023-03-23 | End: 2023-03-23

## 2023-03-23 RX ORDER — POLYETHYLENE GLYCOL 3350 17 G/17G
17 POWDER, FOR SOLUTION ORAL DAILY PRN
Status: DISCONTINUED | OUTPATIENT
Start: 2023-03-23 | End: 2023-03-24 | Stop reason: HOSPADM

## 2023-03-23 RX ORDER — PROCHLORPERAZINE EDISYLATE 5 MG/ML
10 INJECTION INTRAMUSCULAR; INTRAVENOUS ONCE
Status: COMPLETED | OUTPATIENT
Start: 2023-03-23 | End: 2023-03-23

## 2023-03-23 RX ORDER — SODIUM CHLORIDE, SODIUM LACTATE, POTASSIUM CHLORIDE, CALCIUM CHLORIDE 600; 310; 30; 20 MG/100ML; MG/100ML; MG/100ML; MG/100ML
100 INJECTION, SOLUTION INTRAVENOUS CONTINUOUS
Status: DISCONTINUED | OUTPATIENT
Start: 2023-03-23 | End: 2023-03-24

## 2023-03-23 RX ORDER — ACETAMINOPHEN 325 MG/1
650 TABLET ORAL
Status: DISCONTINUED | OUTPATIENT
Start: 2023-03-23 | End: 2023-03-24 | Stop reason: HOSPADM

## 2023-03-23 RX ADMIN — SODIUM CHLORIDE, PRESERVATIVE FREE 10 ML: 5 INJECTION INTRAVENOUS at 21:54

## 2023-03-23 RX ADMIN — SODIUM CHLORIDE, POTASSIUM CHLORIDE, SODIUM LACTATE AND CALCIUM CHLORIDE 100 ML/HR: 600; 310; 30; 20 INJECTION, SOLUTION INTRAVENOUS at 18:15

## 2023-03-23 RX ADMIN — FAMOTIDINE 20 MG: 10 INJECTION INTRAVENOUS at 13:57

## 2023-03-23 RX ADMIN — HYDROMORPHONE HYDROCHLORIDE 0.5 MG: 1 INJECTION, SOLUTION INTRAMUSCULAR; INTRAVENOUS; SUBCUTANEOUS at 16:28

## 2023-03-23 RX ADMIN — IOPAMIDOL 100 ML: 755 INJECTION, SOLUTION INTRAVENOUS at 15:09

## 2023-03-23 RX ADMIN — MORPHINE SULFATE 4 MG: 4 INJECTION, SOLUTION INTRAMUSCULAR; INTRAVENOUS at 13:58

## 2023-03-23 RX ADMIN — CEFTRIAXONE SODIUM 1 G: 1 INJECTION, POWDER, FOR SOLUTION INTRAMUSCULAR; INTRAVENOUS at 18:15

## 2023-03-23 RX ADMIN — SODIUM CHLORIDE 40 MG: 9 INJECTION INTRAMUSCULAR; INTRAVENOUS; SUBCUTANEOUS at 18:15

## 2023-03-23 RX ADMIN — PROCHLORPERAZINE EDISYLATE 10 MG: 5 INJECTION INTRAMUSCULAR; INTRAVENOUS at 16:28

## 2023-03-23 RX ADMIN — HALOPERIDOL LACTATE 2.5 MG: 5 INJECTION, SOLUTION INTRAMUSCULAR at 14:12

## 2023-03-23 RX ADMIN — SODIUM CHLORIDE, PRESERVATIVE FREE 10 ML: 5 INJECTION INTRAVENOUS at 21:55

## 2023-03-23 RX ADMIN — LABETALOL HYDROCHLORIDE 10 MG: 5 INJECTION, SOLUTION INTRAVENOUS at 16:47

## 2023-03-23 RX ADMIN — SODIUM CHLORIDE 1000 ML: 9 INJECTION, SOLUTION INTRAVENOUS at 14:00

## 2023-03-23 RX ADMIN — LABETALOL HYDROCHLORIDE 10 MG: 5 INJECTION, SOLUTION INTRAVENOUS at 16:01

## 2023-03-23 RX ADMIN — ACETAMINOPHEN 650 MG: 325 TABLET ORAL at 20:16

## 2023-03-23 RX ADMIN — ONDANSETRON 4 MG: 2 INJECTION INTRAMUSCULAR; INTRAVENOUS at 20:30

## 2023-03-23 RX ADMIN — METOPROLOL TARTRATE 50 MG: 50 TABLET, FILM COATED ORAL at 18:14

## 2023-03-23 RX ADMIN — ONDANSETRON 4 MG: 2 INJECTION INTRAMUSCULAR; INTRAVENOUS at 13:57

## 2023-03-23 NOTE — ED NOTES
TRANSFER - OUT REPORT:    Verbal report given to Deniz Singletary (name) on Sondra Moe  being transferred to [place] [id number] (unit) for routine progression of care       Report consisted of patients Situation, Background, Assessment and   Recommendations(SBAR). Information from the following report(s) SBAR, Kardex, ED Summary, MAR, Recent Results and Cardiac Rhythm normal sinus  was reviewed with the receiving nurse. Lines:   Peripheral IV 03/23/23 Posterior;Right Hand (Active)       Peripheral IV 03/23/23 Right Arm (Active)        Opportunity for questions and clarification was provided.       Patient transported with:   Monitor

## 2023-03-23 NOTE — ED TRIAGE NOTES
Pt reports diffuse abdominal pain with N/V/D x 3 day. Pt reports her BP is also up.  Actively vomiting in triage

## 2023-03-23 NOTE — ED NOTES
Emergency Department Nursing Plan of Care       The Nursing Plan of Care is developed from the Nursing assessment and Emergency Department Attending provider initial evaluation. The plan of care may be reviewed in the ED Provider note.     The Plan of Care was developed with the following considerations:   Patient / Family readiness to learn indicated by:verbalized understanding  Persons(s) to be included in education: patient  Barriers to Learning/Limitations:No    Signed     Pushpa Rodriguez RN    3/23/2023   2:56 PM

## 2023-03-23 NOTE — H&P
History and Physical    Date of Service:  3/23/2023  Primary Care Provider: Nadia Guerrier MD  Source of information: The patient    Chief Complaint: Abdominal Pain      History of Presenting Illness:   Ray Johnson is a 51 y.o. female is known past medical history of hypertension who presents to ED with complaint of intractable abdominal pain associated with intractable nausea vomiting inability to tolerate p.o for 3 days gradually worsening in severity. The patient was not able to tolerate your medication and her blood pressure was uncontrolled. According to patient she denied having any fever, chest pain, productive cough. 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 The patient denied having any hemoptysis or bloody stool. In the emergency department patient was evaluated, vital signs were obtained was significant for uncontrolled blood pressure with systolic blood pressure as high as 537, end-diastolic 400. The patient was premedicated with labetalol IV. Due to abdominal pain Dilaudid was given. The patient was premedicated with morphine, Compazine, Zofran and Haldol. CT abdomen pelvis was obtained, reviewed was negative for acute findings patient does have cyst in the kidney bilateral.   Due to above problems medicine was consulted for admission and further evaluation of intractable nausea vomiting and hypertensive emergency. In length discussed condition of the patient and diagnostic findings with ED provider, ED provider notes were reviewed.     The patient denies any headache, blurry vision, sore throat, trouble swallowing, trouble with speech, chest pain, SOB, cough, fever, chills, urinary symptoms, constipation, recent travels, focal or generalized neurological symptoms, falls, injuries, rashes, contact with COVID-19 diagnosed patients, hematemesis, melena, hemoptysis, hematuria, rashes, denies starting any new medications and denies any other concerns or problems besides as mentioned above. REVIEW OF SYSTEMS:  A comprehensive review of systems was negative except for that written in the History of Present Illness. Past Medical History:   Diagnosis Date    Hypertension     Kidney anomaly, congenital     Tubal pregnancy       Past Surgical History:   Procedure Laterality Date    HX CHOLECYSTECTOMY      HX GYN  10/07/2013    cyst removed    HX TUBAL LIGATION       Prior to Admission medications    Medication Sig Start Date End Date Taking? Authorizing Provider   metoprolol tartrate (LOPRESSOR) 50 mg tablet TAKE 1 TABLET BY MOUTH TWICE DAILY 8/29/22  Yes Carie David NP   chlorthalidone (HYGROTON) 25 mg tablet Take 1 Tablet by mouth daily. 2/15/22   Kinga Ni MD   esomeprazole magnesium (NexIUM 24HR) 20 mg TbEC Take 20 mg by mouth daily. Provider, Historical   hydrocortisone (CORTAID) 1 % topical cream Apply  to affected area two (2) times daily as needed (Eczema). Provider, Historical   estradioL (VIVELLE) 0.05 mg/24 hr 1 Patch by TransDERmal route two (2) times a week on Wednesday and Saturday. Provider, Historical   cetirizine (ZYRTEC) 10 mg tablet Take 10 mg by mouth daily. Provider, Historical     No Known Allergies   Family History   Problem Relation Age of Onset    Hypertension Mother     Hypertension Father     Hypertension Sister       Social History:  reports that she has been smoking cigarettes. She has a 6.25 pack-year smoking history. She has never used smokeless tobacco. She reports current alcohol use. She reports current drug use. Drug: Marijuana.    Social Determinants of Health     Tobacco Use: High Risk    Smoking Tobacco Use: Every Day    Smokeless Tobacco Use: Never    Passive Exposure: Not on file   Alcohol Use: Not on file   Financial Resource Strain: Not on file   Food Insecurity: Not on file   Transportation Needs: Not on file   Physical Activity: Not on file Stress: Not on file   Social Connections: Not on file   Intimate Partner Violence: Not on file   Depression: Not on file   Housing Stability: Not on file        Medications were reconciled to the best of my ability given all available resources at the time of admission. Route is PO if not otherwise noted. Family and social history were personally reviewed, all pertinent and relevant details are outlined as above. Objective:   Visit Vitals  BP (!) 131/97   Pulse 76   Temp 97.3 °F (36.3 °C)   Resp 12   Ht 5' 1\" (1.549 m)   Wt 64.8 kg (142 lb 13.7 oz)   LMP 08/13/2017   SpO2 100%   BMI 26.99 kg/m²      O2 Device: None (Room air)    PHYSICAL EXAM:   General: Alert x oriented x 3, awake, + acute distress, abdominal pain, nausea  HEENT: PEERL, EOMI, moist mucus membranes  Neck: Supple, no JVD, no meningeal signs  Chest: Clear to auscultation bilaterally   CVS: RRR, S1 S2 heard, no murmurs/rubs/gallops  Abd: Soft, slight tender, non-distended, +bowel sounds   Ext: No clubbing, no cyanosis, no edema  Neuro/Psych: Pleasant mood and affect, CN 2-12 grossly intact, sensory grossly within normal limit, Strength 5/5 in all extremities, DTR 1+ x 4  Cap refill: Brisk, less than 3 seconds  Pulses: 2+, symmetric in all extremities  Skin: Warm, dry, without rashes or lesions    Data Review:   I have independently reviewed and interpreted patient's lab and all other diagnostic data    Abnormal Labs Reviewed   CBC WITH AUTOMATED DIFF - Abnormal; Notable for the following components:       Result Value    IMMATURE GRANULOCYTES 1 (*)     ABS. IMM.  GRANS. 0.1 (*)     All other components within normal limits   METABOLIC PANEL, COMPREHENSIVE - Abnormal; Notable for the following components:    Anion gap 16 (*)     Glucose 129 (*)     BUN/Creatinine ratio 11 (*)     AST (SGOT) 38 (*)     Protein, total 10.2 (*)     Globulin 5.9 (*)     A-G Ratio 0.7 (*)     All other components within normal limits   LIPASE - Abnormal; Notable for the following components:    Lipase 68 (*)     All other components within normal limits   URINALYSIS W/ REFLEX CULTURE - Abnormal; Notable for the following components:    Protein 300 (*)     Ketone TRACE (*)     Blood SMALL (*)     Epithelial cells MODERATE (*)     All other components within normal limits       All Micro Results       Procedure Component Value Units Date/Time    ENTERIC BACTERIA PANEL, DNA [[id number]]     Order Status: Sent Specimen: Stool             IMAGING:   CT ABD PELV W CONT   Final Result      1. No acute findings in the abdomen or pelvis. 2. Numerous cysts in the kidneys bilaterally,.   3. Small pericardial effusion. ECG/ECHO:    Results for orders placed or performed during the hospital encounter of 11/10/20   EKG, 12 LEAD, INITIAL   Result Value Ref Range    Ventricular Rate 82 BPM    Atrial Rate 82 BPM    P-R Interval 128 ms    QRS Duration 84 ms    Q-T Interval 392 ms    QTC Calculation (Bezet) 457 ms    Calculated P Axis 56 degrees    Calculated R Axis 69 degrees    Calculated T Axis 51 degrees    Diagnosis       Normal sinus rhythm  Possible Left atrial enlargement  Borderline ECG  When compared with ECG of 09-NOV-2020 15:55,  No significant change was found  Confirmed by Sergo Manning M.D., Suad Lambert ([id number]) on 11/11/2020 8:14:45 AM            Notes reviewed from all clinical/nonclinical/nursing services involved in patient's clinical care. Care coordination discussions were held with appropriate clinical/nonclinical/ nursing providers based on care coordination needs. Assessment:   Given the patient's current clinical presentation, there is a high level of concern for decompensation if discharged from the emergency department. Complex decision making was performed, which includes reviewing the patient's available past medical records, laboratory results, and imaging studies.     Active Problems:    Hypertensive emergency (3/23/2023)      Intractable nausea and vomiting (3/23/2023)    Diarrhea    Possible acute gastroenteritis    Possible UTI     THC use    Plan:     The patient will be admitted to medicine, stepdown unit, will be monitored on telemetry. IV fluids for hydration will be provided. Home medication will be reinstated including metoprolol. Antiemetic with Zofran as needed will be provided. Stool culture will be obtained. Urinalysis suspicious for urinary tract infection and ceftriaxone will be initiated, urine culture was requested. The patient has elevated glucose without diagnosis of diabetes mellitus, hemoglobin A1c will be obtained, will continue monitor blood glucose. Clear liquid diet will be provided and advanced to regular diet as patient tolerated. Per patient she use THC approximately 1 week ago, doubt it is because her intractable nausea vomiting. DIET: ADULT DIET Clear Liquid   ISOLATION PRECAUTIONS: There are currently no Active Isolations  CODE STATUS: Full Code   DVT PROPHYLAXIS: SCDs  FUNCTIONAL STATUS PRIOR TO HOSPITALIZATION: Fully active and ambulatory; able to carry on all self-care without restriction. Ambulatory status/function: By self   EARLY MOBILITY ASSESSMENT: Recommend routine ambulation while hospitalized with the assistance of nursing staff  ANTICIPATED DISCHARGE: 24-48 hours. ANTICIPATED DISPOSITION: Home  EMERGENCY CONTACT/SURROGATE DECISION MAKER: Julisa Spears (Mother)   [phone] (Home Phone)    CRITICAL CARE WAS PERFORMED FOR THIS ENCOUNTER: NO.      Signed By: Melissa Patten MD     March 23, 2023         Please note that this dictation may have been completed with Dragon, the [employer] voice recognition software. Quite often unanticipated grammatical, syntax, homophones, and other interpretive errors are inadvertently transcribed by the computer software. Please disregard these errors. Please excuse any errors that have escaped final proofreading.

## 2023-03-23 NOTE — ED PROVIDER NOTES
EMERGENCY DEPARTMENT HISTORY AND PHYSICAL EXAM      Date: 3/23/2023  Patient Name: Azam Kirkpatrick    History of Presenting Illness     Chief Complaint   Patient presents with    Abdominal Pain         HPI: History From: Patient, History limited by: none  Azam Kirkpatrick, 51 y.o. female presents to the ED with cc of vomiting and diarrhea. This has been going on for the past 3 days, she reports countless episodes of emesis. She was unable to take her blood pressure medicine this morning due to the vomiting. She reports chills without measured fevers. She also describes diffuse sharp abdominal pain. No dysuria or hematuria, she denies chest pain, shortness of breath or headache. There are no other complaints, changes, or physical findings at this time. PCP: Augustin Chou MD    No current facility-administered medications on file prior to encounter. Current Outpatient Medications on File Prior to Encounter   Medication Sig Dispense Refill    metoprolol tartrate (LOPRESSOR) 50 mg tablet TAKE 1 TABLET BY MOUTH TWICE DAILY 180 Tablet 0    chlorthalidone (HYGROTON) 25 mg tablet Take 1 Tablet by mouth daily. 90 Tablet 1    esomeprazole magnesium (NexIUM 24HR) 20 mg TbEC Take 20 mg by mouth daily. hydrocortisone (CORTAID) 1 % topical cream Apply  to affected area two (2) times daily as needed (Eczema). estradioL (VIVELLE) 0.05 mg/24 hr 1 Patch by TransDERmal route two (2) times a week on Wednesday and Saturday. cetirizine (ZYRTEC) 10 mg tablet Take 10 mg by mouth daily.          Past History     Past Medical History:  Past Medical History:   Diagnosis Date    Hypertension     Kidney anomaly, congenital     Tubal pregnancy        Past Surgical History:  Past Surgical History:   Procedure Laterality Date    HX CHOLECYSTECTOMY      HX GYN  10/07/2013    cyst removed    HX HYSTERECTOMY  10/7/2013    partial hysterectomy       Family History:  Family History   Problem Relation Age of Onset    Hypertension Mother     Hypertension Father     Hypertension Sister        Social History:  Social History     Tobacco Use    Smoking status: Some Days     Packs/day: 0.25     Years: 25.00     Pack years: 6.25     Types: Cigarettes    Smokeless tobacco: Never    Tobacco comments:     1-3 day   Substance Use Topics    Alcohol use: Yes     Comment: occasionally/socially    Drug use: Yes     Types: Marijuana     Comment: occ       Allergies:  No Known Allergies      Physical Exam   Physical Exam  Constitutional:       Appearance: She is not toxic-appearing. Comments:   Uncomfortable appearing, nauseous appearing   HENT:      Head: Normocephalic. Eyes:      Extraocular Movements: Extraocular movements intact. Cardiovascular:      Rate and Rhythm: Normal rate and regular rhythm. Pulmonary:      Effort: Pulmonary effort is normal.      Breath sounds: Normal breath sounds. Abdominal:      Palpations: Abdomen is soft. Tenderness: There is abdominal tenderness. Comments: Diffuse abdominal tenderness without guarding or rebound tenderness   Skin:     General: Skin is warm and dry. Neurological:      General: No focal deficit present. Mental Status: She is alert. Psychiatric:         Mood and Affect: Mood normal.       Diagnostic Study Results     Labs -   No results found for this or any previous visit (from the past 24 hour(s)). Radiologic Studies -   CT ABD PELV W CONT    (Results Pending)     CT Results  (Last 48 hours)      None          CXR Results  (Last 48 hours)      None              Medical Decision Making   I am the first provider for this patient. I reviewed the vital signs, available nursing notes, past medical history, past surgical history, family history and social history. Vital Signs-Reviewed the patient's vital signs.   Patient Vitals for the past 24 hrs:   Temp Pulse Resp BP SpO2   03/23/23 1321 97.3 °F (36.3 °C) (!) 111 16 (!) 221/176 100 %         Provider Notes (Medical Decision Making):   50-year-old female presenting with nausea, vomiting and abdominal pain. Differential includes gastroenteritis, viral syndrome, diverticulitis, colitis, UTI, dehydration, electrolyte or metabolic abnormalities. Her blood pressure is quite elevated initially, she was not able to take her blood pressure medicine and is quite uncomfortable appearing likely accounting for her hypertension. Her repeat blood pressure is improved to 171/145. She not have any chest pain or shortness of breath, no headache, will test her renal function, otherwise no evidence of any other endorgan dysfunction due to elevated blood pressure acutely. ED Course:     Initial assessment performed. The patients presenting problems have been discussed, and they are in agreement with the care plan formulated and outlined with them. I have encouraged them to ask questions as they arise throughout their visit. Medications   sodium chloride 0.9 % bolus infusion 1,000 mL (has no administration in time range)   ondansetron (ZOFRAN) injection 4 mg (4 mg IntraVENous Given 3/23/23 1357)   famotidine (PF) (PEPCID) 20 mg in 0.9% sodium chloride 10 mL injection (20 mg IntraVENous Given 3/23/23 1357)   morphine injection 4 mg (4 mg IntraVENous Given 3/23/23 1358)        ED Course as of 03/23/23 1748   Thu Mar 23, 2023   1440 Labs interpreted by myself, CBC negative for leukocytosis or anemia, basic metabolic panel with unremarkable renal function, no worrisome electrolyte abnormalities. Troponin is not significantly elevated at 13, lipase reassuring at 68. [CM]   1513 I reviewed her CT scan, and is independently interpreted by myself as bilateral multiple renal cysts, no obvious fat stranding around the intestines. Will await formal radiology read.  [CM]      ED Course User Index  [CM] Cora Felix MD        I reviewed her discharge summary from 8/27/2021, it seems she had similar presentation as well at that time with vomiting, canabinoid hyperemesis, colitis, and also had hypertensive urgency. EKG is performed at 13: 54, independently interpreted by myself as sinus rhythm at a rate of 96, no ST segment elevation or depression concerning for ACS. On reevaluation, she is resting comfortably, after labetalol her blood pressure is improved to 222 systolic. She says that she still feels nauseous but improved and is able to tolerate ice chips. Given her severely elevated blood pressure, continued nausea, I believe that she warrants admission, she is in agreement. Critical Care Time:         Disposition:  Roxy Brooks's  results have been reviewed with her. She has been counseled regarding her diagnosis, treatment, and plan. She verbally conveys understanding and agreement of the signs, symptoms, diagnosis, treatment and prognosis and additionally agrees to follow up as discussed. She also agrees with the care-plan and conveys that all of her questions have been answered. I have also provided discharge instructions for her that include: educational information regarding their diagnosis and treatment, and list of reasons why they would want to return to the ED prior to their follow-up appointment, should her condition change. PLAN:  1. Current Discharge Medication List        2.    Follow-up Information    None       Return to ED if worse     Diagnosis     Clinical Impression: Acute nausea and vomiting, acute hypertensive urgency

## 2023-03-24 VITALS
WEIGHT: 140 LBS | OXYGEN SATURATION: 100 % | RESPIRATION RATE: 16 BRPM | HEART RATE: 81 BPM | BODY MASS INDEX: 26.43 KG/M2 | HEIGHT: 61 IN | SYSTOLIC BLOOD PRESSURE: 157 MMHG | TEMPERATURE: 98 F | DIASTOLIC BLOOD PRESSURE: 105 MMHG

## 2023-03-24 LAB
ALBUMIN SERPL-MCNC: 3.4 G/DL (ref 3.5–5)
ALBUMIN/GLOB SERPL: 0.7 (ref 1.1–2.2)
ALP SERPL-CCNC: 92 U/L (ref 45–117)
ALT SERPL-CCNC: 18 U/L (ref 12–78)
ANION GAP SERPL CALC-SCNC: 15 MMOL/L (ref 5–15)
APPEARANCE UR: CLEAR
AST SERPL-CCNC: 25 U/L (ref 15–37)
ATRIAL RATE: 96 BPM
BACTERIA URNS QL MICRO: NEGATIVE /HPF
BASOPHILS # BLD: 0 K/UL (ref 0–0.1)
BASOPHILS NFR BLD: 0 % (ref 0–1)
BILIRUB SERPL-MCNC: 0.4 MG/DL (ref 0.2–1)
BILIRUB UR QL: NEGATIVE
BUN SERPL-MCNC: 12 MG/DL (ref 6–20)
BUN/CREAT SERPL: 9 (ref 12–20)
CALCIUM SERPL-MCNC: 8.2 MG/DL (ref 8.5–10.1)
CALCULATED P AXIS, ECG09: 53 DEGREES
CALCULATED R AXIS, ECG10: 69 DEGREES
CALCULATED T AXIS, ECG11: 43 DEGREES
CHLORIDE SERPL-SCNC: 99 MMOL/L (ref 97–108)
CO2 SERPL-SCNC: 22 MMOL/L (ref 21–32)
COLOR UR: ABNORMAL
CREAT SERPL-MCNC: 1.31 MG/DL (ref 0.55–1.02)
DIAGNOSIS, 93000: NORMAL
DIFFERENTIAL METHOD BLD: NORMAL
EOSINOPHIL # BLD: 0 K/UL (ref 0–0.4)
EOSINOPHIL NFR BLD: 0 % (ref 0–7)
EPITH CASTS URNS QL MICRO: ABNORMAL /LPF
ERYTHROCYTE [DISTWIDTH] IN BLOOD BY AUTOMATED COUNT: 14.3 % (ref 11.5–14.5)
GLOBULIN SER CALC-MCNC: 4.7 G/DL (ref 2–4)
GLUCOSE BLD STRIP.AUTO-MCNC: 101 MG/DL (ref 65–117)
GLUCOSE BLD STRIP.AUTO-MCNC: 121 MG/DL (ref 65–117)
GLUCOSE SERPL-MCNC: 117 MG/DL (ref 65–100)
GLUCOSE UR STRIP.AUTO-MCNC: NEGATIVE MG/DL
HCT VFR BLD AUTO: 37 % (ref 35–47)
HGB BLD-MCNC: 12.7 G/DL (ref 11.5–16)
HGB UR QL STRIP: NEGATIVE
IMM GRANULOCYTES # BLD AUTO: 0 K/UL (ref 0–0.04)
IMM GRANULOCYTES NFR BLD AUTO: 0 % (ref 0–0.5)
INR PPP: 1 (ref 0.9–1.1)
KETONES UR QL STRIP.AUTO: NEGATIVE MG/DL
LACTATE SERPL-SCNC: 1.5 MMOL/L (ref 0.4–2)
LACTATE SERPL-SCNC: 2.7 MMOL/L (ref 0.4–2)
LEUKOCYTE ESTERASE UR QL STRIP.AUTO: NEGATIVE
LYMPHOCYTES # BLD: 2 K/UL (ref 0.8–3.5)
LYMPHOCYTES NFR BLD: 27 % (ref 12–49)
Lab: NORMAL
MAGNESIUM SERPL-MCNC: 1.5 MG/DL (ref 1.6–2.4)
MCH RBC QN AUTO: 31.7 PG (ref 26–34)
MCHC RBC AUTO-ENTMCNC: 34.3 G/DL (ref 30–36.5)
MCV RBC AUTO: 92.3 FL (ref 80–99)
MONOCYTES # BLD: 0.5 K/UL (ref 0–1)
MONOCYTES NFR BLD: 7 % (ref 5–13)
NEUTS SEG # BLD: 5 K/UL (ref 1.8–8)
NEUTS SEG NFR BLD: 66 % (ref 32–75)
NITRITE UR QL STRIP.AUTO: NEGATIVE
NRBC # BLD: 0 K/UL (ref 0–0.01)
NRBC BLD-RTO: 0 PER 100 WBC
P-R INTERVAL, ECG05: 124 MS
PH UR STRIP: 6.5 (ref 5–8)
PLATELET # BLD AUTO: 281 K/UL (ref 150–400)
PMV BLD AUTO: 9.1 FL (ref 8.9–12.9)
POTASSIUM SERPL-SCNC: 3.2 MMOL/L (ref 3.5–5.1)
PROT SERPL-MCNC: 8.1 G/DL (ref 6.4–8.2)
PROT UR STRIP-MCNC: ABNORMAL MG/DL
PROTHROMBIN TIME: 10.3 SEC (ref 9–11.1)
Q-T INTERVAL, ECG07: 372 MS
QRS DURATION, ECG06: 78 MS
QTC CALCULATION (BEZET), ECG08: 469 MS
RBC # BLD AUTO: 4.01 M/UL (ref 3.8–5.2)
RBC #/AREA URNS HPF: ABNORMAL /HPF (ref 0–5)
REFERENCE LAB,REFLB: NORMAL
SERVICE CMNT-IMP: ABNORMAL
SERVICE CMNT-IMP: NORMAL
SODIUM SERPL-SCNC: 136 MMOL/L (ref 136–145)
SP GR UR REFRACTOMETRY: 1.01
TEST DESCRIPTION:,ATST: NORMAL
UA: UC IF INDICATED,UAUC: ABNORMAL
UROBILINOGEN UR QL STRIP.AUTO: 0.2 EU/DL (ref 0.2–1)
VENTRICULAR RATE, ECG03: 96 BPM
WBC # BLD AUTO: 7.6 K/UL (ref 3.6–11)
WBC URNS QL MICRO: ABNORMAL /HPF (ref 0–4)

## 2023-03-24 PROCEDURE — 74011250637 HC RX REV CODE- 250/637: Performed by: INTERNAL MEDICINE

## 2023-03-24 PROCEDURE — 74011000250 HC RX REV CODE- 250: Performed by: INTERNAL MEDICINE

## 2023-03-24 PROCEDURE — 83605 ASSAY OF LACTIC ACID: CPT

## 2023-03-24 PROCEDURE — 74011250637 HC RX REV CODE- 250/637

## 2023-03-24 PROCEDURE — 85025 COMPLETE CBC W/AUTO DIFF WBC: CPT

## 2023-03-24 PROCEDURE — 74011250636 HC RX REV CODE- 250/636

## 2023-03-24 PROCEDURE — 80053 COMPREHEN METABOLIC PANEL: CPT

## 2023-03-24 PROCEDURE — 82962 GLUCOSE BLOOD TEST: CPT

## 2023-03-24 PROCEDURE — C9113 INJ PANTOPRAZOLE SODIUM, VIA: HCPCS | Performed by: INTERNAL MEDICINE

## 2023-03-24 PROCEDURE — 82088 ASSAY OF ALDOSTERONE: CPT

## 2023-03-24 PROCEDURE — 83735 ASSAY OF MAGNESIUM: CPT

## 2023-03-24 PROCEDURE — 81001 URINALYSIS AUTO W/SCOPE: CPT

## 2023-03-24 PROCEDURE — 97161 PT EVAL LOW COMPLEX 20 MIN: CPT | Performed by: PHYSICAL THERAPIST

## 2023-03-24 PROCEDURE — 82570 ASSAY OF URINE CREATININE: CPT

## 2023-03-24 PROCEDURE — 85610 PROTHROMBIN TIME: CPT

## 2023-03-24 PROCEDURE — 36415 COLL VENOUS BLD VENIPUNCTURE: CPT

## 2023-03-24 PROCEDURE — 74011250636 HC RX REV CODE- 250/636: Performed by: INTERNAL MEDICINE

## 2023-03-24 RX ORDER — METOPROLOL TARTRATE 25 MG/1
25 TABLET, FILM COATED ORAL ONCE
Status: DISCONTINUED | OUTPATIENT
Start: 2023-03-24 | End: 2023-03-24

## 2023-03-24 RX ORDER — POTASSIUM CHLORIDE 7.45 MG/ML
10 INJECTION INTRAVENOUS
Status: DISCONTINUED | OUTPATIENT
Start: 2023-03-24 | End: 2023-03-24

## 2023-03-24 RX ORDER — HYDRALAZINE HYDROCHLORIDE 20 MG/ML
10 INJECTION INTRAMUSCULAR; INTRAVENOUS
Status: DISCONTINUED | OUTPATIENT
Start: 2023-03-24 | End: 2023-03-24 | Stop reason: HOSPADM

## 2023-03-24 RX ORDER — MAGNESIUM SULFATE HEPTAHYDRATE 40 MG/ML
2 INJECTION, SOLUTION INTRAVENOUS ONCE
Status: DISCONTINUED | OUTPATIENT
Start: 2023-03-24 | End: 2023-03-24

## 2023-03-24 RX ORDER — LANOLIN ALCOHOL/MO/W.PET/CERES
400 CREAM (GRAM) TOPICAL ONCE
Status: COMPLETED | OUTPATIENT
Start: 2023-03-24 | End: 2023-03-24

## 2023-03-24 RX ORDER — ONDANSETRON 4 MG/1
4 TABLET, ORALLY DISINTEGRATING ORAL
Qty: 3 TABLET | Refills: 0 | Status: SHIPPED | OUTPATIENT
Start: 2023-03-24

## 2023-03-24 RX ORDER — SPIRONOLACTONE 25 MG/1
25 TABLET ORAL DAILY
Qty: 30 TABLET | Refills: 0 | Status: SHIPPED | OUTPATIENT
Start: 2023-03-25 | End: 2023-04-24

## 2023-03-24 RX ORDER — HYDRALAZINE HYDROCHLORIDE 20 MG/ML
10 INJECTION INTRAMUSCULAR; INTRAVENOUS ONCE
Status: COMPLETED | OUTPATIENT
Start: 2023-03-24 | End: 2023-03-24

## 2023-03-24 RX ORDER — MAGNESIUM SULFATE 1 G/100ML
1 INJECTION INTRAVENOUS ONCE
Status: COMPLETED | OUTPATIENT
Start: 2023-03-24 | End: 2023-03-24

## 2023-03-24 RX ORDER — METOPROLOL TARTRATE 75 MG/1
75 TABLET, FILM COATED ORAL 2 TIMES DAILY
Qty: 60 TABLET | Refills: 0 | Status: SHIPPED | OUTPATIENT
Start: 2023-03-24 | End: 2023-04-23

## 2023-03-24 RX ORDER — METOPROLOL TARTRATE 25 MG/1
25 TABLET, FILM COATED ORAL ONCE
Status: COMPLETED | OUTPATIENT
Start: 2023-03-24 | End: 2023-03-24

## 2023-03-24 RX ORDER — SPIRONOLACTONE 25 MG/1
25 TABLET ORAL DAILY
Status: DISCONTINUED | OUTPATIENT
Start: 2023-03-24 | End: 2023-03-24 | Stop reason: HOSPADM

## 2023-03-24 RX ADMIN — MAGNESIUM SULFATE HEPTAHYDRATE 1 G: 1 INJECTION, SOLUTION INTRAVENOUS at 08:10

## 2023-03-24 RX ADMIN — HYDRALAZINE HYDROCHLORIDE 10 MG: 20 INJECTION, SOLUTION INTRAMUSCULAR; INTRAVENOUS at 13:54

## 2023-03-24 RX ADMIN — METOPROLOL TARTRATE 50 MG: 50 TABLET, FILM COATED ORAL at 09:16

## 2023-03-24 RX ADMIN — POTASSIUM BICARBONATE 40 MEQ: 782 TABLET, EFFERVESCENT ORAL at 03:28

## 2023-03-24 RX ADMIN — SODIUM CHLORIDE, PRESERVATIVE FREE 10 ML: 5 INJECTION INTRAVENOUS at 05:10

## 2023-03-24 RX ADMIN — METOPROLOL TARTRATE 25 MG: 25 TABLET, FILM COATED ORAL at 12:36

## 2023-03-24 RX ADMIN — SODIUM CHLORIDE 40 MG: 9 INJECTION INTRAMUSCULAR; INTRAVENOUS; SUBCUTANEOUS at 09:18

## 2023-03-24 RX ADMIN — SPIRONOLACTONE 25 MG: 25 TABLET ORAL at 12:36

## 2023-03-24 RX ADMIN — HYDRALAZINE HYDROCHLORIDE 10 MG: 20 INJECTION, SOLUTION INTRAMUSCULAR; INTRAVENOUS at 04:50

## 2023-03-24 RX ADMIN — ONDANSETRON 4 MG: 2 INJECTION INTRAMUSCULAR; INTRAVENOUS at 03:26

## 2023-03-24 RX ADMIN — CEFTRIAXONE SODIUM 1 G: 1 INJECTION, POWDER, FOR SOLUTION INTRAMUSCULAR; INTRAVENOUS at 11:43

## 2023-03-24 RX ADMIN — MAGNESIUM OXIDE 400 MG (241.3 MG MAGNESIUM) TABLET 400 MG: TABLET at 03:22

## 2023-03-24 RX ADMIN — SODIUM CHLORIDE, POTASSIUM CHLORIDE, SODIUM LACTATE AND CALCIUM CHLORIDE 100 ML/HR: 600; 310; 30; 20 INJECTION, SOLUTION INTRAVENOUS at 03:35

## 2023-03-24 RX ADMIN — CETIRIZINE HYDROCHLORIDE 10 MG: 10 TABLET, FILM COATED ORAL at 09:16

## 2023-03-24 NOTE — DISCHARGE INSTRUCTIONS
You have been evaluated and treated for intractable nausea, Vomiting abdominal pain and uncontrolled hypertension. You might have cycling vomiting and should avoid THC. You had treatment for mild UTI for Abx IV. You should take blood pressure medications as directed and follow up yopur PCP in 1 week. Your Hb A1C was 5.7 and it is considered you have prediabetes. You should follow up PCP and consider diabetic diet. You had low potassium and uncontrolled hyper tension, aldosterone blood and urine were requested and should be follow up by PCP.

## 2023-03-24 NOTE — ADVANCED PRACTICE NURSE
Lactic acid 2.7 - fluids infusing. Will trend. Magnesium 1.5 and potassium 3.2 on AM labs. Will replete. Nursing to continue to monitor. ADDENDUM:    Pt is hypertensive - 180/100. Order for IV hydralazine placed. Nursing to continue to monitor. ADDENDUM:    /81. Lactic acid 1.5 - Nursing to continue to monitor.

## 2023-03-24 NOTE — PROGRESS NOTES
PHYSICAL THERAPY EVALUATION/DISCHARGE  Patient: Azam Kirkpatrick (79 y.o. female)  Date: 3/24/2023  Primary Diagnosis: Hypertensive emergency [I16.1]  Intractable nausea and vomiting [R11.2]       Precautions: HTN         ASSESSMENT  Based on the objective data described below, the patient presented to hospital with hypertensive emergency and unsteadiness on feet. Patient received sitting at edge of bed independently. She was able to ambulate around room and manage IV pole independently. She slows low fall risk on Coelho Balance Scale. Patient has no skilled PT needs at this time and is being discharged from care. Functional Outcome Measure: The patient scored 53/56 on the Coelho outcome measure which is indicative of low fall risk. Other factors to consider for discharge: HTN     Further skilled acute physical therapy is not indicated at this time. PLAN :  Recommendation for discharge: (in order for the patient to meet his/her long term goals)  No skilled physical therapy/ follow up rehabilitation needs identified at this time. This discharge recommendation:  Has been made in collaboration with the attending provider and/or case management    IF patient discharges home will need the following DME: none       SUBJECTIVE:   Patient stated I am so much better today.     OBJECTIVE DATA SUMMARY:   HISTORY:    Past Medical History:   Diagnosis Date    Hypertension     Kidney anomaly, congenital     Tubal pregnancy      Past Surgical History:   Procedure Laterality Date    HX CHOLECYSTECTOMY      HX GYN  10/07/2013    cyst removed    HX TUBAL LIGATION       Prior level of function: independent    Home Situation  Home Environment: Other (comment)  One/Two Story Residence: Other (Comment)  Living Alone: Yes  Support Systems: Spouse/Significant Other  Patient Expects to be Discharged to[de-identified] Home  Current DME Used/Available at Home: None    EXAMINATION/PRESENTATION/DECISION MAKING:  Range Of Motion:  AROM: Within functional limits  Strength:    Strength:  Within functional limits  Functional Mobility:  Bed Mobility:  Rolling: Independent  Supine to Sit: Independent  Sit to Supine: Independent  Transfers:  Sit to Stand: Independent  Stand to Sit: Independent  Balance:   Sitting: Intact  Standing: Intact  Ambulation/Gait Training:  Distance (ft): 20 Feet (ft)  Assistive Device: Gait belt  Ambulation - Level of Assistance: Independent  Gait Description (WDL): Exceptions to [facility]  Base of Support: Widened  Step Length: Left shortened;Right shortened    Functional Measure:  Coelho Balance Test:    Sitting to Standin  Standing Unsupported: 4  Sitting with Back Unsupported: 4  Standing to Sittin  Transfers: 4  Standing Unsupported with Eyes Closed: 4  Standing Unsupported with Feet Together: 4  Reach Forward with Outstretched Arm: 3   Object: 4  Turn to Look Over Shoulders: 4  Turn 360 Degrees: 3  Alternate Foot on Step/Stool: 4  Standing Unsupported One Foot in Front: 4  Stand on One Leg: 3  Total: 53/56     56=Maximum possible score;   0-20=High fall risk  21-40=Moderate fall risk   41-56=Low fall risk     Physical Therapy Evaluation Charge Determination   History Examination Presentation Decision-Making   LOW Complexity : Zero comorbidities / personal factors that will impact the outcome / POC LOW Complexity : 1-2 Standardized tests and measures addressing body structure, function, activity limitation and / or participation in recreation  LOW Complexity : Stable, uncomplicated  Other outcome measures Coelho  LOW       Based on the above components, the patient evaluation is determined to be of the following complexity level: LOW     Pain Rating:  None reported    Activity Tolerance:   Good      After treatment patient left in no apparent distress:   Sitting at edge of bed with call bell within reach    COMMUNICATION/EDUCATION:   The patients plan of care was discussed with: Physical therapist, Registered nurse, and Physician. Fall prevention education was provided and the patient/caregiver indicated understanding., Patient/family have participated as able in goal setting and plan of care. , and Patient/family agree to work toward stated goals and plan of care.     Thank you for this referral.  Juan Pablo López, PT   Time Calculation: 9 mins

## 2023-03-24 NOTE — PROGRESS NOTES
Pt is transferred from ED via stretcher. Alert, oriented x 4, and coherent. C/o lower back pain 5/10. VSS.

## 2023-03-24 NOTE — PROGRESS NOTES
[facility]  Hospitalist Group                                                                                          Hospitalist Progress Note  Ada Aguilar MD  Answering service: [phone] OR 36 from in house phone        Date of Service:  3/24/2023  NAME:  Ray Johnson  :  1975  MRN:  [id number]       Admission Summary:   Per HPI: Ray Johnson is a 51 y.o. female is known past medical history of hypertension who presents to ED with complaint of intractable abdominal pain associated with intractable nausea vomiting inability to tolerate p.o for 3 days gradually worsening in severity. The patient was not able to tolerate your medication and her blood pressure was uncontrolled. According to patient she denied having any fever, chest pain, productive cough. 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 The patient denied having any hemoptysis or bloody stool. In the emergency department patient was evaluated, vital signs were obtained was significant for uncontrolled blood pressure with systolic blood pressure as high as 431, end-diastolic 905. The patient was premedicated with labetalol IV. Due to abdominal pain Dilaudid was given. The patient was premedicated with morphine, Compazine, Zofran and Haldol. CT abdomen pelvis was obtained, reviewed was negative for acute findings patient does have cyst in the kidney bilateral.   Due to above problems medicine was consulted for admission and further evaluation of intractable nausea vomiting and hypertensive emergency. Interval history / Subjective:    Follow up the patient admitted for evaluation of HTN urgency, intractable N/V.  NAD, admitted last night, event over night noted,   Condition of the patient improved significantly,   BP is better controled   Able to tolerate PO diet  No N/V  No diarrhea  No abdominal pain  Electrolytes replaced  Pt wish tpo go home as she need to  her child from school  Condition of the patient and plan of care was discussed with RN, CM, pharmacy during morning rounds. Assessment & Plan:     Hypertensive emergency (3/23/2023), resolved  Possible related to intractable pain, N/V  Home medications Metoprolol were reinstated  Able to tolerate PO and abd pain resolved  The patient BP is not well controlled  Metoprolol was increased to 75 mg BID  Spironolactone was added. Will need BMP follow up next week     Intractable nausea and vomiting (3/23/2023), resolved  Diarrhea, resolved  Acute gastroenteritis, resolved  Possible cycling vomiting, resolved  Able to tolerate PO,   No abdominal pain  PPI     Possible UTI   Ceftriaxone IV  UA culture was not collected     THC use  Recommended to quit    Renal cyst  multiple simple per radiologist  CT abd:  simple characteristics. The largest measures 5.4 cm and the left kidney.   Will need follow up as outpatient    Social determinant of health  pt is stressed out due to no one could  her child and she requested to be discharged home          Code status: full Code  Prophylaxis: SCD  Care Plan discussed with: patient, RN, CM  Anticipated Disposition: home today, the patient BP not well controlled yet, but pt is stressed out due to no one could  her child and she requested to be discharged home    Inpatient  Cardiac monitoring: Telemetry        Hospital Problems  Date Reviewed: 2/16/2022            Codes Class Noted POA    Hypertensive emergency ICD-10-CM: I16.1  ICD-9-CM: 401.9  3/23/2023 Unknown        Intractable nausea and vomiting ICD-10-CM: R11.2  ICD-9-CM: 536.2  3/23/2023 Unknown           Social Determinants of Health     Tobacco Use: High Risk    Smoking Tobacco Use: Every Day    Smokeless Tobacco Use: Never    Passive Exposure: Not on file   Alcohol Use: Not on file   Financial Resource Strain: Not on file   Food Insecurity: Not on file Transportation Needs: Not on file   Physical Activity: Not on file   Stress: Not on file   Social Connections: Not on file   Intimate Partner Violence: Not on file   Depression: Not on file   Housing Stability: Not on file       Review of Systems:   A comprehensive review of systems was negative except for that written in the HPI. Vital Signs:    Last 24hrs VS reviewed since prior progress note. Most recent are:  Visit Vitals  /84 (BP 1 Location: Left upper arm, BP Patient Position: Semi fowlers)   Pulse 80   Temp 99.2 °F (37.3 °C)   Resp 16   Ht 5' 1\" (1.549 m)   Wt 63.5 kg (140 lb)   SpO2 100%   BMI 26.45 kg/m²         Intake/Output Summary (Last 24 hours) at 3/24/2023 1103  Last data filed at 3/24/2023 2539  Gross per 24 hour   Intake 2518.33 ml   Output --   Net 2518.33 ml        Physical Examination:     I had a face to face encounter with this patient and independently examined them on 3/24/2023 as outlined below:          General : alert x 3, awake, no acute distress,   HEENT: PEERL, EOMI, moist mucus membrane, TM clear  Neck: supple, no JVD, no meningeal signs  Chest: Clear to auscultation bilaterally   CVS: S1 S2 heard, Capillary refill less than 2 seconds  Abd: soft/ non tender, non distended, BS physiological,   Ext: no clubbing, no cyanosis, no edema, brisk 2+ DP pulses  Neuro/Psych: pleasant mood and affect, CN 2-12 grossly intact, sensory grossly within normal limit, Strength 5/5 in all extremities, DTR 1+ x 4  Skin: warm     Data Review:    Review and/or order of clinical lab test    CT abd:  IMPRESSION     1. No acute findings in the abdomen or pelvis. 2. Numerous cysts in the kidneys bilaterally,.  3. Small pericardial effusion. I have personally and independently reviewed all pertinent labs, diagnostic studies, imaging, and have provided independent interpretation of the same.      Labs:     Recent Labs     03/24/23  0026 03/23/23  1355   WBC 7.6 10.4   HGB 12.7 15.4   HCT 37.0 44.7    347     Recent Labs     03/24/23  0026 03/23/23  1355    136   K 3.2* 4.2   CL 99 98   CO2 22 22   BUN 12 11   CREA 1.31* 0.96   * 129*   CA 8.2* 9.8   MG 1.5*  --      Recent Labs     03/24/23  0026 03/23/23  1355   ALT 18 19   AP 92 114   TBILI 0.4 0.6   TP 8.1 10.2*   ALB 3.4* 4.3   GLOB 4.7* 5.9*   LPSE  --  68*     Recent Labs     03/24/23  0026   INR 1.0   PTP 10.3      No results for input(s): FE, TIBC, PSAT, FERR in the last 72 hours. No results found for: FOL, RBCF   No results for input(s): PH, PCO2, PO2 in the last 72 hours. No results for input(s): CPK, CKNDX, TROIQ in the last 72 hours. No lab exists for component: CPKMB  Lab Results   Component Value Date/Time    Cholesterol, total 184 11/11/2020 03:22 PM    HDL Cholesterol 43 11/11/2020 03:22 PM    LDL, calculated 84.4 11/11/2020 03:22 PM    Triglyceride 283 (H) 11/11/2020 03:22 PM    CHOL/HDL Ratio 4.3 11/11/2020 03:22 PM     Lab Results   Component Value Date/Time    Glucose (POC) 101 03/24/2023 08:08 AM    Glucose (POC) 145 (H) 03/23/2023 09:30 PM    Glucose  08/05/2013 11:20 AM     Lab Results   Component Value Date/Time    Color YELLOW/STRAW 03/23/2023 02:35 PM    Appearance CLEAR 03/23/2023 02:35 PM    Specific gravity 1.015 03/23/2023 02:35 PM    Specific gravity 1.025 11/11/2020 12:08 AM    pH (UA) 6.0 03/23/2023 02:35 PM    Protein 300 (A) 03/23/2023 02:35 PM    Glucose Negative 03/23/2023 02:35 PM    Ketone TRACE (A) 03/23/2023 02:35 PM    Bilirubin Negative 03/23/2023 02:35 PM    Urobilinogen 0.2 03/23/2023 02:35 PM    Nitrites Negative 03/23/2023 02:35 PM    Leukocyte Esterase Negative 03/23/2023 02:35 PM    Epithelial cells MODERATE (A) 03/23/2023 02:35 PM    Bacteria Negative 03/23/2023 02:35 PM    WBC 5-10 03/23/2023 02:35 PM    RBC 0-5 03/23/2023 02:35 PM       Notes reviewed from all clinical/nonclinical/nursing services involved in patient's clinical care.  Care coordination discussions were held with appropriate clinical/nonclinical/ nursing providers based on care coordination needs. Patients current active Medications were reviewed, considered, added and adjusted based on the clinical condition today. Home Medications were reconciled to the best of my ability given all available resources at the time of admission. Route is PO if not otherwise noted.       Admission Status:[id number]:::1}      Medications Reviewed:     Current Facility-Administered Medications   Medication Dose Route Frequency    sodium chloride (NS) flush 5-40 mL  5-40 mL IntraVENous Q8H    sodium chloride (NS) flush 5-40 mL  5-40 mL IntraVENous PRN    acetaminophen (TYLENOL) tablet 650 mg  650 mg Oral Q6H PRN    Or    acetaminophen (TYLENOL) suppository 650 mg  650 mg Rectal Q6H PRN    polyethylene glycol (MIRALAX) packet 17 g  17 g Oral DAILY PRN    ondansetron (ZOFRAN ODT) tablet 4 mg  4 mg Oral Q8H PRN    Or    ondansetron (ZOFRAN) injection 4 mg  4 mg IntraVENous Q6H PRN    insulin lispro (HUMALOG) injection   SubCUTAneous AC&HS    glucose chewable tablet 16 g  4 Tablet Oral PRN    glucagon (GLUCAGEN) injection 1 mg  1 mg IntraMUSCular PRN    dextrose 10% infusion 0-250 mL  0-250 mL IntraVENous PRN    lactated Ringers infusion  100 mL/hr IntraVENous CONTINUOUS    cefTRIAXone (ROCEPHIN) 1 g in sterile water (preservative free) 10 mL IV syringe  1 g IntraVENous Q24H    pantoprazole (PROTONIX) 40 mg in 0.9% sodium chloride 10 mL injection  40 mg IntraVENous DAILY    cetirizine (ZYRTEC) tablet 10 mg  10 mg Oral DAILY    metoprolol tartrate (LOPRESSOR) tablet 50 mg  50 mg Oral BID     ______________________________________________________________________  EXPECTED LENGTH OF STAY: 2d 2h  ACTUAL LENGTH OF STAY:          1                 Mery Lepe MD

## 2023-03-24 NOTE — PROGRESS NOTES
0740) Bedside shift change report given to Marj River, RN (oncoming nurse) by Bk Marquez, JOVANA (offgoing nurse). Report included the following information SBAR, Kardex, and MAR. 1030 )IDR with Dr. Axel Howard (MD), Jenny Londono (pharmacist), Yara Richter (), Cristopher Hansen (nurse supervisor), and Marj River (RN) to discuss plan of care including pt needs to discharge by 2, retime last dose of Rocephin, worknote and prn for nausea on discharge  36) Discuss with Dr. Axel Howard-- call [place]'s lab to see if urine culture could be run off 3/23/23 sample. Plan for another urine culture now, another dose of antibiotic and discharge  1202) Gin Villafana BP is 179/87--- do you want to give her anything before discharge? 1224) Orthostatics--162/104, heart rate 72 (at rest); 189/106, 78 heart rate (sitting); 167/111, 80 heart rate (standing). ) Call to [facility] lab--add-on urine for Friday's lab  1349) Gin Villafana /118. She is stressed because she has not found a ride for her son. Could we give her anything else? 1407) Gin Livingston 163/107. Should I have her sign out AMA or do you want to add spironolactone and change to metoprolol on discharge papers? 1507) Dr. Axel Howard at bedside. 1510) . Markos Muñoz Reviewed discharge instructions with pt including follow-up appointments, new medications and side effects, medications to continue, UTI education, signs/symptoms of stroke and heart attack, and MyChart information. Pt expressed understanding. IV was removed.  Belongings sent home with pt.   1540) Gin Howard-- if okay to run aldosterone/creatinine ratio as one time lab

## 2023-03-24 NOTE — PROGRESS NOTES
Pharmacist Discharge Medication Reconciliation    Discharge Provider:  Dr Dhiraj London       Discharge Medications:      My Medications        START taking these medications        Instructions Each Dose to Equal Morning Noon Evening Bedtime   ondansetron 4 mg disintegrating tablet  Commonly known as: ZOFRAN ODT    Your last dose was: Your next dose is: Take 1 Tablet by mouth every eight (8) hours as needed for Nausea or Vomiting. 4 mg                        CONTINUE taking these medications        Instructions Each Dose to Equal Morning Noon Evening Bedtime   cetirizine 10 mg tablet  Commonly known as: ZYRTEC    Your last dose was: Your next dose is: Take 10 mg by mouth daily. 10 mg                 estradioL 0.05 mg/24 hr  Commonly known as: VIVELLE    Your last dose was: Your next dose is:         1 Patch by TransDERmal route two (2) times a week on Wednesday and Saturday. 1 Patch                 hydrocortisone 1 % topical cream  Commonly known as: CORTAID    Your last dose was: Your next dose is:         Apply  to affected area two (2) times daily as needed (Eczema). metoprolol tartrate 50 mg tablet  Commonly known as: LOPRESSOR    Your last dose was: Your next dose is:         TAKE 1 TABLET BY MOUTH TWICE DAILY                  NexIUM 24HR 20 mg Tbec  Generic drug: esomeprazole magnesium    Your last dose was: Your next dose is: Take 20 mg by mouth daily.    20 mg                           Where to Get Your Medications        These medications were sent to [facility] [address]  [place]  [address] [facility] [address]      Phone: [phone]   ondansetron 4 mg disintegrating tablet          The patient's chart, MAR, and AVS were reviewed by   Salima Mathias, Jackie Mclaughlin,   Contact: [phone]

## 2023-03-24 NOTE — PROGRESS NOTES
Bedside and Verbal shift change report given to [address] [place] [address] (oncoming nurse) by Nely Carvajal (offgoing nurse).  Report included the following information SBAR, Kardex, Intake/Output, MAR, Accordion, Recent Results, Med Rec Status, and Cardiac Rhythm SR .

## 2023-03-24 NOTE — PROGRESS NOTES
0342 /100. NP made aware    0423 Manual /100 on left upper arm per order. NP Veena Márquez, made aware thru Perfect[employer]. 0450 Hydralazine 10 mg IVP once, given as ordered.

## 2023-03-24 NOTE — PROGRESS NOTES
0131 Lactic 2.7, K.3.2, Mg 1.5, and Cr 1.31.  NP, Lavella Flatter, made aware thru [employer].    0325 Effer-K 40 mEq PO and Mag-Ox 400 mg PO once, given as ordered. 8748 Repeat lactic 1.5.

## 2023-03-24 NOTE — PROGRESS NOTES
Negative covid test result released via live well application. [address] March 24, 2023       RE: Yakov Villasenor      To Whom It May Concern,    This is to certify that Yakov Villasenor was in the hospital since 03/23/2023 until 03/24/2023 and may return to work on 03/27/2023. Please feel free to contact my office if you have any questions or concerns. Thank you for your assistance in this matter.       Sincerely,        Sandrine Condon MD

## 2023-03-24 NOTE — PROGRESS NOTES
[facility]: MED RECONCILIATION    Comments/Recommendations:   Patient was interviewed at bedside and was a good historian. Medications added:     None    Medications removed:    Chlorthalidone 25 mg     Information obtained from: Patient, RxQuery Refill History, [facility]     Allergies: Patient has no known allergies. Prior to Admission Medications:     Prior to Admission Medications   Prescriptions Last Dose Informant Patient Reported? Taking? cetirizine (ZYRTEC) 10 mg tablet  Self Yes Yes   Sig: Take 10 mg by mouth daily. esomeprazole magnesium (NexIUM 24HR) 20 mg TbEC  Self Yes Yes   Sig: Take 20 mg by mouth daily. estradioL (VIVELLE) 0.05 mg/24 hr  Self Yes Yes   Si Patch by TransDERmal route two (2) times a week on Wednesday and Saturday. hydrocortisone (CORTAID) 1 % topical cream  Self Yes Yes   Sig: Apply  to affected area two (2) times daily as needed (Eczema).    metoprolol tartrate (LOPRESSOR) 50 mg tablet 3/22/2023  No Yes   Sig: TAKE 1 TABLET BY MOUTH TWICE DAILY          [place], [place]   Contact: [phone]

## 2023-03-24 NOTE — PROGRESS NOTES
TRANSITION OF CARE    RUR:  9%  DISPOSITION:  Home  ELOS:  3/24/2023  TRANSPORTATION:  Pt stated that someone will pick her up  ADLS/DME:  Pt is independent in ADLs and does not use DME  PH[facility]ACY:  [facility] at the corner of [address] [place] [address] and [address]  PCP:  Has an established PCP and prefers to make her own appointment  BARRIERS TO DISCHARGE:  None    Reason for Admission:    Pt admitted with diagnosis of hypertensive emergency                   RUR Score:     9%                Plan for utilizing home health:      [facility] is not needed. PCP: First and Last name:  George Mcnulty MD     Name of Practice:  [facility]   Are you a current patient: Yes/No:  yes   Approximate date of last visit:  February 2022   Can you participate in a virtual visit with your PCP:  Yes                    Current Advanced Directive/Advance Care Plan: Full Code      Healthcare Decision Maker:   Click here to complete [address] including selection of the Healthcare Decision Maker Relationship (ie \"Primary\")             Primary Decision Maker: Julisa Spears -  - [phone]                  Transition of Care Plan:       CM met with patient at bedside, introduced self/role and verified demographic information. Pt will return to her home at discharge and will have someone to pick her up. She is employed by [employer] and will need a work note. Pt reports taking her medications and stated that she has no problem with obtaining her medications or with taking them as prescribed. [facility]acy is [facility] on corner of [facility] [place] [address] and [address]. She has [employer] and income from employment. Pt prefers to schedule her PCP appointment for follow up. Asked to schedule within 7 days of discharge. Care Management Interventions  PCP Verified by CM:  Yes  Palliative Care Criteria Met (RRAT>21 & CHF Dx)?: No  Mode of Transport at Discharge:  Other (see comment) (Will be picked up for transport home)  Transition of Care Consult (CM Consult): Discharge Planning  Discharge Durable Medical Equipment: No  Physical Therapy Consult: No  Occupational Therapy Consult: No  Speech Therapy Consult: No  Support Systems: Parent(s)  The Plan for Transition of Care is Related to the Following Treatment Goals : medical stability  The Patient and/or Patient Representative was Provided with a Choice of Provider and Agrees with the Discharge Plan?: No  Freedom of Choice List was Provided with Basic Dialogue that Supports the Patient's Individualized Plan of Care/Goals, Treatment Preferences and Shares the Quality Data Associated with the Providers?: Yes  [place] Resource Information Provided?: No  Discharge Location  Patient Expects to be Discharged to[de-identified] Home with outpatient services

## 2023-03-24 NOTE — DISCHARGE SUMMARY
Physician Discharge Summary     Patient ID:    Venecia Morin  [id number]  [unfilled]  1975    Admit date: 3/23/2023    Discharge date and time: 3/24/2023      DISCHARGE CONDITION: Stable        Hospital Diagnoses and Treatment Rendered:   Venecia Morin is a 51 y.o. female is known past medical history of hypertension who presents to ED with complaint of intractable abdominal pain associated with intractable nausea vomiting inability to tolerate p.o for 3 days gradually worsening in severity. The patient was not able to tolerate your medication and her blood pressure was uncontrolled. According to patient she denied having any fever, chest pain, productive cough. 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 The patient denied having any hemoptysis or bloody stool. In the emergency department patient was evaluated, vital signs were obtained was significant for uncontrolled blood pressure with systolic blood pressure as high as 624, end-diastolic 383. The patient was premedicated with labetalol IV. Due to abdominal pain Dilaudid was given. The patient was premedicated with morphine, Compazine, Zofran and Haldol. CT abdomen pelvis was obtained, reviewed was negative for acute findings patient does have cyst in the kidney bilateral.      The patient was admitted to medicine, was monitored on telemetry, home medication were reinstated with metoprolol. Antiemetics were provided. Fluids IV were provided. On current medical management condition of patient improve, nausea vomiting, abdominal pain resolved. Diet was initiated and advanced. Lactate metabolic acidosis resolved. Electrolytes were replaced. Medication was adjusted for better control of blood pressure, metoprolol was increased to 75 mg p.o. twice a day.     Due to hypertension and hypokalemia aldosterone level was requested and is still pending. Urinalysis was suspicious for urinary tract infection and ceftriaxone IV was provided during hospital stay. Repeat urinalysis was negative for leuk esterase and white blood cells. Rest of hospital stay patient remained hemodynamically stable, afebrile, she was able to tolerate p.o. diet, ambulate, and she requested to be discharged home. The patient blood pressure remains still not at goal but the patient had personal needs she did not have any backup to  to child and she requested to be discharged home. The patient understands that if her blood pressure is not controlled at home she would need to come back to the emergency department. Hypertensive emergency (3/23/2023), resolved  Possible related to intractable pain, N/V  Home medications Metoprolol were reinstated  Able to tolerate PO and abd pain resolved  The patient BP is not well controlled  Metoprolol was increased to 75 mg BID  Spironolactone was added. Will need BMP follow up next week     Intractable nausea and vomiting (3/23/2023), resolved  Diarrhea, resolved  Acute gastroenteritis, resolved  Possible cycling vomiting, resolved  WISAM possible related to dehydration  Able to tolerate PO,   No abdominal pain  PPI     Possible UTI   Ceftriaxone IV  UA culture was not collected     THC use  Recommended to quit     Renal cyst  multiple simple per radiologist  CT abd:  simple characteristics. The largest measures 5.4 cm and the left kidney.   Will need follow up as outpatient     Social determinant of health  pt is stressed out due to no one could  her child and she requested to be discharged home            Chronic Diagnoses:    Problem List as of 3/24/2023 Date Reviewed: 2/16/2022            Codes Class Noted - Resolved    Tobacco abuse ICD-10-CM: Z72.0  ICD-9-CM: 305.1 Chronic 7/18/2013 - Present        Hypertensive emergency ICD-10-CM: I16.1  ICD-9-CM: 401.9  3/23/2023 - Present        Intractable nausea and vomiting ICD-10-CM: R11.2  ICD-9-CM: 536.2  3/23/2023 - Present        Cocaine use ICD-10-CM: F14.90  ICD-9-CM: 305.60  9/1/2020 - Present        Exophthalmos ICD-10-CM: H05.20  ICD-9-CM: 376.30  1/2/2019 - Present        S/P laparoscopic cholecystectomy ICD-10-CM: Z90.49  ICD-9-CM: V45.89  9/21/2018 - Present        Essential hypertension ICD-10-CM: I10  ICD-9-CM: 401.9  5/8/2018 - Present        Hidradenitis suppurativa ICD-10-CM: L73.2  ICD-9-CM: 705.83  5/8/2018 - Present        Polycystic kidney disease, autosomal dominant ICD-10-CM: Q61.2  ICD-9-CM: 753.13 Present on Admission 7/19/2013 - Present    Overview Signed 7/19/2013 10:08 AM by Tom Giordano MD     CT ABD 07/18/13 IMPRESSION:  2 large left ovarian cysts; sonographic followup is recommended to assure   resolution. Innumerable bilateral renal cysts; finding suggests autosomal   dominant polycystic kidney disease. Probable tiny hepatic cysts. Otherwise,   no acute process.              Cannabis abuse ICD-10-CM: F12.10  ICD-9-CM: 305.20 Chronic 7/18/2013 - Present    Overview Signed 7/18/2013  3:23 PM by Tom Giordano MD     UDS +VE 07/17/13             RESOLVED: Vomiting ICD-10-CM: R11.10  ICD-9-CM: 787.03  8/27/2021 - 2/15/2022        RESOLVED: Pancreatitis ICD-10-CM: K85.90  ICD-9-CM: 577.0  11/11/2020 - 2/15/2022        RESOLVED: Nausea & vomiting ICD-10-CM: R11.2  ICD-9-CM: 787.01  11/10/2020 - 2/15/2022        RESOLVED: Intractable nausea and vomiting ICD-10-CM: R11.2  ICD-9-CM: 536.2  8/31/2020 - 2/15/2022        RESOLVED: Acute pancreatitis ICD-10-CM: K85.90  ICD-9-CM: 967.5  8/28/2019 - 2/15/2022        RESOLVED: Accelerated hypertension ICD-10-CM: I10  ICD-9-CM: 401.0  11/22/2013 - 5/8/2018        RESOLVED: Hypertensive emergency ICD-10-CM: I16.1  ICD-9-CM: 401.9  11/22/2013 - 5/8/2018        RESOLVED: Nausea & vomiting ICD-10-CM: R11.2  ICD-9-CM: 787.01  7/18/2013 - 5/8/2018        RESOLVED: Generalized hyperhidrosis ICD-10-CM: R61  ICD-9-CM: 780.8 4/13/2012 - 5/8/2018        RESOLVED: Abdominal pain, periumbilic VWA-17-MB: D28.68  ICD-9-CM: 789.05  4/13/2012 - 5/8/2018           Discharge Medications:       Stable  Discharge Medication List as of 3/24/2023  2:11 PM        START taking these medications    Details   ondansetron (ZOFRAN ODT) 4 mg disintegrating tablet Take 1 Tablet by mouth every eight (8) hours as needed for Nausea or Vomiting., Normal, Disp-3 Tablet, R-0      spironolactone (ALDACTONE) 25 mg tablet Take 1 Tablet by mouth daily for 30 days. , Normal, Disp-30 Tablet, R-0           CONTINUE these medications which have CHANGED    Details   metoprolol tartrate 75 mg tab Take 75 mg by mouth two (2) times a day for 30 days. , Normal, Disp-60 Tablet, R-0           CONTINUE these medications which have NOT CHANGED    Details   esomeprazole magnesium (NexIUM 24HR) 20 mg TbEC Take 20 mg by mouth daily. , Historical Med      hydrocortisone (CORTAID) 1 % topical cream Apply  to affected area two (2) times daily as needed (Eczema). , Historical Med      estradioL (VIVELLE) 0.05 mg/24 hr 1 Patch by TransDERmal route two (2) times a week on Wednesday and Saturday., Historical Med      cetirizine (ZYRTEC) 10 mg tablet Take 10 mg by mouth daily. , Historical Med               Follow up Care:    1. Jaida Blanca MD in 1-2 weeks. Please call to set up an appointment shortly after discharge. 2. Repeat BMP next week to follow up electrolytes level. 3. Check blood pressure and discuss with your PCP  4. Aldosterone level Urine and blood requested and pending     Diet:  Cardiac Diet    Disposition:  Home. Advanced Directive:   FULL X   DNR      Discharge Exam:  See today's note. CONSULTATIONS: None    Significant Diagnostic Studies:   3/23/2023: BUN 11 MG/DL (Ref range: 6 - 20 MG/DL); Calcium 9.8 MG/DL (Ref range: 8.5 - 10.1 MG/DL); CO2 22 mmol/L (Ref range: 21 - 32 mmol/L); Creatinine 0.96 MG/DL (Ref range: 0.55 - 1.02 MG/DL);  Glucose 129 mg/dL (H; Ref range: 65 - 100 mg/dL); HCT 44.7 % (Ref range: 35.0 - 47.0 %); HGB 15.4 g/dL (Ref range: 11.5 - 16.0 g/dL); Potassium 4.2 mmol/L (Ref range: 3.5 - 5.1 mmol/L); Sodium 136 mmol/L (Ref range: 136 - 145 mmol/L)  3/24/2023: BUN 12 MG/DL (Ref range: 6 - 20 MG/DL); Calcium 8.2 MG/DL (L; Ref range: 8.5 - 10.1 MG/DL); CO2 22 mmol/L (Ref range: 21 - 32 mmol/L); Creatinine 1.31 MG/DL (H; Ref range: 0.55 - 1.02 MG/DL); Glucose 117 mg/dL (H; Ref range: 65 - 100 mg/dL); HCT 37.0 % (Ref range: 35.0 - 47.0 %); HGB 12.7 g/dL (Ref range: 11.5 - 16.0 g/dL); Potassium 3.2 mmol/L (L; Ref range: 3.5 - 5.1 mmol/L); Sodium 136 mmol/L (Ref range: 136 - 145 mmol/L)  Recent Labs     03/24/23  0026 03/23/23  1355   WBC 7.6 10.4   HGB 12.7 15.4   HCT 37.0 44.7    347     Recent Labs     03/24/23  0026 03/23/23  1355    136   K 3.2* 4.2   CL 99 98   CO2 22 22   BUN 12 11   CREA 1.31* 0.96   * 129*   CA 8.2* 9.8   MG 1.5*  --      Recent Labs     03/24/23  0026 03/23/23  1355   AP 92 114   TP 8.1 10.2*   ALB 3.4* 4.3   GLOB 4.7* 5.9*   LPSE  --  68*     Recent Labs     03/24/23  0026   INR 1.0   PTP 10.3      No results for input(s): FE, TIBC, PSAT, FERR in the last 72 hours. No results for input(s): PH, PCO2, PO2 in the last 72 hours. No results for input(s): CPK, CKMB in the last 72 hours. No lab exists for component: TROPONINI  No components found for: [place]      Total time to discharge patient was 31 minutes more than 50% of time was spent for counseling and coordination of care.     Signed:  Wing Adriana MD  3/24/2023  11:28 AM

## 2023-03-27 ENCOUNTER — VIRTUAL VISIT (OUTPATIENT)
Dept: INTERNAL MEDICINE CLINIC | Age: 48
End: 2023-03-27
Payer: COMMERCIAL

## 2023-03-27 DIAGNOSIS — I10 ESSENTIAL HYPERTENSION: Primary | ICD-10-CM

## 2023-03-27 DIAGNOSIS — F12.10 CANNABIS ABUSE: ICD-10-CM

## 2023-03-27 DIAGNOSIS — R11.2 INTRACTABLE NAUSEA AND VOMITING: ICD-10-CM

## 2023-03-27 DIAGNOSIS — Z72.0 TOBACCO ABUSE: ICD-10-CM

## 2023-03-27 LAB
ATRIAL RATE: 96 BPM
CALCULATED P AXIS, ECG09: 53 DEGREES
CALCULATED R AXIS, ECG10: 69 DEGREES
CALCULATED T AXIS, ECG11: 43 DEGREES
DIAGNOSIS, 93000: NORMAL
P-R INTERVAL, ECG05: 124 MS
Q-T INTERVAL, ECG07: 372 MS
QRS DURATION, ECG06: 78 MS
QTC CALCULATION (BEZET), ECG08: 469 MS
VENTRICULAR RATE, ECG03: 96 BPM

## 2023-03-27 PROCEDURE — 99213 OFFICE O/P EST LOW 20 MIN: CPT | Performed by: INTERNAL MEDICINE

## 2023-03-27 NOTE — PROGRESS NOTES
Rachel Crow is a 51 y.o. female who was seen by synchronous (real-time) audio-video technology on 3/27/2023 for ED Follow-up (For HTN emergency )        Assessment & Plan:       ICD-10-CM ICD-9-CM    1. Essential hypertension  I10 401.9       2. Intractable nausea and vomiting  R11.2 536.2       3. Cannabis abuse  F12.10 305.20       4. Tobacco abuse  Z72.0 305.1         -f/up in office for bp and lab check  Subjective:     Pt was admitted for vomiting/hypertension. She was found to be hypokalemic w an elevated creatinine. Her meds were adjusted and krysta level checked (pending). PMH:  Cyclical vomiting-pt has been evaluated in  ED multiple times due to n/v/hypokalemia. The etiology of her hyperemesis is most likely due to cannabis use. She is s/p EGD which demonstrated gastritis 11/2018    Pulse Readings from Last 3 Encounters:   02/15/22 (!) 109   08/27/21 76   11/13/20 72       Pt denies illicit drug use    Hypertension Review:  The patient has essential hypertension  Diet and Lifestyle: generally follows a  low sodium diet, exercises sporadically  Home BP Monitoring: is not measured at home. Pertinent ROS: taking medications as instructed, no medication side effects noted, no TIA's, no chest pain on exertion, no dyspnea on exertion, no swelling of ankles. BP Readings from Last 3 Encounters:   02/15/22 (!) 174/115   08/27/21 (!) 155/96   11/13/20 (!) 134/98     PCKD-pt has not established w renal as yet      Prior to Admission medications    Medication Sig Start Date End Date Taking? Authorizing Provider   ondansetron (ZOFRAN ODT) 4 mg disintegrating tablet Take 1 Tablet by mouth every eight (8) hours as needed for Nausea or Vomiting. 3/24/23  Yes Daphney Corado MD   spironolactone (ALDACTONE) 25 mg tablet Take 1 Tablet by mouth daily for 30 days. 3/25/23 4/24/23 Yes Daphney Corado MD   metoprolol tartrate 75 mg tab Take 75 mg by mouth two (2) times a day for 30 days.  3/24/23 4/23/23 Yes Laura Augustin MD   esomeprazole magnesium (NexIUM 24HR) 20 mg TbEC Take 20 mg by mouth daily. Yes Provider, Historical   hydrocortisone (CORTAID) 1 % topical cream Apply  to affected area two (2) times daily as needed (Eczema). Yes Provider, Historical   estradioL (VIVELLE) 0.05 mg/24 hr 1 Patch by TransDERmal route two (2) times a week on Wednesday and Saturday. Yes Provider, Historical   cetirizine (ZYRTEC) 10 mg tablet Take 10 mg by mouth daily. Yes Provider, Historical     Review of systems (12) negative, except noted above. Objective:   No flowsheet data found. General: alert, cooperative, no distress   Mental  status: normal mood, behavior, speech, dress, motor activity, and thought processes, able to follow commands   HENT: NCAT   Neck: no visualized mass   Resp: no respiratory distress   Neuro: no gross deficits   Skin: no discoloration or lesions of concern on visible areas   Psychiatric: normal affect, consistent with stated mood, no evidence of hallucinations     Additional exam findings: We discussed the expected course, resolution and complications of the diagnosis(es) in detail. Medication risks, benefits, costs, interactions, and alternatives were discussed as indicated. I advised her to contact the office if her condition worsens, changes or fails to improve as anticipated. She expressed understanding with the diagnosis(es) and plan. Adelfo Julian, was evaluated through a synchronous (real-time) audio-video encounter. The patient (or guardian if applicable) is aware that this is a billable service, which includes applicable co-pays. This Virtual Visit was conducted with patient's (and/or legal guardian's) consent. The visit was conducted pursuant to the emergency declaration under the [facility], [address] [place] [address] authority and the [employer] and [employer] General Act.   Patient identification was verified, and a caregiver was present when appropriate.   The patient was located at: Home: [facility] [id number]  The provider was located at: Home: Peter Herrera MD detailed exam

## 2023-03-27 NOTE — PROGRESS NOTES
Pt is here for   Chief Complaint   Patient presents with    ED Follow-up     For HTN emergency      1. \"Have you been to the ER, urgent care clinic since your last visit? Hospitalized since your last visit? \" Yes When: 3/23/2023 [facility] for HTN Emergency     2. \"Have you seen or consulted any other health care providers outside of the [address] [place] [address] since your last visit? \" No     3. For patients aged 39-70: Has the patient had a colonoscopy / FIT/ Cologuard? NA - based on age      If the patient is female:    4. For patients aged 41-77: Has the patient had a mammogram within the past 2 years? No      5. For patients aged 21-65: Has the patient had a pap smear?  No

## 2023-03-30 NOTE — PROGRESS NOTES
Physician Progress Note      Diana Lemus  CSN #:                  [id number]  :                       1975  ADMIT DATE:       3/23/2023 1:29 PM  [address] [place] DATE:        3/24/2023 3:55 PM  RESPONDING  PROVIDER #:        Jose Cunha MD          QUERY TEXT:    Dear attending,    Patient presents to ED with complaint of intractable abdominal pain associated with intractable nausea vomiting inability to tolerate p.o for 3 days gradually worsening in severity. Per the discharge summary-Acute gastroenteritis, resolved, Possible cycling vomiting, resolved    If possible, please document the etiology for the patients symptoms in the medical record. The medical record reflects the following:  Risk Factors: hx. 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 Per patient she use THC approximately 1 week ago, doubt it is because her intractable nausea vomiting. Per dc summary- Intractable nausea and vomiting (3/23/2023), resolved  Diarrhea, resolved  Acute gastroenteritis, resolved  Possible cycling vomiting, resolved    Treatment: Monitor labwork, imaging, IV @100 cc/hour, NS IV bolus 1 liter x 1 on 3/23      Thank you,    Dottie Spencer RN, BSN, [place], [facility]  Clinical Documentation Improvement  [phone] or via Perfect Serve  Options provided:  -- N/V/diarrhea due to gastroenteritis, please specify type. -- N/V/diarrhea due to cyclical vomiting  -- N/V/diarrhea due to other etiology, Please document other etiology.   -- Other - I will add my own diagnosis  -- Disagree - Not applicable / Not valid  -- Disagree - Clinically unable to determine / Unknown  -- Refer to Clinical Documentation Reviewer    PROVIDER RESPONSE TEXT:    Acute nausea, vomiting and diarrhea, possible multifactorial, most likely related to acute gastroenteritis possible viral and possible related to cycling vomiting related to [facility] use.     Query created by: Clyde Dwyer on 3/28/2023 11:05 AM      Electronically signed by:  Severa Mainland MD 3/30/2023 3:32 PM

## 2023-04-03 LAB — ALDOST SERPL-MCNC: 1.6 NG/DL (ref 0–30)

## 2023-05-15 LAB
Lab: NORMAL
REFERENCE LAB,REFLB: NORMAL
TEST DESCRIPTION:,ATST: NORMAL

## 2023-05-22 RX ORDER — ONDANSETRON 4 MG/1
4 TABLET, ORALLY DISINTEGRATING ORAL EVERY 8 HOURS PRN
COMMUNITY
Start: 2023-03-24

## 2023-05-22 RX ORDER — CETIRIZINE HYDROCHLORIDE 10 MG/1
10 TABLET ORAL DAILY
COMMUNITY

## 2023-05-22 RX ORDER — ESOMEPRAZOLE MAGNESIUM 20 MG/1
20 TABLET, DELAYED RELEASE ORAL DAILY
COMMUNITY

## 2023-05-22 RX ORDER — DIAPER,BRIEF,INFANT-TODD,DISP
EACH MISCELLANEOUS 2 TIMES DAILY PRN
COMMUNITY

## 2023-05-22 RX ORDER — ESTRADIOL 0.05 MG/D
1 PATCH, EXTENDED RELEASE TRANSDERMAL
COMMUNITY

## 2023-07-24 ENCOUNTER — HOSPITAL ENCOUNTER (EMERGENCY)
Facility: HOSPITAL | Age: 48
Discharge: HOME | End: 2023-07-24
Attending: EMERGENCY MEDICINE
Payer: COMMERCIAL

## 2023-07-24 ENCOUNTER — APPOINTMENT (OUTPATIENT)
Facility: HOSPITAL | Age: 48
End: 2023-07-24
Payer: COMMERCIAL

## 2023-07-24 VITALS
DIASTOLIC BLOOD PRESSURE: 99 MMHG | RESPIRATION RATE: 18 BRPM | WEIGHT: 140 LBS | SYSTOLIC BLOOD PRESSURE: 146 MMHG | HEIGHT: 61 IN | HEART RATE: 71 BPM | OXYGEN SATURATION: 99 % | TEMPERATURE: 97.5 F | BODY MASS INDEX: 26.43 KG/M2

## 2023-07-24 DIAGNOSIS — K57.30 SIGMOID DIVERTICULOSIS: ICD-10-CM

## 2023-07-24 DIAGNOSIS — N28.1 BILATERAL RENAL CYSTS: ICD-10-CM

## 2023-07-24 DIAGNOSIS — F12.90 CANNABIS USE DISORDER: ICD-10-CM

## 2023-07-24 DIAGNOSIS — I16.0 HYPERTENSIVE URGENCY: ICD-10-CM

## 2023-07-24 DIAGNOSIS — K38.9 APPENDICOLITH: ICD-10-CM

## 2023-07-24 DIAGNOSIS — E87.6 ACUTE HYPOKALEMIA: ICD-10-CM

## 2023-07-24 DIAGNOSIS — E86.0 ACUTE DEHYDRATION: Primary | ICD-10-CM

## 2023-07-24 LAB
ALBUMIN SERPL-MCNC: 4.4 G/DL (ref 3.5–5)
ALBUMIN/GLOB SERPL: 0.8 (ref 1.1–2.2)
ALP SERPL-CCNC: 116 U/L (ref 45–117)
ALT SERPL-CCNC: 19 U/L (ref 12–78)
ANION GAP SERPL CALC-SCNC: 21 MMOL/L (ref 5–15)
AST SERPL-CCNC: 28 U/L (ref 15–37)
BASOPHILS # BLD: 0.1 K/UL (ref 0–0.1)
BASOPHILS NFR BLD: 1 % (ref 0–1)
BILIRUB SERPL-MCNC: 0.5 MG/DL (ref 0.2–1)
BUN SERPL-MCNC: 16 MG/DL (ref 6–20)
BUN/CREAT SERPL: 15 (ref 12–20)
CALCIUM SERPL-MCNC: 10.1 MG/DL (ref 8.5–10.1)
CHLORIDE SERPL-SCNC: 104 MMOL/L (ref 97–108)
CO2 SERPL-SCNC: 16 MMOL/L (ref 21–32)
CREAT SERPL-MCNC: 1.07 MG/DL (ref 0.55–1.02)
DIFFERENTIAL METHOD BLD: ABNORMAL
EOSINOPHIL # BLD: 0 K/UL (ref 0–0.4)
EOSINOPHIL NFR BLD: 0 % (ref 0–7)
ERYTHROCYTE [DISTWIDTH] IN BLOOD BY AUTOMATED COUNT: 13.9 % (ref 11.5–14.5)
ETHANOL SERPL-MCNC: <10 MG/DL (ref 0–0.08)
GLOBULIN SER CALC-MCNC: 5.6 G/DL (ref 2–4)
GLUCOSE SERPL-MCNC: 171 MG/DL (ref 65–100)
HCT VFR BLD AUTO: 42.7 % (ref 35–47)
HGB BLD-MCNC: 14.8 G/DL (ref 11.5–16)
IMM GRANULOCYTES # BLD AUTO: 0.1 K/UL (ref 0–0.04)
IMM GRANULOCYTES NFR BLD AUTO: 1 % (ref 0–0.5)
LIPASE SERPL-CCNC: 98 U/L (ref 73–393)
LYMPHOCYTES # BLD: 2.4 K/UL (ref 0.8–3.5)
LYMPHOCYTES NFR BLD: 20 % (ref 12–49)
MAGNESIUM SERPL-MCNC: 2 MG/DL (ref 1.6–2.4)
MCH RBC QN AUTO: 32 PG (ref 26–34)
MCHC RBC AUTO-ENTMCNC: 34.7 G/DL (ref 30–36.5)
MCV RBC AUTO: 92.4 FL (ref 80–99)
MONOCYTES # BLD: 0.5 K/UL (ref 0–1)
MONOCYTES NFR BLD: 4 % (ref 5–13)
NEUTS SEG # BLD: 9.1 K/UL (ref 1.8–8)
NEUTS SEG NFR BLD: 74 % (ref 32–75)
NRBC # BLD: 0 K/UL (ref 0–0.01)
NRBC BLD-RTO: 0 PER 100 WBC
PLATELET # BLD AUTO: 330 K/UL (ref 150–400)
PMV BLD AUTO: 9.3 FL (ref 8.9–12.9)
POTASSIUM SERPL-SCNC: 3.2 MMOL/L (ref 3.5–5.1)
PROT SERPL-MCNC: 10 G/DL (ref 6.4–8.2)
RBC # BLD AUTO: 4.62 M/UL (ref 3.8–5.2)
SODIUM SERPL-SCNC: 141 MMOL/L (ref 136–145)
WBC # BLD AUTO: 12.2 K/UL (ref 3.6–11)

## 2023-07-24 PROCEDURE — 96361 HYDRATE IV INFUSION ADD-ON: CPT

## 2023-07-24 PROCEDURE — 99285 EMERGENCY DEPT VISIT HI MDM: CPT

## 2023-07-24 PROCEDURE — 2580000003 HC RX 258: Performed by: EMERGENCY MEDICINE

## 2023-07-24 PROCEDURE — 83690 ASSAY OF LIPASE: CPT

## 2023-07-24 PROCEDURE — 6360000002 HC RX W HCPCS: Performed by: EMERGENCY MEDICINE

## 2023-07-24 PROCEDURE — 96375 TX/PRO/DX INJ NEW DRUG ADDON: CPT

## 2023-07-24 PROCEDURE — 6370000000 HC RX 637 (ALT 250 FOR IP): Performed by: EMERGENCY MEDICINE

## 2023-07-24 PROCEDURE — 83735 ASSAY OF MAGNESIUM: CPT

## 2023-07-24 PROCEDURE — 85025 COMPLETE CBC W/AUTO DIFF WBC: CPT

## 2023-07-24 PROCEDURE — 80053 COMPREHEN METABOLIC PANEL: CPT

## 2023-07-24 PROCEDURE — 82077 ASSAY SPEC XCP UR&BREATH IA: CPT

## 2023-07-24 PROCEDURE — 74176 CT ABD & PELVIS W/O CONTRAST: CPT

## 2023-07-24 PROCEDURE — 96374 THER/PROPH/DIAG INJ IV PUSH: CPT

## 2023-07-24 RX ORDER — DICYCLOMINE HCL 20 MG
20 TABLET ORAL 4 TIMES DAILY PRN
Qty: 20 TABLET | Refills: 0 | Status: SHIPPED | OUTPATIENT
Start: 2023-07-24

## 2023-07-24 RX ORDER — POTASSIUM CHLORIDE 750 MG/1
40 TABLET, FILM COATED, EXTENDED RELEASE ORAL ONCE
Status: COMPLETED | OUTPATIENT
Start: 2023-07-24 | End: 2023-07-24

## 2023-07-24 RX ORDER — DROPERIDOL 2.5 MG/ML
1.25 INJECTION, SOLUTION INTRAMUSCULAR; INTRAVENOUS ONCE
Status: COMPLETED | OUTPATIENT
Start: 2023-07-24 | End: 2023-07-24

## 2023-07-24 RX ORDER — METOPROLOL TARTRATE 50 MG/1
50 TABLET, FILM COATED ORAL
Status: COMPLETED | OUTPATIENT
Start: 2023-07-24 | End: 2023-07-24

## 2023-07-24 RX ORDER — 0.9 % SODIUM CHLORIDE 0.9 %
1000 INTRAVENOUS SOLUTION INTRAVENOUS ONCE
Status: COMPLETED | OUTPATIENT
Start: 2023-07-24 | End: 2023-07-24

## 2023-07-24 RX ORDER — ONDANSETRON 4 MG/1
4 TABLET, FILM COATED ORAL 3 TIMES DAILY PRN
Qty: 20 TABLET | Refills: 0 | Status: SHIPPED | OUTPATIENT
Start: 2023-07-24

## 2023-07-24 RX ORDER — PROMETHAZINE HYDROCHLORIDE 25 MG/1
25 SUPPOSITORY RECTAL EVERY 6 HOURS PRN
Qty: 12 SUPPOSITORY | Refills: 0 | Status: SHIPPED | OUTPATIENT
Start: 2023-07-24 | End: 2023-07-31

## 2023-07-24 RX ORDER — LABETALOL HYDROCHLORIDE 5 MG/ML
10 INJECTION, SOLUTION INTRAVENOUS
Status: COMPLETED | OUTPATIENT
Start: 2023-07-24 | End: 2023-07-24

## 2023-07-24 RX ORDER — MORPHINE SULFATE 2 MG/ML
2 INJECTION, SOLUTION INTRAMUSCULAR; INTRAVENOUS
Status: COMPLETED | OUTPATIENT
Start: 2023-07-24 | End: 2023-07-24

## 2023-07-24 RX ORDER — KETOROLAC TROMETHAMINE 30 MG/ML
30 INJECTION, SOLUTION INTRAMUSCULAR; INTRAVENOUS
Status: COMPLETED | OUTPATIENT
Start: 2023-07-24 | End: 2023-07-24

## 2023-07-24 RX ADMIN — METOPROLOL TARTRATE 50 MG: 50 TABLET, FILM COATED ORAL at 04:21

## 2023-07-24 RX ADMIN — MORPHINE SULFATE 2 MG: 2 INJECTION, SOLUTION INTRAMUSCULAR; INTRAVENOUS at 04:20

## 2023-07-24 RX ADMIN — KETOROLAC TROMETHAMINE 30 MG: 30 INJECTION, SOLUTION INTRAMUSCULAR; INTRAVENOUS at 04:05

## 2023-07-24 RX ADMIN — POTASSIUM CHLORIDE 40 MEQ: 750 TABLET, EXTENDED RELEASE ORAL at 05:45

## 2023-07-24 RX ADMIN — LABETALOL HYDROCHLORIDE 10 MG: 5 INJECTION, SOLUTION INTRAVENOUS at 04:20

## 2023-07-24 RX ADMIN — SODIUM CHLORIDE 1000 ML: 9 INJECTION, SOLUTION INTRAVENOUS at 04:05

## 2023-07-24 RX ADMIN — DROPERIDOL 1.25 MG: 2.5 INJECTION, SOLUTION INTRAMUSCULAR; INTRAVENOUS at 04:03

## 2023-07-24 ASSESSMENT — PAIN DESCRIPTION - ORIENTATION: ORIENTATION: ANTERIOR

## 2023-07-24 ASSESSMENT — ENCOUNTER SYMPTOMS
SHORTNESS OF BREATH: 0
VOMITING: 1
EYE PAIN: 0
RHINORRHEA: 0
ABDOMINAL PAIN: 1
DIARRHEA: 1
COUGH: 0
NAUSEA: 1
SORE THROAT: 0

## 2023-07-24 ASSESSMENT — PAIN DESCRIPTION - FREQUENCY: FREQUENCY: CONTINUOUS

## 2023-07-24 ASSESSMENT — PAIN SCALES - GENERAL
PAINLEVEL_OUTOF10: 10

## 2023-07-24 ASSESSMENT — PAIN - FUNCTIONAL ASSESSMENT: PAIN_FUNCTIONAL_ASSESSMENT: 0-10

## 2023-07-24 ASSESSMENT — PAIN DESCRIPTION - DESCRIPTORS: DESCRIPTORS: BURNING

## 2023-07-24 ASSESSMENT — PAIN DESCRIPTION - LOCATION: LOCATION: ABDOMEN

## 2023-07-24 ASSESSMENT — PAIN DESCRIPTION - PAIN TYPE: TYPE: ACUTE PAIN

## 2023-07-24 NOTE — ED PROVIDER NOTES
30.0 - 36.5 g/dL    RDW 13.9 11.5 - 14.5 %    Platelets 165 941 - 170 K/uL    MPV 9.3 8.9 - 12.9 FL    Nucleated RBCs 0.0 0  WBC    nRBC 0.00 0.00 - 0.01 K/uL    Neutrophils % 74 32 - 75 %    Lymphocytes % 20 12 - 49 %    Monocytes % 4 (L) 5 - 13 %    Eosinophils % 0 0 - 7 %    Basophils % 1 0 - 1 %    Immature Granulocytes 1 (H) 0.0 - 0.5 %    Neutrophils Absolute 9.1 (H) 1.8 - 8.0 K/UL    Lymphocytes Absolute 2.4 0.8 - 3.5 K/UL    Monocytes Absolute 0.5 0.0 - 1.0 K/UL    Eosinophils Absolute 0.0 0.0 - 0.4 K/UL    Basophils Absolute 0.1 0.0 - 0.1 K/UL    Absolute Immature Granulocyte 0.1 (H) 0.00 - 0.04 K/UL    Differential Type AUTOMATED     Comprehensive Metabolic Panel    Collection Time: 07/24/23  3:55 AM   Result Value Ref Range    Sodium 141 136 - 145 mmol/L    Potassium 3.2 (L) 3.5 - 5.1 mmol/L    Chloride 104 97 - 108 mmol/L    CO2 16 (L) 21 - 32 mmol/L    Anion Gap 21 (H) 5 - 15 mmol/L    Glucose 171 (H) 65 - 100 mg/dL    BUN 16 6 - 20 MG/DL    Creatinine 1.07 (H) 0.55 - 1.02 MG/DL    Bun/Cre Ratio 15 12 - 20      Est, Glom Filt Rate >60 >60 ml/min/1.73m2    Calcium 10.1 8.5 - 10.1 MG/DL    Total Bilirubin 0.5 0.2 - 1.0 MG/DL    ALT 19 12 - 78 U/L    AST 28 15 - 37 U/L    Alk Phosphatase 116 45 - 117 U/L    Total Protein 10.0 (H) 6.4 - 8.2 g/dL    Albumin 4.4 3.5 - 5.0 g/dL    Globulin 5.6 (H) 2.0 - 4.0 g/dL    Albumin/Globulin Ratio 0.8 (L) 1.1 - 2.2     Magnesium    Collection Time: 07/24/23  3:55 AM   Result Value Ref Range    Magnesium 2.0 1.6 - 2.4 mg/dL   Lipase    Collection Time: 07/24/23  3:55 AM   Result Value Ref Range    Lipase 98 73 - 393 U/L   Ethanol    Collection Time: 07/24/23  3:55 AM   Result Value Ref Range    Ethanol Lvl <10 <10 MG/DL       RADIOLOGY RESULTS:  I have personally reviewed and interpreted all available imaging studies and agree with radiology interpretation.   CT ABDOMEN PELVIS WO CONTRAST Additional Contrast? None   Final Result   Appendicolith without evidence for

## 2023-07-24 NOTE — ED NOTES
Pt d/c with instructions and ambulatory. Pt vss and documented. No redness or swelling at iv site. Pt instructed to f/u with pcp and gi. Rx x3 sent to pharm and pt has no additional questions at this time.       Mirna Morillo RN  07/24/23 0516

## 2023-07-24 NOTE — ED TRIAGE NOTES
Vomiting, diarrhea and abdominal pain x 3 days. Last emesis PTA. Unable to tolerate food or drink. Notes she has not been able to take BP meds in 3 days. /136 on arrival. Tachycardic in 110's.

## 2023-07-24 NOTE — DISCHARGE INSTRUCTIONS
you by the pharmacy. If you have any questions or reservations about taking the medication due to side effects or interactions with other medications, please call your primary care physician or contact the ER to speak with the charge nurse. Please make an appointment with your family doctor or the physician you were referred to for follow-up of this visit as instructed on your discharge paperwork. Return to the ER if you are unable to be seen or if you are unable to be seen in a timely manner. If you have any problem arranging the follow-up visit, contact the Emergency Department immediately.

## 2024-01-24 RX ORDER — METOPROLOL TARTRATE 75 MG/1
1 TABLET, FILM COATED ORAL 2 TIMES DAILY
Qty: 180 TABLET | Refills: 0 | Status: SHIPPED | OUTPATIENT
Start: 2024-01-24

## 2024-04-25 RX ORDER — METOPROLOL TARTRATE 75 MG/1
1 TABLET, FILM COATED ORAL 2 TIMES DAILY
Qty: 60 TABLET | Refills: 0 | OUTPATIENT
Start: 2024-04-25

## 2024-04-25 NOTE — TELEPHONE ENCOUNTER
Please contact patient for scheduling. Thanks  Writer sent pt a message via [employer].   Letter was sent out on 2/8/24.     Last appointment: 03/27/2023 Virtual visit MD Slaughter   Next appointment: Nothing scheduled   Previous refill encounter(s):   01/24/2024 Metoprolol #180     For Pharmacy Admin Tracking Only    Program: Medication Refill  Intervention Detail: New Rx: 1, reason: Patient Preference  Time Spent (min): 5    Requested Prescriptions     Pending Prescriptions Disp Refills    Metoprolol Tartrate 75 MG TABS 60 tablet 0     Sig: Take 1 tablet by mouth 2 times daily

## 2024-10-07 ENCOUNTER — OFFICE VISIT (OUTPATIENT)
Facility: CLINIC | Age: 49
End: 2024-10-07
Payer: COMMERCIAL

## 2024-10-07 ENCOUNTER — HOSPITAL ENCOUNTER (OUTPATIENT)
Facility: HOSPITAL | Age: 49
Discharge: HOME | End: 2024-10-10
Payer: COMMERCIAL

## 2024-10-07 ENCOUNTER — TELEPHONE (OUTPATIENT)
Facility: CLINIC | Age: 49
End: 2024-10-07

## 2024-10-07 VITALS
BODY MASS INDEX: 24.92 KG/M2 | WEIGHT: 132 LBS | OXYGEN SATURATION: 97 % | HEIGHT: 61 IN | DIASTOLIC BLOOD PRESSURE: 128 MMHG | TEMPERATURE: 99.9 F | HEART RATE: 100 BPM | RESPIRATION RATE: 18 BRPM | SYSTOLIC BLOOD PRESSURE: 186 MMHG

## 2024-10-07 DIAGNOSIS — R06.02 SHORTNESS OF BREATH: ICD-10-CM

## 2024-10-07 DIAGNOSIS — Z90.711 S/P PARTIAL HYSTERECTOMY: ICD-10-CM

## 2024-10-07 DIAGNOSIS — I10 ACCELERATED HYPERTENSION: ICD-10-CM

## 2024-10-07 DIAGNOSIS — J01.40 ACUTE PANSINUSITIS, RECURRENCE NOT SPECIFIED: Primary | ICD-10-CM

## 2024-10-07 DIAGNOSIS — J01.40 ACUTE PANSINUSITIS, RECURRENCE NOT SPECIFIED: ICD-10-CM

## 2024-10-07 DIAGNOSIS — R23.2 HOT FLASHES: ICD-10-CM

## 2024-10-07 LAB
GROUP A STREP ANTIGEN, POC: NEGATIVE
INFLUENZA A ANTIGEN, POC: NEGATIVE
INFLUENZA B ANTIGEN, POC: NEGATIVE
Lab: NORMAL
QC PASS/FAIL: NORMAL
SARS-COV-2, POC: NORMAL
VALID INTERNAL CONTROL, POC: YES
VALID INTERNAL CONTROL, POC: YES

## 2024-10-07 PROCEDURE — 3080F DIAST BP >= 90 MM HG: CPT | Performed by: NURSE PRACTITIONER

## 2024-10-07 PROCEDURE — 99204 OFFICE O/P NEW MOD 45 MIN: CPT | Performed by: NURSE PRACTITIONER

## 2024-10-07 PROCEDURE — 87502 INFLUENZA DNA AMP PROBE: CPT | Performed by: NURSE PRACTITIONER

## 2024-10-07 PROCEDURE — 3077F SYST BP >= 140 MM HG: CPT | Performed by: NURSE PRACTITIONER

## 2024-10-07 PROCEDURE — 87880 STREP A ASSAY W/OPTIC: CPT | Performed by: NURSE PRACTITIONER

## 2024-10-07 PROCEDURE — 71046 X-RAY EXAM CHEST 2 VIEWS: CPT

## 2024-10-07 PROCEDURE — 87635 SARS-COV-2 COVID-19 AMP PRB: CPT | Performed by: NURSE PRACTITIONER

## 2024-10-07 RX ORDER — FEXOFENADINE HCL 180 MG/1
180 TABLET ORAL DAILY
Qty: 30 TABLET | Refills: 0 | Status: SHIPPED | OUTPATIENT
Start: 2024-10-07

## 2024-10-07 RX ORDER — ALBUTEROL SULFATE 90 UG/1
2 INHALANT RESPIRATORY (INHALATION) EVERY 4 HOURS PRN
Qty: 18 G | Refills: 1 | Status: SHIPPED | OUTPATIENT
Start: 2024-10-07

## 2024-10-07 RX ORDER — ESTRADIOL 0.05 MG/D
1 PATCH, EXTENDED RELEASE TRANSDERMAL
Qty: 8 PATCH | Refills: 0 | Status: SHIPPED | OUTPATIENT
Start: 2024-10-07

## 2024-10-07 RX ORDER — METOPROLOL TARTRATE 75 MG/1
1 TABLET, FILM COATED ORAL 2 TIMES DAILY
Qty: 60 TABLET | Refills: 0 | Status: SHIPPED | OUTPATIENT
Start: 2024-10-07

## 2024-10-07 RX ORDER — METOPROLOL TARTRATE 75 MG/1
1 TABLET, FILM COATED ORAL 2 TIMES DAILY
Qty: 180 TABLET | OUTPATIENT
Start: 2024-10-07

## 2024-10-07 RX ORDER — NAPROXEN 500 MG/1
500 TABLET ORAL 2 TIMES DAILY WITH MEALS
Qty: 14 TABLET | Refills: 0 | Status: SHIPPED | OUTPATIENT
Start: 2024-10-07 | End: 2024-10-14

## 2024-10-07 RX ORDER — FLUTICASONE PROPIONATE 50 MCG
1 SPRAY, SUSPENSION (ML) NASAL NIGHTLY
Qty: 16 G | Refills: 0 | Status: SHIPPED | OUTPATIENT
Start: 2024-10-07

## 2024-10-07 SDOH — ECONOMIC STABILITY: FOOD INSECURITY: WITHIN THE PAST 12 MONTHS, YOU WORRIED THAT YOUR FOOD WOULD RUN OUT BEFORE YOU GOT MONEY TO BUY MORE.: NEVER TRUE

## 2024-10-07 SDOH — ECONOMIC STABILITY: FOOD INSECURITY: WITHIN THE PAST 12 MONTHS, THE FOOD YOU BOUGHT JUST DIDN'T LAST AND YOU DIDN'T HAVE MONEY TO GET MORE.: NEVER TRUE

## 2024-10-07 SDOH — ECONOMIC STABILITY: INCOME INSECURITY: HOW HARD IS IT FOR YOU TO PAY FOR THE VERY BASICS LIKE FOOD, HOUSING, MEDICAL CARE, AND HEATING?: NOT HARD AT ALL

## 2024-10-07 ASSESSMENT — PATIENT HEALTH QUESTIONNAIRE - PHQ9
SUM OF ALL RESPONSES TO PHQ9 QUESTIONS 1 & 2: 0
SUM OF ALL RESPONSES TO PHQ QUESTIONS 1-9: 0
SUM OF ALL RESPONSES TO PHQ QUESTIONS 1-9: 0
1. LITTLE INTEREST OR PLEASURE IN DOING THINGS: NOT AT ALL
SUM OF ALL RESPONSES TO PHQ QUESTIONS 1-9: 0
2. FEELING DOWN, DEPRESSED OR HOPELESS: NOT AT ALL
SUM OF ALL RESPONSES TO PHQ QUESTIONS 1-9: 0

## 2024-10-07 NOTE — PATIENT INSTRUCTIONS
May also try Delsym, Phenylephrine, or Chlor-Trimetron for symptoms while using nasal spray and Allegra (fexofenadine).    Use your nasal spray NIGHTLY before bed, even if you don't have symptoms. It will take at least 10 DAYS to provide relief. Don't stop using until the allergens or weather has settled or you usually have no symptoms. For example, most people have spring or summer allergies, therefore starting about 2-4 week before the season change is advised. Other people have winter or fall allergies and they need to start at a different time. Living here in [place], you may benefit from year around use, as this is not harming anything and franklin likely help with several of your symptoms to include, post-nasal drip, dry cough, congestion, sinus pressure, runny nose, and watery/itchy eyes.    Try using the nasal spray by holding the tip at the entry to your nostril, do not insert it deeply. Spray once in each nostril (no SNIFFING),THEN IMMEDIATELY pinch your nose with your index finger and thumb leaning forward while breathing out of your mouth for 15-30 seconds. You may repeat for a second spray each nostril if needed.    Also try FACIAL STEAMS for 10 minutes. You can do this by   boiling a pot of water, then take the pot OFF the stove, sitting it on a secure surface where you can safely HOVER your face over it about 10-12 in above. Using a towel over your head and pot, locks in the steam. Use of THYME season or citrus fruit in the boiling water helps too.

## 2024-10-07 NOTE — PROGRESS NOTES
Pt Is here for   Chief Complaint   Patient presents with    Dizziness     Pt states that she got dizzy yesterday,     Anorexia     Pt states hasn't eaten since Friday     Cough     Started on Wednesday, states that she's been coughing up foamy, yellowish mucus.     Fever     Pt states that she had a fever this morning of 100. With chills     \"Have you been to the ER, urgent care clinic since your last visit?  Hospitalized since your last visit?\"    NO    “Have you seen or consulted any other health care providers outside our system since your last visit?”    NO    Have you had a mammogram?”   NO    Date of last Mammogram: 10/11/2018      “Have you had a pap smear?”    NO    Date of last Cervical Cancer screen (HPV or PAP): 8/17/2017       “Have you had a colorectal cancer screening such as a colonoscopy/FIT/Cologuard?    NO    No colonoscopy on file  No cologuard on file  No FIT/FOBT on file   No flexible sigmoidoscopy on file           
Take 1 tablet by mouth daily    fluticasone (FLONASE ALLERGY RELIEF) 50 MCG/ACT nasal spray 1 spray by Each Nostril route nightly May repeat in the morning if needed for congestion, allergies, or runny nose.    dicyclomine (BENTYL) 20 MG tablet Take 1 tablet by mouth 4 times daily as needed (abdominal pian) (Patient not taking: Reported on 10/7/2024)    ondansetron (ZOFRAN) 4 MG tablet Take 1 tablet by mouth 3 times daily as needed for Nausea or Vomiting (Patient not taking: Reported on 10/7/2024)    cetirizine (ZYRTEC) 10 MG tablet Take 1 tablet by mouth daily (Patient not taking: Reported on 10/7/2024)    Esomeprazole Magnesium (NEXIUM 24HR) 20 MG TBEC Take 20 mg by mouth daily (Patient not taking: Reported on 10/7/2024)    hydrocortisone 1 % cream Apply topically 2 times daily as needed (Patient not taking: Reported on 10/7/2024)    ondansetron (ZOFRAN-ODT) 4 MG disintegrating tablet Take 1 tablet by mouth every 8 hours as needed (Patient not taking: Reported on 10/7/2024)     No current facility-administered medications for this visit.       Vitals:    10/07/24 1347   BP: (!) 186/128   Site: Left Upper Arm   Position: Sitting   Cuff Size: Large Adult   Pulse: 100   Resp: 18   Temp: 99.9 °F (37.7 °C)   TempSrc: Temporal   SpO2: 97%   Weight: 59.9 kg (132 lb)   Height: 1.549 m (5' 1\")       Wt Readings from Last 3 Encounters:   10/07/24 59.9 kg (132 lb)   07/24/23 63.5 kg (140 lb)   03/23/23 63.5 kg (140 lb)       Physical Exam:   General appearance - alert, well appearing, and in mild distress.  Mental status - A/O x 4, normal mood and affect.   Head/Eyes- AT/NC. VICENTA, EOMI, corneas normal, no foreign bodies.  Ears- TM injected bilaterally. No erythema or drainage.   Nose- Turbinates boggy and erythematous with mucoid drainage noted. Septum midline, no polyps. Pink mucosa. +Frontal and maxillary sinus tenderness.  Mouth/Throat - mucous membranes moist, pharynx mild erythema. + PND. No tonsillar exudates.

## 2024-10-07 NOTE — TELEPHONE ENCOUNTER
Duplicate request:   10/07/2024 Metoprolol Tartrate 75 mg #60 was sent to [facility] #[id number].     For Pharmacy Admin Tracking Only    Program: Medication Refill  Intervention Detail: Discontinued Rx: 1, reason: Duplicate Therapy  Time Spent (min): 5    Requested Prescriptions     Pending Prescriptions Disp Refills    Metoprolol Tartrate 75 MG TABS [Pharmacy Med Name: METOPROLOL TARTRATE 75MG TABLETS] 180 tablet      Sig: TAKE 1 TABLET BY MOUTH TWICE DAILY

## 2024-10-09 ENCOUNTER — TELEPHONE (OUTPATIENT)
Facility: CLINIC | Age: 49
End: 2024-10-09

## 2024-10-09 RX ORDER — FLUTICASONE PROPIONATE 50 MCG
SPRAY, SUSPENSION (ML) NASAL
Qty: 48 G | OUTPATIENT
Start: 2024-10-09

## 2024-10-09 NOTE — TELEPHONE ENCOUNTER
Patient is asking if her work note can be extended by a day, with the return date of 10/10/24. States she went home today due to coughing badly, wants to cover for missing today so she doesn't have to submit for [facility].     [phone] - seen by JACINTO Haque on 10/07/24.

## 2024-10-13 DIAGNOSIS — J01.40 ACUTE PANSINUSITIS, RECURRENCE NOT SPECIFIED: ICD-10-CM

## 2024-10-14 RX ORDER — NAPROXEN 500 MG/1
TABLET ORAL
Qty: 14 TABLET | Refills: 0 | OUTPATIENT
Start: 2024-10-14

## 2024-11-04 DIAGNOSIS — R23.2 HOT FLASHES: ICD-10-CM

## 2024-11-04 DIAGNOSIS — I10 ACCELERATED HYPERTENSION: ICD-10-CM

## 2024-11-04 NOTE — TELEPHONE ENCOUNTER
Last appointment: 10/07/2024 JACINTO Haque   Next appointment: 11/05/2024 JACINTO Haque   Previous refill encounter(s):   10/07/2024 Vivelle Patch #8 patch     For Pharmacy Admin Tracking Only    Program: Medication Refill  Intervention Detail: New Rx: 1, reason: Patient Preference  Time Spent (min): 5    Requested Prescriptions     Pending Prescriptions Disp Refills    estradiol (VIVELLE) 0.05 MG/24HR [Pharmacy Med Name: ESTRADIOL 0.05MG PATCH (TWICE WK)] 8 patch 0     Sig: APPLY 1 PATCH TOPICALLY TO THE SKIN 2 TIMES A WEEK

## 2024-11-05 DIAGNOSIS — J01.40 ACUTE PANSINUSITIS, RECURRENCE NOT SPECIFIED: ICD-10-CM

## 2024-11-05 RX ORDER — METOPROLOL TARTRATE 75 MG/1
1 TABLET ORAL 2 TIMES DAILY
Qty: 60 TABLET | Refills: 0 | OUTPATIENT
Start: 2024-11-05

## 2024-11-05 RX ORDER — FLUTICASONE PROPIONATE 50 MCG
SPRAY, SUSPENSION (ML) NASAL
Qty: 48 G | OUTPATIENT
Start: 2024-11-05

## 2024-11-05 RX ORDER — ESTRADIOL 0.05 MG/D
PATCH, EXTENDED RELEASE TRANSDERMAL
Qty: 8 PATCH | Refills: 0 | OUTPATIENT
Start: 2024-11-05

## 2024-12-03 ENCOUNTER — OFFICE VISIT (OUTPATIENT)
Facility: CLINIC | Age: 49
End: 2024-12-03
Payer: COMMERCIAL

## 2024-12-03 ENCOUNTER — HOSPITAL ENCOUNTER (OUTPATIENT)
Facility: HOSPITAL | Age: 49
Discharge: HOME | End: 2024-12-06
Payer: COMMERCIAL

## 2024-12-03 VITALS
OXYGEN SATURATION: 99 % | WEIGHT: 133.1 LBS | RESPIRATION RATE: 17 BRPM | BODY MASS INDEX: 25.13 KG/M2 | HEART RATE: 108 BPM | TEMPERATURE: 98.4 F | HEIGHT: 61 IN | SYSTOLIC BLOOD PRESSURE: 171 MMHG | DIASTOLIC BLOOD PRESSURE: 123 MMHG

## 2024-12-03 DIAGNOSIS — Q61.2 POLYCYSTIC KIDNEY DISEASE, AUTOSOMAL DOMINANT: Primary | ICD-10-CM

## 2024-12-03 DIAGNOSIS — Z11.3 ROUTINE SCREENING FOR STI (SEXUALLY TRANSMITTED INFECTION): ICD-10-CM

## 2024-12-03 DIAGNOSIS — R93.89 ABNORMAL CHEST X-RAY: ICD-10-CM

## 2024-12-03 DIAGNOSIS — I10 UNCONTROLLED HYPERTENSION: ICD-10-CM

## 2024-12-03 DIAGNOSIS — F43.20 ADJUSTMENT DISORDER, UNSPECIFIED TYPE: ICD-10-CM

## 2024-12-03 DIAGNOSIS — Z12.11 COLON CANCER SCREENING: ICD-10-CM

## 2024-12-03 PROCEDURE — 71046 X-RAY EXAM CHEST 2 VIEWS: CPT

## 2024-12-03 PROCEDURE — 99214 OFFICE O/P EST MOD 30 MIN: CPT | Performed by: INTERNAL MEDICINE

## 2024-12-03 PROCEDURE — 3077F SYST BP >= 140 MM HG: CPT | Performed by: INTERNAL MEDICINE

## 2024-12-03 PROCEDURE — 3080F DIAST BP >= 90 MM HG: CPT | Performed by: INTERNAL MEDICINE

## 2024-12-03 RX ORDER — METOPROLOL SUCCINATE 100 MG/1
100 TABLET, EXTENDED RELEASE ORAL
Qty: 30 TABLET | Refills: 5 | Status: SHIPPED | OUTPATIENT
Start: 2024-12-03

## 2024-12-03 RX ORDER — AMLODIPINE BESYLATE 5 MG/1
5 TABLET ORAL DAILY
Qty: 90 TABLET | Refills: 0 | Status: SHIPPED | OUTPATIENT
Start: 2024-12-03

## 2024-12-03 RX ORDER — FLUTICASONE PROPIONATE 50 MCG
1 SPRAY, SUSPENSION (ML) NASAL DAILY
Qty: 16 G | Refills: 2 | Status: SHIPPED | OUTPATIENT
Start: 2024-12-03

## 2024-12-03 ASSESSMENT — ANXIETY QUESTIONNAIRES
2. NOT BEING ABLE TO STOP OR CONTROL WORRYING: MORE THAN HALF THE DAYS
GAD7 TOTAL SCORE: 14
4. TROUBLE RELAXING: MORE THAN HALF THE DAYS
IF YOU CHECKED OFF ANY PROBLEMS ON THIS QUESTIONNAIRE, HOW DIFFICULT HAVE THESE PROBLEMS MADE IT FOR YOU TO DO YOUR WORK, TAKE CARE OF THINGS AT HOME, OR GET ALONG WITH OTHER PEOPLE: VERY DIFFICULT
3. WORRYING TOO MUCH ABOUT DIFFERENT THINGS: MORE THAN HALF THE DAYS
6. BECOMING EASILY ANNOYED OR IRRITABLE: MORE THAN HALF THE DAYS
5. BEING SO RESTLESS THAT IT IS HARD TO SIT STILL: MORE THAN HALF THE DAYS
1. FEELING NERVOUS, ANXIOUS, OR ON EDGE: MORE THAN HALF THE DAYS
7. FEELING AFRAID AS IF SOMETHING AWFUL MIGHT HAPPEN: MORE THAN HALF THE DAYS

## 2024-12-03 ASSESSMENT — PATIENT HEALTH QUESTIONNAIRE - PHQ9
SUM OF ALL RESPONSES TO PHQ QUESTIONS 1-9: 2
2. FEELING DOWN, DEPRESSED OR HOPELESS: MORE THAN HALF THE DAYS
SUM OF ALL RESPONSES TO PHQ QUESTIONS 1-9: 2
SUM OF ALL RESPONSES TO PHQ9 QUESTIONS 1 & 2: 2
1. LITTLE INTEREST OR PLEASURE IN DOING THINGS: NOT AT ALL
SUM OF ALL RESPONSES TO PHQ QUESTIONS 1-9: 2
1. LITTLE INTEREST OR PLEASURE IN DOING THINGS: NOT AT ALL
SUM OF ALL RESPONSES TO PHQ QUESTIONS 1-9: 2
SUM OF ALL RESPONSES TO PHQ QUESTIONS 1-9: 2
SUM OF ALL RESPONSES TO PHQ9 QUESTIONS 1 & 2: 2
2. FEELING DOWN, DEPRESSED OR HOPELESS: MORE THAN HALF THE DAYS
SUM OF ALL RESPONSES TO PHQ QUESTIONS 1-9: 2

## 2024-12-03 NOTE — PROGRESS NOTES
Chief Complaint   Patient presents with    Medication Refill     Patient states she have not been feeling well for about 4 days.     Hypertension     \"Have you been to the ER, urgent care clinic since your last visit?  Hospitalized since your last visit?\"    NO    “Have you seen or consulted any other health care providers outside our system since your last visit?”    NO    Have you had a mammogram?”   NO    Date of last Mammogram: 10/11/2018      “Have you had a pap smear?”    NO    Date of last Cervical Cancer screen (HPV or PAP): 8/17/2017       “Have you had a colorectal cancer screening such as a colonoscopy/FIT/Cologuard?    NO    No colonoscopy on file  No cologuard on file  No FIT/FOBT on file   No flexible sigmoidoscopy on file          HIPPA confirmed by two patient identifiers.

## 2024-12-03 NOTE — PROGRESS NOTES
Margarita Schmidt is a 49 y.o.female and presents with   Chief Complaint   Patient presents with    Medication Refill     Patient states she have not been feeling well for about 4 days.     Hypertension      .  Subjective:    Pts last  in office appt, w me, 2022      Pt w c/o nasal congestion and post nasal drip. No cough. Pt has been taking an antihistamine w/o relief  Pt w c/o verona eyes tearing. Pt has an appt w optomitrist in ~ 1 week.    Pt has an abnormal CXR done in October. F/u XR recommended  PMH:  Recurrent vomiting-pt has been evaluated in  ED multiple times due to n/v/hypokalemia.   The etiology of her hyperemesis is most likely due to cannabis use. She is s/p EGD which demonstrated gastritis 11/2018      Hypertension Review:  The patient has essential hypertension  On metoprolol 75mg BID. Last prescription written in October, for 1 mth supply, by another provider, w NO refills.  Pt reports she takes it daily  Diet and Lifestyle: generally follows a  low sodium diet, exercises sporadically  Home BP Monitoring: is not measured at home.  Pertinent ROS: taking medications as instructed, no medication side effects noted, no TIA's, no chest pain on exertion, no dyspnea on exertion, no swelling of ankles.   BP Readings from Last 3 Encounters:   12/03/24 (!) 171/123   10/07/24 (!) 186/128   07/24/23 (!) 146/99        Pulse Readings from Last 3 Encounters:   12/03/24 (!) 108   10/07/24 100   07/24/23 71      PCKD-pt has not established w renal as yet    FH- pts father has PCKD      Review of Systems  Review of systems (12) negative, except noted above.      Current Outpatient Medications:     fluticasone (FLONASE) 50 MCG/ACT nasal spray, 1 spray by Each Nostril route daily, Disp: 16 g, Rfl: 2    metoprolol succinate (TOPROL XL) 100 MG extended release tablet, Take 1 tablet by mouth nightly, Disp: 30 tablet, Rfl: 5    amLODIPine (NORVASC) 5 MG tablet, Take 1 tablet by mouth daily, Disp: 90 tablet, Rfl: 0

## 2024-12-04 LAB
HCV AB SERPL QL IA: NORMAL
HCV IGG SERPL QL IA: NON REACTIVE S/CO RATIO
TSH SERPL DL<=0.05 MIU/L-ACNC: 0.57 UIU/ML (ref 0.45–4.5)

## 2024-12-05 LAB
ALBUMIN SERPL-MCNC: 3.6 G/DL (ref 3.5–5)
ALBUMIN/GLOB SERPL: 0.9 (ref 1.1–2.2)
ALP SERPL-CCNC: 95 U/L (ref 45–117)
ALT SERPL-CCNC: 16 U/L (ref 12–78)
ANION GAP SERPL CALC-SCNC: 9 MMOL/L (ref 2–12)
AST SERPL-CCNC: 20 U/L (ref 15–37)
BASOPHILS # BLD: 0.1 K/UL (ref 0–0.1)
BASOPHILS NFR BLD: 1 % (ref 0–1)
BILIRUB SERPL-MCNC: 0.5 MG/DL (ref 0.2–1)
BUN SERPL-MCNC: 11 MG/DL (ref 6–20)
BUN/CREAT SERPL: 12 (ref 12–20)
C TRACH RRNA SPEC QL NAA+PROBE: NEGATIVE
CALCIUM SERPL-MCNC: 9.1 MG/DL (ref 8.5–10.1)
CHLORIDE SERPL-SCNC: 111 MMOL/L (ref 97–108)
CHOLEST SERPL-MCNC: 210 MG/DL
CO2 SERPL-SCNC: 22 MMOL/L (ref 21–32)
CREAT SERPL-MCNC: 0.94 MG/DL (ref 0.55–1.02)
DIFFERENTIAL METHOD BLD: ABNORMAL
EOSINOPHIL # BLD: 0.1 K/UL (ref 0–0.4)
EOSINOPHIL NFR BLD: 3 % (ref 0–7)
ERYTHROCYTE [DISTWIDTH] IN BLOOD BY AUTOMATED COUNT: 14 % (ref 11.5–14.5)
EST. AVERAGE GLUCOSE BLD GHB EST-MCNC: 108 MG/DL
GLOBULIN SER CALC-MCNC: 4.1 G/DL (ref 2–4)
GLUCOSE SERPL-MCNC: 99 MG/DL (ref 65–100)
HBA1C MFR BLD: 5.4 % (ref 4–5.6)
HCT VFR BLD AUTO: 37.9 % (ref 35–47)
HDLC SERPL-MCNC: 61 MG/DL
HDLC SERPL: 3.4 (ref 0–5)
HGB BLD-MCNC: 13 G/DL (ref 11.5–16)
HIV 1+2 AB+HIV1 P24 AG SERPL QL IA: NONREACTIVE
HIV 1/2 RESULT COMMENT: NORMAL
IMM GRANULOCYTES # BLD AUTO: 0 K/UL (ref 0–0.04)
IMM GRANULOCYTES NFR BLD AUTO: 0 % (ref 0–0.5)
LDLC SERPL CALC-MCNC: ABNORMAL MG/DL (ref 0–100)
LDLC SERPL DIRECT ASSAY-MCNC: 82 MG/DL (ref 0–100)
LYMPHOCYTES # BLD: 2.4 K/UL (ref 0.8–3.5)
LYMPHOCYTES NFR BLD: 54 % (ref 12–49)
MCH RBC QN AUTO: 33.2 PG (ref 26–34)
MCHC RBC AUTO-ENTMCNC: 34.3 G/DL (ref 30–36.5)
MCV RBC AUTO: 96.7 FL (ref 80–99)
MONOCYTES # BLD: 0.5 K/UL (ref 0–1)
MONOCYTES NFR BLD: 11 % (ref 5–13)
N GONORRHOEA RRNA SPEC QL NAA+PROBE: NEGATIVE
NEUTS SEG # BLD: 1.4 K/UL (ref 1.8–8)
NEUTS SEG NFR BLD: 31 % (ref 32–75)
NRBC # BLD: 0 K/UL (ref 0–0.01)
NRBC BLD-RTO: 0 PER 100 WBC
PLATELET # BLD AUTO: 298 K/UL (ref 150–400)
PMV BLD AUTO: 9.7 FL (ref 8.9–12.9)
POTASSIUM SERPL-SCNC: 3.5 MMOL/L (ref 3.5–5.1)
PROT SERPL-MCNC: 7.7 G/DL (ref 6.4–8.2)
RBC # BLD AUTO: 3.92 M/UL (ref 3.8–5.2)
SODIUM SERPL-SCNC: 142 MMOL/L (ref 136–145)
SPECIMEN SOURCE: NORMAL
T VAGINALIS RRNA SPEC QL NAA+PROBE: NEGATIVE
TRIGL SERPL-MCNC: 456 MG/DL
VLDLC SERPL CALC-MCNC: ABNORMAL MG/DL
WBC # BLD AUTO: 4.5 K/UL (ref 3.6–11)

## 2025-03-01 DIAGNOSIS — I10 UNCONTROLLED HYPERTENSION: ICD-10-CM

## 2025-03-03 RX ORDER — AMLODIPINE BESYLATE 5 MG/1
5 TABLET ORAL DAILY
Qty: 90 TABLET | Refills: 1 | Status: SHIPPED | OUTPATIENT
Start: 2025-03-03

## 2025-04-21 ENCOUNTER — TRANSCRIBE ORDERS (OUTPATIENT)
Facility: HOSPITAL | Age: 50
End: 2025-04-21

## 2025-04-21 DIAGNOSIS — N18.9 CHRONIC KIDNEY DISEASE, UNSPECIFIED CKD STAGE: Primary | ICD-10-CM

## 2025-05-07 ENCOUNTER — HOSPITAL ENCOUNTER (OUTPATIENT)
Facility: HOSPITAL | Age: 50
Discharge: HOME | End: 2025-05-10
Attending: INTERNAL MEDICINE

## 2025-05-07 DIAGNOSIS — N18.9 CHRONIC KIDNEY DISEASE, UNSPECIFIED CKD STAGE: ICD-10-CM

## 2025-05-08 ENCOUNTER — TRANSCRIBE ORDERS (OUTPATIENT)
Facility: HOSPITAL | Age: 50
End: 2025-05-08

## 2025-05-08 DIAGNOSIS — N18.9 CHRONIC KIDNEY DISEASE, UNSPECIFIED CKD STAGE: Primary | ICD-10-CM

## 2025-05-14 ENCOUNTER — HOSPITAL ENCOUNTER (OUTPATIENT)
Facility: HOSPITAL | Age: 50
Discharge: HOME | End: 2025-05-17
Attending: INTERNAL MEDICINE
Payer: COMMERCIAL

## 2025-05-14 DIAGNOSIS — N18.9 CHRONIC KIDNEY DISEASE, UNSPECIFIED CKD STAGE: ICD-10-CM

## 2025-05-14 PROCEDURE — 76770 US EXAM ABDO BACK WALL COMP: CPT

## 2025-05-20 DIAGNOSIS — I10 UNCONTROLLED HYPERTENSION: ICD-10-CM

## 2025-05-21 RX ORDER — METOPROLOL SUCCINATE 100 MG/1
100 TABLET, EXTENDED RELEASE ORAL NIGHTLY
Qty: 90 TABLET | Refills: 0 | Status: SHIPPED | OUTPATIENT
Start: 2025-05-21

## 2025-08-28 DIAGNOSIS — I10 UNCONTROLLED HYPERTENSION: ICD-10-CM

## 2025-08-29 RX ORDER — METOPROLOL SUCCINATE 100 MG/1
100 TABLET, EXTENDED RELEASE ORAL NIGHTLY
Qty: 90 TABLET | Refills: 1 | Status: SHIPPED | OUTPATIENT
Start: 2025-08-29

## (undated) DEVICE — SUTURE VCRL SZ 4-0 L27IN ABSRB UD L19MM PS-2 3/8 CIR PRIM J426H

## (undated) DEVICE — INFECTION CONTROL KIT SYS

## (undated) DEVICE — BLADELESS OPTICAL TROCAR WITH FIXATION CANNULA: Brand: VERSAONE

## (undated) DEVICE — Device

## (undated) DEVICE — CATHETER URET 4FR L70CM POLYUR OLV FLX TIP KINK RESIST W/

## (undated) DEVICE — KENDALL SCD EXPRESS SLEEVES, KNEE LENGTH, MEDIUM: Brand: KENDALL SCD

## (undated) DEVICE — STERILE POLYISOPRENE POWDER-FREE SURGICAL GLOVES: Brand: PROTEXIS

## (undated) DEVICE — SOLUTION IV 500ML 0.9% SOD CHL FLX CONT

## (undated) DEVICE — 1200 GUARD II KIT W/5MM TUBE W/O VAC TUBE: Brand: GUARDIAN

## (undated) DEVICE — CLIP APPLIER WITH CLIP LOGIC TECHNOLOGY: Brand: ENDO CLIP III

## (undated) DEVICE — ELECTRODE ES 36CM LAP FLAT L HK COAT DISP CLEANCOAT

## (undated) DEVICE — SURGICAL PROCEDURE KIT GEN LAPAROSCOPY LF

## (undated) DEVICE — STERILE POLYISOPRENE POWDER-FREE SURGICAL GLOVES WITH EMOLLIENT COATING: Brand: PROTEXIS

## (undated) DEVICE — UNIVERSAL FIXATION CANNULA: Brand: VERSAONE

## (undated) DEVICE — REM POLYHESIVE ADULT PATIENT RETURN ELECTRODE: Brand: VALLEYLAB

## (undated) DEVICE — DRAPE XR C ARM 41X74IN LF --

## (undated) DEVICE — BLADELESS OPTICAL TROCAR WITH FIXATION CANNULA: Brand: VERSAPORT

## (undated) DEVICE — SYR 10ML LUER LOK 1/5ML GRAD --

## (undated) DEVICE — DEVON™ KNEE AND BODY STRAP 60" X 3" (1.5 M X 7.6 CM): Brand: DEVON

## (undated) DEVICE — NEEDLE HYPO 22GA L1.5IN BLK S STL HUB POLYPR SHLD REG BVL

## (undated) DEVICE — HANDLE LT SNAP ON ULT DURABLE LENS FOR TRUMPF ALC DISPOSABLE

## (undated) DEVICE — (D)PREP SKN CHLRAPRP APPL 26ML -- CONVERT TO ITEM 371833

## (undated) DEVICE — APPLICATOR BNDG 1MM ADH PREMIERPRO EXOFIN

## (undated) DEVICE — BAG SPEC RETRV 275ML 10ML DISPOSABLE RELIACATCH

## (undated) DEVICE — SUTURE SZ 0 27IN 5/8 CIR UR-6  TAPER PT VIOLET ABSRB VICRYL J603H